# Patient Record
Sex: FEMALE | Race: WHITE | Employment: UNEMPLOYED | URBAN - METROPOLITAN AREA
[De-identification: names, ages, dates, MRNs, and addresses within clinical notes are randomized per-mention and may not be internally consistent; named-entity substitution may affect disease eponyms.]

---

## 2017-09-05 ENCOUNTER — APPOINTMENT (OUTPATIENT)
Dept: GENERAL RADIOLOGY | Age: 77
End: 2017-09-05
Payer: COMMERCIAL

## 2017-09-05 ENCOUNTER — HOSPITAL ENCOUNTER (EMERGENCY)
Age: 77
Discharge: HOME | End: 2017-09-05
Attending: EMERGENCY MEDICINE
Payer: COMMERCIAL

## 2017-09-05 ENCOUNTER — APPOINTMENT (OUTPATIENT)
Dept: CT IMAGING | Age: 77
End: 2017-09-05
Payer: COMMERCIAL

## 2017-09-05 VITALS
HEART RATE: 81 BPM | WEIGHT: 170 LBS | RESPIRATION RATE: 18 BRPM | TEMPERATURE: 98.6 F | BODY MASS INDEX: 31.09 KG/M2 | DIASTOLIC BLOOD PRESSURE: 94 MMHG | OXYGEN SATURATION: 98 % | SYSTOLIC BLOOD PRESSURE: 178 MMHG
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
## 2017-09-05 LAB
-: ABNORMAL
AMORPHOUS: ABNORMAL
BACTERIA: ABNORMAL
BILIRUBIN URINE: NEGATIVE
CASTS UA: ABNORMAL /LPF (ref 0–8)
COLOR: YELLOW
CRYSTALS, UA: ABNORMAL /HPF
EPITHELIAL CELLS UA: ABNORMAL /HPF (ref 0–5)
GLUCOSE URINE: NEGATIVE
KETONES, URINE: NEGATIVE
LEUKOCYTE ESTERASE, URINE: NEGATIVE
MUCUS: ABNORMAL
NITRITE, URINE: NEGATIVE
OTHER OBSERVATIONS UA: ABNORMAL
PH UA: 5.5 (ref 5–8)
PROTEIN UA: ABNORMAL
RBC UA: ABNORMAL /HPF (ref 0–4)
RENAL EPITHELIAL, UA: ABNORMAL /HPF
SPECIFIC GRAVITY UA: 1.01 (ref 1–1.03)
TRICHOMONAS: ABNORMAL
TURBIDITY: CLEAR
URINE HGB: NEGATIVE
UROBILINOGEN, URINE: NORMAL
WBC UA: ABNORMAL /HPF (ref 0–5)
YEAST: ABNORMAL

## 2017-09-05 PROCEDURE — 6370000000 HC RX 637 (ALT 250 FOR IP): Performed by: EMERGENCY MEDICINE

## 2017-09-05 PROCEDURE — 71010 XR CHEST PORTABLE: CPT

## 2017-09-05 PROCEDURE — 72131 CT LUMBAR SPINE W/O DYE: CPT

## 2017-09-05 PROCEDURE — 72128 CT CHEST SPINE W/O DYE: CPT

## 2017-09-05 PROCEDURE — 72125 CT NECK SPINE W/O DYE: CPT

## 2017-09-05 PROCEDURE — 73552 X-RAY EXAM OF FEMUR 2/>: CPT

## 2017-09-05 PROCEDURE — 99284 EMERGENCY DEPT VISIT MOD MDM: CPT

## 2017-09-05 PROCEDURE — 81001 URINALYSIS AUTO W/SCOPE: CPT

## 2017-09-05 PROCEDURE — 70450 CT HEAD/BRAIN W/O DYE: CPT

## 2017-09-05 RX ORDER — ACETAMINOPHEN 160 MG/5ML
650 SOLUTION ORAL ONCE
Status: COMPLETED | OUTPATIENT
Start: 2017-09-05 | End: 2017-09-05

## 2017-09-05 RX ORDER — ACETAMINOPHEN 325 MG/1
650 TABLET ORAL ONCE
Status: DISCONTINUED | OUTPATIENT
Start: 2017-09-05 | End: 2017-09-05 | Stop reason: HOSPADM

## 2017-09-05 RX ADMIN — ACETAMINOPHEN 650 MG: 650 SOLUTION ORAL at 13:13

## 2017-09-05 ASSESSMENT — ENCOUNTER SYMPTOMS
EYE REDNESS: 0
ABDOMINAL PAIN: 0
SHORTNESS OF BREATH: 0
COLOR CHANGE: 0
WHEEZING: 0
NAUSEA: 0
VOMITING: 0
EYE PAIN: 0
SORE THROAT: 0

## 2017-09-05 ASSESSMENT — PAIN SCALES - GENERAL
PAINLEVEL_OUTOF10: 3
PAINLEVEL_OUTOF10: 8
PAINLEVEL_OUTOF10: 6
PAINLEVEL_OUTOF10: 2

## 2019-07-14 ENCOUNTER — APPOINTMENT (OUTPATIENT)
Dept: CT IMAGING | Age: 79
DRG: 871 | End: 2019-07-14
Payer: COMMERCIAL

## 2019-07-14 ENCOUNTER — HOSPITAL ENCOUNTER (INPATIENT)
Age: 79
LOS: 2 days | Discharge: HOME | DRG: 871 | End: 2019-07-16
Attending: EMERGENCY MEDICINE | Admitting: INTERNAL MEDICINE
Payer: COMMERCIAL

## 2019-07-14 ENCOUNTER — APPOINTMENT (OUTPATIENT)
Dept: GENERAL RADIOLOGY | Age: 79
DRG: 871 | End: 2019-07-14
Payer: COMMERCIAL

## 2019-07-14 DIAGNOSIS — J18.9 PNEUMONIA DUE TO ORGANISM: Primary | ICD-10-CM

## 2019-07-14 LAB
-: ABNORMAL
ABSOLUTE EOS #: 0.06 K/UL (ref 0–0.44)
ABSOLUTE IMMATURE GRANULOCYTE: 0.08 K/UL (ref 0–0.3)
ABSOLUTE LYMPH #: 0.91 K/UL (ref 1.1–3.7)
ABSOLUTE MONO #: 0.66 K/UL (ref 0.1–1.2)
ALBUMIN SERPL-MCNC: 4 G/DL (ref 3.5–5.2)
ALBUMIN/GLOBULIN RATIO: 1.1 (ref 1–2.5)
ALP BLD-CCNC: 67 U/L (ref 35–104)
ALT SERPL-CCNC: 9 U/L (ref 5–33)
AMORPHOUS: ABNORMAL
ANION GAP SERPL CALCULATED.3IONS-SCNC: 14 MMOL/L (ref 9–17)
AST SERPL-CCNC: 14 U/L
BACTERIA: ABNORMAL
BASOPHILS # BLD: 0 % (ref 0–2)
BASOPHILS ABSOLUTE: 0.05 K/UL (ref 0–0.2)
BILIRUB SERPL-MCNC: 0.52 MG/DL (ref 0.3–1.2)
BILIRUBIN URINE: NEGATIVE
BNP INTERPRETATION: ABNORMAL
BUN BLDV-MCNC: 25 MG/DL (ref 8–23)
BUN/CREAT BLD: ABNORMAL (ref 9–20)
CALCIUM SERPL-MCNC: 9.8 MG/DL (ref 8.6–10.4)
CASTS UA: ABNORMAL /LPF (ref 0–8)
CHLORIDE BLD-SCNC: 101 MMOL/L (ref 98–107)
CO2: 23 MMOL/L (ref 20–31)
COLOR: YELLOW
CREAT SERPL-MCNC: 1.68 MG/DL (ref 0.5–0.9)
CREATININE URINE: 77.7 MG/DL (ref 28–217)
CRYSTALS, UA: ABNORMAL /HPF
DIFFERENTIAL TYPE: ABNORMAL
DIRECT EXAM: NORMAL
EOSINOPHILS RELATIVE PERCENT: 0 % (ref 1–4)
EPITHELIAL CELLS UA: ABNORMAL /HPF (ref 0–5)
GFR AFRICAN AMERICAN: 36 ML/MIN
GFR NON-AFRICAN AMERICAN: 29 ML/MIN
GFR SERPL CREATININE-BSD FRML MDRD: ABNORMAL ML/MIN/{1.73_M2}
GFR SERPL CREATININE-BSD FRML MDRD: ABNORMAL ML/MIN/{1.73_M2}
GLUCOSE BLD-MCNC: 163 MG/DL (ref 70–99)
GLUCOSE BLD-MCNC: 97 MG/DL (ref 65–105)
GLUCOSE URINE: NEGATIVE
HCT VFR BLD CALC: 41.4 % (ref 36.3–47.1)
HEMOGLOBIN: 13 G/DL (ref 11.9–15.1)
IMMATURE GRANULOCYTES: 0 %
INR BLD: 0.9
KETONES, URINE: NEGATIVE
LACTIC ACID, WHOLE BLOOD: 1.7 MMOL/L (ref 0.7–2.1)
LACTIC ACID, WHOLE BLOOD: 2.9 MMOL/L (ref 0.7–2.1)
LEUKOCYTE ESTERASE, URINE: NEGATIVE
LYMPHOCYTES # BLD: 5 % (ref 24–43)
Lab: NORMAL
MCH RBC QN AUTO: 29.8 PG (ref 25.2–33.5)
MCHC RBC AUTO-ENTMCNC: 31.4 G/DL (ref 28.4–34.8)
MCV RBC AUTO: 95 FL (ref 82.6–102.9)
MONOCYTES # BLD: 3 % (ref 3–12)
MUCUS: ABNORMAL
NITRITE, URINE: NEGATIVE
NRBC AUTOMATED: 0 PER 100 WBC
OTHER OBSERVATIONS UA: ABNORMAL
PARTIAL THROMBOPLASTIN TIME: 23.9 SEC (ref 20.5–30.5)
PDW BLD-RTO: 15.6 % (ref 11.8–14.4)
PH UA: 6.5 (ref 5–8)
PLATELET # BLD: 293 K/UL (ref 138–453)
PLATELET ESTIMATE: ABNORMAL
PMV BLD AUTO: 10 FL (ref 8.1–13.5)
POTASSIUM SERPL-SCNC: 4.5 MMOL/L (ref 3.7–5.3)
PRO-BNP: 1393 PG/ML
PROTEIN UA: ABNORMAL
PROTHROMBIN TIME: 10 SEC (ref 9–12)
RBC # BLD: 4.36 M/UL (ref 3.95–5.11)
RBC # BLD: ABNORMAL 10*6/UL
RBC UA: ABNORMAL /HPF (ref 0–4)
RENAL EPITHELIAL, UA: ABNORMAL /HPF
SEG NEUTROPHILS: 92 % (ref 36–65)
SEGMENTED NEUTROPHILS ABSOLUTE COUNT: 18.32 K/UL (ref 1.5–8.1)
SODIUM BLD-SCNC: 138 MMOL/L (ref 135–144)
SODIUM,UR: 117 MMOL/L
SPECIFIC GRAVITY UA: 1.04 (ref 1–1.03)
SPECIMEN DESCRIPTION: NORMAL
TOTAL PROTEIN, URINE: 30 MG/DL
TOTAL PROTEIN: 7.7 G/DL (ref 6.4–8.3)
TRICHOMONAS: ABNORMAL
TROPONIN INTERP: NORMAL
TROPONIN INTERP: NORMAL
TROPONIN T: NORMAL NG/ML
TROPONIN T: NORMAL NG/ML
TROPONIN, HIGH SENSITIVITY: 10 NG/L (ref 0–14)
TROPONIN, HIGH SENSITIVITY: 13 NG/L (ref 0–14)
TURBIDITY: CLEAR
URINE HGB: NEGATIVE
UROBILINOGEN, URINE: NORMAL
WBC # BLD: 20.1 K/UL (ref 3.5–11.3)
WBC # BLD: ABNORMAL 10*3/UL
WBC UA: ABNORMAL /HPF (ref 0–5)
YEAST: ABNORMAL

## 2019-07-14 PROCEDURE — 82947 ASSAY GLUCOSE BLOOD QUANT: CPT

## 2019-07-14 PROCEDURE — 2580000003 HC RX 258: Performed by: STUDENT IN AN ORGANIZED HEALTH CARE EDUCATION/TRAINING PROGRAM

## 2019-07-14 PROCEDURE — 84300 ASSAY OF URINE SODIUM: CPT

## 2019-07-14 PROCEDURE — 6360000004 HC RX CONTRAST MEDICATION: Performed by: STUDENT IN AN ORGANIZED HEALTH CARE EDUCATION/TRAINING PROGRAM

## 2019-07-14 PROCEDURE — 84156 ASSAY OF PROTEIN URINE: CPT

## 2019-07-14 PROCEDURE — 2060000000 HC ICU INTERMEDIATE R&B

## 2019-07-14 PROCEDURE — 6360000002 HC RX W HCPCS: Performed by: STUDENT IN AN ORGANIZED HEALTH CARE EDUCATION/TRAINING PROGRAM

## 2019-07-14 PROCEDURE — 6370000000 HC RX 637 (ALT 250 FOR IP): Performed by: STUDENT IN AN ORGANIZED HEALTH CARE EDUCATION/TRAINING PROGRAM

## 2019-07-14 PROCEDURE — 87086 URINE CULTURE/COLONY COUNT: CPT

## 2019-07-14 PROCEDURE — 81001 URINALYSIS AUTO W/SCOPE: CPT

## 2019-07-14 PROCEDURE — 86738 MYCOPLASMA ANTIBODY: CPT

## 2019-07-14 PROCEDURE — 71260 CT THORAX DX C+: CPT

## 2019-07-14 PROCEDURE — 82570 ASSAY OF URINE CREATININE: CPT

## 2019-07-14 PROCEDURE — 80053 COMPREHEN METABOLIC PANEL: CPT

## 2019-07-14 PROCEDURE — 96365 THER/PROPH/DIAG IV INF INIT: CPT

## 2019-07-14 PROCEDURE — 83036 HEMOGLOBIN GLYCOSYLATED A1C: CPT

## 2019-07-14 PROCEDURE — 85730 THROMBOPLASTIN TIME PARTIAL: CPT

## 2019-07-14 PROCEDURE — 93005 ELECTROCARDIOGRAM TRACING: CPT | Performed by: STUDENT IN AN ORGANIZED HEALTH CARE EDUCATION/TRAINING PROGRAM

## 2019-07-14 PROCEDURE — 87040 BLOOD CULTURE FOR BACTERIA: CPT

## 2019-07-14 PROCEDURE — 85025 COMPLETE CBC W/AUTO DIFF WBC: CPT

## 2019-07-14 PROCEDURE — 99291 CRITICAL CARE FIRST HOUR: CPT

## 2019-07-14 PROCEDURE — 84484 ASSAY OF TROPONIN QUANT: CPT

## 2019-07-14 PROCEDURE — 87449 NOS EACH ORGANISM AG IA: CPT

## 2019-07-14 PROCEDURE — 93005 ELECTROCARDIOGRAM TRACING: CPT

## 2019-07-14 PROCEDURE — 83605 ASSAY OF LACTIC ACID: CPT

## 2019-07-14 PROCEDURE — 83880 ASSAY OF NATRIURETIC PEPTIDE: CPT

## 2019-07-14 PROCEDURE — 36415 COLL VENOUS BLD VENIPUNCTURE: CPT

## 2019-07-14 PROCEDURE — 85610 PROTHROMBIN TIME: CPT

## 2019-07-14 PROCEDURE — 71046 X-RAY EXAM CHEST 2 VIEWS: CPT

## 2019-07-14 PROCEDURE — 96375 TX/PRO/DX INJ NEW DRUG ADDON: CPT

## 2019-07-14 RX ORDER — LISINOPRIL 5 MG/1
5 TABLET ORAL DAILY
Status: DISCONTINUED | OUTPATIENT
Start: 2019-07-14 | End: 2019-07-14

## 2019-07-14 RX ORDER — ACYCLOVIR 400 MG/1
400 TABLET ORAL
Status: ON HOLD | COMMUNITY
End: 2019-07-14 | Stop reason: ALTCHOICE

## 2019-07-14 RX ORDER — SODIUM CHLORIDE 0.9 % (FLUSH) 0.9 %
10 SYRINGE (ML) INJECTION PRN
Status: DISCONTINUED | OUTPATIENT
Start: 2019-07-14 | End: 2019-07-16 | Stop reason: HOSPADM

## 2019-07-14 RX ORDER — CLOPIDOGREL BISULFATE 75 MG/1
75 TABLET ORAL DAILY
COMMUNITY

## 2019-07-14 RX ORDER — ALPRAZOLAM 0.5 MG/1
0.25 TABLET ORAL 2 TIMES DAILY PRN
Status: ON HOLD | COMMUNITY
End: 2022-10-05 | Stop reason: SDUPTHER

## 2019-07-14 RX ORDER — AMLODIPINE BESYLATE 5 MG/1
5 TABLET ORAL DAILY
Status: DISCONTINUED | OUTPATIENT
Start: 2019-07-14 | End: 2019-07-14

## 2019-07-14 RX ORDER — OMEPRAZOLE 20 MG/1
20 CAPSULE, DELAYED RELEASE ORAL DAILY
Status: DISCONTINUED | OUTPATIENT
Start: 2019-07-14 | End: 2019-07-16 | Stop reason: HOSPADM

## 2019-07-14 RX ORDER — CARVEDILOL 25 MG/1
3.12 TABLET ORAL 2 TIMES DAILY
Status: ON HOLD | COMMUNITY
End: 2022-10-05 | Stop reason: HOSPADM

## 2019-07-14 RX ORDER — ALPRAZOLAM 0.5 MG/1
0.5 TABLET ORAL NIGHTLY PRN
Status: DISCONTINUED | OUTPATIENT
Start: 2019-07-14 | End: 2019-07-16 | Stop reason: HOSPADM

## 2019-07-14 RX ORDER — LEVOFLOXACIN 5 MG/ML
500 INJECTION, SOLUTION INTRAVENOUS EVERY 24 HOURS
Status: DISCONTINUED | OUTPATIENT
Start: 2019-07-14 | End: 2019-07-15

## 2019-07-14 RX ORDER — LABETALOL 20 MG/4 ML (5 MG/ML) INTRAVENOUS SYRINGE
10 EVERY 4 HOURS PRN
Status: DISCONTINUED | OUTPATIENT
Start: 2019-07-14 | End: 2019-07-16 | Stop reason: HOSPADM

## 2019-07-14 RX ORDER — ONDANSETRON 2 MG/ML
4 INJECTION INTRAMUSCULAR; INTRAVENOUS EVERY 6 HOURS PRN
Status: DISCONTINUED | OUTPATIENT
Start: 2019-07-14 | End: 2019-07-16 | Stop reason: HOSPADM

## 2019-07-14 RX ORDER — 0.9 % SODIUM CHLORIDE 0.9 %
1000 INTRAVENOUS SOLUTION INTRAVENOUS ONCE
Status: COMPLETED | OUTPATIENT
Start: 2019-07-14 | End: 2019-07-14

## 2019-07-14 RX ORDER — ALBUTEROL SULFATE 90 UG/1
2 AEROSOL, METERED RESPIRATORY (INHALATION) EVERY 6 HOURS PRN
Status: DISCONTINUED | OUTPATIENT
Start: 2019-07-14 | End: 2019-07-16 | Stop reason: HOSPADM

## 2019-07-14 RX ORDER — NICOTINE POLACRILEX 4 MG
15 LOZENGE BUCCAL PRN
Status: DISCONTINUED | OUTPATIENT
Start: 2019-07-14 | End: 2019-07-16 | Stop reason: HOSPADM

## 2019-07-14 RX ORDER — GABAPENTIN 300 MG/1
300 CAPSULE ORAL 3 TIMES DAILY
Status: DISCONTINUED | OUTPATIENT
Start: 2019-07-14 | End: 2019-07-16 | Stop reason: HOSPADM

## 2019-07-14 RX ORDER — ALPRAZOLAM 0.25 MG/1
0.25 TABLET ORAL NIGHTLY PRN
Status: DISCONTINUED | OUTPATIENT
Start: 2019-07-14 | End: 2019-07-14 | Stop reason: SDUPTHER

## 2019-07-14 RX ORDER — ACETAMINOPHEN 325 MG/1
650 TABLET ORAL EVERY 4 HOURS PRN
Status: DISCONTINUED | OUTPATIENT
Start: 2019-07-14 | End: 2019-07-16 | Stop reason: HOSPADM

## 2019-07-14 RX ORDER — METOPROLOL TARTRATE 50 MG/1
100 TABLET, FILM COATED ORAL 2 TIMES DAILY
Status: DISCONTINUED | OUTPATIENT
Start: 2019-07-14 | End: 2019-07-14

## 2019-07-14 RX ORDER — FLUOXETINE HYDROCHLORIDE 20 MG/1
40 CAPSULE ORAL DAILY
Status: DISCONTINUED | OUTPATIENT
Start: 2019-07-14 | End: 2019-07-16 | Stop reason: HOSPADM

## 2019-07-14 RX ORDER — GABAPENTIN 300 MG/1
300 CAPSULE ORAL DAILY
COMMUNITY

## 2019-07-14 RX ORDER — LEVOFLOXACIN 750 MG/1
750 TABLET ORAL DAILY
Status: DISCONTINUED | OUTPATIENT
Start: 2019-07-15 | End: 2019-07-14

## 2019-07-14 RX ORDER — HEPARIN SODIUM 5000 [USP'U]/ML
5000 INJECTION, SOLUTION INTRAVENOUS; SUBCUTANEOUS EVERY 8 HOURS SCHEDULED
Status: DISCONTINUED | OUTPATIENT
Start: 2019-07-14 | End: 2019-07-16 | Stop reason: HOSPADM

## 2019-07-14 RX ORDER — DEXTROSE MONOHYDRATE 50 MG/ML
100 INJECTION, SOLUTION INTRAVENOUS PRN
Status: DISCONTINUED | OUTPATIENT
Start: 2019-07-14 | End: 2019-07-16 | Stop reason: HOSPADM

## 2019-07-14 RX ORDER — CYCLOBENZAPRINE HCL 10 MG
10 TABLET ORAL 3 TIMES DAILY PRN
Status: DISCONTINUED | OUTPATIENT
Start: 2019-07-14 | End: 2019-07-16 | Stop reason: HOSPADM

## 2019-07-14 RX ORDER — SODIUM CHLORIDE 0.9 % (FLUSH) 0.9 %
10 SYRINGE (ML) INJECTION EVERY 12 HOURS SCHEDULED
Status: DISCONTINUED | OUTPATIENT
Start: 2019-07-14 | End: 2019-07-16 | Stop reason: HOSPADM

## 2019-07-14 RX ORDER — DEXTROSE MONOHYDRATE 25 G/50ML
12.5 INJECTION, SOLUTION INTRAVENOUS PRN
Status: DISCONTINUED | OUTPATIENT
Start: 2019-07-14 | End: 2019-07-16 | Stop reason: HOSPADM

## 2019-07-14 RX ORDER — SODIUM CHLORIDE 9 MG/ML
INJECTION, SOLUTION INTRAVENOUS CONTINUOUS
Status: DISCONTINUED | OUTPATIENT
Start: 2019-07-14 | End: 2019-07-16 | Stop reason: HOSPADM

## 2019-07-14 RX ORDER — HYDROCODONE BITARTRATE AND ACETAMINOPHEN 5; 325 MG/1; MG/1
1 TABLET ORAL EVERY 6 HOURS PRN
Status: ON HOLD | COMMUNITY
End: 2019-10-23 | Stop reason: HOSPADM

## 2019-07-14 RX ORDER — ATORVASTATIN CALCIUM 40 MG/1
40 TABLET, FILM COATED ORAL DAILY
Status: DISCONTINUED | OUTPATIENT
Start: 2019-07-14 | End: 2019-07-15

## 2019-07-14 RX ORDER — AMLODIPINE BESYLATE 10 MG/1
10 TABLET ORAL DAILY
Status: DISCONTINUED | OUTPATIENT
Start: 2019-07-14 | End: 2019-07-15

## 2019-07-14 RX ORDER — HYDRALAZINE HYDROCHLORIDE 50 MG/1
50 TABLET, FILM COATED ORAL 2 TIMES DAILY
Status: ON HOLD | COMMUNITY
End: 2019-07-16 | Stop reason: HOSPADM

## 2019-07-14 RX ORDER — DONEPEZIL HYDROCHLORIDE 10 MG/1
10 TABLET, FILM COATED ORAL NIGHTLY
Status: DISCONTINUED | OUTPATIENT
Start: 2019-07-14 | End: 2019-07-16 | Stop reason: HOSPADM

## 2019-07-14 RX ORDER — CARVEDILOL 25 MG/1
25 TABLET ORAL 2 TIMES DAILY
Status: DISCONTINUED | OUTPATIENT
Start: 2019-07-14 | End: 2019-07-16 | Stop reason: HOSPADM

## 2019-07-14 RX ADMIN — HEPARIN SODIUM 5000 UNITS: 5000 INJECTION INTRAVENOUS; SUBCUTANEOUS at 19:01

## 2019-07-14 RX ADMIN — AZITHROMYCIN MONOHYDRATE 500 MG: 500 INJECTION, POWDER, LYOPHILIZED, FOR SOLUTION INTRAVENOUS at 09:21

## 2019-07-14 RX ADMIN — HEPARIN SODIUM 5000 UNITS: 5000 INJECTION INTRAVENOUS; SUBCUTANEOUS at 20:51

## 2019-07-14 RX ADMIN — SODIUM CHLORIDE: 9 INJECTION, SOLUTION INTRAVENOUS at 14:26

## 2019-07-14 RX ADMIN — CEFTRIAXONE SODIUM 1 G: 1 INJECTION, POWDER, FOR SOLUTION INTRAMUSCULAR; INTRAVENOUS at 08:50

## 2019-07-14 RX ADMIN — DONEPEZIL HYDROCHLORIDE 10 MG: 10 TABLET, FILM COATED ORAL at 20:46

## 2019-07-14 RX ADMIN — SODIUM CHLORIDE 1000 ML: 9 INJECTION, SOLUTION INTRAVENOUS at 07:51

## 2019-07-14 RX ADMIN — Medication 10 ML: at 20:47

## 2019-07-14 RX ADMIN — LEVOFLOXACIN 500 MG: 5 INJECTION, SOLUTION INTRAVENOUS at 14:26

## 2019-07-14 RX ADMIN — CARVEDILOL 25 MG: 25 TABLET, FILM COATED ORAL at 20:46

## 2019-07-14 RX ADMIN — IOHEXOL 75 ML: 350 INJECTION, SOLUTION INTRAVENOUS at 08:49

## 2019-07-14 RX ADMIN — GABAPENTIN 300 MG: 300 CAPSULE ORAL at 20:46

## 2019-07-14 RX ADMIN — CYCLOBENZAPRINE 10 MG: 10 TABLET, FILM COATED ORAL at 19:02

## 2019-07-14 ASSESSMENT — ENCOUNTER SYMPTOMS
DIARRHEA: 0
NAUSEA: 0
SHORTNESS OF BREATH: 1
TROUBLE SWALLOWING: 0
VOMITING: 1
VOICE CHANGE: 0
ABDOMINAL DISTENTION: 0
EYE PAIN: 0
PHOTOPHOBIA: 0
SORE THROAT: 0
BLOOD IN STOOL: 0
VOMITING: 0
ABDOMINAL PAIN: 0
COUGH: 1
NAUSEA: 1
CONSTIPATION: 0
ABDOMINAL PAIN: 1
WHEEZING: 0
BACK PAIN: 0
STRIDOR: 0

## 2019-07-14 ASSESSMENT — PAIN SCALES - GENERAL
PAINLEVEL_OUTOF10: 0
PAINLEVEL_OUTOF10: 0
PAINLEVEL_OUTOF10: 10
PAINLEVEL_OUTOF10: 0

## 2019-07-14 ASSESSMENT — PAIN DESCRIPTION - ORIENTATION: ORIENTATION: LEFT

## 2019-07-14 ASSESSMENT — PAIN DESCRIPTION - LOCATION: LOCATION: FLANK

## 2019-07-14 ASSESSMENT — PAIN DESCRIPTION - PAIN TYPE: TYPE: ACUTE PAIN

## 2019-07-14 NOTE — ED PROVIDER NOTES
file    Years of education: Not on file    Highest education level: Not on file   Occupational History    Not on file   Social Needs    Financial resource strain: Not on file    Food insecurity:     Worry: Not on file     Inability: Not on file    Transportation needs:     Medical: Not on file     Non-medical: Not on file   Tobacco Use    Smoking status: Former Smoker    Smokeless tobacco: Never Used   Substance and Sexual Activity    Alcohol use: No    Drug use: No    Sexual activity: Not on file   Lifestyle    Physical activity:     Days per week: Not on file     Minutes per session: Not on file    Stress: Not on file   Relationships    Social connections:     Talks on phone: Not on file     Gets together: Not on file     Attends Yazdanism service: Not on file     Active member of club or organization: Not on file     Attends meetings of clubs or organizations: Not on file     Relationship status: Not on file    Intimate partner violence:     Fear of current or ex partner: Not on file     Emotionally abused: Not on file     Physically abused: Not on file     Forced sexual activity: Not on file   Other Topics Concern    Not on file   Social History Narrative    Not on file       Patient was advised to stop smoking or to avoid tobacco use    History reviewed. No pertinent family history. Allergies:  Doxycycline hyclate; Norco [hydrocodone-acetaminophen]; Pcn [penicillins]; and Sulfa antibiotics    Home Medications:  Prior to Admission medications    Medication Sig Start Date End Date Taking? Authorizing Provider   carvedilol (COREG) 25 MG tablet Take 25 mg by mouth 2 times daily   Yes Historical Provider, MD   ALPRAZolam (XANAX) 0.5 MG tablet Take 0.5 mg by mouth nightly as needed for Sleep. Yes Historical Provider, MD   gabapentin (NEURONTIN) 300 MG capsule Take 300 mg by mouth 3 times daily.    Yes Historical Provider, MD   hydrALAZINE (APRESOLINE) 50 MG tablet Take 50 mg by mouth 2 times Intake and output    Neurovascular checks    Tobacco cessation education    Place intermittent pneumatic compression device    HYPOGLYCEMIA TREATMENT: blood glucose less than 50 mg/dL and patient  ALERT and TOLERATING PO    HYPOGLYCEMIA TREATMENT: blood glucose less than 70 mg/dL and patient ALERT and TOLERATING PO    HYPOGLYCEMIA TREATMENT: blood glucose less than 70 mg/dL and patient NOT ALERT or NPO    Full Code    Inpatient consult to Internal Medicine    Inpatient consult to Social Work    OT eval and treat    PT evaluation and treat    Initiate Oxygen Therapy Protocol    POCT glucose    POCT Glucose    EKG 12 Lead    PATIENT STATUS (FROM ED OR OR/PROCEDURAL) Inpatient       MEDICATIONS ORDERED:  Orders Placed This Encounter   Medications    0.9 % sodium chloride bolus    cefTRIAXone (ROCEPHIN) 1 g IVPB in 50 mL D5W minibag    azithromycin (ZITHROMAX) 500 mg in dextrose 5 % 250 mL IVPB    iohexol (OMNIPAQUE 350) solution 75 mL    sodium chloride flush 0.9 % injection 10 mL    sodium chloride flush 0.9 % injection 10 mL    acetaminophen (TYLENOL) tablet 650 mg    heparin (porcine) injection 5,000 Units    DISCONTD: ALPRAZolam (XANAX) tablet 0.25 mg    DISCONTD: amLODIPine (NORVASC) tablet 5 mg    atorvastatin (LIPITOR) tablet 40 mg    cyclobenzaprine (FLEXERIL) tablet 10 mg    omeprazole (PRILOSEC) delayed release capsule 20 mg    DISCONTD: metoprolol tartrate (LOPRESSOR) tablet 100 mg    DISCONTD: lisinopril (PRINIVIL;ZESTRIL) tablet 5 mg    DISCONTD: levofloxacin (LEVAQUIN) tablet 750 mg    sodium chloride flush 0.9 % injection 10 mL    sodium chloride flush 0.9 % injection 10 mL    magnesium hydroxide (MILK OF MAGNESIA) 400 MG/5ML suspension 30 mL    ondansetron (ZOFRAN) injection 4 mg    levofloxacin (LEVAQUIN) 500 MG/100ML infusion 500 mg    amLODIPine (NORVASC) tablet 10 mg    labetalol (NORMODYNE;TRANDATE) injection syringe 10 mg    insulin lispro (HUMALOG) images are provided for review. Dose modulation, iterative reconstruction, and/or weight based adjustment of the mA/kV was utilized to reduce the radiation dose to as low as reasonably achievable. COMPARISON: Chest radiograph from 7/14/2019 HISTORY: Hypoxia and dyspnea. Previous venous thromboembolism. Not currently anticoagulated. FINDINGS: Pulmonary Arteries: Respiratory motion and streak artifact somewhat limits evaluation of distal branches. No central or segmental pulmonary embolism. Artifact simulates filling defect in the right lower lobe pulmonary artery; distal branches opacify normally. Mediastinum: Enlarged heart. No pericardial effusion. Fluid-filled esophagus. Prominent AP window lymph node may be reactive to an infectious process. Largest node measures 1.8 x 1.2 cm. Lungs/pleura: Patchy airspace disease left upper lobe compatible with pneumonia. Patchy airspace disease left lower lobe also favoring pneumonia. Small left effusion. Minimal right effusion. No airspace consolidation right lung. Upper Abdomen: Fatty liver. No focal lesion. Soft Tissues/Bones: No acute bone or soft tissue abnormality. Multifocal pneumonia left lung predominantly in the left upper lobe but also in the left lung base. Small left parapneumonic effusion. No pulmonary embolism; streak artifact and respiratory motion in the right lower lung simulates filling defect. However, distal branches opacify normally right pulmonary embolism unlikely.       EKG  EKG Interpretation    Interpreted by me    Rhythm: Sinus tachycardia  Rate: Tachycardia  Axis: normal  Ectopy: none  Conduction: normal  ST Segments: no acute change  T Waves: no acute change  Q Waves: none    Clinical Impression: Sinus tachycardia    All EKG's are interpreted by the Emergency Department Physician who either signs or Co-signsthis chart in the absence of a cardiologist.    EMERGENCY DEPARTMENT COURSE:   Patient has GFR less than 30 and given this CT is

## 2019-07-14 NOTE — ED NOTES
Bed: 10  Expected date:   Expected time:   Means of arrival:   Comments:  LS2-diff breathing      Freddy Shelton RN  07/14/19 3204

## 2019-07-14 NOTE — ED PROVIDER NOTES
[facility] ED     Emergency Department     Faculty Attestation        I performed a history and physical examination of the patient and discussed management with the resident. I reviewed the residents note and agree with the documented findings and plan of care. Any areas of disagreement are noted on the chart. I was personally present for the key portions of any procedures. I have documented in the chart those procedures where I was not present during the key portions. I have reviewed the emergency nurses triage note. I agree with the chief complaint, past medical history, past surgical history, allergies, medications, social and family history as documented unless otherwise noted below. For Physician Assistant/ Nurse Practitioner cases/documentation I have personally evaluated this patient and have completed at least one if not all key elements of the E/M (history, physical exam, and MDM). Additional findings are as noted. Vital Signs: BP: (!) 242/89  Pulse: 102  Resp: 24  Temp: 100.2 °F (37.9 °C) SpO2: 93 %  PCP:  Juan Cheatham MD    Pertinent Comments:     Patient is a 69-year-old female with history of COPD and no actual history of CHF but does have some chronic kidney disease as well as hypertension and diabetes who presents initially with nausea vomiting and some respiratory distress. Patient is supposed to be anticoagulated however several months ago was taken off by her doctor. She was on this for DVT and PE in the past.   She is been having some subjective fevers and chills and arrived hypoxic initially but correctable and oxygen. Initial blood pressure was severely accelerated however only reading of that and has been 140 to 464 mmHg systolic since. Patient does admit to shortness of breath as well as some chest pain. No abdominal pain and denies any lower extremity swelling. There is been no orthopnea either.     Patient has

## 2019-07-15 LAB
ABSOLUTE EOS #: 0.11 K/UL (ref 0–0.44)
ABSOLUTE IMMATURE GRANULOCYTE: 0.03 K/UL (ref 0–0.3)
ABSOLUTE LYMPH #: 1.59 K/UL (ref 1.1–3.7)
ABSOLUTE MONO #: 0.53 K/UL (ref 0.1–1.2)
ANION GAP SERPL CALCULATED.3IONS-SCNC: 14 MMOL/L (ref 9–17)
BASOPHILS # BLD: 0 % (ref 0–2)
BASOPHILS ABSOLUTE: 0.03 K/UL (ref 0–0.2)
BUN BLDV-MCNC: 21 MG/DL (ref 8–23)
BUN/CREAT BLD: ABNORMAL (ref 9–20)
CALCIUM SERPL-MCNC: 9.1 MG/DL (ref 8.6–10.4)
CHLORIDE BLD-SCNC: 104 MMOL/L (ref 98–107)
CO2: 20 MMOL/L (ref 20–31)
CREAT SERPL-MCNC: 1.44 MG/DL (ref 0.5–0.9)
CULTURE: NORMAL
DIFFERENTIAL TYPE: ABNORMAL
EKG ATRIAL RATE: 101 BPM
EKG P AXIS: 37 DEGREES
EKG P-R INTERVAL: 160 MS
EKG Q-T INTERVAL: 346 MS
EKG QRS DURATION: 78 MS
EKG QTC CALCULATION (BAZETT): 448 MS
EKG R AXIS: 9 DEGREES
EKG T AXIS: 54 DEGREES
EKG VENTRICULAR RATE: 101 BPM
EOSINOPHILS RELATIVE PERCENT: 1 % (ref 1–4)
ESTIMATED AVERAGE GLUCOSE: 146 MG/DL
ESTIMATED AVERAGE GLUCOSE: 146 MG/DL
GFR AFRICAN AMERICAN: 43 ML/MIN
GFR NON-AFRICAN AMERICAN: 35 ML/MIN
GFR SERPL CREATININE-BSD FRML MDRD: ABNORMAL ML/MIN/{1.73_M2}
GFR SERPL CREATININE-BSD FRML MDRD: ABNORMAL ML/MIN/{1.73_M2}
GLUCOSE BLD-MCNC: 107 MG/DL (ref 65–105)
GLUCOSE BLD-MCNC: 109 MG/DL (ref 70–99)
GLUCOSE BLD-MCNC: 115 MG/DL (ref 65–105)
GLUCOSE BLD-MCNC: 116 MG/DL (ref 65–105)
GLUCOSE BLD-MCNC: 124 MG/DL (ref 65–105)
GLUCOSE BLD-MCNC: 97 MG/DL (ref 65–105)
HBA1C MFR BLD: 6.7 % (ref 4–6)
HBA1C MFR BLD: 6.7 % (ref 4–6)
HCT VFR BLD CALC: 35.9 % (ref 36.3–47.1)
HEMOGLOBIN: 10.9 G/DL (ref 11.9–15.1)
IMMATURE GRANULOCYTES: 0 %
LACTIC ACID, WHOLE BLOOD: 0.6 MMOL/L (ref 0.7–2.1)
LACTIC ACID, WHOLE BLOOD: 0.7 MMOL/L (ref 0.7–2.1)
LYMPHOCYTES # BLD: 16 % (ref 24–43)
Lab: NORMAL
MCH RBC QN AUTO: 29.3 PG (ref 25.2–33.5)
MCHC RBC AUTO-ENTMCNC: 30.4 G/DL (ref 28.4–34.8)
MCV RBC AUTO: 96.5 FL (ref 82.6–102.9)
MONOCYTES # BLD: 5 % (ref 3–12)
MYCOPLASMA PNEUMONIAE IGM: 0.08
NRBC AUTOMATED: 0 PER 100 WBC
PDW BLD-RTO: 15.8 % (ref 11.8–14.4)
PLATELET # BLD: 235 K/UL (ref 138–453)
PLATELET ESTIMATE: ABNORMAL
PMV BLD AUTO: 9.7 FL (ref 8.1–13.5)
POTASSIUM SERPL-SCNC: 4.1 MMOL/L (ref 3.7–5.3)
RBC # BLD: 3.72 M/UL (ref 3.95–5.11)
RBC # BLD: ABNORMAL 10*6/UL
SEG NEUTROPHILS: 77 % (ref 36–65)
SEGMENTED NEUTROPHILS ABSOLUTE COUNT: 7.45 K/UL (ref 1.5–8.1)
SODIUM BLD-SCNC: 138 MMOL/L (ref 135–144)
SPECIMEN DESCRIPTION: NORMAL
WBC # BLD: 9.7 K/UL (ref 3.5–11.3)
WBC # BLD: ABNORMAL 10*3/UL

## 2019-07-15 PROCEDURE — 2060000000 HC ICU INTERMEDIATE R&B

## 2019-07-15 PROCEDURE — 2700000000 HC OXYGEN THERAPY PER DAY

## 2019-07-15 PROCEDURE — 97530 THERAPEUTIC ACTIVITIES: CPT

## 2019-07-15 PROCEDURE — 83605 ASSAY OF LACTIC ACID: CPT

## 2019-07-15 PROCEDURE — 94664 DEMO&/EVAL PT USE INHALER: CPT

## 2019-07-15 PROCEDURE — 6370000000 HC RX 637 (ALT 250 FOR IP): Performed by: STUDENT IN AN ORGANIZED HEALTH CARE EDUCATION/TRAINING PROGRAM

## 2019-07-15 PROCEDURE — 2500000003 HC RX 250 WO HCPCS: Performed by: STUDENT IN AN ORGANIZED HEALTH CARE EDUCATION/TRAINING PROGRAM

## 2019-07-15 PROCEDURE — 2580000003 HC RX 258: Performed by: STUDENT IN AN ORGANIZED HEALTH CARE EDUCATION/TRAINING PROGRAM

## 2019-07-15 PROCEDURE — 97535 SELF CARE MNGMENT TRAINING: CPT

## 2019-07-15 PROCEDURE — 93010 ELECTROCARDIOGRAM REPORT: CPT | Performed by: INTERNAL MEDICINE

## 2019-07-15 PROCEDURE — 99223 1ST HOSP IP/OBS HIGH 75: CPT | Performed by: INTERNAL MEDICINE

## 2019-07-15 PROCEDURE — 6360000002 HC RX W HCPCS: Performed by: STUDENT IN AN ORGANIZED HEALTH CARE EDUCATION/TRAINING PROGRAM

## 2019-07-15 PROCEDURE — 94760 N-INVAS EAR/PLS OXIMETRY 1: CPT

## 2019-07-15 PROCEDURE — 82947 ASSAY GLUCOSE BLOOD QUANT: CPT

## 2019-07-15 PROCEDURE — 36415 COLL VENOUS BLD VENIPUNCTURE: CPT

## 2019-07-15 PROCEDURE — 80048 BASIC METABOLIC PNL TOTAL CA: CPT

## 2019-07-15 PROCEDURE — 97166 OT EVAL MOD COMPLEX 45 MIN: CPT

## 2019-07-15 PROCEDURE — 85025 COMPLETE CBC W/AUTO DIFF WBC: CPT

## 2019-07-15 PROCEDURE — 97162 PT EVAL MOD COMPLEX 30 MIN: CPT

## 2019-07-15 PROCEDURE — 83036 HEMOGLOBIN GLYCOSYLATED A1C: CPT

## 2019-07-15 PROCEDURE — 94640 AIRWAY INHALATION TREATMENT: CPT

## 2019-07-15 RX ORDER — LEVOFLOXACIN 500 MG/1
500 TABLET, FILM COATED ORAL DAILY
Status: DISCONTINUED | OUTPATIENT
Start: 2019-07-16 | End: 2019-07-16

## 2019-07-15 RX ORDER — HYDRALAZINE HYDROCHLORIDE 50 MG/1
50 TABLET, FILM COATED ORAL EVERY 8 HOURS SCHEDULED
Status: DISCONTINUED | OUTPATIENT
Start: 2019-07-15 | End: 2019-07-15

## 2019-07-15 RX ORDER — HYDRALAZINE HYDROCHLORIDE 50 MG/1
50 TABLET, FILM COATED ORAL EVERY 12 HOURS SCHEDULED
Status: DISCONTINUED | OUTPATIENT
Start: 2019-07-15 | End: 2019-07-16

## 2019-07-15 RX ORDER — HYDRALAZINE HYDROCHLORIDE 50 MG/1
50 TABLET, FILM COATED ORAL 2 TIMES DAILY
Status: DISCONTINUED | OUTPATIENT
Start: 2019-07-15 | End: 2019-07-15

## 2019-07-15 RX ORDER — CLOPIDOGREL BISULFATE 75 MG/1
75 TABLET ORAL DAILY
Status: DISCONTINUED | OUTPATIENT
Start: 2019-07-15 | End: 2019-07-16 | Stop reason: HOSPADM

## 2019-07-15 RX ADMIN — GABAPENTIN 300 MG: 300 CAPSULE ORAL at 14:58

## 2019-07-15 RX ADMIN — CARVEDILOL 25 MG: 25 TABLET, FILM COATED ORAL at 09:39

## 2019-07-15 RX ADMIN — OMEPRAZOLE 20 MG: 20 CAPSULE, DELAYED RELEASE ORAL at 09:39

## 2019-07-15 RX ADMIN — ALBUTEROL SULFATE 2 PUFF: 90 AEROSOL, METERED RESPIRATORY (INHALATION) at 10:08

## 2019-07-15 RX ADMIN — CYCLOBENZAPRINE 10 MG: 10 TABLET, FILM COATED ORAL at 09:39

## 2019-07-15 RX ADMIN — HEPARIN SODIUM 5000 UNITS: 5000 INJECTION INTRAVENOUS; SUBCUTANEOUS at 07:37

## 2019-07-15 RX ADMIN — HEPARIN SODIUM 5000 UNITS: 5000 INJECTION INTRAVENOUS; SUBCUTANEOUS at 22:04

## 2019-07-15 RX ADMIN — CLOPIDOGREL 75 MG: 75 TABLET, FILM COATED ORAL at 11:38

## 2019-07-15 RX ADMIN — DONEPEZIL HYDROCHLORIDE 10 MG: 10 TABLET, FILM COATED ORAL at 20:15

## 2019-07-15 RX ADMIN — FLUOXETINE HYDROCHLORIDE 40 MG: 20 CAPSULE ORAL at 18:06

## 2019-07-15 RX ADMIN — HEPARIN SODIUM 5000 UNITS: 5000 INJECTION INTRAVENOUS; SUBCUTANEOUS at 14:59

## 2019-07-15 RX ADMIN — HYDRALAZINE HYDROCHLORIDE 50 MG: 50 TABLET, FILM COATED ORAL at 14:58

## 2019-07-15 RX ADMIN — CARVEDILOL 25 MG: 25 TABLET, FILM COATED ORAL at 20:15

## 2019-07-15 RX ADMIN — LEVOFLOXACIN 500 MG: 5 INJECTION, SOLUTION INTRAVENOUS at 11:38

## 2019-07-15 RX ADMIN — DESMOPRESSIN ACETATE 40 MG: 0.2 TABLET ORAL at 11:37

## 2019-07-15 RX ADMIN — Medication 10 MG: at 05:39

## 2019-07-15 RX ADMIN — ALPRAZOLAM 0.5 MG: 0.5 TABLET ORAL at 04:35

## 2019-07-15 RX ADMIN — GABAPENTIN 300 MG: 300 CAPSULE ORAL at 09:39

## 2019-07-15 RX ADMIN — HYDRALAZINE HYDROCHLORIDE 50 MG: 50 TABLET, FILM COATED ORAL at 15:00

## 2019-07-15 RX ADMIN — GABAPENTIN 300 MG: 300 CAPSULE ORAL at 22:04

## 2019-07-15 RX ADMIN — Medication 10 ML: at 10:11

## 2019-07-15 ASSESSMENT — PAIN SCALES - GENERAL
PAINLEVEL_OUTOF10: 0

## 2019-07-15 ASSESSMENT — PAIN DESCRIPTION - PAIN TYPE: TYPE: ACUTE PAIN

## 2019-07-15 ASSESSMENT — PAIN DESCRIPTION - LOCATION: LOCATION: BACK

## 2019-07-16 VITALS
OXYGEN SATURATION: 96 % | HEART RATE: 83 BPM | TEMPERATURE: 98.4 F | WEIGHT: 183.3 LBS | BODY MASS INDEX: 34.61 KG/M2 | DIASTOLIC BLOOD PRESSURE: 68 MMHG | SYSTOLIC BLOOD PRESSURE: 93 MMHG | HEIGHT: 61 IN | RESPIRATION RATE: 18 BRPM

## 2019-07-16 LAB
ABSOLUTE EOS #: 0.18 K/UL (ref 0–0.44)
ABSOLUTE IMMATURE GRANULOCYTE: 0.04 K/UL (ref 0–0.3)
ABSOLUTE LYMPH #: 2.17 K/UL (ref 1.1–3.7)
ABSOLUTE MONO #: 0.59 K/UL (ref 0.1–1.2)
ANION GAP SERPL CALCULATED.3IONS-SCNC: 11 MMOL/L (ref 9–17)
BASOPHILS # BLD: 0 % (ref 0–2)
BASOPHILS ABSOLUTE: 0.04 K/UL (ref 0–0.2)
BUN BLDV-MCNC: 23 MG/DL (ref 8–23)
BUN/CREAT BLD: ABNORMAL (ref 9–20)
CALCIUM SERPL-MCNC: 9.3 MG/DL (ref 8.6–10.4)
CHLORIDE BLD-SCNC: 103 MMOL/L (ref 98–107)
CO2: 25 MMOL/L (ref 20–31)
CREAT SERPL-MCNC: 1.68 MG/DL (ref 0.5–0.9)
DIFFERENTIAL TYPE: ABNORMAL
EOSINOPHILS RELATIVE PERCENT: 2 % (ref 1–4)
GFR AFRICAN AMERICAN: 36 ML/MIN
GFR NON-AFRICAN AMERICAN: 29 ML/MIN
GFR SERPL CREATININE-BSD FRML MDRD: ABNORMAL ML/MIN/{1.73_M2}
GFR SERPL CREATININE-BSD FRML MDRD: ABNORMAL ML/MIN/{1.73_M2}
GLUCOSE BLD-MCNC: 110 MG/DL (ref 65–105)
GLUCOSE BLD-MCNC: 115 MG/DL (ref 70–99)
GLUCOSE BLD-MCNC: 116 MG/DL (ref 65–105)
HCT VFR BLD CALC: 36 % (ref 36.3–47.1)
HEMOGLOBIN: 10.9 G/DL (ref 11.9–15.1)
IMMATURE GRANULOCYTES: 0 %
LYMPHOCYTES # BLD: 23 % (ref 24–43)
MCH RBC QN AUTO: 28.8 PG (ref 25.2–33.5)
MCHC RBC AUTO-ENTMCNC: 30.3 G/DL (ref 28.4–34.8)
MCV RBC AUTO: 95.2 FL (ref 82.6–102.9)
MONOCYTES # BLD: 6 % (ref 3–12)
NRBC AUTOMATED: 0 PER 100 WBC
PDW BLD-RTO: 15.8 % (ref 11.8–14.4)
PLATELET # BLD: 260 K/UL (ref 138–453)
PLATELET ESTIMATE: ABNORMAL
PMV BLD AUTO: 10.1 FL (ref 8.1–13.5)
POTASSIUM SERPL-SCNC: 4 MMOL/L (ref 3.7–5.3)
RBC # BLD: 3.78 M/UL (ref 3.95–5.11)
RBC # BLD: ABNORMAL 10*6/UL
SEG NEUTROPHILS: 69 % (ref 36–65)
SEGMENTED NEUTROPHILS ABSOLUTE COUNT: 6.57 K/UL (ref 1.5–8.1)
SODIUM BLD-SCNC: 139 MMOL/L (ref 135–144)
WBC # BLD: 9.6 K/UL (ref 3.5–11.3)
WBC # BLD: ABNORMAL 10*3/UL

## 2019-07-16 PROCEDURE — 6370000000 HC RX 637 (ALT 250 FOR IP): Performed by: STUDENT IN AN ORGANIZED HEALTH CARE EDUCATION/TRAINING PROGRAM

## 2019-07-16 PROCEDURE — 82947 ASSAY GLUCOSE BLOOD QUANT: CPT

## 2019-07-16 PROCEDURE — 94760 N-INVAS EAR/PLS OXIMETRY 1: CPT

## 2019-07-16 PROCEDURE — 85025 COMPLETE CBC W/AUTO DIFF WBC: CPT

## 2019-07-16 PROCEDURE — 80048 BASIC METABOLIC PNL TOTAL CA: CPT

## 2019-07-16 PROCEDURE — 6360000002 HC RX W HCPCS: Performed by: STUDENT IN AN ORGANIZED HEALTH CARE EDUCATION/TRAINING PROGRAM

## 2019-07-16 PROCEDURE — 99239 HOSP IP/OBS DSCHRG MGMT >30: CPT | Performed by: INTERNAL MEDICINE

## 2019-07-16 PROCEDURE — 36415 COLL VENOUS BLD VENIPUNCTURE: CPT

## 2019-07-16 RX ORDER — DONEPEZIL HYDROCHLORIDE 10 MG/1
10 TABLET, FILM COATED ORAL NIGHTLY
Qty: 30 TABLET | Refills: 3 | Status: SHIPPED | OUTPATIENT
Start: 2019-07-16

## 2019-07-16 RX ORDER — LEVOFLOXACIN 250 MG/1
250 TABLET ORAL DAILY
Status: DISCONTINUED | OUTPATIENT
Start: 2019-07-16 | End: 2019-07-16

## 2019-07-16 RX ORDER — LEVOFLOXACIN 250 MG/1
250 TABLET ORAL DAILY
Qty: 8 TABLET | Refills: 0 | Status: SHIPPED | OUTPATIENT
Start: 2019-07-17 | End: 2019-07-22

## 2019-07-16 RX ORDER — LEVOFLOXACIN 250 MG/1
250 TABLET ORAL DAILY
Status: DISCONTINUED | OUTPATIENT
Start: 2019-07-17 | End: 2019-07-16 | Stop reason: HOSPADM

## 2019-07-16 RX ORDER — HYDRALAZINE HYDROCHLORIDE 50 MG/1
50 TABLET, FILM COATED ORAL EVERY 8 HOURS SCHEDULED
Status: DISCONTINUED | OUTPATIENT
Start: 2019-07-16 | End: 2019-07-16 | Stop reason: HOSPADM

## 2019-07-16 RX ORDER — LEVOFLOXACIN 500 MG/1
500 TABLET, FILM COATED ORAL DAILY
Qty: 8 TABLET | Refills: 0 | Status: SHIPPED | OUTPATIENT
Start: 2019-07-17 | End: 2019-07-16 | Stop reason: HOSPADM

## 2019-07-16 RX ORDER — GLIPIZIDE 5 MG/1
2.5 TABLET ORAL DAILY
Qty: 60 TABLET | Refills: 3 | Status: SHIPPED | OUTPATIENT
Start: 2019-07-16

## 2019-07-16 RX ORDER — HYDRALAZINE HYDROCHLORIDE 50 MG/1
50 TABLET, FILM COATED ORAL EVERY 8 HOURS SCHEDULED
Qty: 90 TABLET | Refills: 3 | Status: ON HOLD | OUTPATIENT
Start: 2019-07-16 | End: 2019-08-22 | Stop reason: HOSPADM

## 2019-07-16 RX ORDER — FLUOXETINE HYDROCHLORIDE 20 MG/5ML
40 LIQUID ORAL DAILY
Qty: 150 ML | Refills: 3 | Status: ON HOLD | OUTPATIENT
Start: 2019-07-16 | End: 2022-09-23 | Stop reason: HOSPADM

## 2019-07-16 RX ORDER — ALBUTEROL SULFATE 90 UG/1
2 AEROSOL, METERED RESPIRATORY (INHALATION) EVERY 6 HOURS PRN
Qty: 1 INHALER | Refills: 0 | Status: SHIPPED | OUTPATIENT
Start: 2019-07-16

## 2019-07-16 RX ADMIN — CLOPIDOGREL 75 MG: 75 TABLET, FILM COATED ORAL at 10:11

## 2019-07-16 RX ADMIN — HEPARIN SODIUM 5000 UNITS: 5000 INJECTION INTRAVENOUS; SUBCUTANEOUS at 15:02

## 2019-07-16 RX ADMIN — LEVOFLOXACIN 500 MG: 500 TABLET, FILM COATED ORAL at 10:10

## 2019-07-16 RX ADMIN — GABAPENTIN 300 MG: 300 CAPSULE ORAL at 15:02

## 2019-07-16 RX ADMIN — GABAPENTIN 300 MG: 300 CAPSULE ORAL at 10:11

## 2019-07-16 RX ADMIN — HEPARIN SODIUM 5000 UNITS: 5000 INJECTION INTRAVENOUS; SUBCUTANEOUS at 05:33

## 2019-07-16 RX ADMIN — CARVEDILOL 25 MG: 25 TABLET, FILM COATED ORAL at 10:11

## 2019-07-16 RX ADMIN — OMEPRAZOLE 20 MG: 20 CAPSULE, DELAYED RELEASE ORAL at 10:10

## 2019-07-16 RX ADMIN — HYDRALAZINE HYDROCHLORIDE 50 MG: 50 TABLET, FILM COATED ORAL at 10:12

## 2019-07-16 NOTE — PROGRESS NOTES
Inhaler / Aerosol Education        [x] Served spacer    [] Provided and reviewed booklet   [x] Good return demonstration per patient   [] Aerosolized Medications:     Verbal education has been provided in the use, benefits and possible adverse reactions of aerosolized medications used in the treatment of this patient.     [] Other:
Physical Therapy  DATE: 2019    NAME: Manuel Solorzano  MRN: [id number]   : 1940    Patient not seen this date for Physical Therapy due to:  [] Blood transfusion in progress  [] Hemodialysis  [x]  Patient Declined  -  \"I'm going home today. \" RN informed. [] Spine Precautions   [] Strict Bedrest  [] Surgery/ Procedure  [] Testing      [] Other        [] PT being discontinued at this time. Patient independent. No further needs. [] PT being discontinued at this time as the patient has been transferred to palliative care. No further needs.     Destini Silence, PTA
Tolerated  Required Braces or Orthoses?: No  Position Activity Restriction  Other position/activity restrictions: Up w/assist    Subjective   General  Patient assessed for rehabilitation services?: Yes  Family / Caregiver Present: No  General Comment  Comments: RN ok'd patient for therapy services this date. Patient pleasant and cooperative throughout   Patient Currently in Pain: Denies  Pain Assessment  Pain Assessment: 0-10  Pain Level: 0  Pain Type: Acute pain  Pain Location: Back  Non-Pharmaceutical Pain Intervention(s): Ambulation/Increased Activity;Repositioned  Response to Pain Intervention: Patient Satisfied  Vital Signs  Pulse: 85  Resp: 20  Patient Currently in Pain: Denies  Oxygen Therapy  SpO2: 98 %  Pulse Oximeter Device Mode: Intermittent  O2 Device: Nasal cannula  O2 Flow Rate (L/min): 2 L/min  Social/Functional History  Social/Functional History  Lives With: Alone  Type of Home: House  Home Layout: One level  Home Access: Ramped entrance  Bathroom Shower/Tub: Walk-in shower, Shower chair with back  Bathroom Toilet: Handicap height  Bathroom Equipment: Grab bars in shower, Shower chair  Bathroom Accessibility: Accessible  Home Equipment: Rolling walker, Quad cane  Receives Help From: Family  ADL Assistance: Independent  Homemaking Assistance: Needs assistance  Homemaking Responsibilities: Yes  Meal Prep Responsibility: Secondary(neighbors and family assist with cooking majority of time)  Laundry Responsibility: Primary  Cleaning Responsibility: Primary  Ambulation Assistance: Independent  Transfer Assistance: Independent  Active : No  Patient's  Info: Family and neighbors   Mode of Transportation: Family  Occupation: Retired  Type of occupation: / law firm  Leisure & Hobbies: Patient enjoys traveling and shopping  IADL Comments: Patient completed own laundry ( located on main floor) and receives assistance from neighbors for most meal prep.  Patient completse own grocery
[facility]  7/16/2019 10:33 AM     Attestation and add on       I have discussed the care of Kwasi Gregg , including pertinent history and exam findings,    today with the resident. I have seen and examined the patient and the key elements of all parts of the encounter have been performed by me . I agree with the assessment, plan and orders as documented by the resident. Principal Problem:    Pneumonia  Active Problems:    COPD (chronic obstructive pulmonary disease) ([facility] [address]    Diabetes mellitus ([facility] [address]    Essential hypertension    CKD (chronic kidney disease)  Resolved Problems:    * No resolved hospital problems. *         Improved   Stable . Home today . See discharge summary . Medications: Allergies:     Allergies   Allergen Reactions    Doxycycline Hyclate     Norco [Hydrocodone-Acetaminophen] Other (See Comments)     Nausea, dizziness    Pcn [Penicillins]     Sulfa Antibiotics        Current Meds:   Scheduled Meds:    hydrALAZINE  50 mg Oral 3 times per day    [START ON 7/17/2019] levofloxacin  250 mg Oral Daily    clopidogrel  75 mg Oral Daily    sodium chloride flush  10 mL Intravenous 2 times per day    heparin (porcine)  5,000 Units Subcutaneous 3 times per day    omeprazole  20 mg Oral Daily    sodium chloride flush  10 mL Intravenous 2 times per day    insulin lispro  0-12 Units Subcutaneous TID WC    insulin lispro  0-6 Units Subcutaneous Nightly    donepezil  10 mg Oral Nightly    FLUoxetine  40 mg Oral Daily    carvedilol  25 mg Oral BID    gabapentin  300 mg Oral TID     Continuous Infusions:    dextrose      sodium chloride Stopped (07/16/19 0738)     PRN Meds: sodium chloride flush, acetaminophen, cyclobenzaprine, sodium chloride flush, magnesium hydroxide, ondansetron, labetalol, glucose, dextrose, glucagon (rDNA), dextrose, albuterol sulfate HFA, ALPRAZolam      ---- ;     [facility]
rhonchi or rales. There is no intercostal retraction or use of accessory muscles. No egophony noted. Cardiovascular: Regular without murmur, clicks, gallops or rubs. Abdomen: Slightly rounded and soft without organomegaly. No rebound, rigidity or guarding was appreciated. Lymphatic: No lymphadenopathy. Musculoskeletal: Normal curvature of the spine. No gross muscle weakness. Extremities:  No lower extremity edema, ulcerations, tenderness, varicosities or erythema. Muscle size, tone and strength are normal.  No involuntary movements are noted. Skin:  Warm and dry. Good color, turgor and pigmentation. No lesions or scars.   No cyanosis or clubbing  Neurological/Psychiatric: The patient's general behavior, level of consciousness, thought content and emotional status is normal.          INVESTIGATIONS     Laboratory Testing:     Recent Results (from the past 24 hour(s))   CBC Auto Differential    Collection Time: 07/14/19  6:52 AM   Result Value Ref Range    WBC 20.1 (H) 3.5 - 11.3 k/uL    RBC 4.36 3.95 - 5.11 m/uL    Hemoglobin 13.0 11.9 - 15.1 g/dL    Hematocrit 41.4 36.3 - 47.1 %    MCV 95.0 82.6 - 102.9 fL    MCH 29.8 25.2 - 33.5 pg    MCHC 31.4 28.4 - 34.8 g/dL    RDW 15.6 (H) 11.8 - 14.4 %    Platelets 706 798 - 263 k/uL    MPV 10.0 8.1 - 13.5 fL    NRBC Automated 0.0 0.0 per 100 WBC    Differential Type NOT REPORTED     Seg Neutrophils 92 (H) 36 - 65 %    Lymphocytes 5 (L) 24 - 43 %    Monocytes 3 3 - 12 %    Eosinophils % 0 (L) 1 - 4 %    Basophils 0 0 - 2 %    Immature Granulocytes 0 0 %    Segs Absolute 18.32 (H) 1.50 - 8.10 k/uL    Absolute Lymph # 0.91 (L) 1.10 - 3.70 k/uL    Absolute Mono # 0.66 0.10 - 1.20 k/uL    Absolute Eos # 0.06 0.00 - 0.44 k/uL    Basophils # 0.05 0.00 - 0.20 k/uL    Absolute Immature Granulocyte 0.08 0.00 - 0.30 k/uL    WBC Morphology NOT REPORTED     RBC Morphology ANISOCYTOSIS PRESENT     Platelet Estimate NOT REPORTED    Troponin    Collection Time: 07/14/19  6:52

## 2019-07-20 LAB
CULTURE: NORMAL
Lab: NORMAL
SPECIMEN DESCRIPTION: NORMAL

## 2019-07-22 RX ORDER — LEVOFLOXACIN 250 MG/1
250 TABLET ORAL DAILY
Qty: 8 TABLET | Refills: 0 | Status: SHIPPED | OUTPATIENT
Start: 2019-07-22 | End: 2019-07-30

## 2019-08-17 ENCOUNTER — APPOINTMENT (OUTPATIENT)
Dept: GENERAL RADIOLOGY | Age: 79
DRG: 871 | End: 2019-08-17
Payer: COMMERCIAL

## 2019-08-17 ENCOUNTER — HOSPITAL ENCOUNTER (INPATIENT)
Age: 79
LOS: 5 days | Discharge: HOME | DRG: 871 | End: 2019-08-22
Attending: EMERGENCY MEDICINE | Admitting: INTERNAL MEDICINE
Payer: COMMERCIAL

## 2019-08-17 DIAGNOSIS — G47.33 OSA (OBSTRUCTIVE SLEEP APNEA): ICD-10-CM

## 2019-08-17 DIAGNOSIS — A41.9 SEPSIS, DUE TO UNSPECIFIED ORGANISM: ICD-10-CM

## 2019-08-17 DIAGNOSIS — J18.9 PNEUMONIA DUE TO ORGANISM: Primary | ICD-10-CM

## 2019-08-17 LAB
-: ABNORMAL
ABSOLUTE EOS #: 0.05 K/UL (ref 0–0.44)
ABSOLUTE IMMATURE GRANULOCYTE: 0.07 K/UL (ref 0–0.3)
ABSOLUTE LYMPH #: 0.87 K/UL (ref 1.1–3.7)
ABSOLUTE MONO #: 0.61 K/UL (ref 0.1–1.2)
ALBUMIN SERPL-MCNC: 3.9 G/DL (ref 3.5–5.2)
ALBUMIN/GLOBULIN RATIO: 1 (ref 1–2.5)
ALP BLD-CCNC: 65 U/L (ref 35–104)
ALT SERPL-CCNC: 8 U/L (ref 5–33)
AMORPHOUS: ABNORMAL
ANION GAP SERPL CALCULATED.3IONS-SCNC: 15 MMOL/L (ref 9–17)
AST SERPL-CCNC: 16 U/L
BACTERIA: ABNORMAL
BASOPHILS # BLD: 0 % (ref 0–2)
BASOPHILS ABSOLUTE: 0.04 K/UL (ref 0–0.2)
BILIRUB SERPL-MCNC: 0.5 MG/DL (ref 0.3–1.2)
BILIRUBIN URINE: NEGATIVE
BNP INTERPRETATION: ABNORMAL
BUN BLDV-MCNC: 28 MG/DL (ref 8–23)
BUN/CREAT BLD: ABNORMAL (ref 9–20)
CALCIUM SERPL-MCNC: 9.6 MG/DL (ref 8.6–10.4)
CASTS UA: ABNORMAL /LPF (ref 0–2)
CHLORIDE BLD-SCNC: 102 MMOL/L (ref 98–107)
CO2: 21 MMOL/L (ref 20–31)
COLOR: YELLOW
CREAT SERPL-MCNC: 1.73 MG/DL (ref 0.5–0.9)
CRYSTALS, UA: ABNORMAL /HPF
DIFFERENTIAL TYPE: ABNORMAL
DIRECT EXAM: NORMAL
DIRECT EXAM: NORMAL
EOSINOPHILS RELATIVE PERCENT: 0 % (ref 1–4)
EPITHELIAL CELLS UA: ABNORMAL /HPF (ref 0–5)
GFR AFRICAN AMERICAN: 34 ML/MIN
GFR NON-AFRICAN AMERICAN: 28 ML/MIN
GFR SERPL CREATININE-BSD FRML MDRD: ABNORMAL ML/MIN/{1.73_M2}
GFR SERPL CREATININE-BSD FRML MDRD: ABNORMAL ML/MIN/{1.73_M2}
GLUCOSE BLD-MCNC: 151 MG/DL (ref 70–99)
GLUCOSE URINE: NEGATIVE
HCT VFR BLD CALC: 42 % (ref 36.3–47.1)
HEMOGLOBIN: 12.6 G/DL (ref 11.9–15.1)
IMMATURE GRANULOCYTES: 0 %
INR BLD: 0.9
KETONES, URINE: NEGATIVE
LACTIC ACID, WHOLE BLOOD: 1.7 MMOL/L (ref 0.7–2.1)
LACTIC ACID, WHOLE BLOOD: 2.4 MMOL/L (ref 0.7–2.1)
LEUKOCYTE ESTERASE, URINE: NEGATIVE
LYMPHOCYTES # BLD: 5 % (ref 24–43)
Lab: NORMAL
Lab: NORMAL
MCH RBC QN AUTO: 28.8 PG (ref 25.2–33.5)
MCHC RBC AUTO-ENTMCNC: 30 G/DL (ref 28.4–34.8)
MCV RBC AUTO: 96.1 FL (ref 82.6–102.9)
MONOCYTES # BLD: 3 % (ref 3–12)
MUCUS: ABNORMAL
NITRITE, URINE: NEGATIVE
NRBC AUTOMATED: 0 PER 100 WBC
OTHER OBSERVATIONS UA: ABNORMAL
PARTIAL THROMBOPLASTIN TIME: 23.6 SEC (ref 20.5–30.5)
PDW BLD-RTO: 14.4 % (ref 11.8–14.4)
PH UA: 6.5 (ref 5–8)
PLATELET # BLD: 259 K/UL (ref 138–453)
PLATELET ESTIMATE: ABNORMAL
PMV BLD AUTO: 9.6 FL (ref 8.1–13.5)
POTASSIUM SERPL-SCNC: 4.7 MMOL/L (ref 3.7–5.3)
PRO-BNP: 490 PG/ML
PROCALCITONIN: 0.16 NG/ML
PROTEIN UA: ABNORMAL
PROTHROMBIN TIME: 9.9 SEC (ref 9–12)
RBC # BLD: 4.37 M/UL (ref 3.95–5.11)
RBC # BLD: ABNORMAL 10*6/UL
RBC UA: ABNORMAL /HPF (ref 0–2)
RENAL EPITHELIAL, UA: ABNORMAL /HPF
SEG NEUTROPHILS: 92 % (ref 36–65)
SEGMENTED NEUTROPHILS ABSOLUTE COUNT: 16.1 K/UL (ref 1.5–8.1)
SODIUM BLD-SCNC: 138 MMOL/L (ref 135–144)
SPECIFIC GRAVITY UA: 1.01 (ref 1–1.03)
SPECIMEN DESCRIPTION: NORMAL
SPECIMEN DESCRIPTION: NORMAL
TOTAL PROTEIN: 7.7 G/DL (ref 6.4–8.3)
TRICHOMONAS: ABNORMAL
TROPONIN INTERP: NORMAL
TROPONIN T: NORMAL NG/ML
TROPONIN, HIGH SENSITIVITY: 11 NG/L (ref 0–14)
TURBIDITY: CLEAR
URINE HGB: NEGATIVE
UROBILINOGEN, URINE: NORMAL
WBC # BLD: 17.7 K/UL (ref 3.5–11.3)
WBC # BLD: ABNORMAL 10*3/UL
WBC UA: ABNORMAL /HPF (ref 0–5)
YEAST: ABNORMAL

## 2019-08-17 PROCEDURE — 82330 ASSAY OF CALCIUM: CPT

## 2019-08-17 PROCEDURE — 83880 ASSAY OF NATRIURETIC PEPTIDE: CPT

## 2019-08-17 PROCEDURE — 89220 SPUTUM SPECIMEN COLLECTION: CPT

## 2019-08-17 PROCEDURE — 86738 MYCOPLASMA ANTIBODY: CPT

## 2019-08-17 PROCEDURE — 6360000002 HC RX W HCPCS: Performed by: STUDENT IN AN ORGANIZED HEALTH CARE EDUCATION/TRAINING PROGRAM

## 2019-08-17 PROCEDURE — 85025 COMPLETE CBC W/AUTO DIFF WBC: CPT

## 2019-08-17 PROCEDURE — 84145 PROCALCITONIN (PCT): CPT

## 2019-08-17 PROCEDURE — 87086 URINE CULTURE/COLONY COUNT: CPT

## 2019-08-17 PROCEDURE — 82565 ASSAY OF CREATININE: CPT

## 2019-08-17 PROCEDURE — 36415 COLL VENOUS BLD VENIPUNCTURE: CPT

## 2019-08-17 PROCEDURE — 84132 ASSAY OF SERUM POTASSIUM: CPT

## 2019-08-17 PROCEDURE — 2580000003 HC RX 258: Performed by: STUDENT IN AN ORGANIZED HEALTH CARE EDUCATION/TRAINING PROGRAM

## 2019-08-17 PROCEDURE — 2060000000 HC ICU INTERMEDIATE R&B

## 2019-08-17 PROCEDURE — 6370000000 HC RX 637 (ALT 250 FOR IP): Performed by: STUDENT IN AN ORGANIZED HEALTH CARE EDUCATION/TRAINING PROGRAM

## 2019-08-17 PROCEDURE — 84295 ASSAY OF SERUM SODIUM: CPT

## 2019-08-17 PROCEDURE — 99285 EMERGENCY DEPT VISIT HI MDM: CPT

## 2019-08-17 PROCEDURE — 85610 PROTHROMBIN TIME: CPT

## 2019-08-17 PROCEDURE — 82947 ASSAY GLUCOSE BLOOD QUANT: CPT

## 2019-08-17 PROCEDURE — 84484 ASSAY OF TROPONIN QUANT: CPT

## 2019-08-17 PROCEDURE — 86698 HISTOPLASMA ANTIBODY: CPT

## 2019-08-17 PROCEDURE — 81001 URINALYSIS AUTO W/SCOPE: CPT

## 2019-08-17 PROCEDURE — 87449 NOS EACH ORGANISM AG IA: CPT

## 2019-08-17 PROCEDURE — 87040 BLOOD CULTURE FOR BACTERIA: CPT

## 2019-08-17 PROCEDURE — 82803 BLOOD GASES ANY COMBINATION: CPT

## 2019-08-17 PROCEDURE — 71046 X-RAY EXAM CHEST 2 VIEWS: CPT

## 2019-08-17 PROCEDURE — 80053 COMPREHEN METABOLIC PANEL: CPT

## 2019-08-17 PROCEDURE — 85014 HEMATOCRIT: CPT

## 2019-08-17 PROCEDURE — 93005 ELECTROCARDIOGRAM TRACING: CPT | Performed by: STUDENT IN AN ORGANIZED HEALTH CARE EDUCATION/TRAINING PROGRAM

## 2019-08-17 PROCEDURE — 87899 AGENT NOS ASSAY W/OPTIC: CPT

## 2019-08-17 PROCEDURE — 85730 THROMBOPLASTIN TIME PARTIAL: CPT

## 2019-08-17 PROCEDURE — 83605 ASSAY OF LACTIC ACID: CPT

## 2019-08-17 PROCEDURE — 82435 ASSAY OF BLOOD CHLORIDE: CPT

## 2019-08-17 RX ORDER — ACETAMINOPHEN 325 MG/1
650 TABLET ORAL ONCE
Status: COMPLETED | OUTPATIENT
Start: 2019-08-17 | End: 2019-08-17

## 2019-08-17 RX ORDER — SODIUM CHLORIDE 9 MG/ML
INJECTION, SOLUTION INTRAVENOUS CONTINUOUS
Status: DISCONTINUED | OUTPATIENT
Start: 2019-08-17 | End: 2019-08-20

## 2019-08-17 RX ORDER — LEVOFLOXACIN 5 MG/ML
750 INJECTION, SOLUTION INTRAVENOUS ONCE
Status: COMPLETED | OUTPATIENT
Start: 2019-08-17 | End: 2019-08-17

## 2019-08-17 RX ORDER — CARVEDILOL 25 MG/1
25 TABLET ORAL 2 TIMES DAILY
Status: DISCONTINUED | OUTPATIENT
Start: 2019-08-17 | End: 2019-08-22 | Stop reason: HOSPADM

## 2019-08-17 RX ORDER — HEPARIN SODIUM 5000 [USP'U]/ML
5000 INJECTION, SOLUTION INTRAVENOUS; SUBCUTANEOUS EVERY 8 HOURS SCHEDULED
Status: DISCONTINUED | OUTPATIENT
Start: 2019-08-17 | End: 2019-08-22 | Stop reason: HOSPADM

## 2019-08-17 RX ORDER — DONEPEZIL HYDROCHLORIDE 10 MG/1
10 TABLET, FILM COATED ORAL NIGHTLY
Status: DISCONTINUED | OUTPATIENT
Start: 2019-08-17 | End: 2019-08-22 | Stop reason: HOSPADM

## 2019-08-17 RX ORDER — SODIUM CHLORIDE 0.9 % (FLUSH) 0.9 %
10 SYRINGE (ML) INJECTION EVERY 12 HOURS SCHEDULED
Status: DISCONTINUED | OUTPATIENT
Start: 2019-08-17 | End: 2019-08-17

## 2019-08-17 RX ORDER — HYDRALAZINE HYDROCHLORIDE 20 MG/ML
10 INJECTION INTRAMUSCULAR; INTRAVENOUS EVERY 6 HOURS PRN
Status: DISCONTINUED | OUTPATIENT
Start: 2019-08-17 | End: 2019-08-22 | Stop reason: HOSPADM

## 2019-08-17 RX ORDER — CLOPIDOGREL BISULFATE 75 MG/1
75 TABLET ORAL DAILY
Status: DISCONTINUED | OUTPATIENT
Start: 2019-08-17 | End: 2019-08-22 | Stop reason: HOSPADM

## 2019-08-17 RX ORDER — OMEPRAZOLE 20 MG/1
20 CAPSULE, DELAYED RELEASE ORAL DAILY
Status: DISCONTINUED | OUTPATIENT
Start: 2019-08-17 | End: 2019-08-22 | Stop reason: HOSPADM

## 2019-08-17 RX ORDER — GABAPENTIN 300 MG/1
300 CAPSULE ORAL 3 TIMES DAILY
Status: DISCONTINUED | OUTPATIENT
Start: 2019-08-17 | End: 2019-08-22 | Stop reason: HOSPADM

## 2019-08-17 RX ORDER — SODIUM CHLORIDE 0.9 % (FLUSH) 0.9 %
10 SYRINGE (ML) INJECTION PRN
Status: DISCONTINUED | OUTPATIENT
Start: 2019-08-17 | End: 2019-08-17

## 2019-08-17 RX ORDER — ALPRAZOLAM 0.5 MG/1
0.5 TABLET ORAL NIGHTLY PRN
Status: DISCONTINUED | OUTPATIENT
Start: 2019-08-17 | End: 2019-08-22 | Stop reason: HOSPADM

## 2019-08-17 RX ORDER — ALBUTEROL SULFATE 90 UG/1
2 AEROSOL, METERED RESPIRATORY (INHALATION) EVERY 6 HOURS PRN
Status: DISCONTINUED | OUTPATIENT
Start: 2019-08-17 | End: 2019-08-22 | Stop reason: HOSPADM

## 2019-08-17 RX ORDER — FLUOXETINE HYDROCHLORIDE 20 MG/5ML
40 LIQUID ORAL DAILY
Status: DISCONTINUED | OUTPATIENT
Start: 2019-08-17 | End: 2019-08-18

## 2019-08-17 RX ORDER — LEVOFLOXACIN 5 MG/ML
750 INJECTION, SOLUTION INTRAVENOUS EVERY 24 HOURS
Status: CANCELLED | OUTPATIENT
Start: 2019-08-17

## 2019-08-17 RX ORDER — HYDRALAZINE HYDROCHLORIDE 50 MG/1
50 TABLET, FILM COATED ORAL EVERY 8 HOURS SCHEDULED
Status: DISCONTINUED | OUTPATIENT
Start: 2019-08-17 | End: 2019-08-21

## 2019-08-17 RX ORDER — 0.9 % SODIUM CHLORIDE 0.9 %
1000 INTRAVENOUS SOLUTION INTRAVENOUS ONCE
Status: COMPLETED | OUTPATIENT
Start: 2019-08-17 | End: 2019-08-17

## 2019-08-17 RX ORDER — ONDANSETRON 2 MG/ML
4 INJECTION INTRAMUSCULAR; INTRAVENOUS EVERY 6 HOURS PRN
Status: DISCONTINUED | OUTPATIENT
Start: 2019-08-17 | End: 2019-08-17

## 2019-08-17 RX ADMIN — SODIUM CHLORIDE 1000 ML: 9 INJECTION, SOLUTION INTRAVENOUS at 08:00

## 2019-08-17 RX ADMIN — GABAPENTIN 300 MG: 300 CAPSULE ORAL at 19:58

## 2019-08-17 RX ADMIN — CLOPIDOGREL 75 MG: 75 TABLET, FILM COATED ORAL at 14:13

## 2019-08-17 RX ADMIN — HYDRALAZINE HYDROCHLORIDE 50 MG: 50 TABLET, FILM COATED ORAL at 20:03

## 2019-08-17 RX ADMIN — CEFEPIME HYDROCHLORIDE 1 G: 1 INJECTION, POWDER, FOR SOLUTION INTRAMUSCULAR; INTRAVENOUS at 21:31

## 2019-08-17 RX ADMIN — HEPARIN SODIUM 5000 UNITS: 5000 INJECTION INTRAVENOUS; SUBCUTANEOUS at 22:05

## 2019-08-17 RX ADMIN — FLUOXETINE HYDROCHLORIDE 40 MG: 20 SOLUTION ORAL at 11:48

## 2019-08-17 RX ADMIN — CEFEPIME HYDROCHLORIDE 1 G: 1 INJECTION, POWDER, FOR SOLUTION INTRAMUSCULAR; INTRAVENOUS at 14:18

## 2019-08-17 RX ADMIN — GABAPENTIN 300 MG: 300 CAPSULE ORAL at 11:48

## 2019-08-17 RX ADMIN — ACETAMINOPHEN 650 MG: 325 TABLET ORAL at 08:05

## 2019-08-17 RX ADMIN — HYDRALAZINE HYDROCHLORIDE 50 MG: 50 TABLET, FILM COATED ORAL at 14:19

## 2019-08-17 RX ADMIN — CARVEDILOL 25 MG: 25 TABLET, FILM COATED ORAL at 19:58

## 2019-08-17 RX ADMIN — LEVOFLOXACIN 750 MG: 5 INJECTION, SOLUTION INTRAVENOUS at 09:11

## 2019-08-17 RX ADMIN — OMEPRAZOLE 20 MG: 20 CAPSULE, DELAYED RELEASE ORAL at 11:48

## 2019-08-17 RX ADMIN — CARVEDILOL 25 MG: 25 TABLET, FILM COATED ORAL at 14:13

## 2019-08-17 RX ADMIN — DONEPEZIL HYDROCHLORIDE 10 MG: 10 TABLET, FILM COATED ORAL at 19:58

## 2019-08-17 RX ADMIN — SODIUM CHLORIDE: 9 INJECTION, SOLUTION INTRAVENOUS at 13:31

## 2019-08-17 RX ADMIN — HEPARIN SODIUM 5000 UNITS: 5000 INJECTION INTRAVENOUS; SUBCUTANEOUS at 13:31

## 2019-08-17 ASSESSMENT — PAIN SCALES - GENERAL: PAINLEVEL_OUTOF10: 0

## 2019-08-17 NOTE — ED NOTES
Bed: 23  Expected date:   Expected time:   Means of arrival:   Comments:   [address], RN  08/17/19 4150

## 2019-08-17 NOTE — ED NOTES
Dr Bear Bucio advised of EPOC potassium of 9.1, suspected hemolysis     [address] [place] [address], RN  08/17/19 5738

## 2019-08-17 NOTE — ED PROVIDER NOTES
Medical: Not on file     Non-medical: Not on file   Tobacco Use    Smoking status: Former Smoker    Smokeless tobacco: Never Used   Substance and Sexual Activity    Alcohol use: No    Drug use: No    Sexual activity: Not on file   Lifestyle    Physical activity:     Days per week: Not on file     Minutes per session: Not on file    Stress: Not on file   Relationships    Social connections:     Talks on phone: Not on file     Gets together: Not on file     Attends Muslim service: Not on file     Active member of club or organization: Not on file     Attends meetings of clubs or organizations: Not on file     Relationship status: Not on file    Intimate partner violence:     Fear of current or ex partner: Not on file     Emotionally abused: Not on file     Physically abused: Not on file     Forced sexual activity: Not on file   Other Topics Concern    Not on file   Social History Narrative    Not on file       No family history on file. Allergies:  Doxycycline hyclate; Norco [hydrocodone-acetaminophen]; Pcn [penicillins]; and Sulfa antibiotics    Home Medications:  Prior to Admission medications    Medication Sig Start Date End Date Taking?  Authorizing Provider   albuterol sulfate HFA (VENTOLIN HFA) 108 (90 Base) MCG/ACT inhaler Inhale 2 puffs into the lungs every 6 hours as needed for Wheezing or Shortness of Breath 7/16/19   Lisa Mercer MD   FLUoxetine (PROZAC) 20 MG/5ML solution Take 10 mLs by mouth daily 7/16/19   Lisa Mercer MD   hydrALAZINE (APRESOLINE) 50 MG tablet Take 1 tablet by mouth every 8 hours 7/16/19   Lisa Mercer MD   donepezil (ARICEPT) 10 MG tablet Take 1 tablet by mouth nightly 7/16/19   Lisa Mercer MD   glipiZIDE (GLUCOTROL) 5 MG tablet Take 0.5 tablets by mouth daily 7/16/19   Lisa Mercer MD   carvedilol (COREG) 25 MG tablet Take 25 mg by mouth 2 times daily    Historical Provider, MD   ALPRAZolam Miguel Quiroga) 0.5 MG tablet Take 0.5 mg by mouth medicine. Discussed with internal medicine plan for admission to stepdown unit for sepsis 72 respiratory source. PROCEDURES:  None    CONSULTS:  PHARMACY TO DOSE VANCOMYCIN  IP CONSULT TO INTERNAL MEDICINE    CRITICAL CARE:  None    FINAL IMPRESSION      1. Pneumonia due to organism    2.  Sepsis, due to unspecified organism [facility])          DISPOSITION / [address] Admitted 08/17/2019 08:48:33 AM      PATIENT REFERRED TO:  Cash Tompkins [facility]  [facility] [id number] [id number]            DISCHARGE MEDICATIONS:  New Prescriptions    No medications on file       Sylvia Mckenna DO  Emergency Medicine Resident    (Please note that portions of thisnote were completed with a voice recognition program.  Efforts were made to edit the dictations but occasionally words are mis-transcribed.)       Sylvia Mckenna DO  08/17/19 1988

## 2019-08-17 NOTE — ED NOTES
Pt to ED per [facility] with c/o SOB and swollen ankles, pt states has been coughing and feeling SOB since yesterday, pt was febrile 101 on arrival to ED, denies any pain, no n/v/d, speaking in full sentences, pt is slow to answer questions, appears confused     Yahir Grayson RN  08/17/19 8119

## 2019-08-17 NOTE — CARE COORDINATION
Case Management Initial Discharge Plan  Lenny Brunson,             Met with:patient to discuss discharge plans. Information verified: address, contacts, phone number, , insurance Yes  PCP: Ruy Mg MD  Date of last visit: 3 weeks ago    Insurance Provider: [employer]    Discharge Planning    Living Arrangements:  Alone   Support Systems:  [id number] has 1 stories  Ramp to get into front door,   Location of bedroom/bathroom in home main    Patient able to perform ADL's:Independent    Current Services (outpatient & in home) none  DME equipment: cane  DME provider: none    Pharmacy: [facility] [address] Medications:  No  Does patient want to participate in local refill/ meds to beds program?  No    Potential Assistance Needed:  N/A    Patient agreeable to home care: No  Vaughan of choice provided:  no    Prior SNF/Rehab Placement and Facility: no  Agreeable to SNF/Rehab: No  Vaughan of choice provided: no   Evaluation: no    Expected Discharge date:     Patient expects to be discharged to:  home  Follow Up Appointment: Best Day/ Time:      Transportation provider: family  Transportation arrangements needed for discharge: No    Readmission Risk              Risk of Unplanned Readmission:        15             Does patient have a readmission risk score greater than 14?: Yes  If yes, follow-up appointment must be made within 7 days of discharge.      Discharge Plan: home independently          Electronically signed by Hero Ascencio RN on 19 at 11:31 AM

## 2019-08-17 NOTE — ED NOTES
Pt assisted with bedside commode, urine collected and sent     [address] [place] [address], RN  08/17/19 9894

## 2019-08-18 PROBLEM — E87.20 LACTIC ACIDOSIS: Status: ACTIVE | Noted: 2019-08-18

## 2019-08-18 PROBLEM — I16.0 HYPERTENSIVE URGENCY: Status: ACTIVE | Noted: 2019-08-18

## 2019-08-18 LAB
ABSOLUTE EOS #: 0.06 K/UL (ref 0–0.44)
ABSOLUTE IMMATURE GRANULOCYTE: 0.05 K/UL (ref 0–0.3)
ABSOLUTE LYMPH #: 1.51 K/UL (ref 1.1–3.7)
ABSOLUTE MONO #: 0.61 K/UL (ref 0.1–1.2)
ANION GAP SERPL CALCULATED.3IONS-SCNC: 14 MMOL/L (ref 9–17)
BASOPHILS # BLD: 0 % (ref 0–2)
BASOPHILS ABSOLUTE: 0.03 K/UL (ref 0–0.2)
BUN BLDV-MCNC: 26 MG/DL (ref 8–23)
BUN/CREAT BLD: ABNORMAL (ref 9–20)
CALCIUM SERPL-MCNC: 9 MG/DL (ref 8.6–10.4)
CHLORIDE BLD-SCNC: 101 MMOL/L (ref 98–107)
CO2: 22 MMOL/L (ref 20–31)
CREAT SERPL-MCNC: 1.78 MG/DL (ref 0.5–0.9)
CULTURE: NO GROWTH
DIFFERENTIAL TYPE: ABNORMAL
EOSINOPHILS RELATIVE PERCENT: 1 % (ref 1–4)
GFR AFRICAN AMERICAN: 33 ML/MIN
GFR NON-AFRICAN AMERICAN: 27 ML/MIN
GFR SERPL CREATININE-BSD FRML MDRD: ABNORMAL ML/MIN/{1.73_M2}
GFR SERPL CREATININE-BSD FRML MDRD: ABNORMAL ML/MIN/{1.73_M2}
GLUCOSE BLD-MCNC: 114 MG/DL (ref 70–99)
HCT VFR BLD CALC: 33 % (ref 36.3–47.1)
HEMOGLOBIN: 10.3 G/DL (ref 11.9–15.1)
IMMATURE GRANULOCYTES: 1 %
LYMPHOCYTES # BLD: 14 % (ref 24–43)
Lab: NORMAL
MCH RBC QN AUTO: 29.9 PG (ref 25.2–33.5)
MCHC RBC AUTO-ENTMCNC: 31.2 G/DL (ref 28.4–34.8)
MCV RBC AUTO: 95.7 FL (ref 82.6–102.9)
MONOCYTES # BLD: 6 % (ref 3–12)
NRBC AUTOMATED: 0 PER 100 WBC
PDW BLD-RTO: 14.5 % (ref 11.8–14.4)
PLATELET # BLD: 209 K/UL (ref 138–453)
PLATELET ESTIMATE: ABNORMAL
PMV BLD AUTO: 9.6 FL (ref 8.1–13.5)
POTASSIUM SERPL-SCNC: 4.1 MMOL/L (ref 3.7–5.3)
RBC # BLD: 3.45 M/UL (ref 3.95–5.11)
RBC # BLD: ABNORMAL 10*6/UL
SEG NEUTROPHILS: 79 % (ref 36–65)
SEGMENTED NEUTROPHILS ABSOLUTE COUNT: 8.29 K/UL (ref 1.5–8.1)
SODIUM BLD-SCNC: 137 MMOL/L (ref 135–144)
SPECIMEN DESCRIPTION: NORMAL
WBC # BLD: 10.6 K/UL (ref 3.5–11.3)
WBC # BLD: ABNORMAL 10*3/UL

## 2019-08-18 PROCEDURE — 97162 PT EVAL MOD COMPLEX 30 MIN: CPT

## 2019-08-18 PROCEDURE — 97530 THERAPEUTIC ACTIVITIES: CPT

## 2019-08-18 PROCEDURE — 80048 BASIC METABOLIC PNL TOTAL CA: CPT

## 2019-08-18 PROCEDURE — 6370000000 HC RX 637 (ALT 250 FOR IP): Performed by: STUDENT IN AN ORGANIZED HEALTH CARE EDUCATION/TRAINING PROGRAM

## 2019-08-18 PROCEDURE — 36415 COLL VENOUS BLD VENIPUNCTURE: CPT

## 2019-08-18 PROCEDURE — 87070 CULTURE OTHR SPECIMN AEROBIC: CPT

## 2019-08-18 PROCEDURE — 99223 1ST HOSP IP/OBS HIGH 75: CPT | Performed by: INTERNAL MEDICINE

## 2019-08-18 PROCEDURE — 6360000002 HC RX W HCPCS: Performed by: STUDENT IN AN ORGANIZED HEALTH CARE EDUCATION/TRAINING PROGRAM

## 2019-08-18 PROCEDURE — 2060000000 HC ICU INTERMEDIATE R&B

## 2019-08-18 PROCEDURE — 85025 COMPLETE CBC W/AUTO DIFF WBC: CPT

## 2019-08-18 PROCEDURE — 87205 SMEAR GRAM STAIN: CPT

## 2019-08-18 PROCEDURE — 2580000003 HC RX 258: Performed by: STUDENT IN AN ORGANIZED HEALTH CARE EDUCATION/TRAINING PROGRAM

## 2019-08-18 RX ORDER — FLUOXETINE HYDROCHLORIDE 20 MG/1
40 CAPSULE ORAL DAILY
Status: DISCONTINUED | OUTPATIENT
Start: 2019-08-18 | End: 2019-08-22 | Stop reason: HOSPADM

## 2019-08-18 RX ADMIN — CEFEPIME HYDROCHLORIDE 1 G: 1 INJECTION, POWDER, FOR SOLUTION INTRAMUSCULAR; INTRAVENOUS at 04:28

## 2019-08-18 RX ADMIN — HEPARIN SODIUM 5000 UNITS: 5000 INJECTION INTRAVENOUS; SUBCUTANEOUS at 05:56

## 2019-08-18 RX ADMIN — CLOPIDOGREL 75 MG: 75 TABLET, FILM COATED ORAL at 08:13

## 2019-08-18 RX ADMIN — GABAPENTIN 300 MG: 300 CAPSULE ORAL at 20:37

## 2019-08-18 RX ADMIN — HYDRALAZINE HYDROCHLORIDE 50 MG: 50 TABLET, FILM COATED ORAL at 22:35

## 2019-08-18 RX ADMIN — CEFEPIME HYDROCHLORIDE 1 G: 1 INJECTION, POWDER, FOR SOLUTION INTRAMUSCULAR; INTRAVENOUS at 12:45

## 2019-08-18 RX ADMIN — CEFEPIME HYDROCHLORIDE 1 G: 1 INJECTION, POWDER, FOR SOLUTION INTRAMUSCULAR; INTRAVENOUS at 20:37

## 2019-08-18 RX ADMIN — OMEPRAZOLE 20 MG: 20 CAPSULE, DELAYED RELEASE ORAL at 08:13

## 2019-08-18 RX ADMIN — GABAPENTIN 300 MG: 300 CAPSULE ORAL at 08:13

## 2019-08-18 RX ADMIN — GABAPENTIN 300 MG: 300 CAPSULE ORAL at 14:56

## 2019-08-18 RX ADMIN — CARVEDILOL 25 MG: 25 TABLET, FILM COATED ORAL at 08:13

## 2019-08-18 RX ADMIN — HEPARIN SODIUM 5000 UNITS: 5000 INJECTION INTRAVENOUS; SUBCUTANEOUS at 14:57

## 2019-08-18 RX ADMIN — CARVEDILOL 25 MG: 25 TABLET, FILM COATED ORAL at 20:37

## 2019-08-18 RX ADMIN — HEPARIN SODIUM 5000 UNITS: 5000 INJECTION INTRAVENOUS; SUBCUTANEOUS at 20:38

## 2019-08-18 RX ADMIN — FLUOXETINE HYDROCHLORIDE 40 MG: 20 CAPSULE ORAL at 14:57

## 2019-08-18 RX ADMIN — HYDRALAZINE HYDROCHLORIDE 50 MG: 50 TABLET, FILM COATED ORAL at 06:21

## 2019-08-18 RX ADMIN — HYDRALAZINE HYDROCHLORIDE 50 MG: 50 TABLET, FILM COATED ORAL at 15:13

## 2019-08-18 RX ADMIN — DONEPEZIL HYDROCHLORIDE 10 MG: 10 TABLET, FILM COATED ORAL at 20:37

## 2019-08-18 NOTE — PROGRESS NOTES
Inpatient without Stair CMS G-Code Modifier : Momo Sanabria (08/18/19 1138)       Goals  Short term goals  Time Frame for Short term goals: 14  Short term goal 1: Perform independent bed mobility   Short term goal 2: Demonstrate independent functional transfers  Short term goal 3: Ambulate 300ft w/ cane independently  Short term goal 4: Tolerate 45 minutes of therapy to demo increased endurnace  Patient Goals   Patient goals : Did not state       Therapy Time   Individual Concurrent Group Co-treatment   Time In 1053         Time Out 1107         Minutes 14         Timed Code Treatment Minutes: 8 Minutes       Shelli Tamayo, PT

## 2019-08-18 NOTE — PLAN OF CARE
Problem: Falls - Risk of:  Goal: Will remain free from falls  Description  Will remain free from falls  8/18/2019 1442 by Maulik Orr RN  Outcome: Ongoing  8/18/2019 0555 by Everardo Hardin RN  Outcome: Ongoing  Goal: Absence of physical injury  Description  Absence of physical injury  8/18/2019 1442 by Maulik Orr RN  Outcome: Ongoing  8/18/2019 0555 by Everardo Hardin RN  Outcome: Ongoing     Problem: Risk for Impaired Skin Integrity  Goal: Tissue integrity - skin and mucous membranes  Description  Structural intactness and normal physiological function of skin and  mucous membranes.   Outcome: Ongoing

## 2019-08-19 ENCOUNTER — APPOINTMENT (OUTPATIENT)
Dept: GENERAL RADIOLOGY | Age: 79
DRG: 871 | End: 2019-08-19
Payer: COMMERCIAL

## 2019-08-19 ENCOUNTER — APPOINTMENT (OUTPATIENT)
Dept: CT IMAGING | Age: 79
DRG: 871 | End: 2019-08-19
Payer: COMMERCIAL

## 2019-08-19 LAB
ABSOLUTE EOS #: 0.15 K/UL (ref 0–0.44)
ABSOLUTE IMMATURE GRANULOCYTE: <0.03 K/UL (ref 0–0.3)
ABSOLUTE LYMPH #: 1.75 K/UL (ref 1.1–3.7)
ABSOLUTE MONO #: 0.52 K/UL (ref 0.1–1.2)
ALLEN TEST: ABNORMAL
ANION GAP SERPL CALCULATED.3IONS-SCNC: 12 MMOL/L (ref 9–17)
ANION GAP: 5 MMOL/L (ref 7–16)
BASOPHILS # BLD: 1 % (ref 0–2)
BASOPHILS ABSOLUTE: 0.04 K/UL (ref 0–0.2)
BUN BLDV-MCNC: 25 MG/DL (ref 8–23)
BUN/CREAT BLD: ABNORMAL (ref 9–20)
CALCIUM SERPL-MCNC: 9.1 MG/DL (ref 8.6–10.4)
CHLORIDE BLD-SCNC: 102 MMOL/L (ref 98–107)
CO2: 23 MMOL/L (ref 20–31)
CREAT SERPL-MCNC: 1.59 MG/DL (ref 0.5–0.9)
CULTURE: ABNORMAL
DIFFERENTIAL TYPE: ABNORMAL
DIRECT EXAM: ABNORMAL
EOSINOPHILS RELATIVE PERCENT: 2 % (ref 1–4)
FIO2: ABNORMAL
GFR AFRICAN AMERICAN: 38 ML/MIN
GFR NON-AFRICAN AMERICAN: 29 ML/MIN
GFR NON-AFRICAN AMERICAN: 31 ML/MIN
GFR SERPL CREATININE-BSD FRML MDRD: 35 ML/MIN
GFR SERPL CREATININE-BSD FRML MDRD: ABNORMAL ML/MIN/{1.73_M2}
GLUCOSE BLD-MCNC: 108 MG/DL (ref 70–99)
GLUCOSE BLD-MCNC: 142 MG/DL (ref 74–100)
HCO3 VENOUS: 27.9 MMOL/L (ref 22–29)
HCT VFR BLD CALC: 35.4 % (ref 36.3–47.1)
HEMOGLOBIN: 10.9 G/DL (ref 11.9–15.1)
IMMATURE GRANULOCYTES: 0 %
LYMPHOCYTES # BLD: 25 % (ref 24–43)
Lab: ABNORMAL
MCH RBC QN AUTO: 29.6 PG (ref 25.2–33.5)
MCHC RBC AUTO-ENTMCNC: 30.8 G/DL (ref 28.4–34.8)
MCV RBC AUTO: 96.2 FL (ref 82.6–102.9)
MODE: ABNORMAL
MONOCYTES # BLD: 7 % (ref 3–12)
MYCOPLASMA PNEUMONIAE IGM: 0.08
NEGATIVE BASE EXCESS, VEN: ABNORMAL (ref 0–2)
NRBC AUTOMATED: 0 PER 100 WBC
O2 DEVICE/FLOW/%: ABNORMAL
O2 SAT, VEN: 14 % (ref 60–85)
PATIENT TEMP: ABNORMAL
PCO2, VEN: 46.6 MM HG (ref 41–51)
PDW BLD-RTO: 14.4 % (ref 11.8–14.4)
PH VENOUS: 7.38 (ref 7.32–7.43)
PLATELET # BLD: 217 K/UL (ref 138–453)
PLATELET ESTIMATE: ABNORMAL
PMV BLD AUTO: 10.1 FL (ref 8.1–13.5)
PO2, VEN: 12.9 MM HG (ref 30–50)
POC CHLORIDE: 105 MMOL/L (ref 98–107)
POC CREATININE: 1.69 MG/DL (ref 0.51–1.19)
POC HEMATOCRIT: 41 % (ref 36–46)
POC HEMOGLOBIN: 14.1 G/DL (ref 12–16)
POC IONIZED CALCIUM: 1.13 MMOL/L (ref 1.15–1.33)
POC LACTIC ACID: 1.77 MMOL/L (ref 0.56–1.39)
POC PCO2 TEMP: ABNORMAL MM HG
POC PH TEMP: ABNORMAL
POC PO2 TEMP: ABNORMAL MM HG
POC POTASSIUM: 9.1 MMOL/L (ref 3.5–4.5)
POC SODIUM: 138 MMOL/L (ref 138–146)
POSITIVE BASE EXCESS, VEN: 2 (ref 0–3)
POTASSIUM SERPL-SCNC: 4.1 MMOL/L (ref 3.7–5.3)
RBC # BLD: 3.68 M/UL (ref 3.95–5.11)
RBC # BLD: ABNORMAL 10*6/UL
SAMPLE SITE: ABNORMAL
SEG NEUTROPHILS: 65 % (ref 36–65)
SEGMENTED NEUTROPHILS ABSOLUTE COUNT: 4.66 K/UL (ref 1.5–8.1)
SODIUM BLD-SCNC: 137 MMOL/L (ref 135–144)
SPECIMEN DESCRIPTION: ABNORMAL
TOTAL CO2, VENOUS: 29 MMOL/L (ref 23–30)
WBC # BLD: 7.1 K/UL (ref 3.5–11.3)
WBC # BLD: ABNORMAL 10*3/UL

## 2019-08-19 PROCEDURE — 80048 BASIC METABOLIC PNL TOTAL CA: CPT

## 2019-08-19 PROCEDURE — 6370000000 HC RX 637 (ALT 250 FOR IP): Performed by: STUDENT IN AN ORGANIZED HEALTH CARE EDUCATION/TRAINING PROGRAM

## 2019-08-19 PROCEDURE — 2580000003 HC RX 258: Performed by: STUDENT IN AN ORGANIZED HEALTH CARE EDUCATION/TRAINING PROGRAM

## 2019-08-19 PROCEDURE — 74220 X-RAY XM ESOPHAGUS 1CNTRST: CPT

## 2019-08-19 PROCEDURE — 2060000000 HC ICU INTERMEDIATE R&B

## 2019-08-19 PROCEDURE — 85025 COMPLETE CBC W/AUTO DIFF WBC: CPT

## 2019-08-19 PROCEDURE — 36415 COLL VENOUS BLD VENIPUNCTURE: CPT

## 2019-08-19 PROCEDURE — 71250 CT THORAX DX C-: CPT

## 2019-08-19 PROCEDURE — 6360000002 HC RX W HCPCS: Performed by: STUDENT IN AN ORGANIZED HEALTH CARE EDUCATION/TRAINING PROGRAM

## 2019-08-19 PROCEDURE — 99232 SBSQ HOSP IP/OBS MODERATE 35: CPT | Performed by: INTERNAL MEDICINE

## 2019-08-19 PROCEDURE — 2500000003 HC RX 250 WO HCPCS: Performed by: STUDENT IN AN ORGANIZED HEALTH CARE EDUCATION/TRAINING PROGRAM

## 2019-08-19 RX ORDER — AZITHROMYCIN 250 MG/1
500 TABLET, FILM COATED ORAL DAILY
Status: COMPLETED | OUTPATIENT
Start: 2019-08-19 | End: 2019-08-21

## 2019-08-19 RX ADMIN — AZITHROMYCIN 500 MG: 250 TABLET, FILM COATED ORAL at 18:58

## 2019-08-19 RX ADMIN — OMEPRAZOLE 20 MG: 20 CAPSULE, DELAYED RELEASE ORAL at 08:12

## 2019-08-19 RX ADMIN — CARVEDILOL 25 MG: 25 TABLET, FILM COATED ORAL at 20:20

## 2019-08-19 RX ADMIN — GABAPENTIN 300 MG: 300 CAPSULE ORAL at 13:19

## 2019-08-19 RX ADMIN — HEPARIN SODIUM 5000 UNITS: 5000 INJECTION INTRAVENOUS; SUBCUTANEOUS at 13:20

## 2019-08-19 RX ADMIN — GABAPENTIN 300 MG: 300 CAPSULE ORAL at 08:11

## 2019-08-19 RX ADMIN — CARVEDILOL 25 MG: 25 TABLET, FILM COATED ORAL at 08:11

## 2019-08-19 RX ADMIN — FLUOXETINE HYDROCHLORIDE 40 MG: 20 CAPSULE ORAL at 08:11

## 2019-08-19 RX ADMIN — GABAPENTIN 300 MG: 300 CAPSULE ORAL at 20:20

## 2019-08-19 RX ADMIN — CEFEPIME HYDROCHLORIDE 1 G: 1 INJECTION, POWDER, FOR SOLUTION INTRAMUSCULAR; INTRAVENOUS at 20:20

## 2019-08-19 RX ADMIN — HYDRALAZINE HYDROCHLORIDE 50 MG: 50 TABLET, FILM COATED ORAL at 06:26

## 2019-08-19 RX ADMIN — CEFEPIME HYDROCHLORIDE 1 G: 1 INJECTION, POWDER, FOR SOLUTION INTRAMUSCULAR; INTRAVENOUS at 13:19

## 2019-08-19 RX ADMIN — HYDRALAZINE HYDROCHLORIDE 10 MG: 20 INJECTION INTRAMUSCULAR; INTRAVENOUS at 17:10

## 2019-08-19 RX ADMIN — DONEPEZIL HYDROCHLORIDE 10 MG: 10 TABLET, FILM COATED ORAL at 20:20

## 2019-08-19 RX ADMIN — HEPARIN SODIUM 5000 UNITS: 5000 INJECTION INTRAVENOUS; SUBCUTANEOUS at 23:05

## 2019-08-19 RX ADMIN — BARIUM SULFATE 140 ML: 980 POWDER, FOR SUSPENSION ORAL at 10:16

## 2019-08-19 RX ADMIN — HYDRALAZINE HYDROCHLORIDE 50 MG: 50 TABLET, FILM COATED ORAL at 13:19

## 2019-08-19 RX ADMIN — HEPARIN SODIUM 5000 UNITS: 5000 INJECTION INTRAVENOUS; SUBCUTANEOUS at 06:26

## 2019-08-19 RX ADMIN — CEFEPIME HYDROCHLORIDE 1 G: 1 INJECTION, POWDER, FOR SOLUTION INTRAMUSCULAR; INTRAVENOUS at 04:13

## 2019-08-19 NOTE — PROGRESS NOTES
answered . Electronically signed by Lisa Wilson MD on   8/19/19 at 10:25 PM    Please note that this chart was generated using voice recognition Dragon dictation software. Although every effort was made to ensure the accuracy of this automated transcription, some errors in transcription may have occurred.

## 2019-08-19 NOTE — PLAN OF CARE
Problem: Falls - Risk of:  Goal: Will remain free from falls  Description  Will remain free from falls  Outcome: Ongoing  Goal: Absence of physical injury  Description  Absence of physical injury  Outcome: Ongoing     Problem: Risk for Impaired Skin Integrity  Goal: Tissue integrity - skin and mucous membranes  Description  Structural intactness and normal physiological function of skin and  mucous membranes.   Outcome: Ongoing   Electronically signed by Rj Cross RN on 8/19/2019 at 4:43 AM

## 2019-08-19 NOTE — PROGRESS NOTES
Occupational Therapy Not Seen Note    DATE: 2019  Name: Luzma Michele  : 1940  MRN: [id number]    Patient not available for Occupational Therapy due to:    Patient Declined: Pt reports increased fatigue following procedures this date, agreeable to re-attempt tomorrow    Next Scheduled Treatment: Re-check 2019    Electronically signed by Koren Mcburney, OT on 2019 at 5:20 PM

## 2019-08-20 LAB
ABSOLUTE EOS #: 0.15 K/UL (ref 0–0.44)
ABSOLUTE IMMATURE GRANULOCYTE: <0.03 K/UL (ref 0–0.3)
ABSOLUTE LYMPH #: 1.71 K/UL (ref 1.1–3.7)
ABSOLUTE MONO #: 0.59 K/UL (ref 0.1–1.2)
ANION GAP SERPL CALCULATED.3IONS-SCNC: 13 MMOL/L (ref 9–17)
BASOPHILS # BLD: 0 % (ref 0–2)
BASOPHILS ABSOLUTE: 0.03 K/UL (ref 0–0.2)
BUN BLDV-MCNC: 26 MG/DL (ref 8–23)
BUN/CREAT BLD: ABNORMAL (ref 9–20)
CALCIUM SERPL-MCNC: 9.2 MG/DL (ref 8.6–10.4)
CHLORIDE BLD-SCNC: 103 MMOL/L (ref 98–107)
CO2: 22 MMOL/L (ref 20–31)
CREAT SERPL-MCNC: 1.39 MG/DL (ref 0.5–0.9)
DIFFERENTIAL TYPE: ABNORMAL
EKG ATRIAL RATE: 103 BPM
EKG P AXIS: 23 DEGREES
EKG P-R INTERVAL: 160 MS
EKG Q-T INTERVAL: 340 MS
EKG QRS DURATION: 70 MS
EKG QTC CALCULATION (BAZETT): 445 MS
EKG R AXIS: 10 DEGREES
EKG T AXIS: 37 DEGREES
EKG VENTRICULAR RATE: 103 BPM
EOSINOPHILS RELATIVE PERCENT: 2 % (ref 1–4)
GFR AFRICAN AMERICAN: 44 ML/MIN
GFR NON-AFRICAN AMERICAN: 36 ML/MIN
GFR SERPL CREATININE-BSD FRML MDRD: ABNORMAL ML/MIN/{1.73_M2}
GFR SERPL CREATININE-BSD FRML MDRD: ABNORMAL ML/MIN/{1.73_M2}
GLUCOSE BLD-MCNC: 105 MG/DL (ref 70–99)
GLUCOSE BLD-MCNC: 167 MG/DL (ref 65–105)
HCT VFR BLD CALC: 34.1 % (ref 36.3–47.1)
HEMOGLOBIN: 10.9 G/DL (ref 11.9–15.1)
IMMATURE GRANULOCYTES: 0 %
LYMPHOCYTES # BLD: 22 % (ref 24–43)
MCH RBC QN AUTO: 29.7 PG (ref 25.2–33.5)
MCHC RBC AUTO-ENTMCNC: 32 G/DL (ref 28.4–34.8)
MCV RBC AUTO: 92.9 FL (ref 82.6–102.9)
MONOCYTES # BLD: 8 % (ref 3–12)
NRBC AUTOMATED: 0 PER 100 WBC
PDW BLD-RTO: 14.5 % (ref 11.8–14.4)
PLATELET # BLD: 228 K/UL (ref 138–453)
PLATELET ESTIMATE: ABNORMAL
PMV BLD AUTO: 10.9 FL (ref 8.1–13.5)
POTASSIUM SERPL-SCNC: 4.6 MMOL/L (ref 3.7–5.3)
RBC # BLD: 3.67 M/UL (ref 3.95–5.11)
RBC # BLD: ABNORMAL 10*6/UL
SEG NEUTROPHILS: 68 % (ref 36–65)
SEGMENTED NEUTROPHILS ABSOLUTE COUNT: 5.25 K/UL (ref 1.5–8.1)
SODIUM BLD-SCNC: 138 MMOL/L (ref 135–144)
WBC # BLD: 7.8 K/UL (ref 3.5–11.3)
WBC # BLD: ABNORMAL 10*3/UL

## 2019-08-20 PROCEDURE — 6360000002 HC RX W HCPCS: Performed by: STUDENT IN AN ORGANIZED HEALTH CARE EDUCATION/TRAINING PROGRAM

## 2019-08-20 PROCEDURE — 6370000000 HC RX 637 (ALT 250 FOR IP): Performed by: STUDENT IN AN ORGANIZED HEALTH CARE EDUCATION/TRAINING PROGRAM

## 2019-08-20 PROCEDURE — 36415 COLL VENOUS BLD VENIPUNCTURE: CPT

## 2019-08-20 PROCEDURE — 99232 SBSQ HOSP IP/OBS MODERATE 35: CPT | Performed by: INTERNAL MEDICINE

## 2019-08-20 PROCEDURE — 85025 COMPLETE CBC W/AUTO DIFF WBC: CPT

## 2019-08-20 PROCEDURE — 82947 ASSAY GLUCOSE BLOOD QUANT: CPT

## 2019-08-20 PROCEDURE — 80048 BASIC METABOLIC PNL TOTAL CA: CPT

## 2019-08-20 PROCEDURE — 93010 ELECTROCARDIOGRAM REPORT: CPT | Performed by: INTERNAL MEDICINE

## 2019-08-20 PROCEDURE — 2060000000 HC ICU INTERMEDIATE R&B

## 2019-08-20 PROCEDURE — 2580000003 HC RX 258: Performed by: STUDENT IN AN ORGANIZED HEALTH CARE EDUCATION/TRAINING PROGRAM

## 2019-08-20 RX ADMIN — HEPARIN SODIUM 5000 UNITS: 5000 INJECTION INTRAVENOUS; SUBCUTANEOUS at 21:28

## 2019-08-20 RX ADMIN — GABAPENTIN 300 MG: 300 CAPSULE ORAL at 21:15

## 2019-08-20 RX ADMIN — AZITHROMYCIN 500 MG: 250 TABLET, FILM COATED ORAL at 08:35

## 2019-08-20 RX ADMIN — GABAPENTIN 300 MG: 300 CAPSULE ORAL at 13:12

## 2019-08-20 RX ADMIN — HYDRALAZINE HYDROCHLORIDE 50 MG: 50 TABLET, FILM COATED ORAL at 05:44

## 2019-08-20 RX ADMIN — GABAPENTIN 300 MG: 300 CAPSULE ORAL at 08:35

## 2019-08-20 RX ADMIN — HEPARIN SODIUM 5000 UNITS: 5000 INJECTION INTRAVENOUS; SUBCUTANEOUS at 05:43

## 2019-08-20 RX ADMIN — DONEPEZIL HYDROCHLORIDE 10 MG: 10 TABLET, FILM COATED ORAL at 21:15

## 2019-08-20 RX ADMIN — CARVEDILOL 25 MG: 25 TABLET, FILM COATED ORAL at 21:15

## 2019-08-20 RX ADMIN — CEFEPIME HYDROCHLORIDE 1 G: 1 INJECTION, POWDER, FOR SOLUTION INTRAMUSCULAR; INTRAVENOUS at 21:15

## 2019-08-20 RX ADMIN — OMEPRAZOLE 20 MG: 20 CAPSULE, DELAYED RELEASE ORAL at 08:36

## 2019-08-20 RX ADMIN — CEFEPIME HYDROCHLORIDE 1 G: 1 INJECTION, POWDER, FOR SOLUTION INTRAMUSCULAR; INTRAVENOUS at 04:00

## 2019-08-20 RX ADMIN — CARVEDILOL 25 MG: 25 TABLET, FILM COATED ORAL at 08:35

## 2019-08-20 RX ADMIN — CEFEPIME HYDROCHLORIDE 1 G: 1 INJECTION, POWDER, FOR SOLUTION INTRAMUSCULAR; INTRAVENOUS at 13:12

## 2019-08-20 RX ADMIN — HYDRALAZINE HYDROCHLORIDE 50 MG: 50 TABLET, FILM COATED ORAL at 23:13

## 2019-08-20 RX ADMIN — HYDRALAZINE HYDROCHLORIDE 50 MG: 50 TABLET, FILM COATED ORAL at 13:12

## 2019-08-20 RX ADMIN — HEPARIN SODIUM 5000 UNITS: 5000 INJECTION INTRAVENOUS; SUBCUTANEOUS at 13:16

## 2019-08-20 RX ADMIN — FLUOXETINE HYDROCHLORIDE 40 MG: 20 CAPSULE ORAL at 08:35

## 2019-08-20 ASSESSMENT — PAIN DESCRIPTION - LOCATION: LOCATION: BACK

## 2019-08-20 ASSESSMENT — PAIN SCALES - GENERAL: PAINLEVEL_OUTOF10: 2

## 2019-08-20 ASSESSMENT — PAIN DESCRIPTION - PAIN TYPE: TYPE: CHRONIC PAIN

## 2019-08-20 NOTE — PROGRESS NOTES
[facility]  Internal Medicine Teaching Residency Program  Inpatient Daily Progress Note  ______________________________________________________________________________    Patient: Kiki Sanchez  YOB: 1940   VJO:[id number]    Acct: [de-identified]     Room: [facility]-  Admit date: 2019  Today's date: 19  Number of days in the hospital: 3    SUBJECTIVE   Admitting Diagnosis: Pneumonia  CC: fever, cough  Pt examined at bedside. Chart & results reviewed. Patient still not feel at the baseline    Esophagogram revealed diverticulum, talked to the patient regarding the treatment but she is unwilling to go for the repair surgery. Did tell her about the endoscopic interventions. Patient still having cough, brings up food particles. Secretions are clear. Does not report any shortness of breath, fever, chills, chest pain. ROS:  Constitutional:  negative for chills, fevers, sweats  Respiratory:  negative for cough, dyspnea on exertion, hemoptysis, shortness of breath, wheezing  Cardiovascular:  negative for chest pain, chest pressure/discomfort, lower extremity edema, palpitations  Gastrointestinal:  negative for abdominal pain, constipation, diarrhea, nausea, vomiting  Neurological:  negative for dizziness, headache  BRIEF HISTORY     The patient is a 79 y.o.  female who is admitted after she presented with increasing shortness of breath cough and greenish to yellow sputum production since last 2 days, she was recently admitted 1 month back and was diagnosed with pneumonia and was treated with a levofloxacin, got clinically better.      OBJECTIVE     Vital Signs:  BP (!) 165/60   Pulse 84   Temp 97.9 °F (36.6 °C) (Oral)   Resp 20   Wt 186 lb (84.4 kg)   SpO2 96%   BMI 35.14 kg/m²     Temp (24hrs), Av.9 °F (36.6 °C), Min:97.7 °F (36.5 °C), Max:98.1 °F (36.7 °C)    In: 747   Out: 520 [Urine:520]    Physical Exam:  Constitutional: This is a well developed, well nourished, 25-29.9 - Overweight [de-identified]y.o. year old female who is alert, oriented, cooperative and in no apparent distress. Head:normocephalic and atraumatic. EENT:  PERRLA. No conjunctival injections. Septum was midline, mucosa was without erythema, exudates or cobblestoning. No thrush was noted. Neck: Supple without thyromegaly. No elevated JVP. Trachea was midline. Respiratory: Chest was symmetrical without dullness to percussion. Crackles noted on left side  Cardiovascular: Regular without murmur, clicks, gallops or rubs. Abdomen: Slightly rounded and soft without organomegaly. No rebound, rigidity or guarding was appreciated. Lymphatic: No lymphadenopathy. Musculoskeletal: Normal curvature of the spine. No gross muscle weakness. Extremities:  No lower extremity edema, ulcerations, tenderness, varicosities or erythema. Muscle size, tone and strength are normal.  No involuntary movements are noted. Skin:  Warm and dry. Good color, turgor and pigmentation. No lesions or scars.   No cyanosis or clubbing  Neurological/Psychiatric: The patient's general behavior, level of consciousness, thought content and emotional status is normal.        Medications:  Scheduled Medications:    azithromycin  500 mg Oral Daily    FLUoxetine  40 mg Oral Daily    heparin (porcine)  5,000 Units Subcutaneous 3 times per day    carvedilol  25 mg Oral BID    [Held by provider] clopidogrel  75 mg Oral Daily    donepezil  10 mg Oral Nightly    gabapentin  300 mg Oral TID    omeprazole  20 mg Oral Daily    cefepime  1 g Intravenous Q8H    hydrALAZINE  50 mg Oral 3 times per day     Continuous Infusions:   PRN Medications  magnesium hydroxide 30 mL Daily PRN   albuterol sulfate HFA 2 puff Q6H PRN   ALPRAZolam 0.5 mg Nightly PRN   hydrALAZINE 10 mg Q6H PRN       Diagnostic Labs:  CBC: Recent Labs     08/18/19  0739 08/19/19  0621 08/20/19  0552   WBC 10.6 7.1 7.8   RBC 3.45* 3.68* 3.67*   HGB

## 2019-08-21 LAB
ABSOLUTE EOS #: 0.22 K/UL (ref 0–0.44)
ABSOLUTE IMMATURE GRANULOCYTE: 0.04 K/UL (ref 0–0.3)
ABSOLUTE LYMPH #: 1.98 K/UL (ref 1.1–3.7)
ABSOLUTE MONO #: 0.47 K/UL (ref 0.1–1.2)
ANION GAP SERPL CALCULATED.3IONS-SCNC: 14 MMOL/L (ref 9–17)
BASOPHILS # BLD: 1 % (ref 0–2)
BASOPHILS ABSOLUTE: 0.04 K/UL (ref 0–0.2)
BUN BLDV-MCNC: 23 MG/DL (ref 8–23)
BUN/CREAT BLD: ABNORMAL (ref 9–20)
CALCIUM SERPL-MCNC: 9.7 MG/DL (ref 8.6–10.4)
CHLORIDE BLD-SCNC: 102 MMOL/L (ref 98–107)
CO2: 23 MMOL/L (ref 20–31)
CREAT SERPL-MCNC: 1.46 MG/DL (ref 0.5–0.9)
DIFFERENTIAL TYPE: ABNORMAL
EOSINOPHILS RELATIVE PERCENT: 3 % (ref 1–4)
GFR AFRICAN AMERICAN: 42 ML/MIN
GFR NON-AFRICAN AMERICAN: 34 ML/MIN
GFR SERPL CREATININE-BSD FRML MDRD: ABNORMAL ML/MIN/{1.73_M2}
GFR SERPL CREATININE-BSD FRML MDRD: ABNORMAL ML/MIN/{1.73_M2}
GLUCOSE BLD-MCNC: 110 MG/DL (ref 70–99)
HCT VFR BLD CALC: 36.2 % (ref 36.3–47.1)
HEMOGLOBIN: 11.2 G/DL (ref 11.9–15.1)
IMMATURE GRANULOCYTES: 1 %
LV EF: 55 %
LVEF MODALITY: NORMAL
LYMPHOCYTES # BLD: 27 % (ref 24–43)
MCH RBC QN AUTO: 29.3 PG (ref 25.2–33.5)
MCHC RBC AUTO-ENTMCNC: 30.9 G/DL (ref 28.4–34.8)
MCV RBC AUTO: 94.8 FL (ref 82.6–102.9)
MONOCYTES # BLD: 6 % (ref 3–12)
NRBC AUTOMATED: 0 PER 100 WBC
PDW BLD-RTO: 14.3 % (ref 11.8–14.4)
PLATELET # BLD: 280 K/UL (ref 138–453)
PLATELET ESTIMATE: ABNORMAL
PMV BLD AUTO: 10 FL (ref 8.1–13.5)
POTASSIUM SERPL-SCNC: 4.2 MMOL/L (ref 3.7–5.3)
RBC # BLD: 3.82 M/UL (ref 3.95–5.11)
RBC # BLD: ABNORMAL 10*6/UL
SEG NEUTROPHILS: 62 % (ref 36–65)
SEGMENTED NEUTROPHILS ABSOLUTE COUNT: 4.63 K/UL (ref 1.5–8.1)
SODIUM BLD-SCNC: 139 MMOL/L (ref 135–144)
TROPONIN INTERP: NORMAL
TROPONIN INTERP: NORMAL
TROPONIN T: NORMAL NG/ML
TROPONIN T: NORMAL NG/ML
TROPONIN, HIGH SENSITIVITY: 10 NG/L (ref 0–14)
TROPONIN, HIGH SENSITIVITY: 10 NG/L (ref 0–14)
WBC # BLD: 7.4 K/UL (ref 3.5–11.3)
WBC # BLD: ABNORMAL 10*3/UL

## 2019-08-21 PROCEDURE — 6360000002 HC RX W HCPCS: Performed by: STUDENT IN AN ORGANIZED HEALTH CARE EDUCATION/TRAINING PROGRAM

## 2019-08-21 PROCEDURE — 6370000000 HC RX 637 (ALT 250 FOR IP): Performed by: STUDENT IN AN ORGANIZED HEALTH CARE EDUCATION/TRAINING PROGRAM

## 2019-08-21 PROCEDURE — 99233 SBSQ HOSP IP/OBS HIGH 50: CPT | Performed by: INTERNAL MEDICINE

## 2019-08-21 PROCEDURE — 2500000003 HC RX 250 WO HCPCS: Performed by: STUDENT IN AN ORGANIZED HEALTH CARE EDUCATION/TRAINING PROGRAM

## 2019-08-21 PROCEDURE — 2580000003 HC RX 258: Performed by: STUDENT IN AN ORGANIZED HEALTH CARE EDUCATION/TRAINING PROGRAM

## 2019-08-21 PROCEDURE — 85025 COMPLETE CBC W/AUTO DIFF WBC: CPT

## 2019-08-21 PROCEDURE — 84484 ASSAY OF TROPONIN QUANT: CPT

## 2019-08-21 PROCEDURE — 80048 BASIC METABOLIC PNL TOTAL CA: CPT

## 2019-08-21 PROCEDURE — 36415 COLL VENOUS BLD VENIPUNCTURE: CPT

## 2019-08-21 PROCEDURE — 2060000000 HC ICU INTERMEDIATE R&B

## 2019-08-21 PROCEDURE — 93306 TTE W/DOPPLER COMPLETE: CPT

## 2019-08-21 RX ORDER — HYDROCHLOROTHIAZIDE 50 MG/1
50 TABLET ORAL DAILY
Status: DISCONTINUED | OUTPATIENT
Start: 2019-08-21 | End: 2019-08-22 | Stop reason: HOSPADM

## 2019-08-21 RX ORDER — AMLODIPINE BESYLATE 10 MG/1
10 TABLET ORAL DAILY
Status: DISCONTINUED | OUTPATIENT
Start: 2019-08-21 | End: 2019-08-22 | Stop reason: HOSPADM

## 2019-08-21 RX ORDER — HYDRALAZINE HYDROCHLORIDE 50 MG/1
50 TABLET, FILM COATED ORAL ONCE
Status: DISCONTINUED | OUTPATIENT
Start: 2019-08-21 | End: 2019-08-22 | Stop reason: HOSPADM

## 2019-08-21 RX ORDER — LABETALOL HYDROCHLORIDE 5 MG/ML
20 INJECTION, SOLUTION INTRAVENOUS
Status: DISCONTINUED | OUTPATIENT
Start: 2019-08-21 | End: 2019-08-22 | Stop reason: HOSPADM

## 2019-08-21 RX ORDER — HYDRALAZINE HYDROCHLORIDE 50 MG/1
100 TABLET, FILM COATED ORAL EVERY 8 HOURS SCHEDULED
Status: DISCONTINUED | OUTPATIENT
Start: 2019-08-21 | End: 2019-08-22 | Stop reason: HOSPADM

## 2019-08-21 RX ORDER — HYDROCHLOROTHIAZIDE 25 MG/1
25 TABLET ORAL DAILY
Status: DISCONTINUED | OUTPATIENT
Start: 2019-08-21 | End: 2019-08-21

## 2019-08-21 RX ADMIN — HYDRALAZINE HYDROCHLORIDE 100 MG: 50 TABLET, FILM COATED ORAL at 14:51

## 2019-08-21 RX ADMIN — CEFEPIME HYDROCHLORIDE 1 G: 1 INJECTION, POWDER, FOR SOLUTION INTRAMUSCULAR; INTRAVENOUS at 21:46

## 2019-08-21 RX ADMIN — Medication 20 MG: at 23:02

## 2019-08-21 RX ADMIN — HYDRALAZINE HYDROCHLORIDE 100 MG: 50 TABLET, FILM COATED ORAL at 20:00

## 2019-08-21 RX ADMIN — FLUOXETINE HYDROCHLORIDE 40 MG: 20 CAPSULE ORAL at 09:44

## 2019-08-21 RX ADMIN — HYDROCHLOROTHIAZIDE 50 MG: 50 TABLET ORAL at 20:00

## 2019-08-21 RX ADMIN — GABAPENTIN 300 MG: 300 CAPSULE ORAL at 19:41

## 2019-08-21 RX ADMIN — AZITHROMYCIN 500 MG: 250 TABLET, FILM COATED ORAL at 09:43

## 2019-08-21 RX ADMIN — HYDRALAZINE HYDROCHLORIDE 50 MG: 50 TABLET, FILM COATED ORAL at 06:35

## 2019-08-21 RX ADMIN — CARVEDILOL 25 MG: 25 TABLET, FILM COATED ORAL at 19:41

## 2019-08-21 RX ADMIN — AMLODIPINE BESYLATE 10 MG: 10 TABLET ORAL at 10:03

## 2019-08-21 RX ADMIN — CARVEDILOL 25 MG: 25 TABLET, FILM COATED ORAL at 08:55

## 2019-08-21 RX ADMIN — OMEPRAZOLE 20 MG: 20 CAPSULE, DELAYED RELEASE ORAL at 09:43

## 2019-08-21 RX ADMIN — HYDRALAZINE HYDROCHLORIDE 10 MG: 20 INJECTION INTRAMUSCULAR; INTRAVENOUS at 07:04

## 2019-08-21 RX ADMIN — Medication 20 MG: at 15:05

## 2019-08-21 RX ADMIN — DONEPEZIL HYDROCHLORIDE 10 MG: 10 TABLET, FILM COATED ORAL at 19:41

## 2019-08-21 RX ADMIN — CEFEPIME HYDROCHLORIDE 1 G: 1 INJECTION, POWDER, FOR SOLUTION INTRAMUSCULAR; INTRAVENOUS at 04:23

## 2019-08-21 RX ADMIN — HEPARIN SODIUM 5000 UNITS: 5000 INJECTION INTRAVENOUS; SUBCUTANEOUS at 14:51

## 2019-08-21 RX ADMIN — HEPARIN SODIUM 5000 UNITS: 5000 INJECTION INTRAVENOUS; SUBCUTANEOUS at 06:30

## 2019-08-21 RX ADMIN — CEFEPIME HYDROCHLORIDE 1 G: 1 INJECTION, POWDER, FOR SOLUTION INTRAMUSCULAR; INTRAVENOUS at 11:44

## 2019-08-21 RX ADMIN — HEPARIN SODIUM 5000 UNITS: 5000 INJECTION INTRAVENOUS; SUBCUTANEOUS at 20:00

## 2019-08-21 ASSESSMENT — PAIN SCALES - GENERAL
PAINLEVEL_OUTOF10: 0

## 2019-08-21 NOTE — CONSULTS
continues to defer surgical intervention at  this time. Further, she refused simple bedside flexible upper airway  endoscopy. We would consider modified barium swallow study with initiation of  swallow therapy. Modify diet as indicated. Otherwise, we will sign off  at this time. Please do not hesitate to contact myself with any  specific questions regarding the above.         Tanisha Tolentino    D: 08/20/2019 17:41:39       T: 08/20/2019 17:47:21     [email or web address]  Job#: [id number]     Doc#: [id number]    CC:

## 2019-08-21 NOTE — PROGRESS NOTES
recorded. Physical Exam:  Constitutional: This is a well developed, well nourished,[de-identified]y.o. year old female who is alert, oriented, cooperative and in no apparent distress. Head:normocephalic and atraumatic. EENT:  PERRLA. No conjunctival injections. Septum was midline, mucosa was without erythema, exudates or cobblestoning. No thrush was noted. Neck: Supple without thyromegaly. No elevated JVP. Trachea was midline. Respiratory: Chest was symmetrical without dullness to percussion. Breath sounds bilaterally were clear to auscultation. There were no wheezes, rhonchi or rales. There is no intercostal retraction or use of accessory muscles. No egophony noted. Cardiovascular: Regular without murmur, clicks, gallops or rubs. Abdomen: Slightly rounded and soft without organomegaly. No rebound, rigidity or guarding was appreciated. Lymphatic: No lymphadenopathy. Musculoskeletal: Normal curvature of the spine. No gross muscle weakness. Extremities:  No lower extremity edema, ulcerations, tenderness, varicosities or erythema. Muscle size, tone and strength are normal.  No involuntary movements are noted. Skin:  Warm and dry. Good color, turgor and pigmentation. No lesions or scars.   No cyanosis or clubbing  Neurological/Psychiatric: The patient's general behavior, level of consciousness, thought content and emotional status is normal.        Medications:  Scheduled Medications:    amLODIPine  10 mg Oral Daily    hydrALAZINE  100 mg Oral 3 times per day    hydrALAZINE  50 mg Oral Once    azithromycin  500 mg Oral Daily    FLUoxetine  40 mg Oral Daily    heparin (porcine)  5,000 Units Subcutaneous 3 times per day    carvedilol  25 mg Oral BID    [Held by provider] clopidogrel  75 mg Oral Daily    donepezil  10 mg Oral Nightly    gabapentin  300 mg Oral TID    omeprazole  20 mg Oral Daily    cefepime  1 g Intravenous Q8H     Continuous Infusions:   PRN Medications    labetalol 20 mg Q2H PRN   magnesium hydroxide 30 mL Daily PRN   albuterol sulfate HFA 2 puff Q6H PRN   ALPRAZolam 0.5 mg Nightly PRN   hydrALAZINE 10 mg Q6H PRN       Diagnostic Labs:  CBC:   Recent Labs     08/19/19  0621 08/20/19  0552 08/21/19  0558   WBC 7.1 7.8 7.4   RBC 3.68* 3.67* 3.82*   HGB 10.9* 10.9* 11.2*   HCT 35.4* 34.1* 36.2*   MCV 96.2 92.9 94.8   RDW 14.4 14.5* 14.3    228 280     BMP:   Recent Labs     08/19/19  0621 08/20/19  0552 08/21/19  0558    138 139   K 4.1 4.6 4.2    103 102   CO2 23 22 23   BUN 25* 26* 23   CREATININE 1.59* 1.39* 1.46*     BNP: No results for input(s): BNP in the last 72 hours. PT/INR: No results for input(s): PROTIME, INR in the last 72 hours. APTT: No results for input(s): APTT in the last 72 hours. CARDIAC ENZYMES: No results for input(s): CKMB, CKMBINDEX, TROPONINI in the last 72 hours. Invalid input(s): CKTOTAL;3  FASTING LIPID PANEL:No results found for: CHOL, HDL, TRIG  LIVER PROFILE: No results for input(s): AST, ALT, ALB, BILIDIR, BILITOT, ALKPHOS in the last 72 hours. MICROBIOLOGY:   Lab Results   Component Value Date/Time    CULTURE NORMAL RESPIRATORY LINETTE LIGHT GROWTH 08/18/2019 05:00 AM       Imaging:    Xr Chest Standard (2 Vw)    Result Date: 8/17/2019  1. Persistent disease projecting over the left mid and left lower lung zones likely pneumonia. Follow-up recommended to document resolution. 2. Mild pulmonary vascular congestion. Stable mild cardiomegaly. Ct Chest Wo Contrast    Result Date: 8/19/2019  Interval improvement in multiple areas of ground-glass pulmonary opacities, most significant within the left upper lobe since July 14, 2019. Findings may represent residual/recurrent atypical infection, pulmonary edema or possible aspiration. No evidence of significant pleural effusion. No evidence of pneumothorax. Retained contrast within the central diverticulum at the cervicothoracic junction.   This limits/obscures evaluation of

## 2019-08-21 NOTE — PROGRESS NOTES
Occupational Therapy Not Seen Note    DATE: 2019  Name: Terrence Solis  : 1940  MRN: [id number]    Patient not available for Occupational Therapy due to:     Other: Cx per RN, BP high    Next Scheduled Treatment: PM as able or 2019    Electronically signed by TRUE Hernandez on 2019 at 1:00 PM

## 2019-08-21 NOTE — PLAN OF CARE
Problem: Falls - Risk of:  Goal: Will remain free from falls  Description  Will remain free from falls  Outcome: Met This Shift     Problem: Falls - Risk of:  Goal: Absence of physical injury  Description  Absence of physical injury  Outcome: Met This Shift     Problem: Risk for Impaired Skin Integrity  Goal: Tissue integrity - skin and mucous membranes  Description  Structural intactness and normal physiological function of skin and  mucous membranes.   Outcome: Ongoing     Problem: Safety:  Goal: Free from accidental physical injury  Description  Free from accidental physical injury  Outcome: Met This Shift     Problem: Safety:  Goal: Free from intentional harm  Description  Free from intentional harm  Outcome: Met This Shift

## 2019-08-22 VITALS
OXYGEN SATURATION: 98 % | SYSTOLIC BLOOD PRESSURE: 112 MMHG | HEART RATE: 75 BPM | TEMPERATURE: 97.2 F | RESPIRATION RATE: 16 BRPM | WEIGHT: 179.1 LBS | BODY MASS INDEX: 33.84 KG/M2 | DIASTOLIC BLOOD PRESSURE: 54 MMHG

## 2019-08-22 LAB
ABSOLUTE EOS #: 0.09 K/UL (ref 0–0.44)
ABSOLUTE IMMATURE GRANULOCYTE: 0.03 K/UL (ref 0–0.3)
ABSOLUTE LYMPH #: 1.65 K/UL (ref 1.1–3.7)
ABSOLUTE MONO #: 0.67 K/UL (ref 0.1–1.2)
ANION GAP SERPL CALCULATED.3IONS-SCNC: 14 MMOL/L (ref 9–17)
BASOPHILS # BLD: 0 % (ref 0–2)
BASOPHILS ABSOLUTE: 0.03 K/UL (ref 0–0.2)
BUN BLDV-MCNC: 20 MG/DL (ref 8–23)
BUN/CREAT BLD: ABNORMAL (ref 9–20)
CALCIUM SERPL-MCNC: 9.6 MG/DL (ref 8.6–10.4)
CHLORIDE BLD-SCNC: 97 MMOL/L (ref 98–107)
CO2: 23 MMOL/L (ref 20–31)
CREAT SERPL-MCNC: 1.31 MG/DL (ref 0.5–0.9)
DIFFERENTIAL TYPE: ABNORMAL
EOSINOPHILS RELATIVE PERCENT: 1 % (ref 1–4)
GFR AFRICAN AMERICAN: 47 ML/MIN
GFR NON-AFRICAN AMERICAN: 39 ML/MIN
GFR SERPL CREATININE-BSD FRML MDRD: ABNORMAL ML/MIN/{1.73_M2}
GFR SERPL CREATININE-BSD FRML MDRD: ABNORMAL ML/MIN/{1.73_M2}
GLUCOSE BLD-MCNC: 123 MG/DL (ref 70–99)
HCT VFR BLD CALC: 37.2 % (ref 36.3–47.1)
HEMOGLOBIN: 11.6 G/DL (ref 11.9–15.1)
HISTOPLASMA ABS, ID: NORMAL
HISTOPLASMA ANTIBODY MYCELIAL CF: NORMAL
HISTOPLASMA ANTIBODY YEAST CF: NORMAL
IMMATURE GRANULOCYTES: 0 %
LYMPHOCYTES # BLD: 20 % (ref 24–43)
MCH RBC QN AUTO: 29.7 PG (ref 25.2–33.5)
MCHC RBC AUTO-ENTMCNC: 31.2 G/DL (ref 28.4–34.8)
MCV RBC AUTO: 95.4 FL (ref 82.6–102.9)
MONOCYTES # BLD: 8 % (ref 3–12)
NRBC AUTOMATED: 0 PER 100 WBC
PDW BLD-RTO: 13.9 % (ref 11.8–14.4)
PLATELET # BLD: 287 K/UL (ref 138–453)
PLATELET ESTIMATE: ABNORMAL
PMV BLD AUTO: 9.6 FL (ref 8.1–13.5)
POTASSIUM SERPL-SCNC: 4.1 MMOL/L (ref 3.7–5.3)
RBC # BLD: 3.9 M/UL (ref 3.95–5.11)
RBC # BLD: ABNORMAL 10*6/UL
SEG NEUTROPHILS: 71 % (ref 36–65)
SEGMENTED NEUTROPHILS ABSOLUTE COUNT: 5.71 K/UL (ref 1.5–8.1)
SODIUM BLD-SCNC: 134 MMOL/L (ref 135–144)
WBC # BLD: 8.2 K/UL (ref 3.5–11.3)
WBC # BLD: ABNORMAL 10*3/UL

## 2019-08-22 PROCEDURE — 97165 OT EVAL LOW COMPLEX 30 MIN: CPT

## 2019-08-22 PROCEDURE — 97535 SELF CARE MNGMENT TRAINING: CPT

## 2019-08-22 PROCEDURE — 6370000000 HC RX 637 (ALT 250 FOR IP): Performed by: STUDENT IN AN ORGANIZED HEALTH CARE EDUCATION/TRAINING PROGRAM

## 2019-08-22 PROCEDURE — 2500000003 HC RX 250 WO HCPCS: Performed by: STUDENT IN AN ORGANIZED HEALTH CARE EDUCATION/TRAINING PROGRAM

## 2019-08-22 PROCEDURE — 80048 BASIC METABOLIC PNL TOTAL CA: CPT

## 2019-08-22 PROCEDURE — 85025 COMPLETE CBC W/AUTO DIFF WBC: CPT

## 2019-08-22 PROCEDURE — 6360000002 HC RX W HCPCS: Performed by: STUDENT IN AN ORGANIZED HEALTH CARE EDUCATION/TRAINING PROGRAM

## 2019-08-22 PROCEDURE — 36415 COLL VENOUS BLD VENIPUNCTURE: CPT

## 2019-08-22 PROCEDURE — 99232 SBSQ HOSP IP/OBS MODERATE 35: CPT | Performed by: INTERNAL MEDICINE

## 2019-08-22 PROCEDURE — 2580000003 HC RX 258: Performed by: STUDENT IN AN ORGANIZED HEALTH CARE EDUCATION/TRAINING PROGRAM

## 2019-08-22 RX ORDER — ONDANSETRON 4 MG/1
4 TABLET, FILM COATED ORAL ONCE
Status: DISCONTINUED | OUTPATIENT
Start: 2019-08-22 | End: 2019-08-22

## 2019-08-22 RX ORDER — LISINOPRIL 20 MG/1
20 TABLET ORAL DAILY
Status: DISCONTINUED | OUTPATIENT
Start: 2019-08-22 | End: 2019-08-22

## 2019-08-22 RX ORDER — HYDROCHLOROTHIAZIDE 50 MG/1
50 TABLET ORAL DAILY
Qty: 30 TABLET | Refills: 3 | Status: ON HOLD | OUTPATIENT
Start: 2019-08-23 | End: 2019-09-15 | Stop reason: HOSPADM

## 2019-08-22 RX ORDER — HYDRALAZINE HYDROCHLORIDE 100 MG/1
100 TABLET, FILM COATED ORAL EVERY 8 HOURS SCHEDULED
Qty: 90 TABLET | Refills: 3 | Status: ON HOLD | OUTPATIENT
Start: 2019-08-22 | End: 2019-10-23 | Stop reason: HOSPADM

## 2019-08-22 RX ORDER — ONDANSETRON 2 MG/ML
4 INJECTION INTRAMUSCULAR; INTRAVENOUS ONCE
Status: COMPLETED | OUTPATIENT
Start: 2019-08-22 | End: 2019-08-22

## 2019-08-22 RX ORDER — LEVOFLOXACIN 750 MG/1
750 TABLET ORAL DAILY
Qty: 2 TABLET | Refills: 0 | Status: SHIPPED | OUTPATIENT
Start: 2019-08-22 | End: 2019-08-24

## 2019-08-22 RX ORDER — LISINOPRIL 20 MG/1
20 TABLET ORAL DAILY
Qty: 30 TABLET | Refills: 3 | Status: SHIPPED | OUTPATIENT
Start: 2019-08-22 | End: 2019-08-22 | Stop reason: HOSPADM

## 2019-08-22 RX ORDER — AMLODIPINE BESYLATE 10 MG/1
10 TABLET ORAL DAILY
Qty: 30 TABLET | Refills: 3 | Status: ON HOLD | OUTPATIENT
Start: 2019-08-23 | End: 2022-09-23 | Stop reason: HOSPADM

## 2019-08-22 RX ADMIN — CEFEPIME HYDROCHLORIDE 1 G: 1 INJECTION, POWDER, FOR SOLUTION INTRAMUSCULAR; INTRAVENOUS at 04:18

## 2019-08-22 RX ADMIN — HYDRALAZINE HYDROCHLORIDE 100 MG: 50 TABLET, FILM COATED ORAL at 07:00

## 2019-08-22 RX ADMIN — HYDRALAZINE HYDROCHLORIDE 10 MG: 20 INJECTION INTRAMUSCULAR; INTRAVENOUS at 00:36

## 2019-08-22 RX ADMIN — Medication 20 MG: at 04:18

## 2019-08-22 RX ADMIN — HEPARIN SODIUM 5000 UNITS: 5000 INJECTION INTRAVENOUS; SUBCUTANEOUS at 04:18

## 2019-08-22 RX ADMIN — HYDROCHLOROTHIAZIDE 50 MG: 50 TABLET ORAL at 09:27

## 2019-08-22 RX ADMIN — OMEPRAZOLE 20 MG: 20 CAPSULE, DELAYED RELEASE ORAL at 09:27

## 2019-08-22 RX ADMIN — CARVEDILOL 25 MG: 25 TABLET, FILM COATED ORAL at 09:27

## 2019-08-22 RX ADMIN — ONDANSETRON 4 MG: 2 INJECTION INTRAMUSCULAR; INTRAVENOUS at 00:36

## 2019-08-22 RX ADMIN — GABAPENTIN 300 MG: 300 CAPSULE ORAL at 09:27

## 2019-08-22 RX ADMIN — AMLODIPINE BESYLATE 10 MG: 10 TABLET ORAL at 09:27

## 2019-08-22 RX ADMIN — FLUOXETINE HYDROCHLORIDE 40 MG: 20 CAPSULE ORAL at 09:27

## 2019-08-22 ASSESSMENT — PAIN SCALES - GENERAL: PAINLEVEL_OUTOF10: 0

## 2019-08-22 NOTE — PROGRESS NOTES
RDW 14.5* 14.3 13.9    280 287     BMP:   Recent Labs     08/20/19  0552 08/21/19  0558 08/22/19  0605    139 134*   K 4.6 4.2 4.1    102 97*   CO2 22 23 23   BUN 26* 23 20   CREATININE 1.39* 1.46* 1.31*     BNP: No results for input(s): BNP in the last 72 hours. PT/INR: No results for input(s): PROTIME, INR in the last 72 hours. APTT: No results for input(s): APTT in the last 72 hours. CARDIAC ENZYMES: No results for input(s): CKMB, CKMBINDEX, TROPONINI in the last 72 hours. Invalid input(s): CKTOTAL;3  FASTING LIPID PANEL:No results found for: CHOL, HDL, TRIG  LIVER PROFILE: No results for input(s): AST, ALT, ALB, BILIDIR, BILITOT, ALKPHOS in the last 72 hours. MICROBIOLOGY:   Lab Results   Component Value Date/Time    CULTURE NORMAL RESPIRATORY LINETTE LIGHT GROWTH 08/18/2019 05:00 AM       Imaging:    Xr Chest Standard (2 Vw)    Result Date: 8/17/2019  1. Persistent disease projecting over the left mid and left lower lung zones likely pneumonia. Follow-up recommended to document resolution. 2. Mild pulmonary vascular congestion. Stable mild cardiomegaly. Ct Chest Wo Contrast    Result Date: 8/19/2019  Interval improvement in multiple areas of ground-glass pulmonary opacities, most significant within the left upper lobe since July 14, 2019. Findings may represent residual/recurrent atypical infection, pulmonary edema or possible aspiration. No evidence of significant pleural effusion. No evidence of pneumothorax. Retained contrast within the central diverticulum at the cervicothoracic junction. This limits/obscures evaluation of adjacent structures. Fl Esophagram    Result Date: 8/19/2019  1. Large Zenker's diverticulum at the cervicothoracic junction at the C6-T1 level which fills with barium/contrast. 2. Diffuse tertiary contractions. Patulous esophagus. 3. Contrast migrates through the esophagus, GE junction, and into the stomach.  The findings were sent to the Radiology Results [address] at 11:09 am on 8/19/2019to be communicated to a licensed caregiver. ASSESSMENT & PLAN     ASSESSMENT:     Primary Problem  Pneumonia    Active Hospital Problems    Diagnosis Date Noted    Lactic acidosis [E87.2] 08/18/2019    Hypertensive urgency [I16.0] 08/18/2019    Sepsis ([facility] [address] [A41.9] 08/17/2019    Pneumonia [J18.9] 07/14/2019    CKD (chronic kidney disease) [N18.9] 08/09/2016    Type 2 diabetes mellitus without complication, without long-term current use of insulin (HCC) [E11.9]     Pneumonia due to organism [J18.9] 08/04/2016    Diabetes mellitus ([facility] [address] [E11.9]     Essential hypertension [I10]     COPD (chronic obstructive pulmonary disease) ([facility]) [J44.9]        PLAN:   Pneumonia- left lobe   Blood cultures negative  Legionella, mycoplasma, strep pneumoniae negative  continue on cefepime- day 5 , azithromycin- day 3 ( will stop after this)  De-escalate on discharge  leukocytosis resolved      Zenker diverticulum  ENT were consulted: but patient does not want any intervention at the moment though she endorses dysphagia for both solids and liquids      Stage 3 CKD- at baseline  Creatinine 1.46     Diabetes mellitus- type 2, [id number]  poct glucose ac, hs  Add insulin sliding scale if needed     Hypertension  Still running high   Continue coreg and hydralazine  PRN hydralazine injection  Added amlodipine 10 mg  Will continue to monitor   Check BP Q shift      Leukocytosis- resolved      DVT ppx : heparin   GI ppx: Omeprazole      PT/OT: working with them  Discharge Planning / SW: plan to go home independently. Will DC today with sleep study outpatient. Laney Morris MD, PGY-1  Internal Medicine  [address]  8/22/2019 9:06 AM   Attending Physician Statement  I have discussed the care of Luzma Michele, including pertinent history and exam findings,  with the resident.  I have seen and examined the patient and the key elements of all

## 2019-08-22 NOTE — PROGRESS NOTES
infection), and UTI (urinary tract infection). has a past surgical history that includes Carotid endarterectomy (Bilateral). Restrictions  Restrictions/Precautions  Restrictions/Precautions: General Precautions, Up as Tolerated  Required Braces or Orthoses?: No    Subjective   General  Chart Reviewed: Progress Notes, History and Physical, Imaging, Labs, Orders  Patient assessed for rehabilitation services?: Yes  Family / Caregiver Present: No  Pain Assessment  Pain Assessment: 0-10  Pain Level: 0    Social/Functional History  Social/Functional History  Lives With: Alone  Type of Home: House  Home Layout: One level  Home Access: Ramped entrance  Bathroom Shower/Tub: Walk-in shower, Curtain  Bathroom Toilet: Handicap height  Bathroom Equipment: Grab bars in shower, Shower chair  Home Equipment: [employer], 4 wheeled walker(Uses can for short distances and walker for long distanced)  Receives Help From: Family, Neighbor, Friend(s)  ADL Assistance: Independent  Homemaking Assistance: Independent  Homemaking Responsibilities: Yes  Meal Prep Responsibility: Secondary  Laundry Responsibility: Primary  Cleaning Responsibility: Secondary  Shopping Responsibility: Primary  Ambulation Assistance: Independent  Transfer Assistance: Independent  Active : No  Patient's  Info: Family and neighbors   Mode of Transportation: Family, Friends  Occupation: Retired  Additional Comments: Patient completed own laundry ( located on main floor) and receives assistance from neighbors for most meal prep. Patient completes own grocery shopping. Pt reports good support upon discharge and has been managing well since last admission.      Objective   Vision: Impaired  Vision Exceptions: Wears glasses for reading  Hearing: Within functional limits    Orientation  Overall Orientation Status: Within Functional Limits  Observation/Palpation  Posture: Fair  Balance  Sitting Balance: Stand by assistance(Sitting on shower chair ~25

## 2019-08-23 LAB
CULTURE: NORMAL
Lab: NORMAL
SPECIMEN DESCRIPTION: NORMAL

## 2019-09-13 ENCOUNTER — HOSPITAL ENCOUNTER (INPATIENT)
Age: 79
LOS: 3 days | Discharge: HOME | DRG: 871 | End: 2019-09-16
Attending: EMERGENCY MEDICINE | Admitting: INTERNAL MEDICINE
Payer: COMMERCIAL

## 2019-09-13 ENCOUNTER — APPOINTMENT (OUTPATIENT)
Dept: GENERAL RADIOLOGY | Age: 79
DRG: 871 | End: 2019-09-13
Payer: COMMERCIAL

## 2019-09-13 ENCOUNTER — HOSPITAL ENCOUNTER (EMERGENCY)
Age: 79
Discharge: HOME | DRG: 871 | End: 2019-09-13
Attending: EMERGENCY MEDICINE
Payer: COMMERCIAL

## 2019-09-13 VITALS
BODY MASS INDEX: 33.13 KG/M2 | HEIGHT: 62 IN | HEART RATE: 112 BPM | WEIGHT: 180 LBS | TEMPERATURE: 101.5 F | OXYGEN SATURATION: 95 %

## 2019-09-13 DIAGNOSIS — J18.9 PNEUMONIA DUE TO ORGANISM: Primary | ICD-10-CM

## 2019-09-13 DIAGNOSIS — J69.0 RECURRENT ASPIRATION PNEUMONIA (HCC): ICD-10-CM

## 2019-09-13 DIAGNOSIS — Z91.199 NONCOMPLIANCE: ICD-10-CM

## 2019-09-13 DIAGNOSIS — J18.9 PNEUMONIA OF LEFT UPPER LOBE DUE TO INFECTIOUS ORGANISM: ICD-10-CM

## 2019-09-13 LAB
-: NORMAL
ABSOLUTE EOS #: 0.11 K/UL (ref 0–0.44)
ABSOLUTE IMMATURE GRANULOCYTE: 0.06 K/UL (ref 0–0.3)
ABSOLUTE LYMPH #: 0.99 K/UL (ref 1.1–3.7)
ABSOLUTE MONO #: 0.69 K/UL (ref 0.1–1.2)
ALBUMIN SERPL-MCNC: 4.2 G/DL (ref 3.5–5.2)
ALBUMIN/GLOBULIN RATIO: 1.1 (ref 1–2.5)
ALP BLD-CCNC: 74 U/L (ref 35–104)
ALT SERPL-CCNC: 13 U/L (ref 5–33)
AMORPHOUS: NORMAL
ANION GAP SERPL CALCULATED.3IONS-SCNC: 16 MMOL/L (ref 9–17)
AST SERPL-CCNC: 26 U/L
BACTERIA: NORMAL
BASOPHILS # BLD: 0 % (ref 0–2)
BASOPHILS ABSOLUTE: 0.04 K/UL (ref 0–0.2)
BILIRUB SERPL-MCNC: 0.29 MG/DL (ref 0.3–1.2)
BILIRUBIN DIRECT: 0.11 MG/DL
BILIRUBIN URINE: NEGATIVE
BILIRUBIN, INDIRECT: 0.18 MG/DL (ref 0–1)
BUN BLDV-MCNC: 39 MG/DL (ref 8–23)
BUN/CREAT BLD: ABNORMAL (ref 9–20)
CALCIUM SERPL-MCNC: 9.9 MG/DL (ref 8.6–10.4)
CASTS UA: NORMAL /LPF (ref 0–8)
CHLORIDE BLD-SCNC: 98 MMOL/L (ref 98–107)
CO2: 21 MMOL/L (ref 20–31)
COLOR: YELLOW
COMMENT UA: ABNORMAL
CREAT SERPL-MCNC: 1.62 MG/DL (ref 0.5–0.9)
CRYSTALS, UA: NORMAL /HPF
CULTURE: NORMAL
CULTURE: NORMAL
DIFFERENTIAL TYPE: ABNORMAL
EOSINOPHILS RELATIVE PERCENT: 1 % (ref 1–4)
EPITHELIAL CELLS UA: NORMAL /HPF (ref 0–5)
GFR AFRICAN AMERICAN: 37 ML/MIN
GFR NON-AFRICAN AMERICAN: 31 ML/MIN
GFR SERPL CREATININE-BSD FRML MDRD: ABNORMAL ML/MIN/{1.73_M2}
GFR SERPL CREATININE-BSD FRML MDRD: ABNORMAL ML/MIN/{1.73_M2}
GLOBULIN: ABNORMAL G/DL (ref 1.5–3.8)
GLUCOSE BLD-MCNC: 157 MG/DL (ref 70–99)
GLUCOSE URINE: NEGATIVE
HCT VFR BLD CALC: 41 % (ref 36.3–47.1)
HEMOGLOBIN: 12.3 G/DL (ref 11.9–15.1)
IMMATURE GRANULOCYTES: 0 %
INR BLD: 0.9
KETONES, URINE: NEGATIVE
LACTIC ACID, SEPSIS WHOLE BLOOD: 1.9 MMOL/L (ref 0.5–1.9)
LACTIC ACID, SEPSIS: NORMAL MMOL/L (ref 0.5–1.9)
LACTIC ACID, WHOLE BLOOD: 2.4 MMOL/L (ref 0.7–2.1)
LACTIC ACID: ABNORMAL MMOL/L
LEUKOCYTE ESTERASE, URINE: NEGATIVE
LYMPHOCYTES # BLD: 6 % (ref 24–43)
Lab: NORMAL
Lab: NORMAL
MCH RBC QN AUTO: 28.9 PG (ref 25.2–33.5)
MCHC RBC AUTO-ENTMCNC: 30 G/DL (ref 28.4–34.8)
MCV RBC AUTO: 96.2 FL (ref 82.6–102.9)
MONOCYTES # BLD: 4 % (ref 3–12)
MUCUS: NORMAL
NITRITE, URINE: NEGATIVE
NRBC AUTOMATED: 0 PER 100 WBC
OTHER OBSERVATIONS UA: NORMAL
PARTIAL THROMBOPLASTIN TIME: 26.5 SEC (ref 20.5–30.5)
PDW BLD-RTO: 13.6 % (ref 11.8–14.4)
PH UA: 6 (ref 5–8)
PLATELET # BLD: 303 K/UL (ref 138–453)
PLATELET ESTIMATE: ABNORMAL
PMV BLD AUTO: 9.5 FL (ref 8.1–13.5)
POTASSIUM SERPL-SCNC: 5.3 MMOL/L (ref 3.7–5.3)
PROCALCITONIN: 3.69 NG/ML
PROTEIN UA: NEGATIVE
PROTHROMBIN TIME: 9.6 SEC (ref 9–12)
RBC # BLD: 4.26 M/UL (ref 3.95–5.11)
RBC # BLD: ABNORMAL 10*6/UL
RBC UA: NORMAL /HPF (ref 0–4)
RENAL EPITHELIAL, UA: NORMAL /HPF
SEG NEUTROPHILS: 89 % (ref 36–65)
SEGMENTED NEUTROPHILS ABSOLUTE COUNT: 14.19 K/UL (ref 1.5–8.1)
SODIUM BLD-SCNC: 135 MMOL/L (ref 135–144)
SPECIFIC GRAVITY UA: 1.01 (ref 1–1.03)
SPECIMEN DESCRIPTION: NORMAL
SPECIMEN DESCRIPTION: NORMAL
TOTAL PROTEIN: 8 G/DL (ref 6.4–8.3)
TRICHOMONAS: NORMAL
TROPONIN INTERP: NORMAL
TROPONIN T: NORMAL NG/ML
TROPONIN, HIGH SENSITIVITY: 11 NG/L (ref 0–14)
TURBIDITY: ABNORMAL
URINE HGB: NEGATIVE
UROBILINOGEN, URINE: NORMAL
WBC # BLD: 16.1 K/UL (ref 3.5–11.3)
WBC # BLD: ABNORMAL 10*3/UL
WBC UA: NORMAL /HPF (ref 0–5)
YEAST: NORMAL

## 2019-09-13 PROCEDURE — 85025 COMPLETE CBC W/AUTO DIFF WBC: CPT

## 2019-09-13 PROCEDURE — 84145 PROCALCITONIN (PCT): CPT

## 2019-09-13 PROCEDURE — 81001 URINALYSIS AUTO W/SCOPE: CPT

## 2019-09-13 PROCEDURE — 80048 BASIC METABOLIC PNL TOTAL CA: CPT

## 2019-09-13 PROCEDURE — 96365 THER/PROPH/DIAG IV INF INIT: CPT

## 2019-09-13 PROCEDURE — 6370000000 HC RX 637 (ALT 250 FOR IP): Performed by: NURSE PRACTITIONER

## 2019-09-13 PROCEDURE — 94640 AIRWAY INHALATION TREATMENT: CPT

## 2019-09-13 PROCEDURE — 87086 URINE CULTURE/COLONY COUNT: CPT

## 2019-09-13 PROCEDURE — 6360000002 HC RX W HCPCS: Performed by: STUDENT IN AN ORGANIZED HEALTH CARE EDUCATION/TRAINING PROGRAM

## 2019-09-13 PROCEDURE — 6370000000 HC RX 637 (ALT 250 FOR IP): Performed by: STUDENT IN AN ORGANIZED HEALTH CARE EDUCATION/TRAINING PROGRAM

## 2019-09-13 PROCEDURE — 80076 HEPATIC FUNCTION PANEL: CPT

## 2019-09-13 PROCEDURE — 2580000003 HC RX 258: Performed by: STUDENT IN AN ORGANIZED HEALTH CARE EDUCATION/TRAINING PROGRAM

## 2019-09-13 PROCEDURE — 71045 X-RAY EXAM CHEST 1 VIEW: CPT

## 2019-09-13 PROCEDURE — 99285 EMERGENCY DEPT VISIT HI MDM: CPT

## 2019-09-13 PROCEDURE — 87040 BLOOD CULTURE FOR BACTERIA: CPT

## 2019-09-13 PROCEDURE — 87449 NOS EACH ORGANISM AG IA: CPT

## 2019-09-13 PROCEDURE — 36415 COLL VENOUS BLD VENIPUNCTURE: CPT

## 2019-09-13 PROCEDURE — 99223 1ST HOSP IP/OBS HIGH 75: CPT | Performed by: NURSE PRACTITIONER

## 2019-09-13 PROCEDURE — 2060000000 HC ICU INTERMEDIATE R&B

## 2019-09-13 PROCEDURE — 93005 ELECTROCARDIOGRAM TRACING: CPT

## 2019-09-13 PROCEDURE — 6360000002 HC RX W HCPCS: Performed by: NURSE PRACTITIONER

## 2019-09-13 PROCEDURE — 2580000003 HC RX 258: Performed by: NURSE PRACTITIONER

## 2019-09-13 PROCEDURE — 85730 THROMBOPLASTIN TIME PARTIAL: CPT

## 2019-09-13 PROCEDURE — 83605 ASSAY OF LACTIC ACID: CPT

## 2019-09-13 PROCEDURE — 84484 ASSAY OF TROPONIN QUANT: CPT

## 2019-09-13 PROCEDURE — 85610 PROTHROMBIN TIME: CPT

## 2019-09-13 RX ORDER — CARVEDILOL 25 MG/1
25 TABLET ORAL 2 TIMES DAILY
Status: DISCONTINUED | OUTPATIENT
Start: 2019-09-13 | End: 2019-09-16 | Stop reason: HOSPADM

## 2019-09-13 RX ORDER — ALBUTEROL SULFATE 90 UG/1
2 AEROSOL, METERED RESPIRATORY (INHALATION)
Status: DISCONTINUED | OUTPATIENT
Start: 2019-09-13 | End: 2019-09-14

## 2019-09-13 RX ORDER — ALBUTEROL SULFATE 2.5 MG/3ML
5 SOLUTION RESPIRATORY (INHALATION)
Status: DISCONTINUED | OUTPATIENT
Start: 2019-09-13 | End: 2019-09-13

## 2019-09-13 RX ORDER — SODIUM CHLORIDE 0.9 % (FLUSH) 0.9 %
10 SYRINGE (ML) INJECTION EVERY 12 HOURS SCHEDULED
Status: DISCONTINUED | OUTPATIENT
Start: 2019-09-13 | End: 2019-09-16 | Stop reason: HOSPADM

## 2019-09-13 RX ORDER — ALPRAZOLAM 0.5 MG/1
0.5 TABLET ORAL NIGHTLY PRN
Status: DISCONTINUED | OUTPATIENT
Start: 2019-09-13 | End: 2019-09-16 | Stop reason: HOSPADM

## 2019-09-13 RX ORDER — IPRATROPIUM BROMIDE AND ALBUTEROL SULFATE 2.5; .5 MG/3ML; MG/3ML
1 SOLUTION RESPIRATORY (INHALATION)
Status: DISCONTINUED | OUTPATIENT
Start: 2019-09-14 | End: 2019-09-14

## 2019-09-13 RX ORDER — ACETAMINOPHEN 325 MG/1
650 TABLET ORAL EVERY 4 HOURS PRN
Status: DISCONTINUED | OUTPATIENT
Start: 2019-09-13 | End: 2019-09-16 | Stop reason: HOSPADM

## 2019-09-13 RX ORDER — DONEPEZIL HYDROCHLORIDE 10 MG/1
10 TABLET, FILM COATED ORAL NIGHTLY
Status: DISCONTINUED | OUTPATIENT
Start: 2019-09-13 | End: 2019-09-16 | Stop reason: HOSPADM

## 2019-09-13 RX ORDER — CLOPIDOGREL BISULFATE 75 MG/1
75 TABLET ORAL DAILY
Status: DISCONTINUED | OUTPATIENT
Start: 2019-09-13 | End: 2019-09-16 | Stop reason: HOSPADM

## 2019-09-13 RX ORDER — SODIUM CHLORIDE 9 MG/ML
INJECTION, SOLUTION INTRAVENOUS CONTINUOUS
Status: DISCONTINUED | OUTPATIENT
Start: 2019-09-13 | End: 2019-09-16 | Stop reason: HOSPADM

## 2019-09-13 RX ORDER — FLUOXETINE HYDROCHLORIDE 20 MG/1
40 CAPSULE ORAL DAILY
Status: DISCONTINUED | OUTPATIENT
Start: 2019-09-13 | End: 2019-09-16 | Stop reason: HOSPADM

## 2019-09-13 RX ORDER — CYCLOBENZAPRINE HCL 10 MG
10 TABLET ORAL 3 TIMES DAILY PRN
Status: DISCONTINUED | OUTPATIENT
Start: 2019-09-13 | End: 2019-09-16 | Stop reason: HOSPADM

## 2019-09-13 RX ORDER — GABAPENTIN 300 MG/1
300 CAPSULE ORAL 3 TIMES DAILY
Status: DISCONTINUED | OUTPATIENT
Start: 2019-09-13 | End: 2019-09-16 | Stop reason: HOSPADM

## 2019-09-13 RX ORDER — AMLODIPINE BESYLATE 10 MG/1
10 TABLET ORAL DAILY
Status: DISCONTINUED | OUTPATIENT
Start: 2019-09-13 | End: 2019-09-16 | Stop reason: HOSPADM

## 2019-09-13 RX ORDER — ALBUTEROL SULFATE 90 UG/1
2 AEROSOL, METERED RESPIRATORY (INHALATION)
Status: DISCONTINUED | OUTPATIENT
Start: 2019-09-13 | End: 2019-09-13

## 2019-09-13 RX ORDER — HYDRALAZINE HYDROCHLORIDE 50 MG/1
100 TABLET, FILM COATED ORAL EVERY 8 HOURS SCHEDULED
Status: DISCONTINUED | OUTPATIENT
Start: 2019-09-13 | End: 2019-09-16 | Stop reason: HOSPADM

## 2019-09-13 RX ORDER — ACETAMINOPHEN 325 MG/1
650 TABLET ORAL ONCE
Status: COMPLETED | OUTPATIENT
Start: 2019-09-13 | End: 2019-09-13

## 2019-09-13 RX ORDER — 0.9 % SODIUM CHLORIDE 0.9 %
1000 INTRAVENOUS SOLUTION INTRAVENOUS ONCE
Status: COMPLETED | OUTPATIENT
Start: 2019-09-13 | End: 2019-09-13

## 2019-09-13 RX ORDER — PANTOPRAZOLE SODIUM 40 MG/1
40 TABLET, DELAYED RELEASE ORAL
Status: DISCONTINUED | OUTPATIENT
Start: 2019-09-14 | End: 2019-09-16 | Stop reason: HOSPADM

## 2019-09-13 RX ORDER — SODIUM CHLORIDE 0.9 % (FLUSH) 0.9 %
10 SYRINGE (ML) INJECTION PRN
Status: DISCONTINUED | OUTPATIENT
Start: 2019-09-13 | End: 2019-09-16 | Stop reason: HOSPADM

## 2019-09-13 RX ORDER — NICOTINE 21 MG/24HR
1 PATCH, TRANSDERMAL 24 HOURS TRANSDERMAL DAILY PRN
Status: DISCONTINUED | OUTPATIENT
Start: 2019-09-13 | End: 2019-09-16 | Stop reason: HOSPADM

## 2019-09-13 RX ORDER — ALBUTEROL SULFATE 2.5 MG/3ML
2.5 SOLUTION RESPIRATORY (INHALATION)
Status: DISCONTINUED | OUTPATIENT
Start: 2019-09-13 | End: 2019-09-14

## 2019-09-13 RX ORDER — ONDANSETRON 2 MG/ML
4 INJECTION INTRAMUSCULAR; INTRAVENOUS EVERY 6 HOURS PRN
Status: DISCONTINUED | OUTPATIENT
Start: 2019-09-13 | End: 2019-09-16 | Stop reason: HOSPADM

## 2019-09-13 RX ORDER — IPRATROPIUM BROMIDE AND ALBUTEROL SULFATE 2.5; .5 MG/3ML; MG/3ML
1 SOLUTION RESPIRATORY (INHALATION)
Status: DISCONTINUED | OUTPATIENT
Start: 2019-09-13 | End: 2019-09-13

## 2019-09-13 RX ADMIN — ALBUTEROL SULFATE 2.5 MG: 2.5 SOLUTION RESPIRATORY (INHALATION) at 22:18

## 2019-09-13 RX ADMIN — GABAPENTIN 300 MG: 300 CAPSULE ORAL at 21:56

## 2019-09-13 RX ADMIN — Medication 1250 MG: at 22:00

## 2019-09-13 RX ADMIN — ALBUTEROL SULFATE 5 MG: 5 SOLUTION RESPIRATORY (INHALATION) at 17:35

## 2019-09-13 RX ADMIN — HYDRALAZINE HYDROCHLORIDE 100 MG: 50 TABLET ORAL at 21:56

## 2019-09-13 RX ADMIN — SODIUM CHLORIDE: 9 INJECTION, SOLUTION INTRAVENOUS at 22:00

## 2019-09-13 RX ADMIN — SODIUM CHLORIDE 1000 ML: 9 INJECTION, SOLUTION INTRAVENOUS at 17:45

## 2019-09-13 RX ADMIN — CYCLOBENZAPRINE 10 MG: 10 TABLET, FILM COATED ORAL at 21:56

## 2019-09-13 RX ADMIN — CEFEPIME HYDROCHLORIDE 2 G: 2 INJECTION, POWDER, FOR SOLUTION INTRAVENOUS at 18:51

## 2019-09-13 RX ADMIN — CARVEDILOL 25 MG: 25 TABLET, FILM COATED ORAL at 21:56

## 2019-09-13 RX ADMIN — IPRATROPIUM BROMIDE 0.5 MG: 0.5 SOLUTION RESPIRATORY (INHALATION) at 14:35

## 2019-09-13 RX ADMIN — ACETAMINOPHEN 650 MG: 325 TABLET ORAL at 19:10

## 2019-09-13 RX ADMIN — ENOXAPARIN SODIUM 30 MG: 30 INJECTION SUBCUTANEOUS at 22:11

## 2019-09-13 RX ADMIN — AMLODIPINE BESYLATE 10 MG: 10 TABLET ORAL at 21:57

## 2019-09-13 RX ADMIN — CLOPIDOGREL 75 MG: 75 TABLET, FILM COATED ORAL at 21:56

## 2019-09-13 ASSESSMENT — PAIN SCALES - GENERAL: PAINLEVEL_OUTOF10: 0

## 2019-09-14 ENCOUNTER — APPOINTMENT (OUTPATIENT)
Dept: GENERAL RADIOLOGY | Age: 79
DRG: 871 | End: 2019-09-14
Payer: COMMERCIAL

## 2019-09-14 PROBLEM — K22.5 ZENKER DIVERTICULUM: Status: ACTIVE | Noted: 2019-09-14

## 2019-09-14 LAB
ANION GAP SERPL CALCULATED.3IONS-SCNC: 14 MMOL/L (ref 9–17)
BUN BLDV-MCNC: 39 MG/DL (ref 8–23)
BUN/CREAT BLD: ABNORMAL (ref 9–20)
CALCIUM SERPL-MCNC: 8.8 MG/DL (ref 8.6–10.4)
CHLORIDE BLD-SCNC: 102 MMOL/L (ref 98–107)
CO2: 21 MMOL/L (ref 20–31)
CREAT SERPL-MCNC: 1.71 MG/DL (ref 0.5–0.9)
D-DIMER QUANTITATIVE: 3.99 MG/L FEU
DIRECT EXAM: NORMAL
GFR AFRICAN AMERICAN: 35 ML/MIN
GFR NON-AFRICAN AMERICAN: 29 ML/MIN
GFR SERPL CREATININE-BSD FRML MDRD: ABNORMAL ML/MIN/{1.73_M2}
GFR SERPL CREATININE-BSD FRML MDRD: ABNORMAL ML/MIN/{1.73_M2}
GLUCOSE BLD-MCNC: 102 MG/DL (ref 70–99)
HCT VFR BLD CALC: 32.4 % (ref 36.3–47.1)
HEMOGLOBIN: 10.2 G/DL (ref 11.9–15.1)
Lab: NORMAL
MCH RBC QN AUTO: 29.9 PG (ref 25.2–33.5)
MCHC RBC AUTO-ENTMCNC: 31.5 G/DL (ref 28.4–34.8)
MCV RBC AUTO: 95 FL (ref 82.6–102.9)
NRBC AUTOMATED: 0 PER 100 WBC
PDW BLD-RTO: 13.8 % (ref 11.8–14.4)
PLATELET # BLD: 213 K/UL (ref 138–453)
PMV BLD AUTO: 9.7 FL (ref 8.1–13.5)
POTASSIUM SERPL-SCNC: 4.6 MMOL/L (ref 3.7–5.3)
RBC # BLD: 3.41 M/UL (ref 3.95–5.11)
SODIUM BLD-SCNC: 137 MMOL/L (ref 135–144)
SPECIMEN DESCRIPTION: NORMAL
WBC # BLD: 12.9 K/UL (ref 3.5–11.3)

## 2019-09-14 PROCEDURE — 80048 BASIC METABOLIC PNL TOTAL CA: CPT

## 2019-09-14 PROCEDURE — 99232 SBSQ HOSP IP/OBS MODERATE 35: CPT | Performed by: INTERNAL MEDICINE

## 2019-09-14 PROCEDURE — 36415 COLL VENOUS BLD VENIPUNCTURE: CPT

## 2019-09-14 PROCEDURE — 97162 PT EVAL MOD COMPLEX 30 MIN: CPT

## 2019-09-14 PROCEDURE — 94760 N-INVAS EAR/PLS OXIMETRY 1: CPT

## 2019-09-14 PROCEDURE — 2060000000 HC ICU INTERMEDIATE R&B

## 2019-09-14 PROCEDURE — 6370000000 HC RX 637 (ALT 250 FOR IP): Performed by: NURSE PRACTITIONER

## 2019-09-14 PROCEDURE — 2580000003 HC RX 258: Performed by: NURSE PRACTITIONER

## 2019-09-14 PROCEDURE — 71046 X-RAY EXAM CHEST 2 VIEWS: CPT

## 2019-09-14 PROCEDURE — 94640 AIRWAY INHALATION TREATMENT: CPT

## 2019-09-14 PROCEDURE — 99223 1ST HOSP IP/OBS HIGH 75: CPT | Performed by: INTERNAL MEDICINE

## 2019-09-14 PROCEDURE — 6360000002 HC RX W HCPCS: Performed by: NURSE PRACTITIONER

## 2019-09-14 PROCEDURE — 6370000000 HC RX 637 (ALT 250 FOR IP): Performed by: INTERNAL MEDICINE

## 2019-09-14 PROCEDURE — 97530 THERAPEUTIC ACTIVITIES: CPT

## 2019-09-14 PROCEDURE — 94664 DEMO&/EVAL PT USE INHALER: CPT

## 2019-09-14 PROCEDURE — 85027 COMPLETE CBC AUTOMATED: CPT

## 2019-09-14 PROCEDURE — 85379 FIBRIN DEGRADATION QUANT: CPT

## 2019-09-14 RX ORDER — ALBUTEROL SULFATE 2.5 MG/3ML
2.5 SOLUTION RESPIRATORY (INHALATION)
Status: DISCONTINUED | OUTPATIENT
Start: 2019-09-14 | End: 2019-09-14

## 2019-09-14 RX ORDER — ALBUTEROL SULFATE 2.5 MG/3ML
2.5 SOLUTION RESPIRATORY (INHALATION) EVERY 6 HOURS PRN
Status: DISCONTINUED | OUTPATIENT
Start: 2019-09-14 | End: 2019-09-16 | Stop reason: HOSPADM

## 2019-09-14 RX ORDER — ALBUTEROL SULFATE 90 UG/1
2 AEROSOL, METERED RESPIRATORY (INHALATION) EVERY 6 HOURS PRN
Status: DISCONTINUED | OUTPATIENT
Start: 2019-09-14 | End: 2019-09-16 | Stop reason: HOSPADM

## 2019-09-14 RX ORDER — ALBUTEROL SULFATE 90 UG/1
6 AEROSOL, METERED RESPIRATORY (INHALATION) EVERY 6 HOURS PRN
Status: DISCONTINUED | OUTPATIENT
Start: 2019-09-14 | End: 2019-09-14

## 2019-09-14 RX ADMIN — DONEPEZIL HYDROCHLORIDE 10 MG: 10 TABLET, FILM COATED ORAL at 20:27

## 2019-09-14 RX ADMIN — TIOTROPIUM BROMIDE 18 MCG: 18 CAPSULE ORAL; RESPIRATORY (INHALATION) at 17:30

## 2019-09-14 RX ADMIN — SODIUM CHLORIDE: 9 INJECTION, SOLUTION INTRAVENOUS at 22:46

## 2019-09-14 RX ADMIN — ENOXAPARIN SODIUM 30 MG: 30 INJECTION SUBCUTANEOUS at 09:23

## 2019-09-14 RX ADMIN — HYDRALAZINE HYDROCHLORIDE 100 MG: 50 TABLET ORAL at 22:34

## 2019-09-14 RX ADMIN — PANTOPRAZOLE SODIUM 40 MG: 40 TABLET, DELAYED RELEASE ORAL at 06:27

## 2019-09-14 RX ADMIN — GABAPENTIN 300 MG: 300 CAPSULE ORAL at 20:27

## 2019-09-14 RX ADMIN — HYDRALAZINE HYDROCHLORIDE 100 MG: 50 TABLET ORAL at 06:27

## 2019-09-14 RX ADMIN — IPRATROPIUM BROMIDE AND ALBUTEROL SULFATE 1 AMPULE: .5; 3 SOLUTION RESPIRATORY (INHALATION) at 12:42

## 2019-09-14 RX ADMIN — CARVEDILOL 25 MG: 25 TABLET, FILM COATED ORAL at 20:27

## 2019-09-14 RX ADMIN — Medication 10 ML: at 09:32

## 2019-09-14 RX ADMIN — GABAPENTIN 300 MG: 300 CAPSULE ORAL at 14:23

## 2019-09-14 RX ADMIN — FLUOXETINE HYDROCHLORIDE 40 MG: 20 CAPSULE ORAL at 09:21

## 2019-09-14 RX ADMIN — CLOPIDOGREL 75 MG: 75 TABLET, FILM COATED ORAL at 09:21

## 2019-09-14 RX ADMIN — GABAPENTIN 300 MG: 300 CAPSULE ORAL at 09:21

## 2019-09-14 RX ADMIN — AMLODIPINE BESYLATE 10 MG: 10 TABLET ORAL at 09:22

## 2019-09-14 RX ADMIN — HYDRALAZINE HYDROCHLORIDE 100 MG: 50 TABLET ORAL at 14:23

## 2019-09-14 RX ADMIN — CARVEDILOL 25 MG: 25 TABLET, FILM COATED ORAL at 09:22

## 2019-09-14 ASSESSMENT — ENCOUNTER SYMPTOMS
STRIDOR: 0
NAUSEA: 0
CONSTIPATION: 0
BLOOD IN STOOL: 0
ABDOMINAL PAIN: 0
SHORTNESS OF BREATH: 1
SORE THROAT: 0
WHEEZING: 0
VOICE CHANGE: 1
COUGH: 1
VOMITING: 0
DIARRHEA: 0

## 2019-09-15 LAB
CULTURE: NORMAL
EKG ATRIAL RATE: 108 BPM
EKG P AXIS: 59 DEGREES
EKG P-R INTERVAL: 162 MS
EKG Q-T INTERVAL: 294 MS
EKG QRS DURATION: 68 MS
EKG QTC CALCULATION (BAZETT): 393 MS
EKG R AXIS: 40 DEGREES
EKG T AXIS: 65 DEGREES
EKG VENTRICULAR RATE: 108 BPM
Lab: NORMAL
SPECIMEN DESCRIPTION: NORMAL

## 2019-09-15 PROCEDURE — 2060000000 HC ICU INTERMEDIATE R&B

## 2019-09-15 PROCEDURE — 99233 SBSQ HOSP IP/OBS HIGH 50: CPT | Performed by: INTERNAL MEDICINE

## 2019-09-15 PROCEDURE — 6360000002 HC RX W HCPCS: Performed by: NURSE PRACTITIONER

## 2019-09-15 PROCEDURE — 6370000000 HC RX 637 (ALT 250 FOR IP): Performed by: NURSE PRACTITIONER

## 2019-09-15 PROCEDURE — 94640 AIRWAY INHALATION TREATMENT: CPT

## 2019-09-15 PROCEDURE — 2580000003 HC RX 258: Performed by: NURSE PRACTITIONER

## 2019-09-15 PROCEDURE — 99232 SBSQ HOSP IP/OBS MODERATE 35: CPT | Performed by: INTERNAL MEDICINE

## 2019-09-15 PROCEDURE — 6370000000 HC RX 637 (ALT 250 FOR IP): Performed by: INTERNAL MEDICINE

## 2019-09-15 RX ORDER — LEVOFLOXACIN 500 MG/1
500 TABLET, FILM COATED ORAL DAILY
Qty: 5 TABLET | Refills: 0 | Status: SHIPPED | OUTPATIENT
Start: 2019-09-15 | End: 2019-09-16 | Stop reason: HOSPADM

## 2019-09-15 RX ADMIN — CARVEDILOL 25 MG: 25 TABLET, FILM COATED ORAL at 09:00

## 2019-09-15 RX ADMIN — CLOPIDOGREL 75 MG: 75 TABLET, FILM COATED ORAL at 09:00

## 2019-09-15 RX ADMIN — SODIUM CHLORIDE: 9 INJECTION, SOLUTION INTRAVENOUS at 20:48

## 2019-09-15 RX ADMIN — DONEPEZIL HYDROCHLORIDE 10 MG: 10 TABLET, FILM COATED ORAL at 20:43

## 2019-09-15 RX ADMIN — CARVEDILOL 25 MG: 25 TABLET, FILM COATED ORAL at 20:43

## 2019-09-15 RX ADMIN — Medication 1250 MG: at 09:00

## 2019-09-15 RX ADMIN — PANTOPRAZOLE SODIUM 40 MG: 40 TABLET, DELAYED RELEASE ORAL at 06:00

## 2019-09-15 RX ADMIN — ENOXAPARIN SODIUM 30 MG: 30 INJECTION SUBCUTANEOUS at 09:00

## 2019-09-15 RX ADMIN — HYDRALAZINE HYDROCHLORIDE 100 MG: 50 TABLET ORAL at 06:00

## 2019-09-15 RX ADMIN — HYDRALAZINE HYDROCHLORIDE 100 MG: 50 TABLET ORAL at 20:43

## 2019-09-15 RX ADMIN — FLUOXETINE HYDROCHLORIDE 40 MG: 20 CAPSULE ORAL at 09:00

## 2019-09-15 RX ADMIN — HYDRALAZINE HYDROCHLORIDE 100 MG: 50 TABLET ORAL at 15:04

## 2019-09-15 RX ADMIN — GABAPENTIN 300 MG: 300 CAPSULE ORAL at 20:43

## 2019-09-15 RX ADMIN — GABAPENTIN 300 MG: 300 CAPSULE ORAL at 15:04

## 2019-09-15 RX ADMIN — GABAPENTIN 300 MG: 300 CAPSULE ORAL at 09:00

## 2019-09-15 RX ADMIN — AMLODIPINE BESYLATE 10 MG: 10 TABLET ORAL at 09:00

## 2019-09-15 RX ADMIN — TIOTROPIUM BROMIDE 18 MCG: 18 CAPSULE ORAL; RESPIRATORY (INHALATION) at 09:00

## 2019-09-15 RX ADMIN — Medication 10 ML: at 09:00

## 2019-09-16 VITALS
DIASTOLIC BLOOD PRESSURE: 57 MMHG | HEIGHT: 61 IN | SYSTOLIC BLOOD PRESSURE: 136 MMHG | HEART RATE: 74 BPM | BODY MASS INDEX: 35.84 KG/M2 | TEMPERATURE: 98 F | WEIGHT: 189.82 LBS | RESPIRATION RATE: 18 BRPM | OXYGEN SATURATION: 96 %

## 2019-09-16 PROBLEM — A41.9 SEPSIS (HCC): Status: RESOLVED | Noted: 2019-08-17 | Resolved: 2019-09-16

## 2019-09-16 PROCEDURE — G0008 ADMIN INFLUENZA VIRUS VAC: HCPCS | Performed by: INTERNAL MEDICINE

## 2019-09-16 PROCEDURE — 99222 1ST HOSP IP/OBS MODERATE 55: CPT | Performed by: FAMILY MEDICINE

## 2019-09-16 PROCEDURE — 6360000002 HC RX W HCPCS: Performed by: INTERNAL MEDICINE

## 2019-09-16 PROCEDURE — 6370000000 HC RX 637 (ALT 250 FOR IP): Performed by: NURSE PRACTITIONER

## 2019-09-16 PROCEDURE — 6360000002 HC RX W HCPCS: Performed by: NURSE PRACTITIONER

## 2019-09-16 PROCEDURE — 99232 SBSQ HOSP IP/OBS MODERATE 35: CPT | Performed by: INTERNAL MEDICINE

## 2019-09-16 PROCEDURE — 6370000000 HC RX 637 (ALT 250 FOR IP): Performed by: INTERNAL MEDICINE

## 2019-09-16 PROCEDURE — 99238 HOSP IP/OBS DSCHRG MGMT 30/<: CPT | Performed by: INTERNAL MEDICINE

## 2019-09-16 PROCEDURE — 90686 IIV4 VACC NO PRSV 0.5 ML IM: CPT | Performed by: INTERNAL MEDICINE

## 2019-09-16 RX ORDER — LEVOFLOXACIN 750 MG/1
750 TABLET ORAL EVERY OTHER DAY
Status: DISCONTINUED | OUTPATIENT
Start: 2019-09-16 | End: 2019-09-16 | Stop reason: HOSPADM

## 2019-09-16 RX ORDER — LEVOFLOXACIN 750 MG/1
750 TABLET ORAL EVERY OTHER DAY
Qty: 2 TABLET | Refills: 0 | Status: SHIPPED | OUTPATIENT
Start: 2019-09-18 | End: 2019-09-21

## 2019-09-16 RX ADMIN — HYDRALAZINE HYDROCHLORIDE 100 MG: 50 TABLET ORAL at 14:25

## 2019-09-16 RX ADMIN — ENOXAPARIN SODIUM 30 MG: 30 INJECTION SUBCUTANEOUS at 09:10

## 2019-09-16 RX ADMIN — AMLODIPINE BESYLATE 10 MG: 10 TABLET ORAL at 09:10

## 2019-09-16 RX ADMIN — HYDRALAZINE HYDROCHLORIDE 100 MG: 50 TABLET ORAL at 05:52

## 2019-09-16 RX ADMIN — INFLUENZA A VIRUS A/BRISBANE/02/2018 IVR-190 (H1N1) ANTIGEN (PROPIOLACTONE INACTIVATED), INFLUENZA A VIRUS A/KANSAS/14/2017 X-327 (H3N2) ANTIGEN (PROPIOLACTONE INACTIVATED), INFLUENZA B VIRUS B/MARYLAND/15/2016 ANTIGEN (PROPIOLACTONE INACTIVATED), INFLUENZA B VIRUS B/PHUKET/3073/2013 BVR-1B ANTIGEN (PROPIOLACTONE INACTIVATED) 0.5 ML: 15; 15; 15; 15 INJECTION, SUSPENSION INTRAMUSCULAR at 17:19

## 2019-09-16 RX ADMIN — CLOPIDOGREL 75 MG: 75 TABLET, FILM COATED ORAL at 14:26

## 2019-09-16 RX ADMIN — GABAPENTIN 300 MG: 300 CAPSULE ORAL at 14:26

## 2019-09-16 RX ADMIN — PANTOPRAZOLE SODIUM 40 MG: 40 TABLET, DELAYED RELEASE ORAL at 09:09

## 2019-09-16 RX ADMIN — GABAPENTIN 300 MG: 300 CAPSULE ORAL at 09:09

## 2019-09-16 RX ADMIN — FLUOXETINE HYDROCHLORIDE 40 MG: 20 CAPSULE ORAL at 09:09

## 2019-09-16 RX ADMIN — LEVOFLOXACIN 750 MG: 750 TABLET, FILM COATED ORAL at 14:26

## 2019-09-16 RX ADMIN — CARVEDILOL 25 MG: 25 TABLET, FILM COATED ORAL at 14:26

## 2019-09-16 RX ADMIN — TIOTROPIUM BROMIDE 18 MCG: 18 CAPSULE ORAL; RESPIRATORY (INHALATION) at 08:58

## 2019-09-16 ASSESSMENT — PAIN SCALES - GENERAL: PAINLEVEL_OUTOF10: 0

## 2019-09-16 ASSESSMENT — PAIN DESCRIPTION - LOCATION: LOCATION: GENERALIZED

## 2019-09-16 ASSESSMENT — PAIN DESCRIPTION - PAIN TYPE: TYPE: CHRONIC PAIN

## 2019-09-19 LAB
CULTURE: NORMAL
CULTURE: NORMAL
Lab: NORMAL
Lab: NORMAL
SPECIMEN DESCRIPTION: NORMAL
SPECIMEN DESCRIPTION: NORMAL

## 2019-10-19 ENCOUNTER — APPOINTMENT (OUTPATIENT)
Dept: CT IMAGING | Age: 79
DRG: 871 | End: 2019-10-19
Payer: COMMERCIAL

## 2019-10-19 ENCOUNTER — APPOINTMENT (OUTPATIENT)
Dept: GENERAL RADIOLOGY | Age: 79
DRG: 871 | End: 2019-10-19
Payer: COMMERCIAL

## 2019-10-19 ENCOUNTER — HOSPITAL ENCOUNTER (INPATIENT)
Age: 79
LOS: 4 days | Discharge: HOME | DRG: 871 | End: 2019-10-23
Attending: EMERGENCY MEDICINE | Admitting: INTERNAL MEDICINE
Payer: COMMERCIAL

## 2019-10-19 DIAGNOSIS — J18.9 PNEUMONIA DUE TO ORGANISM: Primary | ICD-10-CM

## 2019-10-19 LAB
-: ABNORMAL
ABSOLUTE EOS #: 0.04 K/UL (ref 0–0.44)
ABSOLUTE IMMATURE GRANULOCYTE: 0.1 K/UL (ref 0–0.3)
ABSOLUTE LYMPH #: 0.71 K/UL (ref 1.1–3.7)
ABSOLUTE MONO #: 0.53 K/UL (ref 0.1–1.2)
ALBUMIN SERPL-MCNC: 4.3 G/DL (ref 3.5–5.2)
ALBUMIN/GLOBULIN RATIO: 1 (ref 1–2.5)
ALP BLD-CCNC: 76 U/L (ref 35–104)
ALT SERPL-CCNC: 11 U/L (ref 5–33)
AMORPHOUS: ABNORMAL
ANION GAP SERPL CALCULATED.3IONS-SCNC: 16 MMOL/L (ref 9–17)
AST SERPL-CCNC: 20 U/L
BACTERIA: ABNORMAL
BASOPHILS # BLD: 0 % (ref 0–2)
BASOPHILS ABSOLUTE: 0.04 K/UL (ref 0–0.2)
BILIRUB SERPL-MCNC: 0.46 MG/DL (ref 0.3–1.2)
BILIRUBIN URINE: NEGATIVE
BNP INTERPRETATION: NORMAL
BUN BLDV-MCNC: 29 MG/DL (ref 8–23)
BUN/CREAT BLD: ABNORMAL (ref 9–20)
CALCIUM SERPL-MCNC: 10.3 MG/DL (ref 8.6–10.4)
CASTS UA: ABNORMAL /LPF (ref 0–8)
CHLORIDE BLD-SCNC: 101 MMOL/L (ref 98–107)
CO2: 21 MMOL/L (ref 20–31)
COLOR: YELLOW
CREAT SERPL-MCNC: 1.54 MG/DL (ref 0.5–0.9)
CRYSTALS, UA: ABNORMAL /HPF
DIFFERENTIAL TYPE: ABNORMAL
EOSINOPHILS RELATIVE PERCENT: 0 % (ref 1–4)
EPITHELIAL CELLS UA: ABNORMAL /HPF (ref 0–5)
GFR AFRICAN AMERICAN: 39 ML/MIN
GFR NON-AFRICAN AMERICAN: 32 ML/MIN
GFR SERPL CREATININE-BSD FRML MDRD: ABNORMAL ML/MIN/{1.73_M2}
GFR SERPL CREATININE-BSD FRML MDRD: ABNORMAL ML/MIN/{1.73_M2}
GLUCOSE BLD-MCNC: 133 MG/DL (ref 70–99)
GLUCOSE BLD-MCNC: 149 MG/DL (ref 65–105)
GLUCOSE BLD-MCNC: 93 MG/DL (ref 65–105)
GLUCOSE URINE: NEGATIVE
HCT VFR BLD CALC: 42.1 % (ref 36.3–47.1)
HEMOGLOBIN: 13.4 G/DL (ref 11.9–15.1)
IMMATURE GRANULOCYTES: 1 %
INR BLD: 0.9
KETONES, URINE: NEGATIVE
LACTIC ACID, SEPSIS WHOLE BLOOD: 2.1 MMOL/L (ref 0.5–1.9)
LACTIC ACID, SEPSIS WHOLE BLOOD: 2.4 MMOL/L (ref 0.5–1.9)
LACTIC ACID, SEPSIS: ABNORMAL MMOL/L (ref 0.5–1.9)
LACTIC ACID, SEPSIS: ABNORMAL MMOL/L (ref 0.5–1.9)
LACTIC ACID, WHOLE BLOOD: 1.5 MMOL/L (ref 0.7–2.1)
LEUKOCYTE ESTERASE, URINE: NEGATIVE
LYMPHOCYTES # BLD: 4 % (ref 24–43)
MCH RBC QN AUTO: 29.6 PG (ref 25.2–33.5)
MCHC RBC AUTO-ENTMCNC: 31.8 G/DL (ref 28.4–34.8)
MCV RBC AUTO: 92.9 FL (ref 82.6–102.9)
MONOCYTES # BLD: 3 % (ref 3–12)
MUCUS: ABNORMAL
NITRITE, URINE: NEGATIVE
NRBC AUTOMATED: 0 PER 100 WBC
OTHER OBSERVATIONS UA: ABNORMAL
PARTIAL THROMBOPLASTIN TIME: 22.4 SEC (ref 20.5–30.5)
PDW BLD-RTO: 14 % (ref 11.8–14.4)
PH UA: 6 (ref 5–8)
PLATELET # BLD: 294 K/UL (ref 138–453)
PLATELET ESTIMATE: ABNORMAL
PMV BLD AUTO: 9.6 FL (ref 8.1–13.5)
POTASSIUM SERPL-SCNC: 4.9 MMOL/L (ref 3.7–5.3)
PRO-BNP: 294 PG/ML
PROTEIN UA: ABNORMAL
PROTHROMBIN TIME: 9.9 SEC (ref 9–12)
RBC # BLD: 4.53 M/UL (ref 3.95–5.11)
RBC # BLD: ABNORMAL 10*6/UL
RBC UA: ABNORMAL /HPF (ref 0–4)
RENAL EPITHELIAL, UA: ABNORMAL /HPF
SEG NEUTROPHILS: 92 % (ref 36–65)
SEGMENTED NEUTROPHILS ABSOLUTE COUNT: 16.76 K/UL (ref 1.5–8.1)
SODIUM BLD-SCNC: 138 MMOL/L (ref 135–144)
SPECIFIC GRAVITY UA: 1.02 (ref 1–1.03)
TOTAL PROTEIN: 8.7 G/DL (ref 6.4–8.3)
TRICHOMONAS: ABNORMAL
TROPONIN INTERP: ABNORMAL
TROPONIN INTERP: NORMAL
TROPONIN T: ABNORMAL NG/ML
TROPONIN T: NORMAL NG/ML
TROPONIN, HIGH SENSITIVITY: 14 NG/L (ref 0–14)
TROPONIN, HIGH SENSITIVITY: 16 NG/L (ref 0–14)
TURBIDITY: CLEAR
URINE HGB: NEGATIVE
UROBILINOGEN, URINE: NORMAL
WBC # BLD: 18.2 K/UL (ref 3.5–11.3)
WBC # BLD: ABNORMAL 10*3/UL
WBC UA: ABNORMAL /HPF (ref 0–5)
YEAST: ABNORMAL

## 2019-10-19 PROCEDURE — 73110 X-RAY EXAM OF WRIST: CPT

## 2019-10-19 PROCEDURE — 81001 URINALYSIS AUTO W/SCOPE: CPT

## 2019-10-19 PROCEDURE — 87899 AGENT NOS ASSAY W/OPTIC: CPT

## 2019-10-19 PROCEDURE — 85610 PROTHROMBIN TIME: CPT

## 2019-10-19 PROCEDURE — 93005 ELECTROCARDIOGRAM TRACING: CPT | Performed by: STUDENT IN AN ORGANIZED HEALTH CARE EDUCATION/TRAINING PROGRAM

## 2019-10-19 PROCEDURE — 6370000000 HC RX 637 (ALT 250 FOR IP): Performed by: STUDENT IN AN ORGANIZED HEALTH CARE EDUCATION/TRAINING PROGRAM

## 2019-10-19 PROCEDURE — 84484 ASSAY OF TROPONIN QUANT: CPT

## 2019-10-19 PROCEDURE — 6360000002 HC RX W HCPCS: Performed by: STUDENT IN AN ORGANIZED HEALTH CARE EDUCATION/TRAINING PROGRAM

## 2019-10-19 PROCEDURE — 2580000003 HC RX 258: Performed by: STUDENT IN AN ORGANIZED HEALTH CARE EDUCATION/TRAINING PROGRAM

## 2019-10-19 PROCEDURE — 87086 URINE CULTURE/COLONY COUNT: CPT

## 2019-10-19 PROCEDURE — 87040 BLOOD CULTURE FOR BACTERIA: CPT

## 2019-10-19 PROCEDURE — 99285 EMERGENCY DEPT VISIT HI MDM: CPT

## 2019-10-19 PROCEDURE — 96365 THER/PROPH/DIAG IV INF INIT: CPT

## 2019-10-19 PROCEDURE — 71045 X-RAY EXAM CHEST 1 VIEW: CPT

## 2019-10-19 PROCEDURE — 83880 ASSAY OF NATRIURETIC PEPTIDE: CPT

## 2019-10-19 PROCEDURE — 2060000000 HC ICU INTERMEDIATE R&B

## 2019-10-19 PROCEDURE — 36415 COLL VENOUS BLD VENIPUNCTURE: CPT

## 2019-10-19 PROCEDURE — 83605 ASSAY OF LACTIC ACID: CPT

## 2019-10-19 PROCEDURE — 80053 COMPREHEN METABOLIC PANEL: CPT

## 2019-10-19 PROCEDURE — 96366 THER/PROPH/DIAG IV INF ADDON: CPT

## 2019-10-19 PROCEDURE — 96367 TX/PROPH/DG ADDL SEQ IV INF: CPT

## 2019-10-19 PROCEDURE — 71260 CT THORAX DX C+: CPT

## 2019-10-19 PROCEDURE — 82947 ASSAY GLUCOSE BLOOD QUANT: CPT

## 2019-10-19 PROCEDURE — 85730 THROMBOPLASTIN TIME PARTIAL: CPT

## 2019-10-19 PROCEDURE — 6360000004 HC RX CONTRAST MEDICATION: Performed by: STUDENT IN AN ORGANIZED HEALTH CARE EDUCATION/TRAINING PROGRAM

## 2019-10-19 PROCEDURE — 85025 COMPLETE CBC W/AUTO DIFF WBC: CPT

## 2019-10-19 RX ORDER — CLOPIDOGREL BISULFATE 75 MG/1
75 TABLET ORAL DAILY
Status: DISCONTINUED | OUTPATIENT
Start: 2019-10-19 | End: 2019-10-23 | Stop reason: HOSPADM

## 2019-10-19 RX ORDER — DEXTROSE MONOHYDRATE 25 G/50ML
12.5 INJECTION, SOLUTION INTRAVENOUS PRN
Status: DISCONTINUED | OUTPATIENT
Start: 2019-10-19 | End: 2019-10-23 | Stop reason: HOSPADM

## 2019-10-19 RX ORDER — HYDRALAZINE HYDROCHLORIDE 50 MG/1
100 TABLET, FILM COATED ORAL EVERY 8 HOURS SCHEDULED
Status: DISCONTINUED | OUTPATIENT
Start: 2019-10-20 | End: 2019-10-20

## 2019-10-19 RX ORDER — DEXTROSE MONOHYDRATE 50 MG/ML
100 INJECTION, SOLUTION INTRAVENOUS PRN
Status: DISCONTINUED | OUTPATIENT
Start: 2019-10-19 | End: 2019-10-23 | Stop reason: HOSPADM

## 2019-10-19 RX ORDER — FLUOXETINE HYDROCHLORIDE 20 MG/1
40 CAPSULE ORAL DAILY
Status: DISCONTINUED | OUTPATIENT
Start: 2019-10-19 | End: 2019-10-23 | Stop reason: HOSPADM

## 2019-10-19 RX ORDER — CARVEDILOL 25 MG/1
25 TABLET ORAL 2 TIMES DAILY
Status: DISCONTINUED | OUTPATIENT
Start: 2019-10-19 | End: 2019-10-23 | Stop reason: HOSPADM

## 2019-10-19 RX ORDER — AMLODIPINE BESYLATE 10 MG/1
10 TABLET ORAL DAILY
Status: DISCONTINUED | OUTPATIENT
Start: 2019-10-19 | End: 2019-10-23 | Stop reason: HOSPADM

## 2019-10-19 RX ORDER — SODIUM CHLORIDE 0.9 % (FLUSH) 0.9 %
10 SYRINGE (ML) INJECTION EVERY 12 HOURS SCHEDULED
Status: DISCONTINUED | OUTPATIENT
Start: 2019-10-19 | End: 2019-10-23 | Stop reason: HOSPADM

## 2019-10-19 RX ORDER — GABAPENTIN 300 MG/1
300 CAPSULE ORAL 3 TIMES DAILY
Status: DISCONTINUED | OUTPATIENT
Start: 2019-10-19 | End: 2019-10-23 | Stop reason: HOSPADM

## 2019-10-19 RX ORDER — 0.9 % SODIUM CHLORIDE 0.9 %
1000 INTRAVENOUS SOLUTION INTRAVENOUS ONCE
Status: COMPLETED | OUTPATIENT
Start: 2019-10-19 | End: 2019-10-19

## 2019-10-19 RX ORDER — CARVEDILOL 12.5 MG/1
25 TABLET ORAL ONCE
Status: COMPLETED | OUTPATIENT
Start: 2019-10-19 | End: 2019-10-19

## 2019-10-19 RX ORDER — PANTOPRAZOLE SODIUM 40 MG/1
40 TABLET, DELAYED RELEASE ORAL
Status: DISCONTINUED | OUTPATIENT
Start: 2019-10-20 | End: 2019-10-23 | Stop reason: HOSPADM

## 2019-10-19 RX ORDER — HEPARIN SODIUM 5000 [USP'U]/ML
5000 INJECTION, SOLUTION INTRAVENOUS; SUBCUTANEOUS EVERY 8 HOURS SCHEDULED
Status: DISCONTINUED | OUTPATIENT
Start: 2019-10-19 | End: 2019-10-23 | Stop reason: HOSPADM

## 2019-10-19 RX ORDER — ALBUTEROL SULFATE 90 UG/1
2 AEROSOL, METERED RESPIRATORY (INHALATION) EVERY 6 HOURS PRN
Status: DISCONTINUED | OUTPATIENT
Start: 2019-10-19 | End: 2019-10-23 | Stop reason: HOSPADM

## 2019-10-19 RX ORDER — NICOTINE POLACRILEX 4 MG
15 LOZENGE BUCCAL PRN
Status: DISCONTINUED | OUTPATIENT
Start: 2019-10-19 | End: 2019-10-23 | Stop reason: HOSPADM

## 2019-10-19 RX ORDER — AMLODIPINE BESYLATE 10 MG/1
10 TABLET ORAL DAILY
Status: DISCONTINUED | OUTPATIENT
Start: 2019-10-20 | End: 2019-10-19

## 2019-10-19 RX ORDER — CARVEDILOL 25 MG/1
25 TABLET ORAL 2 TIMES DAILY
Status: DISCONTINUED | OUTPATIENT
Start: 2019-10-20 | End: 2019-10-19

## 2019-10-19 RX ORDER — SODIUM CHLORIDE 9 MG/ML
INJECTION, SOLUTION INTRAVENOUS CONTINUOUS
Status: DISCONTINUED | OUTPATIENT
Start: 2019-10-19 | End: 2019-10-21

## 2019-10-19 RX ORDER — FLUOXETINE HYDROCHLORIDE 20 MG/5ML
40 LIQUID ORAL DAILY
Status: DISCONTINUED | OUTPATIENT
Start: 2019-10-19 | End: 2019-10-19

## 2019-10-19 RX ORDER — ONDANSETRON 2 MG/ML
4 INJECTION INTRAMUSCULAR; INTRAVENOUS EVERY 6 HOURS PRN
Status: DISCONTINUED | OUTPATIENT
Start: 2019-10-19 | End: 2019-10-23 | Stop reason: HOSPADM

## 2019-10-19 RX ORDER — SODIUM CHLORIDE 0.9 % (FLUSH) 0.9 %
10 SYRINGE (ML) INJECTION PRN
Status: DISCONTINUED | OUTPATIENT
Start: 2019-10-19 | End: 2019-10-23 | Stop reason: HOSPADM

## 2019-10-19 RX ORDER — DONEPEZIL HYDROCHLORIDE 10 MG/1
10 TABLET, FILM COATED ORAL NIGHTLY
Status: DISCONTINUED | OUTPATIENT
Start: 2019-10-19 | End: 2019-10-23 | Stop reason: HOSPADM

## 2019-10-19 RX ADMIN — DONEPEZIL HYDROCHLORIDE 10 MG: 10 TABLET, FILM COATED ORAL at 21:28

## 2019-10-19 RX ADMIN — SODIUM CHLORIDE 1000 ML: 9 INJECTION, SOLUTION INTRAVENOUS at 16:32

## 2019-10-19 RX ADMIN — INSULIN LISPRO 1 UNITS: 100 INJECTION, SOLUTION INTRAVENOUS; SUBCUTANEOUS at 21:37

## 2019-10-19 RX ADMIN — SODIUM CHLORIDE: 9 INJECTION, SOLUTION INTRAVENOUS at 18:31

## 2019-10-19 RX ADMIN — AMLODIPINE BESYLATE 10 MG: 10 TABLET ORAL at 11:16

## 2019-10-19 RX ADMIN — CARVEDILOL 25 MG: 12.5 TABLET, FILM COATED ORAL at 11:16

## 2019-10-19 RX ADMIN — CLOPIDOGREL 75 MG: 75 TABLET, FILM COATED ORAL at 18:49

## 2019-10-19 RX ADMIN — IOHEXOL 75 ML: 350 INJECTION, SOLUTION INTRAVENOUS at 15:06

## 2019-10-19 RX ADMIN — GABAPENTIN 300 MG: 300 CAPSULE ORAL at 21:28

## 2019-10-19 RX ADMIN — SODIUM CHLORIDE 1000 ML: 9 INJECTION, SOLUTION INTRAVENOUS at 11:16

## 2019-10-19 RX ADMIN — HEPARIN SODIUM 5000 UNITS: 5000 INJECTION INTRAVENOUS; SUBCUTANEOUS at 21:38

## 2019-10-19 RX ADMIN — Medication 10 ML: at 21:29

## 2019-10-19 RX ADMIN — CARVEDILOL 25 MG: 25 TABLET, FILM COATED ORAL at 18:53

## 2019-10-19 RX ADMIN — CEFEPIME HYDROCHLORIDE 1 G: 1 INJECTION, POWDER, FOR SOLUTION INTRAMUSCULAR; INTRAVENOUS at 11:39

## 2019-10-19 RX ADMIN — Medication 1250 MG: at 12:14

## 2019-10-19 ASSESSMENT — ENCOUNTER SYMPTOMS
SHORTNESS OF BREATH: 1
BACK PAIN: 0
WHEEZING: 0
CONSTIPATION: 0
SORE THROAT: 0
COUGH: 1
ABDOMINAL PAIN: 0
DIARRHEA: 0
VOMITING: 0
NAUSEA: 0

## 2019-10-19 ASSESSMENT — PAIN SCALES - GENERAL
PAINLEVEL_OUTOF10: 0

## 2019-10-20 PROBLEM — J69.0 ASPIRATION PNEUMONIA (HCC): Status: ACTIVE | Noted: 2019-07-14

## 2019-10-20 LAB
ABSOLUTE EOS #: 0.19 K/UL (ref 0–0.44)
ABSOLUTE IMMATURE GRANULOCYTE: <0.03 K/UL (ref 0–0.3)
ABSOLUTE LYMPH #: 1.72 K/UL (ref 1.1–3.7)
ABSOLUTE MONO #: 0.4 K/UL (ref 0.1–1.2)
ANION GAP SERPL CALCULATED.3IONS-SCNC: 10 MMOL/L (ref 9–17)
BASOPHILS # BLD: 0 % (ref 0–2)
BASOPHILS ABSOLUTE: 0.03 K/UL (ref 0–0.2)
BUN BLDV-MCNC: 21 MG/DL (ref 8–23)
BUN/CREAT BLD: ABNORMAL (ref 9–20)
CALCIUM SERPL-MCNC: 8.8 MG/DL (ref 8.6–10.4)
CHLORIDE BLD-SCNC: 107 MMOL/L (ref 98–107)
CO2: 23 MMOL/L (ref 20–31)
CREAT SERPL-MCNC: 1.48 MG/DL (ref 0.5–0.9)
CULTURE: NO GROWTH
DIFFERENTIAL TYPE: ABNORMAL
DIRECT EXAM: NORMAL
EOSINOPHILS RELATIVE PERCENT: 3 % (ref 1–4)
GFR AFRICAN AMERICAN: 41 ML/MIN
GFR NON-AFRICAN AMERICAN: 34 ML/MIN
GFR SERPL CREATININE-BSD FRML MDRD: ABNORMAL ML/MIN/{1.73_M2}
GFR SERPL CREATININE-BSD FRML MDRD: ABNORMAL ML/MIN/{1.73_M2}
GLUCOSE BLD-MCNC: 113 MG/DL (ref 65–105)
GLUCOSE BLD-MCNC: 84 MG/DL (ref 65–105)
GLUCOSE BLD-MCNC: 99 MG/DL (ref 70–99)
HCT VFR BLD CALC: 33 % (ref 36.3–47.1)
HEMOGLOBIN: 10.2 G/DL (ref 11.9–15.1)
IMMATURE GRANULOCYTES: 0 %
LYMPHOCYTES # BLD: 23 % (ref 24–43)
Lab: NORMAL
Lab: NORMAL
MCH RBC QN AUTO: 29.6 PG (ref 25.2–33.5)
MCHC RBC AUTO-ENTMCNC: 30.9 G/DL (ref 28.4–34.8)
MCV RBC AUTO: 95.7 FL (ref 82.6–102.9)
MONOCYTES # BLD: 5 % (ref 3–12)
NRBC AUTOMATED: 0 PER 100 WBC
PDW BLD-RTO: 14.3 % (ref 11.8–14.4)
PLATELET # BLD: 242 K/UL (ref 138–453)
PLATELET ESTIMATE: ABNORMAL
PMV BLD AUTO: 9.5 FL (ref 8.1–13.5)
POTASSIUM SERPL-SCNC: 4.1 MMOL/L (ref 3.7–5.3)
RBC # BLD: 3.45 M/UL (ref 3.95–5.11)
RBC # BLD: ABNORMAL 10*6/UL
SEG NEUTROPHILS: 69 % (ref 36–65)
SEGMENTED NEUTROPHILS ABSOLUTE COUNT: 5.3 K/UL (ref 1.5–8.1)
SODIUM BLD-SCNC: 140 MMOL/L (ref 135–144)
SPECIMEN DESCRIPTION: NORMAL
SPECIMEN DESCRIPTION: NORMAL
WBC # BLD: 7.7 K/UL (ref 3.5–11.3)
WBC # BLD: ABNORMAL 10*3/UL

## 2019-10-20 PROCEDURE — 94640 AIRWAY INHALATION TREATMENT: CPT

## 2019-10-20 PROCEDURE — 6370000000 HC RX 637 (ALT 250 FOR IP): Performed by: STUDENT IN AN ORGANIZED HEALTH CARE EDUCATION/TRAINING PROGRAM

## 2019-10-20 PROCEDURE — 6360000002 HC RX W HCPCS: Performed by: STUDENT IN AN ORGANIZED HEALTH CARE EDUCATION/TRAINING PROGRAM

## 2019-10-20 PROCEDURE — 36415 COLL VENOUS BLD VENIPUNCTURE: CPT

## 2019-10-20 PROCEDURE — 87205 SMEAR GRAM STAIN: CPT

## 2019-10-20 PROCEDURE — 99223 1ST HOSP IP/OBS HIGH 75: CPT | Performed by: INTERNAL MEDICINE

## 2019-10-20 PROCEDURE — 2060000000 HC ICU INTERMEDIATE R&B

## 2019-10-20 PROCEDURE — 85025 COMPLETE CBC W/AUTO DIFF WBC: CPT

## 2019-10-20 PROCEDURE — 94761 N-INVAS EAR/PLS OXIMETRY MLT: CPT

## 2019-10-20 PROCEDURE — 80048 BASIC METABOLIC PNL TOTAL CA: CPT

## 2019-10-20 PROCEDURE — 82947 ASSAY GLUCOSE BLOOD QUANT: CPT

## 2019-10-20 PROCEDURE — 2580000003 HC RX 258: Performed by: STUDENT IN AN ORGANIZED HEALTH CARE EDUCATION/TRAINING PROGRAM

## 2019-10-20 PROCEDURE — 87070 CULTURE OTHR SPECIMN AEROBIC: CPT

## 2019-10-20 RX ORDER — HYDRALAZINE HYDROCHLORIDE 25 MG/1
25 TABLET, FILM COATED ORAL EVERY 8 HOURS SCHEDULED
Status: DISCONTINUED | OUTPATIENT
Start: 2019-10-20 | End: 2019-10-22

## 2019-10-20 RX ADMIN — HYDRALAZINE HYDROCHLORIDE 25 MG: 25 TABLET, FILM COATED ORAL at 21:16

## 2019-10-20 RX ADMIN — CARVEDILOL 25 MG: 25 TABLET, FILM COATED ORAL at 08:58

## 2019-10-20 RX ADMIN — SODIUM CHLORIDE: 9 INJECTION, SOLUTION INTRAVENOUS at 05:53

## 2019-10-20 RX ADMIN — AMLODIPINE BESYLATE 10 MG: 10 TABLET ORAL at 08:59

## 2019-10-20 RX ADMIN — HEPARIN SODIUM 5000 UNITS: 5000 INJECTION INTRAVENOUS; SUBCUTANEOUS at 14:02

## 2019-10-20 RX ADMIN — GABAPENTIN 300 MG: 300 CAPSULE ORAL at 21:15

## 2019-10-20 RX ADMIN — MEROPENEM 1 G: 1 INJECTION, POWDER, FOR SOLUTION INTRAVENOUS at 13:33

## 2019-10-20 RX ADMIN — Medication 10 ML: at 21:15

## 2019-10-20 RX ADMIN — CLOPIDOGREL 75 MG: 75 TABLET, FILM COATED ORAL at 08:59

## 2019-10-20 RX ADMIN — SODIUM CHLORIDE: 9 INJECTION, SOLUTION INTRAVENOUS at 15:05

## 2019-10-20 RX ADMIN — GABAPENTIN 300 MG: 300 CAPSULE ORAL at 08:59

## 2019-10-20 RX ADMIN — HYDRALAZINE HYDROCHLORIDE 25 MG: 25 TABLET, FILM COATED ORAL at 14:02

## 2019-10-20 RX ADMIN — CARVEDILOL 25 MG: 25 TABLET, FILM COATED ORAL at 21:15

## 2019-10-20 RX ADMIN — TIOTROPIUM BROMIDE 18 MCG: 18 CAPSULE ORAL; RESPIRATORY (INHALATION) at 08:15

## 2019-10-20 RX ADMIN — HEPARIN SODIUM 5000 UNITS: 5000 INJECTION INTRAVENOUS; SUBCUTANEOUS at 05:54

## 2019-10-20 RX ADMIN — GABAPENTIN 300 MG: 300 CAPSULE ORAL at 14:02

## 2019-10-20 RX ADMIN — DONEPEZIL HYDROCHLORIDE 10 MG: 10 TABLET, FILM COATED ORAL at 21:15

## 2019-10-20 RX ADMIN — HEPARIN SODIUM 5000 UNITS: 5000 INJECTION INTRAVENOUS; SUBCUTANEOUS at 21:21

## 2019-10-20 RX ADMIN — HYDRALAZINE HYDROCHLORIDE 25 MG: 25 TABLET, FILM COATED ORAL at 05:53

## 2019-10-20 RX ADMIN — PANTOPRAZOLE SODIUM 40 MG: 40 TABLET, DELAYED RELEASE ORAL at 05:53

## 2019-10-20 ASSESSMENT — PAIN SCALES - GENERAL
PAINLEVEL_OUTOF10: 0

## 2019-10-21 LAB
ABSOLUTE EOS #: 0.29 K/UL (ref 0–0.44)
ABSOLUTE IMMATURE GRANULOCYTE: <0.03 K/UL (ref 0–0.3)
ABSOLUTE LYMPH #: 1.77 K/UL (ref 1.1–3.7)
ABSOLUTE MONO #: 0.44 K/UL (ref 0.1–1.2)
ANION GAP SERPL CALCULATED.3IONS-SCNC: 9 MMOL/L (ref 9–17)
BASOPHILS # BLD: 1 % (ref 0–2)
BASOPHILS ABSOLUTE: 0.04 K/UL (ref 0–0.2)
BUN BLDV-MCNC: 19 MG/DL (ref 8–23)
BUN/CREAT BLD: ABNORMAL (ref 9–20)
CALCIUM SERPL-MCNC: 9.2 MG/DL (ref 8.6–10.4)
CHLORIDE BLD-SCNC: 107 MMOL/L (ref 98–107)
CO2: 23 MMOL/L (ref 20–31)
CREAT SERPL-MCNC: 1.41 MG/DL (ref 0.5–0.9)
CULTURE: ABNORMAL
DIFFERENTIAL TYPE: ABNORMAL
DIRECT EXAM: ABNORMAL
EOSINOPHILS RELATIVE PERCENT: 4 % (ref 1–4)
GFR AFRICAN AMERICAN: 43 ML/MIN
GFR NON-AFRICAN AMERICAN: 36 ML/MIN
GFR SERPL CREATININE-BSD FRML MDRD: ABNORMAL ML/MIN/{1.73_M2}
GFR SERPL CREATININE-BSD FRML MDRD: ABNORMAL ML/MIN/{1.73_M2}
GLUCOSE BLD-MCNC: 113 MG/DL (ref 65–105)
GLUCOSE BLD-MCNC: 142 MG/DL (ref 65–105)
GLUCOSE BLD-MCNC: 83 MG/DL (ref 65–105)
GLUCOSE BLD-MCNC: 89 MG/DL (ref 65–105)
GLUCOSE BLD-MCNC: 99 MG/DL (ref 70–99)
HCT VFR BLD CALC: 34.6 % (ref 36.3–47.1)
HEMOGLOBIN: 10.8 G/DL (ref 11.9–15.1)
IMMATURE GRANULOCYTES: 0 %
LYMPHOCYTES # BLD: 27 % (ref 24–43)
Lab: ABNORMAL
MCH RBC QN AUTO: 29.6 PG (ref 25.2–33.5)
MCHC RBC AUTO-ENTMCNC: 31.2 G/DL (ref 28.4–34.8)
MCV RBC AUTO: 94.8 FL (ref 82.6–102.9)
MONOCYTES # BLD: 7 % (ref 3–12)
NRBC AUTOMATED: 0 PER 100 WBC
PDW BLD-RTO: 14 % (ref 11.8–14.4)
PLATELET # BLD: 242 K/UL (ref 138–453)
PLATELET ESTIMATE: ABNORMAL
PMV BLD AUTO: 10.1 FL (ref 8.1–13.5)
POTASSIUM SERPL-SCNC: 4.1 MMOL/L (ref 3.7–5.3)
RBC # BLD: 3.65 M/UL (ref 3.95–5.11)
RBC # BLD: ABNORMAL 10*6/UL
SEG NEUTROPHILS: 61 % (ref 36–65)
SEGMENTED NEUTROPHILS ABSOLUTE COUNT: 4.11 K/UL (ref 1.5–8.1)
SODIUM BLD-SCNC: 139 MMOL/L (ref 135–144)
SPECIMEN DESCRIPTION: ABNORMAL
WBC # BLD: 6.7 K/UL (ref 3.5–11.3)
WBC # BLD: ABNORMAL 10*3/UL

## 2019-10-21 PROCEDURE — 80048 BASIC METABOLIC PNL TOTAL CA: CPT

## 2019-10-21 PROCEDURE — 6360000002 HC RX W HCPCS: Performed by: STUDENT IN AN ORGANIZED HEALTH CARE EDUCATION/TRAINING PROGRAM

## 2019-10-21 PROCEDURE — 6370000000 HC RX 637 (ALT 250 FOR IP): Performed by: STUDENT IN AN ORGANIZED HEALTH CARE EDUCATION/TRAINING PROGRAM

## 2019-10-21 PROCEDURE — 94761 N-INVAS EAR/PLS OXIMETRY MLT: CPT

## 2019-10-21 PROCEDURE — 94640 AIRWAY INHALATION TREATMENT: CPT

## 2019-10-21 PROCEDURE — 2580000003 HC RX 258: Performed by: STUDENT IN AN ORGANIZED HEALTH CARE EDUCATION/TRAINING PROGRAM

## 2019-10-21 PROCEDURE — 36415 COLL VENOUS BLD VENIPUNCTURE: CPT

## 2019-10-21 PROCEDURE — 82947 ASSAY GLUCOSE BLOOD QUANT: CPT

## 2019-10-21 PROCEDURE — 99232 SBSQ HOSP IP/OBS MODERATE 35: CPT | Performed by: INTERNAL MEDICINE

## 2019-10-21 PROCEDURE — 2060000000 HC ICU INTERMEDIATE R&B

## 2019-10-21 PROCEDURE — 76937 US GUIDE VASCULAR ACCESS: CPT

## 2019-10-21 PROCEDURE — 85025 COMPLETE CBC W/AUTO DIFF WBC: CPT

## 2019-10-21 RX ORDER — LISINOPRIL 5 MG/1
5 TABLET ORAL DAILY
Status: DISCONTINUED | OUTPATIENT
Start: 2019-10-21 | End: 2019-10-22

## 2019-10-21 RX ORDER — UREA 10 %
2 LOTION (ML) TOPICAL ONCE
Status: COMPLETED | OUTPATIENT
Start: 2019-10-22 | End: 2019-10-22

## 2019-10-21 RX ADMIN — CARVEDILOL 25 MG: 25 TABLET, FILM COATED ORAL at 20:40

## 2019-10-21 RX ADMIN — INSULIN LISPRO 1 UNITS: 100 INJECTION, SOLUTION INTRAVENOUS; SUBCUTANEOUS at 21:50

## 2019-10-21 RX ADMIN — HEPARIN SODIUM 5000 UNITS: 5000 INJECTION INTRAVENOUS; SUBCUTANEOUS at 14:29

## 2019-10-21 RX ADMIN — HEPARIN SODIUM 5000 UNITS: 5000 INJECTION INTRAVENOUS; SUBCUTANEOUS at 05:52

## 2019-10-21 RX ADMIN — PANTOPRAZOLE SODIUM 40 MG: 40 TABLET, DELAYED RELEASE ORAL at 06:37

## 2019-10-21 RX ADMIN — GABAPENTIN 300 MG: 300 CAPSULE ORAL at 09:38

## 2019-10-21 RX ADMIN — GABAPENTIN 300 MG: 300 CAPSULE ORAL at 14:29

## 2019-10-21 RX ADMIN — TIOTROPIUM BROMIDE 18 MCG: 18 CAPSULE ORAL; RESPIRATORY (INHALATION) at 08:33

## 2019-10-21 RX ADMIN — MEROPENEM 1 G: 1 INJECTION, POWDER, FOR SOLUTION INTRAVENOUS at 01:35

## 2019-10-21 RX ADMIN — GABAPENTIN 300 MG: 300 CAPSULE ORAL at 20:40

## 2019-10-21 RX ADMIN — HEPARIN SODIUM 5000 UNITS: 5000 INJECTION INTRAVENOUS; SUBCUTANEOUS at 21:49

## 2019-10-21 RX ADMIN — CARVEDILOL 25 MG: 25 TABLET, FILM COATED ORAL at 09:38

## 2019-10-21 RX ADMIN — HYDRALAZINE HYDROCHLORIDE 25 MG: 25 TABLET, FILM COATED ORAL at 14:29

## 2019-10-21 RX ADMIN — HYDRALAZINE HYDROCHLORIDE 25 MG: 25 TABLET, FILM COATED ORAL at 05:52

## 2019-10-21 RX ADMIN — LISINOPRIL 5 MG: 5 TABLET ORAL at 09:43

## 2019-10-21 RX ADMIN — Medication 10 ML: at 20:40

## 2019-10-21 RX ADMIN — HYDRALAZINE HYDROCHLORIDE 25 MG: 25 TABLET, FILM COATED ORAL at 21:49

## 2019-10-21 RX ADMIN — AMLODIPINE BESYLATE 10 MG: 10 TABLET ORAL at 09:38

## 2019-10-21 RX ADMIN — MEROPENEM 1 G: 1 INJECTION, POWDER, FOR SOLUTION INTRAVENOUS at 14:29

## 2019-10-21 RX ADMIN — SODIUM CHLORIDE: 9 INJECTION, SOLUTION INTRAVENOUS at 06:54

## 2019-10-21 RX ADMIN — DONEPEZIL HYDROCHLORIDE 10 MG: 10 TABLET, FILM COATED ORAL at 20:40

## 2019-10-21 RX ADMIN — CLOPIDOGREL 75 MG: 75 TABLET, FILM COATED ORAL at 09:38

## 2019-10-22 ENCOUNTER — APPOINTMENT (OUTPATIENT)
Dept: GENERAL RADIOLOGY | Age: 79
DRG: 871 | End: 2019-10-22
Payer: COMMERCIAL

## 2019-10-22 LAB
ABSOLUTE EOS #: 0.3 K/UL (ref 0–0.44)
ABSOLUTE IMMATURE GRANULOCYTE: <0.03 K/UL (ref 0–0.3)
ABSOLUTE LYMPH #: 2.08 K/UL (ref 1.1–3.7)
ABSOLUTE MONO #: 0.39 K/UL (ref 0.1–1.2)
ANION GAP SERPL CALCULATED.3IONS-SCNC: 12 MMOL/L (ref 9–17)
BASOPHILS # BLD: 1 % (ref 0–2)
BASOPHILS ABSOLUTE: 0.04 K/UL (ref 0–0.2)
BUN BLDV-MCNC: 21 MG/DL (ref 8–23)
BUN/CREAT BLD: ABNORMAL (ref 9–20)
CALCIUM SERPL-MCNC: 9.8 MG/DL (ref 8.6–10.4)
CHLORIDE BLD-SCNC: 105 MMOL/L (ref 98–107)
CO2: 23 MMOL/L (ref 20–31)
CREAT SERPL-MCNC: 1.5 MG/DL (ref 0.5–0.9)
DIFFERENTIAL TYPE: ABNORMAL
EKG ATRIAL RATE: 109 BPM
EKG P AXIS: 27 DEGREES
EKG P-R INTERVAL: 144 MS
EKG Q-T INTERVAL: 322 MS
EKG QRS DURATION: 78 MS
EKG QTC CALCULATION (BAZETT): 433 MS
EKG R AXIS: 9 DEGREES
EKG T AXIS: 42 DEGREES
EKG VENTRICULAR RATE: 109 BPM
EOSINOPHILS RELATIVE PERCENT: 5 % (ref 1–4)
GFR AFRICAN AMERICAN: 40 ML/MIN
GFR NON-AFRICAN AMERICAN: 33 ML/MIN
GFR SERPL CREATININE-BSD FRML MDRD: ABNORMAL ML/MIN/{1.73_M2}
GFR SERPL CREATININE-BSD FRML MDRD: ABNORMAL ML/MIN/{1.73_M2}
GLUCOSE BLD-MCNC: 104 MG/DL (ref 70–99)
GLUCOSE BLD-MCNC: 118 MG/DL (ref 65–105)
GLUCOSE BLD-MCNC: 176 MG/DL (ref 65–105)
GLUCOSE BLD-MCNC: 90 MG/DL (ref 65–105)
GLUCOSE BLD-MCNC: 93 MG/DL (ref 65–105)
HCT VFR BLD CALC: 35.2 % (ref 36.3–47.1)
HEMOGLOBIN: 11.1 G/DL (ref 11.9–15.1)
IMMATURE GRANULOCYTES: 0 %
LYMPHOCYTES # BLD: 32 % (ref 24–43)
MCH RBC QN AUTO: 29.4 PG (ref 25.2–33.5)
MCHC RBC AUTO-ENTMCNC: 31.5 G/DL (ref 28.4–34.8)
MCV RBC AUTO: 93.4 FL (ref 82.6–102.9)
MONOCYTES # BLD: 6 % (ref 3–12)
NRBC AUTOMATED: 0 PER 100 WBC
PDW BLD-RTO: 13.9 % (ref 11.8–14.4)
PLATELET # BLD: 272 K/UL (ref 138–453)
PLATELET ESTIMATE: ABNORMAL
PMV BLD AUTO: 9.5 FL (ref 8.1–13.5)
POTASSIUM SERPL-SCNC: 4.2 MMOL/L (ref 3.7–5.3)
RBC # BLD: 3.77 M/UL (ref 3.95–5.11)
RBC # BLD: ABNORMAL 10*6/UL
SEG NEUTROPHILS: 56 % (ref 36–65)
SEGMENTED NEUTROPHILS ABSOLUTE COUNT: 3.71 K/UL (ref 1.5–8.1)
SODIUM BLD-SCNC: 140 MMOL/L (ref 135–144)
WBC # BLD: 6.5 K/UL (ref 3.5–11.3)
WBC # BLD: ABNORMAL 10*3/UL

## 2019-10-22 PROCEDURE — 6360000002 HC RX W HCPCS: Performed by: STUDENT IN AN ORGANIZED HEALTH CARE EDUCATION/TRAINING PROGRAM

## 2019-10-22 PROCEDURE — 85025 COMPLETE CBC W/AUTO DIFF WBC: CPT

## 2019-10-22 PROCEDURE — 6370000000 HC RX 637 (ALT 250 FOR IP): Performed by: STUDENT IN AN ORGANIZED HEALTH CARE EDUCATION/TRAINING PROGRAM

## 2019-10-22 PROCEDURE — 94761 N-INVAS EAR/PLS OXIMETRY MLT: CPT

## 2019-10-22 PROCEDURE — 2060000000 HC ICU INTERMEDIATE R&B

## 2019-10-22 PROCEDURE — 80048 BASIC METABOLIC PNL TOTAL CA: CPT

## 2019-10-22 PROCEDURE — 2580000003 HC RX 258: Performed by: STUDENT IN AN ORGANIZED HEALTH CARE EDUCATION/TRAINING PROGRAM

## 2019-10-22 PROCEDURE — 99232 SBSQ HOSP IP/OBS MODERATE 35: CPT | Performed by: INTERNAL MEDICINE

## 2019-10-22 PROCEDURE — 97162 PT EVAL MOD COMPLEX 30 MIN: CPT

## 2019-10-22 PROCEDURE — 92611 MOTION FLUOROSCOPY/SWALLOW: CPT

## 2019-10-22 PROCEDURE — 97116 GAIT TRAINING THERAPY: CPT

## 2019-10-22 PROCEDURE — 94640 AIRWAY INHALATION TREATMENT: CPT

## 2019-10-22 PROCEDURE — 93010 ELECTROCARDIOGRAM REPORT: CPT | Performed by: INTERNAL MEDICINE

## 2019-10-22 PROCEDURE — 82947 ASSAY GLUCOSE BLOOD QUANT: CPT

## 2019-10-22 PROCEDURE — 36415 COLL VENOUS BLD VENIPUNCTURE: CPT

## 2019-10-22 PROCEDURE — 74230 X-RAY XM SWLNG FUNCJ C+: CPT

## 2019-10-22 RX ORDER — LISINOPRIL 10 MG/1
10 TABLET ORAL DAILY
Status: DISCONTINUED | OUTPATIENT
Start: 2019-10-23 | End: 2019-10-23 | Stop reason: HOSPADM

## 2019-10-22 RX ORDER — UREA 10 %
2 LOTION (ML) TOPICAL ONCE
Status: COMPLETED | OUTPATIENT
Start: 2019-10-22 | End: 2019-10-22

## 2019-10-22 RX ADMIN — MEROPENEM 1 G: 1 INJECTION, POWDER, FOR SOLUTION INTRAVENOUS at 01:31

## 2019-10-22 RX ADMIN — MEROPENEM 1 G: 1 INJECTION, POWDER, FOR SOLUTION INTRAVENOUS at 14:57

## 2019-10-22 RX ADMIN — Medication 10 ML: at 09:01

## 2019-10-22 RX ADMIN — GABAPENTIN 300 MG: 300 CAPSULE ORAL at 08:59

## 2019-10-22 RX ADMIN — Medication 10 ML: at 20:21

## 2019-10-22 RX ADMIN — INSULIN LISPRO 1 UNITS: 100 INJECTION, SOLUTION INTRAVENOUS; SUBCUTANEOUS at 17:56

## 2019-10-22 RX ADMIN — HEPARIN SODIUM 5000 UNITS: 5000 INJECTION INTRAVENOUS; SUBCUTANEOUS at 22:30

## 2019-10-22 RX ADMIN — LISINOPRIL 5 MG: 5 TABLET ORAL at 09:01

## 2019-10-22 RX ADMIN — CLOPIDOGREL 75 MG: 75 TABLET, FILM COATED ORAL at 09:00

## 2019-10-22 RX ADMIN — CARVEDILOL 25 MG: 25 TABLET, FILM COATED ORAL at 20:17

## 2019-10-22 RX ADMIN — Medication 2 MG: at 00:44

## 2019-10-22 RX ADMIN — HEPARIN SODIUM 5000 UNITS: 5000 INJECTION INTRAVENOUS; SUBCUTANEOUS at 14:49

## 2019-10-22 RX ADMIN — GABAPENTIN 300 MG: 300 CAPSULE ORAL at 20:16

## 2019-10-22 RX ADMIN — Medication 2 MG: at 23:13

## 2019-10-22 RX ADMIN — HYDRALAZINE HYDROCHLORIDE 25 MG: 25 TABLET, FILM COATED ORAL at 05:58

## 2019-10-22 RX ADMIN — AMLODIPINE BESYLATE 10 MG: 10 TABLET ORAL at 09:00

## 2019-10-22 RX ADMIN — HEPARIN SODIUM 5000 UNITS: 5000 INJECTION INTRAVENOUS; SUBCUTANEOUS at 05:58

## 2019-10-22 RX ADMIN — CARVEDILOL 25 MG: 25 TABLET, FILM COATED ORAL at 08:58

## 2019-10-22 RX ADMIN — PANTOPRAZOLE SODIUM 40 MG: 40 TABLET, DELAYED RELEASE ORAL at 06:11

## 2019-10-22 RX ADMIN — TIOTROPIUM BROMIDE 18 MCG: 18 CAPSULE ORAL; RESPIRATORY (INHALATION) at 08:38

## 2019-10-22 RX ADMIN — DONEPEZIL HYDROCHLORIDE 10 MG: 10 TABLET, FILM COATED ORAL at 20:17

## 2019-10-22 ASSESSMENT — PAIN - FUNCTIONAL ASSESSMENT: PAIN_FUNCTIONAL_ASSESSMENT: 0-10

## 2019-10-22 ASSESSMENT — PAIN SCALES - GENERAL: PAINLEVEL_OUTOF10: 0

## 2019-10-23 VITALS
HEART RATE: 74 BPM | BODY MASS INDEX: 35.09 KG/M2 | OXYGEN SATURATION: 96 % | DIASTOLIC BLOOD PRESSURE: 69 MMHG | WEIGHT: 185.85 LBS | RESPIRATION RATE: 15 BRPM | SYSTOLIC BLOOD PRESSURE: 145 MMHG | TEMPERATURE: 98.3 F | HEIGHT: 61 IN

## 2019-10-23 LAB
ABSOLUTE EOS #: 0.17 K/UL (ref 0–0.44)
ABSOLUTE IMMATURE GRANULOCYTE: 0.04 K/UL (ref 0–0.3)
ABSOLUTE LYMPH #: 2.05 K/UL (ref 1.1–3.7)
ABSOLUTE MONO #: 0.41 K/UL (ref 0.1–1.2)
ANION GAP SERPL CALCULATED.3IONS-SCNC: 11 MMOL/L (ref 9–17)
BASOPHILS # BLD: 1 % (ref 0–2)
BASOPHILS ABSOLUTE: 0.05 K/UL (ref 0–0.2)
BUN BLDV-MCNC: 20 MG/DL (ref 8–23)
BUN/CREAT BLD: ABNORMAL (ref 9–20)
CALCIUM SERPL-MCNC: 9.4 MG/DL (ref 8.6–10.4)
CHLORIDE BLD-SCNC: 105 MMOL/L (ref 98–107)
CO2: 23 MMOL/L (ref 20–31)
CREAT SERPL-MCNC: 1.33 MG/DL (ref 0.5–0.9)
DIFFERENTIAL TYPE: ABNORMAL
EOSINOPHILS RELATIVE PERCENT: 2 % (ref 1–4)
GFR AFRICAN AMERICAN: 47 ML/MIN
GFR NON-AFRICAN AMERICAN: 38 ML/MIN
GFR SERPL CREATININE-BSD FRML MDRD: ABNORMAL ML/MIN/{1.73_M2}
GFR SERPL CREATININE-BSD FRML MDRD: ABNORMAL ML/MIN/{1.73_M2}
GLUCOSE BLD-MCNC: 93 MG/DL (ref 65–105)
GLUCOSE BLD-MCNC: 96 MG/DL (ref 70–99)
GLUCOSE BLD-MCNC: 98 MG/DL (ref 65–105)
HCT VFR BLD CALC: 36.7 % (ref 36.3–47.1)
HEMOGLOBIN: 11.5 G/DL (ref 11.9–15.1)
IMMATURE GRANULOCYTES: 1 %
LYMPHOCYTES # BLD: 29 % (ref 24–43)
MCH RBC QN AUTO: 29.8 PG (ref 25.2–33.5)
MCHC RBC AUTO-ENTMCNC: 31.3 G/DL (ref 28.4–34.8)
MCV RBC AUTO: 95.1 FL (ref 82.6–102.9)
MONOCYTES # BLD: 6 % (ref 3–12)
NRBC AUTOMATED: 0 PER 100 WBC
PDW BLD-RTO: 13.9 % (ref 11.8–14.4)
PLATELET # BLD: 267 K/UL (ref 138–453)
PLATELET ESTIMATE: ABNORMAL
PMV BLD AUTO: 9.5 FL (ref 8.1–13.5)
POTASSIUM SERPL-SCNC: 4.6 MMOL/L (ref 3.7–5.3)
RBC # BLD: 3.86 M/UL (ref 3.95–5.11)
RBC # BLD: ABNORMAL 10*6/UL
SEG NEUTROPHILS: 61 % (ref 36–65)
SEGMENTED NEUTROPHILS ABSOLUTE COUNT: 4.47 K/UL (ref 1.5–8.1)
SODIUM BLD-SCNC: 139 MMOL/L (ref 135–144)
WBC # BLD: 7.2 K/UL (ref 3.5–11.3)
WBC # BLD: ABNORMAL 10*3/UL

## 2019-10-23 PROCEDURE — 6360000002 HC RX W HCPCS: Performed by: STUDENT IN AN ORGANIZED HEALTH CARE EDUCATION/TRAINING PROGRAM

## 2019-10-23 PROCEDURE — 99238 HOSP IP/OBS DSCHRG MGMT 30/<: CPT | Performed by: INTERNAL MEDICINE

## 2019-10-23 PROCEDURE — 6370000000 HC RX 637 (ALT 250 FOR IP): Performed by: STUDENT IN AN ORGANIZED HEALTH CARE EDUCATION/TRAINING PROGRAM

## 2019-10-23 PROCEDURE — 85025 COMPLETE CBC W/AUTO DIFF WBC: CPT

## 2019-10-23 PROCEDURE — 94640 AIRWAY INHALATION TREATMENT: CPT

## 2019-10-23 PROCEDURE — 2580000003 HC RX 258: Performed by: STUDENT IN AN ORGANIZED HEALTH CARE EDUCATION/TRAINING PROGRAM

## 2019-10-23 PROCEDURE — 36415 COLL VENOUS BLD VENIPUNCTURE: CPT

## 2019-10-23 PROCEDURE — 80048 BASIC METABOLIC PNL TOTAL CA: CPT

## 2019-10-23 PROCEDURE — 82947 ASSAY GLUCOSE BLOOD QUANT: CPT

## 2019-10-23 RX ORDER — LISINOPRIL 10 MG/1
10 TABLET ORAL DAILY
Qty: 30 TABLET | Refills: 3 | Status: ON HOLD | OUTPATIENT
Start: 2019-10-24 | End: 2020-01-17 | Stop reason: HOSPADM

## 2019-10-23 RX ORDER — CIPROFLOXACIN 500 MG/1
500 TABLET, FILM COATED ORAL 2 TIMES DAILY
Qty: 14 TABLET | Refills: 0 | Status: SHIPPED | OUTPATIENT
Start: 2019-10-23 | End: 2019-10-30

## 2019-10-23 RX ADMIN — AMLODIPINE BESYLATE 10 MG: 10 TABLET ORAL at 10:37

## 2019-10-23 RX ADMIN — CLOPIDOGREL 75 MG: 75 TABLET, FILM COATED ORAL at 09:57

## 2019-10-23 RX ADMIN — Medication 10 ML: at 00:02

## 2019-10-23 RX ADMIN — FLUOXETINE HYDROCHLORIDE 40 MG: 20 CAPSULE ORAL at 09:56

## 2019-10-23 RX ADMIN — TIOTROPIUM BROMIDE 18 MCG: 18 CAPSULE ORAL; RESPIRATORY (INHALATION) at 09:54

## 2019-10-23 RX ADMIN — CARVEDILOL 25 MG: 25 TABLET, FILM COATED ORAL at 09:57

## 2019-10-23 RX ADMIN — MEROPENEM 1 G: 1 INJECTION, POWDER, FOR SOLUTION INTRAVENOUS at 01:31

## 2019-10-23 RX ADMIN — MEROPENEM 1 G: 1 INJECTION, POWDER, FOR SOLUTION INTRAVENOUS at 14:30

## 2019-10-23 RX ADMIN — GABAPENTIN 300 MG: 300 CAPSULE ORAL at 09:57

## 2019-10-23 RX ADMIN — LISINOPRIL 10 MG: 10 TABLET ORAL at 09:56

## 2019-10-23 RX ADMIN — ONDANSETRON 4 MG: 2 INJECTION INTRAMUSCULAR; INTRAVENOUS at 00:02

## 2019-10-23 RX ADMIN — Medication 10 ML: at 09:58

## 2019-10-23 RX ADMIN — HEPARIN SODIUM 5000 UNITS: 5000 INJECTION INTRAVENOUS; SUBCUTANEOUS at 05:19

## 2019-10-23 RX ADMIN — PANTOPRAZOLE SODIUM 40 MG: 40 TABLET, DELAYED RELEASE ORAL at 05:19

## 2019-11-01 ENCOUNTER — OFFICE VISIT (OUTPATIENT)
Dept: PULMONOLOGY | Age: 79
End: 2019-11-01
Payer: COMMERCIAL

## 2019-11-01 VITALS
SYSTOLIC BLOOD PRESSURE: 137 MMHG | DIASTOLIC BLOOD PRESSURE: 71 MMHG | OXYGEN SATURATION: 96 % | HEART RATE: 75 BPM | RESPIRATION RATE: 16 BRPM | BODY MASS INDEX: 34.55 KG/M2 | WEIGHT: 183 LBS | HEIGHT: 61 IN

## 2019-11-01 DIAGNOSIS — K22.5 ZENKER DIVERTICULUM: Primary | ICD-10-CM

## 2019-11-01 DIAGNOSIS — J44.9 CHRONIC OBSTRUCTIVE PULMONARY DISEASE, UNSPECIFIED COPD TYPE (HCC): ICD-10-CM

## 2019-11-01 DIAGNOSIS — J69.0 ASPIRATION PNEUMONIA DUE TO GASTRIC SECRETIONS, UNSPECIFIED LATERALITY, UNSPECIFIED PART OF LUNG (HCC): ICD-10-CM

## 2019-11-01 PROCEDURE — 99213 OFFICE O/P EST LOW 20 MIN: CPT | Performed by: NURSE PRACTITIONER

## 2020-01-13 ENCOUNTER — APPOINTMENT (OUTPATIENT)
Dept: GENERAL RADIOLOGY | Age: 80
DRG: 871 | End: 2020-01-13
Payer: MEDICARE

## 2020-01-13 ENCOUNTER — HOSPITAL ENCOUNTER (INPATIENT)
Age: 80
LOS: 4 days | Discharge: HOME HEALTH | DRG: 871 | End: 2020-01-17
Attending: EMERGENCY MEDICINE | Admitting: INTERNAL MEDICINE
Payer: MEDICARE

## 2020-01-13 PROBLEM — A41.9 SEVERE SEPSIS (HCC): Status: ACTIVE | Noted: 2020-01-13

## 2020-01-13 PROBLEM — I50.9 CHF EXACERBATION (HCC): Status: ACTIVE | Noted: 2020-01-13

## 2020-01-13 PROBLEM — R65.20 SEVERE SEPSIS (HCC): Status: ACTIVE | Noted: 2020-01-13

## 2020-01-13 LAB
-: NORMAL
-: NORMAL
ABSOLUTE EOS #: 0 K/UL (ref 0–0.44)
ABSOLUTE IMMATURE GRANULOCYTE: 0 K/UL (ref 0–0.3)
ABSOLUTE LYMPH #: 0.67 K/UL (ref 1.1–3.7)
ABSOLUTE MONO #: 0.89 K/UL (ref 0.1–1.2)
ALBUMIN SERPL-MCNC: 3.2 G/DL (ref 3.5–5.2)
ALBUMIN/GLOBULIN RATIO: 1 (ref 1–2.5)
ALP BLD-CCNC: 61 U/L (ref 35–104)
ALT SERPL-CCNC: 12 U/L (ref 5–33)
AMORPHOUS: NORMAL
ANION GAP SERPL CALCULATED.3IONS-SCNC: 12 MMOL/L (ref 9–17)
AST SERPL-CCNC: 15 U/L
BACTERIA: NORMAL
BASOPHILS # BLD: 0 % (ref 0–2)
BASOPHILS ABSOLUTE: 0 K/UL (ref 0–0.2)
BILIRUB SERPL-MCNC: 0.29 MG/DL (ref 0.3–1.2)
BILIRUBIN URINE: NEGATIVE
BNP INTERPRETATION: NORMAL
BUN BLDV-MCNC: 36 MG/DL (ref 8–23)
BUN/CREAT BLD: ABNORMAL (ref 9–20)
CALCIUM SERPL-MCNC: 8.7 MG/DL (ref 8.6–10.4)
CASTS UA: NORMAL /LPF (ref 0–8)
CHLORIDE BLD-SCNC: 101 MMOL/L (ref 98–107)
CO2: 20 MMOL/L (ref 20–31)
COLOR: YELLOW
CREAT SERPL-MCNC: 1.89 MG/DL (ref 0.5–0.9)
CRYSTALS, UA: NORMAL /HPF
CULTURE: NORMAL
CULTURE: NORMAL
DIFFERENTIAL TYPE: ABNORMAL
DIRECT EXAM: NORMAL
EOSINOPHILS RELATIVE PERCENT: 0 % (ref 1–4)
EPITHELIAL CELLS UA: NORMAL /HPF (ref 0–5)
GFR AFRICAN AMERICAN: 31 ML/MIN
GFR NON-AFRICAN AMERICAN: 26 ML/MIN
GFR SERPL CREATININE-BSD FRML MDRD: ABNORMAL ML/MIN/{1.73_M2}
GFR SERPL CREATININE-BSD FRML MDRD: ABNORMAL ML/MIN/{1.73_M2}
GLUCOSE BLD-MCNC: 118 MG/DL (ref 65–105)
GLUCOSE BLD-MCNC: 142 MG/DL (ref 70–99)
GLUCOSE URINE: NEGATIVE
HCT VFR BLD CALC: 41.6 % (ref 36.3–47.1)
HEMOGLOBIN: 13.7 G/DL (ref 11.9–15.1)
IMMATURE GRANULOCYTES: 0 %
INR BLD: 0.9
KETONES, URINE: NEGATIVE
LACTIC ACID, SEPSIS WHOLE BLOOD: 1.2 MMOL/L (ref 0.5–1.9)
LACTIC ACID, SEPSIS WHOLE BLOOD: 2.3 MMOL/L (ref 0.5–1.9)
LACTIC ACID, SEPSIS WHOLE BLOOD: 3.3 MMOL/L (ref 0.5–1.9)
LACTIC ACID, SEPSIS: ABNORMAL MMOL/L (ref 0.5–1.9)
LACTIC ACID, SEPSIS: ABNORMAL MMOL/L (ref 0.5–1.9)
LACTIC ACID, SEPSIS: NORMAL MMOL/L (ref 0.5–1.9)
LEUKOCYTE ESTERASE, URINE: NEGATIVE
LYMPHOCYTES # BLD: 3 % (ref 24–43)
Lab: NORMAL
MAGNESIUM: 1.5 MG/DL (ref 1.6–2.6)
MCH RBC QN AUTO: 30 PG (ref 25.2–33.5)
MCHC RBC AUTO-ENTMCNC: 32.9 G/DL (ref 28.4–34.8)
MCV RBC AUTO: 91 FL (ref 82.6–102.9)
MONOCYTES # BLD: 4 % (ref 3–12)
MORPHOLOGY: ABNORMAL
MUCUS: NORMAL
NITRITE, URINE: NEGATIVE
NRBC AUTOMATED: 0 PER 100 WBC
OTHER OBSERVATIONS UA: NORMAL
PARTIAL THROMBOPLASTIN TIME: 20.4 SEC (ref 20.5–30.5)
PDW BLD-RTO: 14.5 % (ref 11.8–14.4)
PH UA: 5 (ref 5–8)
PLATELET # BLD: 343 K/UL (ref 138–453)
PLATELET ESTIMATE: ABNORMAL
PMV BLD AUTO: 9.8 FL (ref 8.1–13.5)
POTASSIUM SERPL-SCNC: 4.4 MMOL/L (ref 3.7–5.3)
PRO-BNP: 154 PG/ML
PROCALCITONIN: 8.03 NG/ML
PROTEIN UA: NEGATIVE
PROTHROMBIN TIME: 9.8 SEC (ref 9–12)
RBC # BLD: 4.57 M/UL (ref 3.95–5.11)
RBC # BLD: ABNORMAL 10*6/UL
RBC UA: NORMAL /HPF (ref 0–4)
REASON FOR REJECTION: NORMAL
RENAL EPITHELIAL, UA: NORMAL /HPF
SEG NEUTROPHILS: 93 % (ref 36–65)
SEGMENTED NEUTROPHILS ABSOLUTE COUNT: 20.74 K/UL (ref 1.5–8.1)
SODIUM BLD-SCNC: 133 MMOL/L (ref 135–144)
SPECIFIC GRAVITY UA: 1.02 (ref 1–1.03)
SPECIMEN DESCRIPTION: NORMAL
TOTAL PROTEIN: 6.3 G/DL (ref 6.4–8.3)
TRICHOMONAS: NORMAL
TROPONIN INTERP: NORMAL
TROPONIN T: NORMAL NG/ML
TROPONIN, HIGH SENSITIVITY: 13 NG/L (ref 0–14)
TURBIDITY: CLEAR
URINE HGB: NEGATIVE
UROBILINOGEN, URINE: NORMAL
WBC # BLD: 22.3 K/UL (ref 3.5–11.3)
WBC # BLD: ABNORMAL 10*3/UL
WBC UA: NORMAL /HPF (ref 0–5)
YEAST: NORMAL
ZZ NTE CLEAN UP: ORDERED TEST: NORMAL
ZZ NTE WITH NAME CLEAN UP: SPECIMEN SOURCE: NORMAL

## 2020-01-13 PROCEDURE — 82947 ASSAY GLUCOSE BLOOD QUANT: CPT

## 2020-01-13 PROCEDURE — 83735 ASSAY OF MAGNESIUM: CPT

## 2020-01-13 PROCEDURE — 80053 COMPREHEN METABOLIC PANEL: CPT

## 2020-01-13 PROCEDURE — 2580000003 HC RX 258: Performed by: EMERGENCY MEDICINE

## 2020-01-13 PROCEDURE — 96375 TX/PRO/DX INJ NEW DRUG ADDON: CPT

## 2020-01-13 PROCEDURE — 2580000003 HC RX 258: Performed by: INTERNAL MEDICINE

## 2020-01-13 PROCEDURE — 85025 COMPLETE CBC W/AUTO DIFF WBC: CPT

## 2020-01-13 PROCEDURE — 84145 PROCALCITONIN (PCT): CPT

## 2020-01-13 PROCEDURE — 96365 THER/PROPH/DIAG IV INF INIT: CPT

## 2020-01-13 PROCEDURE — 2060000000 HC ICU INTERMEDIATE R&B

## 2020-01-13 PROCEDURE — 96361 HYDRATE IV INFUSION ADD-ON: CPT

## 2020-01-13 PROCEDURE — 6370000000 HC RX 637 (ALT 250 FOR IP): Performed by: EMERGENCY MEDICINE

## 2020-01-13 PROCEDURE — 85610 PROTHROMBIN TIME: CPT

## 2020-01-13 PROCEDURE — 99223 1ST HOSP IP/OBS HIGH 75: CPT | Performed by: INTERNAL MEDICINE

## 2020-01-13 PROCEDURE — 71046 X-RAY EXAM CHEST 2 VIEWS: CPT

## 2020-01-13 PROCEDURE — 6370000000 HC RX 637 (ALT 250 FOR IP): Performed by: INTERNAL MEDICINE

## 2020-01-13 PROCEDURE — 87086 URINE CULTURE/COLONY COUNT: CPT

## 2020-01-13 PROCEDURE — 84484 ASSAY OF TROPONIN QUANT: CPT

## 2020-01-13 PROCEDURE — 36415 COLL VENOUS BLD VENIPUNCTURE: CPT

## 2020-01-13 PROCEDURE — 83605 ASSAY OF LACTIC ACID: CPT

## 2020-01-13 PROCEDURE — 93005 ELECTROCARDIOGRAM TRACING: CPT

## 2020-01-13 PROCEDURE — 85730 THROMBOPLASTIN TIME PARTIAL: CPT

## 2020-01-13 PROCEDURE — 6360000002 HC RX W HCPCS: Performed by: EMERGENCY MEDICINE

## 2020-01-13 PROCEDURE — 96367 TX/PROPH/DG ADDL SEQ IV INF: CPT

## 2020-01-13 PROCEDURE — 83880 ASSAY OF NATRIURETIC PEPTIDE: CPT

## 2020-01-13 PROCEDURE — 87040 BLOOD CULTURE FOR BACTERIA: CPT

## 2020-01-13 PROCEDURE — 81001 URINALYSIS AUTO W/SCOPE: CPT

## 2020-01-13 PROCEDURE — 99285 EMERGENCY DEPT VISIT HI MDM: CPT

## 2020-01-13 PROCEDURE — 6360000002 HC RX W HCPCS: Performed by: INTERNAL MEDICINE

## 2020-01-13 PROCEDURE — 87804 INFLUENZA ASSAY W/OPTIC: CPT

## 2020-01-13 RX ORDER — SODIUM CHLORIDE 0.9 % (FLUSH) 0.9 %
10 SYRINGE (ML) INJECTION EVERY 12 HOURS SCHEDULED
Status: DISCONTINUED | OUTPATIENT
Start: 2020-01-13 | End: 2020-01-17 | Stop reason: HOSPADM

## 2020-01-13 RX ORDER — FLUOXETINE HYDROCHLORIDE 20 MG/1
40 CAPSULE ORAL DAILY
Status: DISCONTINUED | OUTPATIENT
Start: 2020-01-14 | End: 2020-01-17 | Stop reason: HOSPADM

## 2020-01-13 RX ORDER — ONDANSETRON 2 MG/ML
4 INJECTION INTRAMUSCULAR; INTRAVENOUS ONCE
Status: COMPLETED | OUTPATIENT
Start: 2020-01-13 | End: 2020-01-13

## 2020-01-13 RX ORDER — DONEPEZIL HYDROCHLORIDE 10 MG/1
10 TABLET, FILM COATED ORAL NIGHTLY
Status: DISCONTINUED | OUTPATIENT
Start: 2020-01-13 | End: 2020-01-17 | Stop reason: HOSPADM

## 2020-01-13 RX ORDER — IPRATROPIUM BROMIDE AND ALBUTEROL SULFATE 2.5; .5 MG/3ML; MG/3ML
1 SOLUTION RESPIRATORY (INHALATION)
Status: DISCONTINUED | OUTPATIENT
Start: 2020-01-13 | End: 2020-01-13 | Stop reason: SDUPTHER

## 2020-01-13 RX ORDER — GLIPIZIDE 5 MG/1
2.5 TABLET ORAL DAILY
Status: DISCONTINUED | OUTPATIENT
Start: 2020-01-14 | End: 2020-01-14

## 2020-01-13 RX ORDER — IPRATROPIUM BROMIDE AND ALBUTEROL SULFATE 2.5; .5 MG/3ML; MG/3ML
1 SOLUTION RESPIRATORY (INHALATION)
Status: DISCONTINUED | OUTPATIENT
Start: 2020-01-13 | End: 2020-01-14

## 2020-01-13 RX ORDER — FUROSEMIDE 10 MG/ML
20 INJECTION INTRAMUSCULAR; INTRAVENOUS 2 TIMES DAILY
Status: DISCONTINUED | OUTPATIENT
Start: 2020-01-14 | End: 2020-01-14

## 2020-01-13 RX ORDER — CARVEDILOL 25 MG/1
25 TABLET ORAL 2 TIMES DAILY
Status: DISCONTINUED | OUTPATIENT
Start: 2020-01-13 | End: 2020-01-17 | Stop reason: HOSPADM

## 2020-01-13 RX ORDER — ALBUTEROL SULFATE 90 UG/1
2 AEROSOL, METERED RESPIRATORY (INHALATION) EVERY 6 HOURS PRN
Status: DISCONTINUED | OUTPATIENT
Start: 2020-01-13 | End: 2020-01-17 | Stop reason: HOSPADM

## 2020-01-13 RX ORDER — CLOPIDOGREL BISULFATE 75 MG/1
75 TABLET ORAL DAILY
Status: DISCONTINUED | OUTPATIENT
Start: 2020-01-14 | End: 2020-01-17 | Stop reason: HOSPADM

## 2020-01-13 RX ORDER — SODIUM CHLORIDE 0.9 % (FLUSH) 0.9 %
10 SYRINGE (ML) INJECTION PRN
Status: DISCONTINUED | OUTPATIENT
Start: 2020-01-13 | End: 2020-01-17 | Stop reason: HOSPADM

## 2020-01-13 RX ORDER — PANTOPRAZOLE SODIUM 40 MG/1
40 TABLET, DELAYED RELEASE ORAL
Status: DISCONTINUED | OUTPATIENT
Start: 2020-01-14 | End: 2020-01-17 | Stop reason: HOSPADM

## 2020-01-13 RX ORDER — ACETAMINOPHEN 500 MG
1000 TABLET ORAL ONCE
Status: COMPLETED | OUTPATIENT
Start: 2020-01-13 | End: 2020-01-13

## 2020-01-13 RX ORDER — 0.9 % SODIUM CHLORIDE 0.9 %
30 INTRAVENOUS SOLUTION INTRAVENOUS ONCE
Status: COMPLETED | OUTPATIENT
Start: 2020-01-13 | End: 2020-01-13

## 2020-01-13 RX ORDER — GABAPENTIN 300 MG/1
300 CAPSULE ORAL 3 TIMES DAILY
Status: DISCONTINUED | OUTPATIENT
Start: 2020-01-13 | End: 2020-01-17 | Stop reason: HOSPADM

## 2020-01-13 RX ORDER — ALPRAZOLAM 0.5 MG/1
0.5 TABLET ORAL NIGHTLY PRN
Status: DISCONTINUED | OUTPATIENT
Start: 2020-01-13 | End: 2020-01-17 | Stop reason: HOSPADM

## 2020-01-13 RX ADMIN — ACETAMINOPHEN 1000 MG: 500 TABLET ORAL at 13:50

## 2020-01-13 RX ADMIN — VANCOMYCIN HYDROCHLORIDE 1250 MG: 10 INJECTION, POWDER, LYOPHILIZED, FOR SOLUTION INTRAVENOUS at 23:56

## 2020-01-13 RX ADMIN — ONDANSETRON 4 MG: 2 INJECTION INTRAMUSCULAR; INTRAVENOUS at 13:49

## 2020-01-13 RX ADMIN — SODIUM CHLORIDE 2490 ML: 9 INJECTION, SOLUTION INTRAVENOUS at 13:13

## 2020-01-13 RX ADMIN — DONEPEZIL HYDROCHLORIDE 10 MG: 10 TABLET, FILM COATED ORAL at 23:56

## 2020-01-13 RX ADMIN — CEFTRIAXONE SODIUM 1 G: 1 INJECTION, POWDER, FOR SOLUTION INTRAMUSCULAR; INTRAVENOUS at 14:22

## 2020-01-13 RX ADMIN — Medication 500 MG: at 16:00

## 2020-01-13 ASSESSMENT — PAIN SCALES - GENERAL
PAINLEVEL_OUTOF10: 0
PAINLEVEL_OUTOF10: 0

## 2020-01-13 NOTE — H&P
embolism (HCC)     Reflux esophagitis     Tinea corporis     Tremor     URI (upper respiratory infection)     UTI (urinary tract infection)     Zenker's diverticulum         Past Surgical History:     Past Surgical History:   Procedure Laterality Date    CAROTID ENDARTERECTOMY Bilateral     ENDOSCOPY, COLON, DIAGNOSTIC      TONSILLECTOMY          Medications Prior to Admission:     Prior to Admission medications    Medication Sig Start Date End Date Taking? Authorizing Provider   tiotropium (SPIRIVA) 18 MCG inhalation capsule Inhale 1 capsule into the lungs daily 11/1/19   LUIS Alarcon CNP   lisinopril (PRINIVIL;ZESTRIL) 10 MG tablet Take 1 tablet by mouth daily 10/24/19   Judy Galindo, DPM   amLODIPine (NORVASC) 10 MG tablet Take 1 tablet by mouth daily 8/23/19   Kisha Vigil MD   albuterol sulfate HFA (VENTOLIN HFA) 108 (90 Base) MCG/ACT inhaler Inhale 2 puffs into the lungs every 6 hours as needed for Wheezing or Shortness of Breath 7/16/19   Bart Stroud MD   FLUoxetine (PROZAC) 20 MG/5ML solution Take 10 mLs by mouth daily 7/16/19   Bart Stroud MD   donepezil (ARICEPT) 10 MG tablet Take 1 tablet by mouth nightly 7/16/19   Bart Stroud MD   glipiZIDE (GLUCOTROL) 5 MG tablet Take 0.5 tablets by mouth daily 7/16/19   Bart Stroud MD   carvedilol (COREG) 25 MG tablet Take 25 mg by mouth 2 times daily    Historical Provider, MD   ALPRAZolam [place]) 0.5 MG tablet Take 0.5 mg by mouth nightly as needed for Sleep. Historical Provider, MD   gabapentin (NEURONTIN) 300 MG capsule Take 300 mg by mouth 3 times daily.     Historical Provider, MD   clopidogrel (PLAVIX) 75 MG tablet Take 75 mg by mouth daily    Historical Provider, MD   cyclobenzaprine (FLEXERIL) 10 MG tablet Take 10 mg by mouth 3 times daily as needed for Muscle spasms    Historical Provider, MD   omeprazole (PRILOSEC) 20 MG capsule Take 20 mg by mouth daily    Historical Provider, MD        Allergies: down    Principal Problem:    CHF exacerbation ([facility] [address] diastolic cardiology evaluation give IV diuresis complicated decision benefit outweighed the risk patient also have sepsis and elevated lactic acid unfortunately cannot give IV fluid or bolus as patient is in CHF exacerbation may need echo pending cardiology eval  Active Problems:    Pneumonia due to organism patient had multiple episodes of pneumonia speech swallow evaluation may benefit from CT scan or pulmonology evaluation as outpatient      COPD (chronic obstructive pulmonary disease) ([facility] [address] with acute exacerbation nebulizer treatment also check ABG as patient also has altered mental status no plan to give steroid for now also check respiratory virus      Diabetes mellitus ([facility] [address] probably type II continue home medication give insulin sliding scale        Essential hypertension hold lisinopril to allow for diuresis          Stage 4 chronic kidney disease ([facility] [address] complicated decision benefit out with the risk continue diuresis for now monitor renal function      Lactic acidosis multifactorial probably related to CHF and sepsis      Severe sepsis ([facility] [address] as patient also has altered mental status and elevated lactic acid no plan to give normal saline bolus as patient is in acute CHF exacerbation continue broad-spectrum IV antibiotics pending culture        Consultations:   IP CONSULT TO HOSPITALIST  IP CONSULT TO CARDIOLOGY  PHARMACY TO DOSE VANCOMYCIN     Patient is admitted as inpatient status because of co-morbidities listed above, severity of signs and symptoms as outlined, requirement for current medical therapies and most importantly because of direct risk to patient if care not provided in a hospital setting.     Nancy Burgess MD  1/13/2020  3:05 PM    Copy sent to Dr. Gabby Briceño MD

## 2020-01-13 NOTE — ED PROVIDER NOTES
[facility] [address] [facility]     Emergency Department     Faculty Attestation    I performed a history and physical examination of the patient and discussed management with the resident. I reviewed the residents note and agree with the documented findings and plan of care. Any areas of disagreement are noted on the chart. I was personally present for the key portions of any procedures. I have documented in the chart those procedures where I was not present during the key portions. I have reviewed the emergency nurses triage note. I agree with the chief complaint, past medical history, past surgical history, allergies, medications, social and family history as documented unless otherwise noted below. For Physician Assistant/ Nurse Practitioner cases/documentation I have personally evaluated this patient and have completed at least one if not all key elements of the E/M (history, physical exam, and MDM). Additional findings are as noted. Patient sent to ED for mental status changes. Lives alone but checked on by neighbors regularly who saw her this morning and felt that she was confused. She is febrile as well. Patient is intermittently confused during conversation but overall appropriate. States she does not feel well just feels weak and tired. Cough productive. No vomiting. On exam diminished breath sounds at the bases right more than left but speaking in full sentences. Heart is tachycardic but is febrile here. Abdomen soft nontender. No asymmetry 1+ edema bilaterally.   Will check labs each chest x-ray sepsis work-up, anticipate admission    EKG interpretation sinus tachycardia 107 normal intervals normal axis no acute ST or T changes normal EKG except for rate    Critical Care     none    Rosa Isela Sanchez MD, Germán Harper  Attending Emergency  Physician             Rosa Isela Sanchez MD  01/13/20 [id number]

## 2020-01-14 LAB
ABSOLUTE EOS #: 0.19 K/UL (ref 0–0.44)
ABSOLUTE IMMATURE GRANULOCYTE: 0.07 K/UL (ref 0–0.3)
ABSOLUTE LYMPH #: 2.54 K/UL (ref 1.1–3.7)
ABSOLUTE MONO #: 0.73 K/UL (ref 0.1–1.2)
ADENOVIRUS PCR: NOT DETECTED
ALBUMIN SERPL-MCNC: 3.1 G/DL (ref 3.5–5.2)
ALBUMIN/GLOBULIN RATIO: 1 (ref 1–2.5)
ALLEN TEST: POSITIVE
ALP BLD-CCNC: 60 U/L (ref 35–104)
ALT SERPL-CCNC: 12 U/L (ref 5–33)
ANION GAP SERPL CALCULATED.3IONS-SCNC: 12 MMOL/L (ref 9–17)
AST SERPL-CCNC: 14 U/L
BASOPHILS # BLD: 0 % (ref 0–2)
BASOPHILS ABSOLUTE: 0.05 K/UL (ref 0–0.2)
BILIRUB SERPL-MCNC: 0.3 MG/DL (ref 0.3–1.2)
BORDETELLA PARAPERTUSSIS: NOT DETECTED
BORDETELLA PERTUSSIS PCR: NOT DETECTED
BUN BLDV-MCNC: 32 MG/DL (ref 8–23)
BUN/CREAT BLD: ABNORMAL (ref 9–20)
CALCIUM IONIZED: 1.13 MMOL/L (ref 1.13–1.33)
CALCIUM SERPL-MCNC: 8.8 MG/DL (ref 8.6–10.4)
CHLAMYDIA PNEUMONIAE BY PCR: NOT DETECTED
CHLORIDE BLD-SCNC: 102 MMOL/L (ref 98–107)
CO2: 21 MMOL/L (ref 20–31)
CORONAVIRUS 229E PCR: NOT DETECTED
CORONAVIRUS HKU1 PCR: NOT DETECTED
CORONAVIRUS NL63 PCR: NOT DETECTED
CORONAVIRUS OC43 PCR: NOT DETECTED
CREAT SERPL-MCNC: 1.76 MG/DL (ref 0.5–0.9)
CULTURE: NORMAL
DIFFERENTIAL TYPE: ABNORMAL
EOSINOPHILS RELATIVE PERCENT: 1 % (ref 1–4)
FIO2: ABNORMAL
GFR AFRICAN AMERICAN: 34 ML/MIN
GFR NON-AFRICAN AMERICAN: 28 ML/MIN
GFR SERPL CREATININE-BSD FRML MDRD: ABNORMAL ML/MIN/{1.73_M2}
GFR SERPL CREATININE-BSD FRML MDRD: ABNORMAL ML/MIN/{1.73_M2}
GLUCOSE BLD-MCNC: 105 MG/DL (ref 65–105)
GLUCOSE BLD-MCNC: 123 MG/DL (ref 70–99)
GLUCOSE BLD-MCNC: 134 MG/DL (ref 65–105)
GLUCOSE BLD-MCNC: 95 MG/DL (ref 65–105)
HCT VFR BLD CALC: 33.7 % (ref 36.3–47.1)
HEMOGLOBIN: 10.8 G/DL (ref 11.9–15.1)
HUMAN METAPNEUMOVIRUS PCR: NOT DETECTED
IMMATURE GRANULOCYTES: 1 %
INFLUENZA A BY PCR: NOT DETECTED
INFLUENZA A H1 (2009) PCR: NORMAL
INFLUENZA A H1 PCR: NORMAL
INFLUENZA A H3 PCR: NORMAL
INFLUENZA B BY PCR: NOT DETECTED
LACTIC ACID, SEPSIS WHOLE BLOOD: 0.8 MMOL/L (ref 0.5–1.9)
LACTIC ACID, SEPSIS: NORMAL MMOL/L (ref 0.5–1.9)
LYMPHOCYTES # BLD: 17 % (ref 24–43)
Lab: NORMAL
MAGNESIUM: 1.8 MG/DL (ref 1.6–2.6)
MCH RBC QN AUTO: 30.1 PG (ref 25.2–33.5)
MCHC RBC AUTO-ENTMCNC: 32 G/DL (ref 28.4–34.8)
MCV RBC AUTO: 93.9 FL (ref 82.6–102.9)
MODE: ABNORMAL
MONOCYTES # BLD: 5 % (ref 3–12)
MYCOPLASMA PNEUMONIAE PCR: NOT DETECTED
NEGATIVE BASE EXCESS, ART: ABNORMAL (ref 0–2)
NRBC AUTOMATED: 0 PER 100 WBC
O2 DEVICE/FLOW/%: ABNORMAL
PARAINFLUENZA 1 PCR: NOT DETECTED
PARAINFLUENZA 2 PCR: NOT DETECTED
PARAINFLUENZA 3 PCR: NOT DETECTED
PARAINFLUENZA 4 PCR: NOT DETECTED
PATIENT TEMP: ABNORMAL
PDW BLD-RTO: 14.6 % (ref 11.8–14.4)
PHOSPHORUS: 3.5 MG/DL (ref 2.6–4.5)
PLATELET # BLD: 270 K/UL (ref 138–453)
PLATELET ESTIMATE: ABNORMAL
PMV BLD AUTO: 9.4 FL (ref 8.1–13.5)
POC HCO3: 25.7 MMOL/L (ref 21–28)
POC O2 SATURATION: 95 % (ref 94–98)
POC PCO2 TEMP: ABNORMAL MM HG
POC PCO2: 38.2 MM HG (ref 35–48)
POC PH TEMP: ABNORMAL
POC PH: 7.44 (ref 7.35–7.45)
POC PO2 TEMP: ABNORMAL MM HG
POC PO2: 71.6 MM HG (ref 83–108)
POSITIVE BASE EXCESS, ART: 2 (ref 0–3)
POTASSIUM SERPL-SCNC: 4.3 MMOL/L (ref 3.7–5.3)
RBC # BLD: 3.59 M/UL (ref 3.95–5.11)
RBC # BLD: ABNORMAL 10*6/UL
RESP SYNCYTIAL VIRUS PCR: NOT DETECTED
RHINO/ENTEROVIRUS PCR: NOT DETECTED
SAMPLE SITE: ABNORMAL
SEG NEUTROPHILS: 76 % (ref 36–65)
SEGMENTED NEUTROPHILS ABSOLUTE COUNT: 11.54 K/UL (ref 1.5–8.1)
SODIUM BLD-SCNC: 135 MMOL/L (ref 135–144)
SPECIMEN DESCRIPTION: NORMAL
SPECIMEN DESCRIPTION: NORMAL
TCO2 (CALC), ART: 27 MMOL/L (ref 22–29)
TOTAL PROTEIN: 6.3 G/DL (ref 6.4–8.3)
WBC # BLD: 15.1 K/UL (ref 3.5–11.3)
WBC # BLD: ABNORMAL 10*3/UL

## 2020-01-14 PROCEDURE — 96375 TX/PRO/DX INJ NEW DRUG ADDON: CPT

## 2020-01-14 PROCEDURE — 2060000000 HC ICU INTERMEDIATE R&B

## 2020-01-14 PROCEDURE — 36415 COLL VENOUS BLD VENIPUNCTURE: CPT

## 2020-01-14 PROCEDURE — 6370000000 HC RX 637 (ALT 250 FOR IP): Performed by: INTERNAL MEDICINE

## 2020-01-14 PROCEDURE — 85025 COMPLETE CBC W/AUTO DIFF WBC: CPT

## 2020-01-14 PROCEDURE — 83605 ASSAY OF LACTIC ACID: CPT

## 2020-01-14 PROCEDURE — 82803 BLOOD GASES ANY COMBINATION: CPT

## 2020-01-14 PROCEDURE — 96368 THER/DIAG CONCURRENT INF: CPT

## 2020-01-14 PROCEDURE — 94640 AIRWAY INHALATION TREATMENT: CPT

## 2020-01-14 PROCEDURE — 99233 SBSQ HOSP IP/OBS HIGH 50: CPT | Performed by: HOSPITALIST

## 2020-01-14 PROCEDURE — 82330 ASSAY OF CALCIUM: CPT

## 2020-01-14 PROCEDURE — 6360000002 HC RX W HCPCS: Performed by: INTERNAL MEDICINE

## 2020-01-14 PROCEDURE — 2580000003 HC RX 258: Performed by: INTERNAL MEDICINE

## 2020-01-14 PROCEDURE — 6370000000 HC RX 637 (ALT 250 FOR IP): Performed by: HOSPITALIST

## 2020-01-14 PROCEDURE — 96367 TX/PROPH/DG ADDL SEQ IV INF: CPT

## 2020-01-14 PROCEDURE — 83735 ASSAY OF MAGNESIUM: CPT

## 2020-01-14 PROCEDURE — 96366 THER/PROPH/DIAG IV INF ADDON: CPT

## 2020-01-14 PROCEDURE — 82947 ASSAY GLUCOSE BLOOD QUANT: CPT

## 2020-01-14 PROCEDURE — 6370000000 HC RX 637 (ALT 250 FOR IP): Performed by: NURSE PRACTITIONER

## 2020-01-14 PROCEDURE — 36600 WITHDRAWAL OF ARTERIAL BLOOD: CPT

## 2020-01-14 PROCEDURE — 84100 ASSAY OF PHOSPHORUS: CPT

## 2020-01-14 PROCEDURE — 80053 COMPREHEN METABOLIC PANEL: CPT

## 2020-01-14 PROCEDURE — 0100U HC RESPIRPTHGN MULT REV TRANS & AMP PRB TECH 21 TRGT: CPT

## 2020-01-14 RX ORDER — FUROSEMIDE 40 MG/1
40 TABLET ORAL DAILY
Status: DISCONTINUED | OUTPATIENT
Start: 2020-01-14 | End: 2020-01-17 | Stop reason: HOSPADM

## 2020-01-14 RX ORDER — ALBUTEROL SULFATE 2.5 MG/3ML
2.5 SOLUTION RESPIRATORY (INHALATION)
Status: DISCONTINUED | OUTPATIENT
Start: 2020-01-14 | End: 2020-01-17 | Stop reason: HOSPADM

## 2020-01-14 RX ORDER — SODIUM CHLORIDE 9 MG/ML
INJECTION, SOLUTION INTRAVENOUS CONTINUOUS
Status: DISCONTINUED | OUTPATIENT
Start: 2020-01-14 | End: 2020-01-17 | Stop reason: HOSPADM

## 2020-01-14 RX ORDER — IPRATROPIUM BROMIDE AND ALBUTEROL SULFATE 2.5; .5 MG/3ML; MG/3ML
1 SOLUTION RESPIRATORY (INHALATION) EVERY 4 HOURS PRN
Status: DISCONTINUED | OUTPATIENT
Start: 2020-01-14 | End: 2020-01-17 | Stop reason: HOSPADM

## 2020-01-14 RX ADMIN — FUROSEMIDE 20 MG: 10 INJECTION, SOLUTION INTRAMUSCULAR; INTRAVENOUS at 00:00

## 2020-01-14 RX ADMIN — CARVEDILOL 25 MG: 25 TABLET, FILM COATED ORAL at 00:01

## 2020-01-14 RX ADMIN — GABAPENTIN 300 MG: 300 CAPSULE ORAL at 08:00

## 2020-01-14 RX ADMIN — IPRATROPIUM BROMIDE AND ALBUTEROL SULFATE 1 AMPULE: .5; 3 SOLUTION RESPIRATORY (INHALATION) at 17:40

## 2020-01-14 RX ADMIN — SODIUM CHLORIDE, PRESERVATIVE FREE 10 ML: 5 INJECTION INTRAVENOUS at 00:35

## 2020-01-14 RX ADMIN — FLUOXETINE HYDROCHLORIDE 40 MG: 20 CAPSULE ORAL at 08:01

## 2020-01-14 RX ADMIN — CARVEDILOL 25 MG: 25 TABLET, FILM COATED ORAL at 08:00

## 2020-01-14 RX ADMIN — CARVEDILOL 25 MG: 25 TABLET, FILM COATED ORAL at 20:32

## 2020-01-14 RX ADMIN — CEFEPIME 2 G: 2 INJECTION, POWDER, FOR SOLUTION INTRAVENOUS at 00:00

## 2020-01-14 RX ADMIN — CEFEPIME 2 G: 2 INJECTION, POWDER, FOR SOLUTION INTRAVENOUS at 14:39

## 2020-01-14 RX ADMIN — DONEPEZIL HYDROCHLORIDE 10 MG: 10 TABLET, FILM COATED ORAL at 20:33

## 2020-01-14 RX ADMIN — PANTOPRAZOLE SODIUM 40 MG: 40 TABLET, DELAYED RELEASE ORAL at 08:01

## 2020-01-14 RX ADMIN — SODIUM CHLORIDE, PRESERVATIVE FREE 10 ML: 5 INJECTION INTRAVENOUS at 08:01

## 2020-01-14 RX ADMIN — CEFEPIME 2 G: 2 INJECTION, POWDER, FOR SOLUTION INTRAVENOUS at 22:21

## 2020-01-14 RX ADMIN — TIOTROPIUM BROMIDE 18 MCG: 18 CAPSULE ORAL; RESPIRATORY (INHALATION) at 17:39

## 2020-01-14 RX ADMIN — CLOPIDOGREL 75 MG: 75 TABLET, FILM COATED ORAL at 08:01

## 2020-01-14 RX ADMIN — FUROSEMIDE 40 MG: 40 TABLET ORAL at 17:28

## 2020-01-14 RX ADMIN — ALPRAZOLAM 0.5 MG: 0.5 TABLET ORAL at 22:24

## 2020-01-14 RX ADMIN — CEFEPIME 2 G: 2 INJECTION, POWDER, FOR SOLUTION INTRAVENOUS at 08:02

## 2020-01-14 ASSESSMENT — PAIN SCALES - GENERAL
PAINLEVEL_OUTOF10: 0

## 2020-01-14 NOTE — H&P
138/56   Pulse 88   Temp 99.1 °F (37.3 °C) (Oral)   Resp 14   Ht 5' 2\" (1.575 m)   Wt 183 lb (83 kg)   SpO2 97%   BMI 33.47 kg/m²   Temp (24hrs), Av.2 °F (37.9 °C), Min:99 °F (37.2 °C), Max:102.8 °F (39.3 °C)    Recent Labs     20   POCGLU 118*       I/O (24Hr):     Intake/Output Summary (Last 24 hours) at 2020 0951  Last data filed at 2020 0800  Gross per 24 hour   Intake 1060 ml   Output --   Net 1060 ml       Labs:  Hematology:  Recent Labs     20  1316 20  0556   WBC 22.3* 15.1*   RBC 4.57 3.59*   HGB 13.7 10.8*   HCT 41.6 33.7*   MCV 91.0 93.9   MCH 30.0 30.1   MCHC 32.9 32.0   RDW 14.5* 14.6*    270   MPV 9.8 9.4   INR 0.9  --      Chemistry:  Recent Labs     20  1517 20  0556   NA  --  133* 135   K  --  4.4 4.3   CL  --  101 102   CO2  --  20 21   GLUCOSE  --  142* 123*   BUN  --  36* 32*   CREATININE  --  1.89* 1.76*   MG  --  1.5* 1.8   ANIONGAP  --  12 12   LABGLOM  --  26* 28*   GFRAA  --  31* 34*   CALCIUM  --  8.7 8.8   CAION  --   --  1.13   PHOS  --   --  3.5   PROBNP 154  --   --    TROPHS 13  --   --      Recent Labs     20  0556   PROT  --  6.3* 6.3*   LABALBU  --  3.2* 3.1*   AST  --  15 14   ALT  --  12 12   ALKPHOS  --  61 60   BILITOT  --  0.29* 0.30   POCGLU 118*  --   --      ABG:  Lab Results   Component Value Date    POCPH 7.436 2020    POCPCO2 38.2 2020    POCPO2 71.6 2020    POCHCO3 25.7 2020    NBEA NOT REPORTED 2020    PBEA 2 2020    EHX9TNL 27 2020    KICW9KZW 95 2020    FIO2 NOT REPORTED 2020     Lab Results   Component Value Date/Time    SPECIAL NOT REPORTED 2020 10:15 PM    SPECIAL NOT REPORTED 2020 10:15 PM     Lab Results   Component Value Date/Time    CULTURE DUPLICATE ORDER  10:15 PM    CULTURE DUPLICATE ORDER  10:15 PM       Radiology:  [facility] Chest Standard (2 Vw)    Result Date:

## 2020-01-14 NOTE — PLAN OF CARE
Problem: Falls - Risk of:  Goal: Will remain free from falls  Description  Will remain free from falls  Outcome: Ongoing     Problem: Falls - Risk of:  Goal: Absence of physical injury  Description  Absence of physical injury  Outcome: Ongoing   Pt remains free from falls at this time. Floor free from obstacles, and bed is locked and in lowest position. Adequate lighting provided. Call light within reach; pt encouraged to call before getting OOB for any need. Will continue to monitor needs during hourly rounding.

## 2020-01-14 NOTE — ED NOTES
Report called to [facility]. All questions answered at this time.      Doren Councilman, CRISTINA  01/13/20 2125

## 2020-01-14 NOTE — PROGRESS NOTES
[facility]  Documentation Note                Admission Dx: Severe sepsis ([facility] [A41.9, R65.20]    Past Medical History:   has a past medical history of Anxiety, Arthritis, Atherosclerosis, Body mass index (bmi) 25.0-25.9, adult, CHF exacerbation (HCC), COPD (chronic obstructive pulmonary disease) ([facility] CVA (cerebral vascular accident) ([facility] [facility] Depression, Diabetes mellitus ([facility] [facility] Diverticula, esophagus congenital, Former smoker, Hypercholesteremia, Hypertension, Hypokalemia, Joint pain, knee, Myocardial infarct, old, Pneumonia, Protein S deficiency ([facility] [facility] Pulmonary embolism ([facility] Reflux esophagitis, Tinea corporis, Tremor, URI (upper respiratory infection), UTI (urinary tract infection), and Zenker's diverticulum. Previous Testing:     ECHO 8/21/19: EF 55%, grade I DD, mild MR/TR, RVSP is 23 mmHg. Previous office/hospital visit:   None     Home Medications:      Prior to Admission medications    Medication Sig Start Date End Date Taking?  Authorizing Provider   tiotropium (SPIRIVA) 18 MCG inhalation capsule Inhale 1 capsule into the lungs daily 11/1/19   LUIS Dalton CNP   lisinopril (PRINIVIL;ZESTRIL) 10 MG tablet Take 1 tablet by mouth daily 10/24/19   Monica Garcia DPM   amLODIPine (NORVASC) 10 MG tablet Take 1 tablet by mouth daily 8/23/19   Clemencia Sanchez MD   albuterol sulfate HFA (VENTOLIN HFA) 108 (90 Base) MCG/ACT inhaler Inhale 2 puffs into the lungs every 6 hours as needed for Wheezing or Shortness of Breath 7/16/19   Charise Snellen, MD   FLUoxetine (PROZAC) 20 MG/5ML solution Take 10 mLs by mouth daily 7/16/19   Charise Snellen, MD   donepezil (ARICEPT) 10 MG tablet Take 1 tablet by mouth nightly 7/16/19   Charise Snellen, MD   glipiZIDE (GLUCOTROL) 5 MG tablet Take 0.5 tablets by mouth daily 7/16/19   Charise Snellen, MD   carvedilol (COREG) 25 MG tablet Take 25 mg by mouth 2 times daily    Historical Provider, MD   ALPRAZolam [place] Ed[place]) 0.5 MG tablet

## 2020-01-14 NOTE — CONSULTS
EKG:NSR, nonspecific ST changes seen  Labs:     CBC:   Recent Labs     01/13/20  1316 01/14/20  0556   WBC 22.3* 15.1*   HGB 13.7 10.8*   HCT 41.6 33.7*    270     BMP:   Recent Labs     01/13/20 2208 01/14/20  0556   * 135   K 4.4 4.3   CO2 20 21   BUN 36* 32*   CREATININE 1.89* 1.76*   LABGLOM 26* 28*   GLUCOSE 142* 123*     BNP: No results for input(s): BNP in the last 72 hours. PT/INR:   Recent Labs     01/13/20  1316   PROTIME 9.8   INR 0.9     APTT:  Recent Labs     01/13/20  1316   APTT 20.4*     CARDIAC ENZYMES:No results for input(s): CKTOTAL, CKMB, CKMBINDEX, TROPONINI in the last 72 hours. FASTING LIPID PANEL:No results found for: HDL, LDLDIRECT, LDLCALC, TRIG  LIVER PROFILE:  Recent Labs     01/13/20 2208 01/14/20  0556   AST 15 14   ALT 12 12   LABALBU 3.2* 3.1*       IMPRESSION/RECOMMENDATIONS:  SOB. Fever and cough. Most likely due to pneumonia. No signs of volume overload. Feels better. Ok to change to PO lasix. Antibiotics per primary service. Echo on 8/2019 showed preserved EF. No further cardiac work up needed. Discussed with patient and Nurse.     Wilfrido Pimentel MD  [facility]           [phone]

## 2020-01-14 NOTE — PLAN OF CARE
improve  Description  Ability to seek appropriate health care will improve  Outcome: Met This Shift     Problem: Nutritional:  Goal: Maintenance of adequate nutrition will improve  Description  Maintenance of adequate nutrition will improve  Outcome: Met This Shift     Problem: Physical Regulation:  Goal: Complications related to the disease process, condition or treatment will be avoided or minimized  Description  Complications related to the disease process, condition or treatment will be avoided or minimized  Outcome: Met This Shift     Problem: Respiratory:  Goal: Ability to maintain adequate ventilation will improve  Description  Ability to maintain adequate ventilation will improve  Outcome: Met This Shift  Goal: Respiratory status will improve  Description  Respiratory status will improve  Outcome: Met This Shift

## 2020-01-15 PROBLEM — N17.9 ACUTE-ON-CHRONIC KIDNEY INJURY (HCC): Status: ACTIVE | Noted: 2020-01-15

## 2020-01-15 PROBLEM — N18.9 ACUTE-ON-CHRONIC KIDNEY INJURY (HCC): Status: ACTIVE | Noted: 2020-01-15

## 2020-01-15 LAB
ABSOLUTE EOS #: 0.23 K/UL (ref 0–0.44)
ABSOLUTE IMMATURE GRANULOCYTE: 0.03 K/UL (ref 0–0.3)
ABSOLUTE LYMPH #: 1.62 K/UL (ref 1.1–3.7)
ABSOLUTE MONO #: 0.72 K/UL (ref 0.1–1.2)
ALBUMIN SERPL-MCNC: 3.6 G/DL (ref 3.5–5.2)
ALBUMIN/GLOBULIN RATIO: 0.9 (ref 1–2.5)
ALP BLD-CCNC: 64 U/L (ref 35–104)
ALT SERPL-CCNC: 12 U/L (ref 5–33)
ANION GAP SERPL CALCULATED.3IONS-SCNC: 16 MMOL/L (ref 9–17)
AST SERPL-CCNC: 15 U/L
BASOPHILS # BLD: 1 % (ref 0–2)
BASOPHILS ABSOLUTE: 0.05 K/UL (ref 0–0.2)
BILIRUB SERPL-MCNC: 0.3 MG/DL (ref 0.3–1.2)
BUN BLDV-MCNC: 32 MG/DL (ref 8–23)
BUN/CREAT BLD: ABNORMAL (ref 9–20)
CALCIUM SERPL-MCNC: 9.7 MG/DL (ref 8.6–10.4)
CHLORIDE BLD-SCNC: 96 MMOL/L (ref 98–107)
CO2: 24 MMOL/L (ref 20–31)
CREAT SERPL-MCNC: 1.98 MG/DL (ref 0.5–0.9)
DIFFERENTIAL TYPE: ABNORMAL
EOSINOPHILS RELATIVE PERCENT: 3 % (ref 1–4)
GFR AFRICAN AMERICAN: 29 ML/MIN
GFR NON-AFRICAN AMERICAN: 24 ML/MIN
GFR SERPL CREATININE-BSD FRML MDRD: ABNORMAL ML/MIN/{1.73_M2}
GFR SERPL CREATININE-BSD FRML MDRD: ABNORMAL ML/MIN/{1.73_M2}
GLUCOSE BLD-MCNC: 105 MG/DL (ref 65–105)
GLUCOSE BLD-MCNC: 108 MG/DL (ref 65–105)
GLUCOSE BLD-MCNC: 110 MG/DL (ref 70–99)
GLUCOSE BLD-MCNC: 114 MG/DL (ref 65–105)
GLUCOSE BLD-MCNC: 195 MG/DL (ref 65–105)
GLUCOSE BLD-MCNC: 84 MG/DL (ref 65–105)
HCT VFR BLD CALC: 38 % (ref 36.3–47.1)
HEMOGLOBIN: 12.2 G/DL (ref 11.9–15.1)
IMMATURE GRANULOCYTES: 0 %
LYMPHOCYTES # BLD: 18 % (ref 24–43)
MCH RBC QN AUTO: 29.3 PG (ref 25.2–33.5)
MCHC RBC AUTO-ENTMCNC: 32.1 G/DL (ref 28.4–34.8)
MCV RBC AUTO: 91.3 FL (ref 82.6–102.9)
MONOCYTES # BLD: 8 % (ref 3–12)
NRBC AUTOMATED: 0 PER 100 WBC
PDW BLD-RTO: 14.5 % (ref 11.8–14.4)
PLATELET # BLD: 286 K/UL (ref 138–453)
PLATELET ESTIMATE: ABNORMAL
PMV BLD AUTO: 9.4 FL (ref 8.1–13.5)
POTASSIUM SERPL-SCNC: 4.3 MMOL/L (ref 3.7–5.3)
PROCALCITONIN: 5.31 NG/ML
RBC # BLD: 4.16 M/UL (ref 3.95–5.11)
RBC # BLD: ABNORMAL 10*6/UL
SEG NEUTROPHILS: 70 % (ref 36–65)
SEGMENTED NEUTROPHILS ABSOLUTE COUNT: 6.32 K/UL (ref 1.5–8.1)
SODIUM BLD-SCNC: 136 MMOL/L (ref 135–144)
TOTAL PROTEIN: 7.4 G/DL (ref 6.4–8.3)
WBC # BLD: 9 K/UL (ref 3.5–11.3)
WBC # BLD: ABNORMAL 10*3/UL

## 2020-01-15 PROCEDURE — 85025 COMPLETE CBC W/AUTO DIFF WBC: CPT

## 2020-01-15 PROCEDURE — 96367 TX/PROPH/DG ADDL SEQ IV INF: CPT

## 2020-01-15 PROCEDURE — 2500000003 HC RX 250 WO HCPCS: Performed by: PHYSICIAN ASSISTANT

## 2020-01-15 PROCEDURE — 94640 AIRWAY INHALATION TREATMENT: CPT

## 2020-01-15 PROCEDURE — 84145 PROCALCITONIN (PCT): CPT

## 2020-01-15 PROCEDURE — 6360000002 HC RX W HCPCS: Performed by: INTERNAL MEDICINE

## 2020-01-15 PROCEDURE — 2580000003 HC RX 258: Performed by: INTERNAL MEDICINE

## 2020-01-15 PROCEDURE — 6370000000 HC RX 637 (ALT 250 FOR IP): Performed by: HOSPITALIST

## 2020-01-15 PROCEDURE — 96366 THER/PROPH/DIAG IV INF ADDON: CPT

## 2020-01-15 PROCEDURE — 6370000000 HC RX 637 (ALT 250 FOR IP): Performed by: INTERNAL MEDICINE

## 2020-01-15 PROCEDURE — 2060000000 HC ICU INTERMEDIATE R&B

## 2020-01-15 PROCEDURE — 99232 SBSQ HOSP IP/OBS MODERATE 35: CPT | Performed by: PHYSICIAN ASSISTANT

## 2020-01-15 PROCEDURE — 82947 ASSAY GLUCOSE BLOOD QUANT: CPT

## 2020-01-15 PROCEDURE — 36415 COLL VENOUS BLD VENIPUNCTURE: CPT

## 2020-01-15 PROCEDURE — 80053 COMPREHEN METABOLIC PANEL: CPT

## 2020-01-15 PROCEDURE — 6370000000 HC RX 637 (ALT 250 FOR IP): Performed by: PHYSICIAN ASSISTANT

## 2020-01-15 PROCEDURE — 94761 N-INVAS EAR/PLS OXIMETRY MLT: CPT

## 2020-01-15 RX ORDER — DOCUSATE SODIUM 100 MG/1
100 CAPSULE, LIQUID FILLED ORAL DAILY
Status: DISCONTINUED | OUTPATIENT
Start: 2020-01-15 | End: 2020-01-17 | Stop reason: HOSPADM

## 2020-01-15 RX ADMIN — CARVEDILOL 25 MG: 25 TABLET, FILM COATED ORAL at 09:28

## 2020-01-15 RX ADMIN — GABAPENTIN 300 MG: 300 CAPSULE ORAL at 09:28

## 2020-01-15 RX ADMIN — CEFEPIME 2 G: 2 INJECTION, POWDER, FOR SOLUTION INTRAVENOUS at 06:14

## 2020-01-15 RX ADMIN — TIOTROPIUM BROMIDE 18 MCG: 18 CAPSULE ORAL; RESPIRATORY (INHALATION) at 08:58

## 2020-01-15 RX ADMIN — DOCUSATE SODIUM 100 MG: 100 CAPSULE, LIQUID FILLED ORAL at 15:11

## 2020-01-15 RX ADMIN — INSULIN LISPRO 1 UNITS: 100 INJECTION, SOLUTION INTRAVENOUS; SUBCUTANEOUS at 20:26

## 2020-01-15 RX ADMIN — GABAPENTIN 300 MG: 300 CAPSULE ORAL at 15:11

## 2020-01-15 RX ADMIN — METRONIDAZOLE 500 MG: 500 INJECTION, SOLUTION INTRAVENOUS at 20:25

## 2020-01-15 RX ADMIN — CEFEPIME 2 G: 2 INJECTION, POWDER, FOR SOLUTION INTRAVENOUS at 15:10

## 2020-01-15 RX ADMIN — CLOPIDOGREL 75 MG: 75 TABLET, FILM COATED ORAL at 09:28

## 2020-01-15 RX ADMIN — CEFEPIME 2 G: 2 INJECTION, POWDER, FOR SOLUTION INTRAVENOUS at 22:34

## 2020-01-15 RX ADMIN — FLUOXETINE HYDROCHLORIDE 40 MG: 20 CAPSULE ORAL at 09:28

## 2020-01-15 RX ADMIN — DONEPEZIL HYDROCHLORIDE 10 MG: 10 TABLET, FILM COATED ORAL at 20:25

## 2020-01-15 RX ADMIN — PANTOPRAZOLE SODIUM 40 MG: 40 TABLET, DELAYED RELEASE ORAL at 06:14

## 2020-01-15 RX ADMIN — CARVEDILOL 25 MG: 25 TABLET, FILM COATED ORAL at 20:24

## 2020-01-15 ASSESSMENT — ENCOUNTER SYMPTOMS
EYE PAIN: 0
DIARRHEA: 0
COUGH: 0
VOMITING: 0
NAUSEA: 0
SORE THROAT: 0
ABDOMINAL PAIN: 0
SHORTNESS OF BREATH: 0

## 2020-01-15 NOTE — PROGRESS NOTES
[facility]    Progress Note    1/15/2020    4:10 PM    Name:   Riley Kiser  MRN:     [id number]     Acct:      [de-identified]   Room:   [address] [place] [address] Day:  2  Admit Date:  1/13/2020 12:17 PM    PCP:   Kamron Hoffman MD  Code Status:  DNR-CC    Subjective:     C/C:   Chief Complaint   Patient presents with   Savanah Malik Altered Mental Status     Patient Active Problem List   Diagnosis    Pneumonia due to organism    COPD (chronic obstructive pulmonary disease) ([address]    Diabetes mellitus ([address]    Essential hypertension    Simple chronic bronchitis ([address]    Type 2 diabetes mellitus with kidney complication, with long-term current use of insulin ([address]    Stage 4 chronic kidney disease ([address]    Aspiration pneumonitis ([address]    Lactic acidosis    Hypertensive urgency    Zenker diverticulum    CHF exacerbation ([address]    Severe sepsis ([address]    Acute-on-chronic kidney injury ([address]       Interval History Status: improved. Patient evaluated while lying in bed. She reports significant improvement in her symptoms since admission. Reports that she has recurrent episodes of \"shaking\" of her entire body associated with inability to speak. This is been happening intermittently over the past 6 months and is worsened in intensity recently. She was admitted to the hospital after her neighbor found her to be altered. Notably, she has a longstanding history of Zenker's diverticulum with recurrent aspiration and is been admitted for pneumonia multiple times previously. She is a DNR CC and does not wish to pursue surgery. She remains afebrile and hemodynamically stable. Her creatinine is increasingly elevated today. She has been receiving IV diuresis; however, her documented intake and output is +2.2 L since admission; however, this appears to be an accurate given that no urination has been documented since admission. Labs reviewed.   Case discussed with nursing at bedside. Brief History:     Patient was admitted to hospital yesterday due to confusion. Patient was brought in by a neighbor. Per report patient was having some fever and cough. However patient is alert and oriented x3 today and denies having fever cough. Patient is grossly asymptomatic but patient's white count and procalcitonin level is elevated. Review of Systems:     Constitutional:  negative for chills, fevers, sweats  Respiratory:  negative for cough, dyspnea on exertion, hemoptysis, shortness of breath, wheezing  Cardiovascular:  negative for chest pain, chest pressure/discomfort, lower extremity edema, palpitations  Gastrointestinal:  negative for abdominal pain, constipation, diarrhea, nausea, vomiting  Neurological:  negative for dizziness, headache    Medications: Allergies:     Allergies   Allergen Reactions    Doxycycline Hyclate     Norco [Hydrocodone-Acetaminophen] Other (See Comments)     Nausea, dizziness    Pcn [Penicillins]     Sulfa Antibiotics        Current Meds:   Scheduled Meds:    docusate sodium  100 mg Oral Daily    [Held by provider] furosemide  40 mg Oral Daily    tiotropium  18 mcg Inhalation Daily    carvedilol  25 mg Oral BID    clopidogrel  75 mg Oral Daily    donepezil  10 mg Oral Nightly    FLUoxetine  40 mg Oral Daily    gabapentin  300 mg Oral TID    pantoprazole  40 mg Oral QAM AC    sodium chloride flush  10 mL Intravenous 2 times per day    cefepime  2 g Intravenous Q8H    insulin lispro  0-12 Units Subcutaneous TID WC    insulin lispro  0-6 Units Subcutaneous Nightly     Continuous Infusions:    sodium chloride 50 mL/hr at 01/14/20 1235     PRN Meds: albuterol, ipratropium-albuterol, albuterol sulfate HFA, ALPRAZolam, sodium chloride flush    Data:     Past Medical History:   has a past medical history of Anxiety, Arthritis, Atherosclerosis, Body mass index (bmi) 25.0-25.9, adult, CHF exacerbation ([facility] COPD (chronic Problems           Last Modified POA    * (Principal) Pneumonia due to organism 1/15/2020 Yes    COPD (chronic obstructive pulmonary disease) ([facility] 1/13/2020 Yes    Diabetes mellitus ([address] (Chronic) 1/13/2020 Yes    Essential hypertension (Chronic) 1/13/2020 Yes    Type 2 diabetes mellitus with kidney complication, with long-term current use of insulin ([address] 1/13/2020 Yes    Stage 4 chronic kidney disease ([facility] [address] 1/13/2020 Yes    Aspiration pneumonitis ([address] 1/15/2020 Yes    Lactic acidosis 1/13/2020 Yes    CHF exacerbation ([address] 1/15/2020 Yes    Severe sepsis ([facility] 1/13/2020 Yes    Acute-on-chronic kidney injury ([facility] 1/15/2020 Yes          Plan:        Continue cefepime for presumed aspiration pneumonia. Will add metronidazole for better anaerobic coverage. This problem will continue to recur due to patient's severe aspiration while eating evidenced by her poor performance on her swallow study. She declines to pursue any surgical intervention. She is a DNR CC. She prefers to continue with her current lifestyle as this gives her the most satisfaction. I will place a palliative care consult to possibly discuss hospice candidacy as she will continue to be admitted to the hospital for aspiration pneumonia given the degree of her aspiration. Congestive heart failure -patient has mild diastolic dysfunction. Patient is asymptomatic. Now well compensated. Will discontinue diuretics as the patient's creatinine jumped significantly since admission. Continue to monitor daily. Will hold lisinopril. COPD -continue current management. Well compensated at this time. Patient is saturating well without oxygen. Diabetes mellitus 2 -continue sliding scale. Essential hypertension -stable. Continue current medications. Acute on chronic kidney injury: Continue to monitor creatinine daily. Hold diuretics. Hold lisinopril. Light fluid resuscitation.   Lactic acidosis -resolved  Severe sepsis -patient no longer meets

## 2020-01-15 NOTE — ED PROVIDER NOTES
[facility] 4B STEPDOWN  Emergency Department Encounter  EmergencyMedicine Resident     Pt Name:Sharona Blankenship  MRN: [id number]  Birthdate 1940  Date of evaluation: 1/15/20  PCP:  Jt Moseley MD    [address] [place] [address]       Chief Complaint   Patient presents with    Altered Mental Status       HISTORY OF PRESENT ILLNESS  (Location/Symptom, Timing/Onset, Context/Setting, Quality, Duration, Modifying Factors, Severity.)      Mariely Kumar is a [de-identified] y.o. female who presents with concerns for AMS. Pt lives at home by herself but is frequently checked on by her neighbors. They went to dinner last night and pt reports she felt normal. When her neighbors checked on her this morning, they thought was wasn't acting right, so they called EMS to bring her here for evaluation and treatment. Pt is febrile and reports she still feels slightly confused. No fall or other known injury. She is not on blood thinners. She reports she feels slightly short of breath as well. No hx of DVT or PE, she does have mild LE edema, which is normal for her. No known cardiac hx. She has had recent issues with pneumonia and recurrent UTIs. She reports normal urination and defecation. No chest pain or abdominal pain. She has some slight nausea, no emesis. She is compliant with her med regimen. No drug intake.      PAST MEDICAL / SURGICAL / SOCIAL / FAMILY HISTORY      has a past medical history of Anxiety, Arthritis, Atherosclerosis, Body mass index (bmi) 25.0-25.9, adult, CHF exacerbation ([facility]), COPD (chronic obstructive pulmonary disease) ([facility]), CVA (cerebral vascular accident) ([address] Depression, Diabetes mellitus ([address] Diverticula, esophagus congenital, Former smoker, Hypercholesteremia, Hypertension, Hypokalemia, Joint pain, knee, Myocardial infarct, old, Pneumonia, Protein S deficiency ([address] Pulmonary embolism ([address] Reflux esophagitis, Tinea corporis, Tremor, URI (upper respiratory infection), UTI (urinary tract infection), and Zenker's lisinopril (PRINIVIL;ZESTRIL) 10 MG tablet Take 1 tablet by mouth daily 10/24/19   Kristin Chavarria DPM   amLODIPine (NORVASC) 10 MG tablet Take 1 tablet by mouth daily 8/23/19   Sagar Weeks MD   albuterol sulfate HFA (VENTOLIN HFA) 108 (90 Base) MCG/ACT inhaler Inhale 2 puffs into the lungs every 6 hours as needed for Wheezing or Shortness of Breath 7/16/19   Gagandeep Moreno MD   FLUoxetine (PROZAC) 20 MG/5ML solution Take 10 mLs by mouth daily 7/16/19   Gagandeep Moreno MD   donepezil (ARICEPT) 10 MG tablet Take 1 tablet by mouth nightly 7/16/19   Gagandeep Moreno MD   glipiZIDE (GLUCOTROL) 5 MG tablet Take 0.5 tablets by mouth daily 7/16/19   Gagandeep Moreno MD   carvedilol (COREG) 25 MG tablet Take 25 mg by mouth 2 times daily    Historical Provider, MD   ALPRAZolam Donshanika Gan) 0.5 MG tablet Take 0.5 mg by mouth nightly as needed for Sleep. Historical Provider, MD   gabapentin (NEURONTIN) 300 MG capsule Take 300 mg by mouth 3 times daily. Historical Provider, MD   clopidogrel (PLAVIX) 75 MG tablet Take 75 mg by mouth daily    Historical Provider, MD   cyclobenzaprine (FLEXERIL) 10 MG tablet Take 10 mg by mouth 3 times daily as needed for Muscle spasms    Historical Provider, MD   omeprazole (PRILOSEC) 20 MG capsule Take 20 mg by mouth daily    Historical Provider, MD       REVIEW OF SYSTEMS    (2-9 systems for level 4, 10 or more for level 5)      Review of Systems   Constitutional: Negative for activity change, appetite change and fever. HENT: Negative for congestion and sore throat. Eyes: Negative for pain and visual disturbance. Respiratory: Negative for cough and shortness of breath. Cardiovascular: Negative for chest pain and leg swelling. Gastrointestinal: Negative for abdominal pain, diarrhea, nausea and vomiting. Endocrine: Negative for polyphagia and polyuria. Genitourinary: Negative for dysuria and frequency.    Musculoskeletal: Negative for arthralgias and myalgias. Skin: Negative for rash and wound. Allergic/Immunologic: Negative for environmental allergies and food allergies. Neurological: Negative for syncope and weakness. Hematological: Negative for adenopathy. Does not bruise/bleed easily. Psychiatric/Behavioral: Negative for confusion. The patient is not nervous/anxious. PHYSICAL EXAM   (up to 7 for level 4, 8 or more for level 5)      INITIAL VITALS:   BP (!) 120/53   Pulse 88   Temp 98.4 °F (36.9 °C) (Temporal)   Resp 15   Ht 5' 2\" (1.575 m)   Wt 183 lb (83 kg)   SpO2 95%   BMI 33.47 kg/m²     Physical Exam  Constitutional:       General: She is not in acute distress. Appearance: She is well-developed. HENT:      Head: Normocephalic and atraumatic. Eyes:      Conjunctiva/sclera: Conjunctivae normal.      Pupils: Pupils are equal, round, and reactive to light. Neck:      Musculoskeletal: Normal range of motion and neck supple. Cardiovascular:      Rate and Rhythm: Regular rhythm. Tachycardia present. Heart sounds: Normal heart sounds. No murmur. No friction rub. No gallop. Pulmonary:      Effort: Pulmonary effort is normal. No respiratory distress. Breath sounds: Decreased air movement present. Rhonchi present. No wheezing or rales. Abdominal:      General: Bowel sounds are normal. There is no distension. Palpations: Abdomen is soft. Tenderness: There is no tenderness. There is no guarding or rebound. Musculoskeletal: Normal range of motion. General: No tenderness. Skin:     General: Skin is warm and dry. Findings: No rash. Neurological:      Mental Status: She is alert and oriented to person, place, and time. GCS: GCS eye subscore is 4. GCS verbal subscore is 5. GCS motor subscore is 6. Cranial Nerves: Cranial nerves are intact. Sensory: Sensation is intact. Motor: Motor function is intact.       Comments: Neuro exam intact   Psychiatric:         Behavior: MCV 91.3 82.6 - 102.9 fL    MCH 29.3 25.2 - 33.5 pg    MCHC 32.1 28.4 - 34.8 g/dL    RDW 14.5 (H) 11.8 - 14.4 %    Platelets 067 288 - 436 k/uL    MPV 9.4 8.1 - 13.5 fL    NRBC Automated 0.0 0.0 per 100 WBC    Differential Type NOT REPORTED     WBC Morphology NOT REPORTED     RBC Morphology ANISOCYTOSIS PRESENT     Platelet Estimate NOT REPORTED     Seg Neutrophils 70 (H) 36 - 65 %    Lymphocytes 18 (L) 24 - 43 %    Monocytes 8 3 - 12 %    Eosinophils % 3 1 - 4 %    Basophils 1 0 - 2 %    Immature Granulocytes 0 0 %    Segs Absolute 6.32 1.50 - 8.10 k/uL    Absolute Lymph # 1.62 1.10 - 3.70 k/uL    Absolute Mono # 0.72 0.10 - 1.20 k/uL    Absolute Eos # 0.23 0.00 - 0.44 k/uL    Basophils Absolute 0.05 0.00 - 0.20 k/uL    Absolute Immature Granulocyte 0.03 0.00 - 0.30 k/uL   Comprehensive Metabolic Panel w/ Reflex to MG   Result Value Ref Range    Glucose 110 (H) 70 - 99 mg/dL    BUN 32 (H) 8 - 23 mg/dL    CREATININE 1.98 (H) 0.50 - 0.90 mg/dL    Bun/Cre Ratio NOT REPORTED 9 - 20    Calcium 9.7 8.6 - 10.4 mg/dL    Sodium 136 135 - 144 mmol/L    Potassium 4.3 3.7 - 5.3 mmol/L    Chloride 96 (L) 98 - 107 mmol/L    CO2 24 20 - 31 mmol/L    Anion Gap 16 9 - 17 mmol/L    Alkaline Phosphatase 64 35 - 104 U/L    ALT 12 5 - 33 U/L    AST 15 <32 U/L    Total Bilirubin 0.30 0.3 - 1.2 mg/dL    Total Protein 7.4 6.4 - 8.3 g/dL    Alb 3.6 3.5 - 5.2 g/dL    Albumin/Globulin Ratio 0.9 (L) 1.0 - 2.5    GFR Non-African American 24 (L) >60 mL/min    GFR  29 (L) >60 mL/min    GFR Comment          GFR Staging NOT REPORTED    POC Glucose Fingerstick   Result Value Ref Range    POC Glucose 118 (H) 65 - 105 mg/dL   Arterial Blood Gas, POC   Result Value Ref Range    POC pH 7.436 7.350 - 7.450    POC pCO2 38.2 35.0 - 48.0 mm Hg    POC PO2 71.6 (L) 83.0 - 108.0 mm Hg    POC HCO3 25.7 21.0 - 28.0 mmol/L    TCO2 (calc), Art 27 22.0 - 29.0 mmol/L    Negative Base Excess, Art NOT REPORTED 0.0 - 2.0    Positive Base Excess, Art 2

## 2020-01-15 NOTE — PLAN OF CARE
Problem: Falls - Risk of:  Goal: Will remain free from falls  Description  Will remain free from falls  Outcome: Met This Shift  Goal: Absence of physical injury  Description  Absence of physical injury  Outcome: Met This Shift     Problem:  Activity:  Goal: Capacity to carry out activities will improve  Description  Capacity to carry out activities will improve  Outcome: Ongoing  Goal: Will verbalize the importance of balancing activity with adequate rest periods  Description  Will verbalize the importance of balancing activity with adequate rest periods  Outcome: Ongoing     Problem: Cardiac:  Goal: Hemodynamic stability will improve  Description  Hemodynamic stability will improve  Outcome: Ongoing  Goal: Ability to maintain an adequate cardiac output will improve  Description  Ability to maintain an adequate cardiac output will improve  Outcome: Ongoing     Problem: Coping:  Goal: Verbalizations of decreased anxiety will decrease  Description  Verbalizations of decreased anxiety will decrease  Outcome: Ongoing     Problem: Fluid Volume:  Goal: Risk for excess fluid volume will decrease  Description  Risk for excess fluid volume will decrease  Outcome: Ongoing  Goal: Maintenance of adequate hydration will improve  Description  Maintenance of adequate hydration will improve  Outcome: Ongoing  Goal: Will show no signs and symptoms of electrolyte imbalance  Description  Will show no signs and symptoms of electrolyte imbalance  Outcome: Ongoing     Problem: Health Behavior:  Goal: Ability to manage health-related needs will improve  Description  Ability to manage health-related needs will improve  Outcome: Met This Shift  Goal: Ability to seek appropriate health care will improve  Description  Ability to seek appropriate health care will improve  Outcome: Met This Shift     Problem: Nutritional:  Goal: Maintenance of adequate nutrition will improve  Description  Maintenance of adequate nutrition will improve  Outcome: Ongoing     Problem: Respiratory:  Goal: Ability to maintain adequate ventilation will improve  Description  Ability to maintain adequate ventilation will improve  Outcome: Ongoing  Goal: Respiratory status will improve  Description  Respiratory status will improve  Outcome: Ongoing

## 2020-01-16 LAB
ABSOLUTE EOS #: 0.29 K/UL (ref 0–0.44)
ABSOLUTE IMMATURE GRANULOCYTE: 0.05 K/UL (ref 0–0.3)
ABSOLUTE LYMPH #: 1.67 K/UL (ref 1.1–3.7)
ABSOLUTE MONO #: 0.58 K/UL (ref 0.1–1.2)
ALBUMIN SERPL-MCNC: 3.6 G/DL (ref 3.5–5.2)
ALBUMIN/GLOBULIN RATIO: 1 (ref 1–2.5)
ALP BLD-CCNC: 62 U/L (ref 35–104)
ALT SERPL-CCNC: 10 U/L (ref 5–33)
ANION GAP SERPL CALCULATED.3IONS-SCNC: 14 MMOL/L (ref 9–17)
ANION GAP SERPL CALCULATED.3IONS-SCNC: 16 MMOL/L (ref 9–17)
AST SERPL-CCNC: 13 U/L
BASOPHILS # BLD: 1 % (ref 0–2)
BASOPHILS ABSOLUTE: 0.04 K/UL (ref 0–0.2)
BILIRUB SERPL-MCNC: 0.29 MG/DL (ref 0.3–1.2)
BUN BLDV-MCNC: 38 MG/DL (ref 8–23)
BUN BLDV-MCNC: 41 MG/DL (ref 8–23)
BUN/CREAT BLD: ABNORMAL (ref 9–20)
BUN/CREAT BLD: ABNORMAL (ref 9–20)
CALCIUM SERPL-MCNC: 9.3 MG/DL (ref 8.6–10.4)
CALCIUM SERPL-MCNC: 9.7 MG/DL (ref 8.6–10.4)
CHLORIDE BLD-SCNC: 100 MMOL/L (ref 98–107)
CHLORIDE BLD-SCNC: 100 MMOL/L (ref 98–107)
CO2: 20 MMOL/L (ref 20–31)
CO2: 23 MMOL/L (ref 20–31)
CREAT SERPL-MCNC: 1.88 MG/DL (ref 0.5–0.9)
CREAT SERPL-MCNC: 2.11 MG/DL (ref 0.5–0.9)
DIFFERENTIAL TYPE: ABNORMAL
EOSINOPHILS RELATIVE PERCENT: 4 % (ref 1–4)
GFR AFRICAN AMERICAN: 27 ML/MIN
GFR AFRICAN AMERICAN: 31 ML/MIN
GFR NON-AFRICAN AMERICAN: 23 ML/MIN
GFR NON-AFRICAN AMERICAN: 26 ML/MIN
GFR SERPL CREATININE-BSD FRML MDRD: ABNORMAL ML/MIN/{1.73_M2}
GLUCOSE BLD-MCNC: 103 MG/DL (ref 65–105)
GLUCOSE BLD-MCNC: 103 MG/DL (ref 65–105)
GLUCOSE BLD-MCNC: 115 MG/DL (ref 70–99)
GLUCOSE BLD-MCNC: 126 MG/DL (ref 70–99)
GLUCOSE BLD-MCNC: 152 MG/DL (ref 65–105)
HCT VFR BLD CALC: 38 % (ref 36.3–47.1)
HEMOGLOBIN: 12.5 G/DL (ref 11.9–15.1)
IMMATURE GRANULOCYTES: 1 %
LYMPHOCYTES # BLD: 24 % (ref 24–43)
MCH RBC QN AUTO: 30 PG (ref 25.2–33.5)
MCHC RBC AUTO-ENTMCNC: 32.9 G/DL (ref 28.4–34.8)
MCV RBC AUTO: 91.3 FL (ref 82.6–102.9)
MONOCYTES # BLD: 8 % (ref 3–12)
NRBC AUTOMATED: 0 PER 100 WBC
PDW BLD-RTO: 14.4 % (ref 11.8–14.4)
PLATELET # BLD: 297 K/UL (ref 138–453)
PLATELET ESTIMATE: ABNORMAL
PMV BLD AUTO: 9.5 FL (ref 8.1–13.5)
POTASSIUM SERPL-SCNC: 4.4 MMOL/L (ref 3.7–5.3)
POTASSIUM SERPL-SCNC: 4.4 MMOL/L (ref 3.7–5.3)
RBC # BLD: 4.16 M/UL (ref 3.95–5.11)
RBC # BLD: ABNORMAL 10*6/UL
SEG NEUTROPHILS: 62 % (ref 36–65)
SEGMENTED NEUTROPHILS ABSOLUTE COUNT: 4.45 K/UL (ref 1.5–8.1)
SODIUM BLD-SCNC: 136 MMOL/L (ref 135–144)
SODIUM BLD-SCNC: 137 MMOL/L (ref 135–144)
TOTAL PROTEIN: 7.3 G/DL (ref 6.4–8.3)
WBC # BLD: 7.1 K/UL (ref 3.5–11.3)
WBC # BLD: ABNORMAL 10*3/UL

## 2020-01-16 PROCEDURE — 99221 1ST HOSP IP/OBS SF/LOW 40: CPT | Performed by: INTERNAL MEDICINE

## 2020-01-16 PROCEDURE — 94761 N-INVAS EAR/PLS OXIMETRY MLT: CPT

## 2020-01-16 PROCEDURE — 80053 COMPREHEN METABOLIC PANEL: CPT

## 2020-01-16 PROCEDURE — 80048 BASIC METABOLIC PNL TOTAL CA: CPT

## 2020-01-16 PROCEDURE — 36415 COLL VENOUS BLD VENIPUNCTURE: CPT

## 2020-01-16 PROCEDURE — 82947 ASSAY GLUCOSE BLOOD QUANT: CPT

## 2020-01-16 PROCEDURE — 6370000000 HC RX 637 (ALT 250 FOR IP): Performed by: HOSPITALIST

## 2020-01-16 PROCEDURE — 2580000003 HC RX 258: Performed by: PHYSICIAN ASSISTANT

## 2020-01-16 PROCEDURE — 6370000000 HC RX 637 (ALT 250 FOR IP): Performed by: INTERNAL MEDICINE

## 2020-01-16 PROCEDURE — 6370000000 HC RX 637 (ALT 250 FOR IP): Performed by: PHYSICIAN ASSISTANT

## 2020-01-16 PROCEDURE — 85025 COMPLETE CBC W/AUTO DIFF WBC: CPT

## 2020-01-16 PROCEDURE — 2580000003 HC RX 258: Performed by: INTERNAL MEDICINE

## 2020-01-16 PROCEDURE — 2500000003 HC RX 250 WO HCPCS: Performed by: PHYSICIAN ASSISTANT

## 2020-01-16 PROCEDURE — 99232 SBSQ HOSP IP/OBS MODERATE 35: CPT | Performed by: PHYSICIAN ASSISTANT

## 2020-01-16 PROCEDURE — 6360000002 HC RX W HCPCS: Performed by: INTERNAL MEDICINE

## 2020-01-16 PROCEDURE — 96366 THER/PROPH/DIAG IV INF ADDON: CPT

## 2020-01-16 PROCEDURE — 94640 AIRWAY INHALATION TREATMENT: CPT

## 2020-01-16 PROCEDURE — 2060000000 HC ICU INTERMEDIATE R&B

## 2020-01-16 PROCEDURE — 96361 HYDRATE IV INFUSION ADD-ON: CPT

## 2020-01-16 RX ORDER — 0.9 % SODIUM CHLORIDE 0.9 %
500 INTRAVENOUS SOLUTION INTRAVENOUS ONCE
Status: COMPLETED | OUTPATIENT
Start: 2020-01-16 | End: 2020-01-16

## 2020-01-16 RX ADMIN — CLOPIDOGREL 75 MG: 75 TABLET, FILM COATED ORAL at 07:55

## 2020-01-16 RX ADMIN — INSULIN LISPRO 2 UNITS: 100 INJECTION, SOLUTION INTRAVENOUS; SUBCUTANEOUS at 11:47

## 2020-01-16 RX ADMIN — CEFEPIME 2 G: 2 INJECTION, POWDER, FOR SOLUTION INTRAVENOUS at 06:09

## 2020-01-16 RX ADMIN — SODIUM CHLORIDE 500 ML: 9 INJECTION, SOLUTION INTRAVENOUS at 12:49

## 2020-01-16 RX ADMIN — PANTOPRAZOLE SODIUM 40 MG: 40 TABLET, DELAYED RELEASE ORAL at 06:09

## 2020-01-16 RX ADMIN — DOCUSATE SODIUM 100 MG: 100 CAPSULE, LIQUID FILLED ORAL at 07:55

## 2020-01-16 RX ADMIN — METRONIDAZOLE 500 MG: 500 INJECTION, SOLUTION INTRAVENOUS at 10:48

## 2020-01-16 RX ADMIN — METRONIDAZOLE 500 MG: 500 INJECTION, SOLUTION INTRAVENOUS at 17:25

## 2020-01-16 RX ADMIN — CEFEPIME HYDROCHLORIDE 1 G: 1 INJECTION, POWDER, FOR SOLUTION INTRAMUSCULAR; INTRAVENOUS at 19:42

## 2020-01-16 RX ADMIN — TIOTROPIUM BROMIDE 18 MCG: 18 CAPSULE ORAL; RESPIRATORY (INHALATION) at 07:21

## 2020-01-16 RX ADMIN — SODIUM CHLORIDE, PRESERVATIVE FREE 10 ML: 5 INJECTION INTRAVENOUS at 07:56

## 2020-01-16 RX ADMIN — FLUOXETINE HYDROCHLORIDE 40 MG: 20 CAPSULE ORAL at 07:55

## 2020-01-16 RX ADMIN — DONEPEZIL HYDROCHLORIDE 10 MG: 10 TABLET, FILM COATED ORAL at 20:15

## 2020-01-16 RX ADMIN — METRONIDAZOLE 500 MG: 500 INJECTION, SOLUTION INTRAVENOUS at 04:21

## 2020-01-16 RX ADMIN — CARVEDILOL 25 MG: 25 TABLET, FILM COATED ORAL at 20:15

## 2020-01-16 RX ADMIN — GABAPENTIN 300 MG: 300 CAPSULE ORAL at 16:27

## 2020-01-16 RX ADMIN — CARVEDILOL 25 MG: 25 TABLET, FILM COATED ORAL at 07:55

## 2020-01-16 RX ADMIN — GABAPENTIN 300 MG: 300 CAPSULE ORAL at 07:55

## 2020-01-16 RX ADMIN — INSULIN LISPRO 1 UNITS: 100 INJECTION, SOLUTION INTRAVENOUS; SUBCUTANEOUS at 20:15

## 2020-01-16 NOTE — PROGRESS NOTES
Meena Cifuentes 19    Progress Note    1/16/2020    5:32 PM    Name:   Leoncio Matthews  MRN:     [id number]     Acct:      [de-identified]   Room:   [address] [place] [address] Day:  3  Admit Date:  1/13/2020 12:17 PM    PCP:   Latasha Gilbert MD  Code Status:  DNR-CCA    Subjective:     C/C:   Chief Complaint   Patient presents with    Altered Mental Status     Patient Active Problem List   Diagnosis    Pneumonia due to organism    COPD (chronic obstructive pulmonary disease) ([address]    Diabetes mellitus ([address]    Essential hypertension    Simple chronic bronchitis ([address]    Type 2 diabetes mellitus with kidney complication, with long-term current use of insulin ([address]    Stage 4 chronic kidney disease ([address]    Aspiration pneumonitis ([address]    Lactic acidosis    Hypertensive urgency    Zenker diverticulum    CHF exacerbation ([address]    Severe sepsis ([address]    Acute-on-chronic kidney injury ([address]       Interval History Status: improved. Patient evaluated while lying in bed. She has no new complaints. Continues to complain of intermittent tremors. She remains afebrile and hemodynamically stable. Labs reviewed. Creatinine continues to rise. Case discussed with nursing. Brief History:     Patient was admitted to hospital yesterday due to confusion. Patient was brought in by a neighbor. Per report patient was having some fever and cough. However patient is alert and oriented x3 today and denies having fever cough. Patient is grossly asymptomatic but patient's white count and procalcitonin level is elevated.     Review of Systems:     Constitutional:  negative for chills, fevers, sweats  Respiratory:  negative for cough, dyspnea on exertion, hemoptysis, shortness of breath, wheezing  Cardiovascular:  negative for chest pain, chest pressure/discomfort, lower extremity edema, palpitations  Gastrointestinal:  negative for abdominal pain, constipation, use drugs. Family History: History reviewed. No pertinent family history. Vitals:  BP (!) 158/57   Pulse 88   Temp 98.1 °F (36.7 °C) (Tympanic)   Resp 16   Ht 5' 2\" (1.575 m)   Wt 183 lb (83 kg)   SpO2 90%   BMI 33.47 kg/m²   Temp (24hrs), Av.5 °F (36.9 °C), Min:98.1 °F (36.7 °C), Max:99 °F (37.2 °C)    Recent Labs     01/15/20  1944 20  0735 20  1145 20  1546   POCGLU 195* 103 152* 103       I/O (24Hr):     Intake/Output Summary (Last 24 hours) at 2020 1732  Last data filed at 2020 1726  Gross per 24 hour   Intake 2194 ml   Output --   Net 2194 ml       Labs:  Hematology:  Recent Labs     20  0556 01/15/20  0537 20  0649   WBC 15.1* 9.0 7.1   RBC 3.59* 4.16 4.16   HGB 10.8* 12.2 12.5   HCT 33.7* 38.0 38.0   MCV 93.9 91.3 91.3   MCH 30.1 29.3 30.0   MCHC 32.0 32.1 32.9   RDW 14.6* 14.5* 14.4    286 297   MPV 9.4 9.4 9.5     Chemistry:  Recent Labs     20  0556 01/15/20  0537 20  0649 20  1217   * 135 136 137 136   K 4.4 4.3 4.3 4.4 4.4    102 96* 100 100   CO2 20 21 24 23 20   GLUCOSE 142* 123* 110* 115* 126*   BUN 36* 32* 32* 38* 41*   CREATININE 1.89* 1.76* 1.98* 2.11* 1.88*   MG 1.5* 1.8  --   --   --    ANIONGAP 12 12 16 14 16   LABGLOM 26* 28* 24* 23* 26*   GFRAA 31* 34* 29* 27* 31*   CALCIUM 8.7 8.8 9.7 9.7 9.3   CAION  --  1.13  --   --   --    PHOS  --  3.5  --   --   --      Recent Labs     20  0556  01/15/20  0537  01/15/20  1250 01/15/20  1657 01/15/20  1944 20  0649 20  0735 20  1145 20  1546   PROT 6.3*  --  7.4  --   --   --   --  7.3  --   --   --    LABALBU 3.1*  --  3.6  --   --   --   --  3.6  --   --   --    AST 14  --  15  --   --   --   --  13  --   --   --    ALT 12  --  12  --   --   --   --  10  --   --   --    ALKPHOS 60  --  64  --   --   --   --  62  --   --   --    BILITOT 0.30  --  0.30  --   --   --   --  0.29*  --   --   --    POCGLU  --    < >  -- < > 84 108* 195*  --  103 152* 103    < > = values in this interval not displayed. ABG:  Lab Results   Component Value Date    POCPH 7.436 01/14/2020    POCPCO2 38.2 01/14/2020    POCPO2 71.6 01/14/2020    POCHCO3 25.7 01/14/2020    NBEA NOT REPORTED 01/14/2020    PBEA 2 01/14/2020    XRZ8AHB 27 01/14/2020    AQGK4PYO 95 01/14/2020    FIO2 NOT REPORTED 01/14/2020     Lab Results   Component Value Date/Time    SPECIAL NOT REPORTED 01/13/2020 10:15 PM    SPECIAL NOT REPORTED 01/13/2020 10:15 PM     Lab Results   Component Value Date/Time    CULTURE DUPLICATE ORDER 01/26/0580 10:15 PM    CULTURE DUPLICATE ORDER 04/82/3596 10:15 PM       Radiology:  Reuben Valentine Chest Standard (2 Vw)    Result Date: 1/13/2020  Possible mild congestive heart failure. Focal pneumonitis lateral lower right lung is a consideration as well. Calcific atherosclerotic disease aorta.        Physical Examination:        General appearance:  alert, cooperative and no distress  Mental Status:  oriented to person, place and time and normal affect  Lungs:  clear to auscultation bilaterally, normal effort  Heart:  regular rate and rhythm, no murmur  Abdomen:  soft, nontender, nondistended, normal bowel sounds, no masses, hepatomegaly, splenomegaly  Extremities:  no edema, redness, tenderness in the calves  Skin:  no gross lesions, rashes, induration    Assessment:        Hospital Problems           Last Modified POA    * (Principal) Pneumonia due to organism 1/15/2020 Yes    COPD (chronic obstructive pulmonary disease) ([address] 1/13/2020 Yes    Diabetes mellitus ([address] (Chronic) 1/13/2020 Yes    Essential hypertension (Chronic) 1/13/2020 Yes    Type 2 diabetes mellitus with kidney complication, with long-term current use of insulin ([address] 1/13/2020 Yes    Stage 4 chronic kidney disease ([address] 1/13/2020 Yes    Aspiration pneumonitis ([address] 1/15/2020 Yes    Lactic acidosis 1/13/2020 Yes    CHF exacerbation ([address] 1/15/2020 Yes    Severe sepsis ([address] 1/13/2020 Yes

## 2020-01-16 NOTE — DISCHARGE INSTR - COC
Continuity of Care Form    Patient Name: Serg Garrett   :  1940  MRN:  [id number]    Admit date:  2020  Discharge date:  2020    Code Status Order: [facility]   Advance Directives:   [address] [facility] Documentation     Date/Time Healthcare Directive Type of Healthcare Directive Copy in [address] [facility] [address] Agent's Name Healthcare Agent's Phone Number    [phone]  Yes, patient has an advance directive for healthcare treatment  --  Yes, copy in chart  --  --  --          Admitting Physician:  Tim Posadas MD  PCP: Jarret Staples MD    Discharging Nurse: JOANN [facility] Unit/Room#: 3314/9754-72  Discharging Unit Phone Number: [phone]    Emergency Contact:   Extended Emergency Contact Information  Primary Emergency Contact: Sumitdiane Hammond Phone: [phone]  Relation: Child   needed? No  Secondary Emergency Contact: Aidan Thompson  [place] Phone: [phone]  Relation: Child   needed?  No    Past Surgical History:  Past Surgical History:   Procedure Laterality Date    CAROTID ENDARTERECTOMY Bilateral     ENDOSCOPY, COLON, DIAGNOSTIC      TONSILLECTOMY         Immunization History:   Immunization History   Administered Date(s) Administered    Influenza, Quadv, IM, PF (6 mo and older Fluzone, Flulaval, Fluarix, and 3 yrs and older Afluria) 2019       Active Problems:  Patient Active Problem List   Diagnosis Code    Pneumonia due to organism J18.9    COPD (chronic obstructive pulmonary disease) ([facility] J44.9    Diabetes mellitus ([facility] E11.9    Essential hypertension I10    Simple chronic bronchitis ([address] J41.0    Type 2 diabetes mellitus with kidney complication, with long-term current use of insulin (HCC) E11.29, Z79.4    Stage 4 chronic kidney disease ([address] N18.4    Aspiration pneumonitis ([address] J69.0    Lactic acidosis E87.2    Hypertensive urgency I16.0    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    CHF exacerbation ([facility] I50.9    Severe sepsis ([facility] A41.9, R65.20    Acute-on-chronic kidney injury ([facility] N17.9, N18.9       Isolation/Infection:   Isolation          No Isolation        Patient Infection Status     None to display          Nurse Assessment:  Last Vital Signs: /70   Pulse 90   Temp 98.5 °F (36.9 °C) (Temporal)   Resp 20   Ht 5' 2\" (1.575 m)   Wt 183 lb (83 kg)   SpO2 90%   BMI 33.47 kg/m²     Last documented pain score (0-10 scale): Pain Level: 0  Last Weight:   Wt Readings from Last 1 Encounters:   01/13/20 183 lb (83 kg)     Mental Status:  oriented and alert    IV Access:  - None    Nursing Mobility/ADLs:  Walking   Independent  Transfer  Independent  Bathing  Independent  Dressing  Independent  [address]  Independent  Med Delivery   whole    Wound Care Documentation and Therapy:        Elimination:  Continence:   · Bowel: Yes  · Bladder: Yes  Urinary Catheter: None   Colostomy/Ileostomy/Ileal Conduit: No       Date of Last BM: 1/16/2020    Intake/Output Summary (Last 24 hours) at 1/16/2020 1049  Last data filed at 1/16/2020 0615  Gross per 24 hour   Intake 679 ml   Output --   Net 679 ml     I/O last 3 completed shifts: In: 200 [I.V.:679]  Out: -     Safety Concerns:     None    Impairments/Disabilities:      None    Nutrition Therapy:  Current Nutrition Therapy:   - Oral Diet:  General and Carb Control 4 carbs/meal (1800kcals/day)    Routes of Feeding: Oral  Liquids: Thin Liquids  Daily Fluid Restriction: no  Last Modified Barium Swallow with Video (Video Swallowing Test): not done    Treatments at the Time of Hospital Discharge:   Respiratory Treatments: ***  Oxygen Therapy:  is not on home oxygen therapy.   Ventilator:    - No ventilator support    Rehab Therapies: {THERAPEUTIC INTERVENTION:[id number]}  Weight Bearing Status/Restrictions: No weight bearing restirctions  Other Medical Equipment (for information only, NOT a DME order):  walker  Other

## 2020-01-16 NOTE — PLAN OF CARE
improve  Description  Ability to seek appropriate health care will improve  Outcome: Ongoing     Problem: Nutritional:  Goal: Maintenance of adequate nutrition will improve  Description  Maintenance of adequate nutrition will improve  Outcome: Ongoing

## 2020-01-16 NOTE — PLAN OF CARE
BRONCHOSPASM/BRONCHOCONSTRICTION     [x]         IMPROVE AERATION/BREATH SOUNDS  [x]   ADMINISTER BRONCHODILATOR THERAPY AS APPROPRIATE  [x]   ASSESS BREATH SOUNDS  [x]   IMPLEMENT AEROSOL/MDI PROTOCOL  [x]   PATIENT EDUCATION AS NEEDED   Connie Mckeon Assessment complete. Severe sepsis ([facility] [A41.9, R65.20] . Vitals:    01/16/20 0721   BP:    Pulse:    Resp: 13   Temp:    SpO2: 96%   . Patients home meds are   Prior to Admission medications    Medication Sig Start Date End Date Taking? Authorizing Provider   tiotropium (SPIRIVA) 18 MCG inhalation capsule Inhale 1 capsule into the lungs daily 11/1/19   LUIS Powell CNP   lisinopril (PRINIVIL;ZESTRIL) 10 MG tablet Take 1 tablet by mouth daily 10/24/19   Tita Rebolledo DPM   amLODIPine (NORVASC) 10 MG tablet Take 1 tablet by mouth daily 8/23/19   Brian Alanis MD   albuterol sulfate HFA (VENTOLIN HFA) 108 (90 Base) MCG/ACT inhaler Inhale 2 puffs into the lungs every 6 hours as needed for Wheezing or Shortness of Breath 7/16/19   Toan Leiva MD   FLUoxetine (PROZAC) 20 MG/5ML solution Take 10 mLs by mouth daily 7/16/19   Toan Leiva MD   donepezil (ARICEPT) 10 MG tablet Take 1 tablet by mouth nightly 7/16/19   Toan Leiva MD   glipiZIDE (GLUCOTROL) 5 MG tablet Take 0.5 tablets by mouth daily 7/16/19   Toan Leiva MD   carvedilol (COREG) 25 MG tablet Take 25 mg by mouth 2 times daily    Historical Provider, MD   ALPRAZolam Elihu Speaks) 0.5 MG tablet Take 0.5 mg by mouth nightly as needed for Sleep. Historical Provider, MD   gabapentin (NEURONTIN) 300 MG capsule Take 300 mg by mouth 3 times daily.     Historical Provider, MD   clopidogrel (PLAVIX) 75 MG tablet Take 75 mg by mouth daily    Historical Provider, MD   cyclobenzaprine (FLEXERIL) 10 MG tablet Take 10 mg by mouth 3 times daily as needed for Muscle spasms    Historical Provider, MD   omeprazole (PRILOSEC) 20 MG capsule Take 20 mg by mouth daily secretions []  Copius, Viscious Yellow/ Secretions   CXR as reported by physician []  clear  []  Unavailable []  Infiltrates and/or consolidation  []  Unavailable []  Mucus Plugging and or lobar consolidation  []  Unavailable   Cough []  Strong, productive cough []  Weak productive cough []  No cough or weak non-productive cough   Nelson James  7:36 AM                            FEMALE                                  MALE                            FEV1 Predicted Normal Values                        FEV1 Predicted Normal Values          Age                                     Height in Feet and Inches       Age                                     Height in Feet and Inches       4' 11\" 5' 1\" 5' 3\" 5' 5\" 5' 7\" 5' 9\" 5' 11\" 6' 1\"  4' 11\" 5' 1\" 5' 3\" 5' 5\" 5' 7\" 5' 9\" 5' 11\" 6' 1\"   42 - 45 2.49 2.66 2.84 3.03 3.22 3.42 3.62 3.83 42 - 45 2.82 3.03 3.26 3.49 3.72 3.96 4.22 4.47   46 - 49 2.40 2.57 2.76 2.94 3.14 3.33 3.54 3.75 46 - 49 2.70 2.92 3.14 3.37 3.61 3.85 4.10 4.36   50 - 53 2.31 2.48 2.66 2.85 3.04 3.24 3.45 3.66 50 - 53 2.58 2.80 3.02 3.25 3.49 3.73 3.98 4.24   54 - 57 2.21 2.38 2.57 2.75 2.95 3.14 3.35 3.56 54 - 57 2.46 2.67 2.89 3.12 3.36 3.60 3.85 4.11   58 - 61 2.10 2.28 2.46 2.65 2.84 3.04 3.24 3.45 58 - 61 2.32 2.54 2.76 2.99 3.23 3.47 3.72 3.98   62 - 65 1.99 2.17 2.35 2.54 2.73 2.93 3.13 3.34 62 - 65 2.19 2.40 2.62 2.85 3.09 3.33 3.58 3.84   66 - 69 1.88 2.05 2.23 2.42 2.61 2.81 3.02 3.23 66 - 69 2.04 2.26 2.48 2.71 2.95 3.19 3.44 3.70   70+ 1.82 1.99 2.17 2.36 2.55 2.75 2.95 3.16 70+ 1.97 2.19 2.41 2.64 2.87 3.12 3.37 3.62             Predicted Peak Expiratory Flow Rate                                       Height (in)  Female       Height (in) Male           Age 64 62 64 63 59 77 76 79 Age            27 688 898 533 311 827 243 063 508  92 00 96 91 24 95 94 19 64   25 337 352 366 381 396 411 426 441 25 447 476 505 533 [phone] 677   30 329 344 359 374 389 404 419 434 30 437 [phone]

## 2020-01-16 NOTE — CONSULTS
Palliative care consulted for goals of care.        Active Hospital Problems    Diagnosis Date Noted    Acute-on-chronic kidney injury ([facility] [N17.9, N18.9] 01/15/2020    CHF exacerbation ([facility] [I50.9] 01/13/2020    Severe sepsis ([facility] [A41.9, R65.20] 01/13/2020    Lactic acidosis [E87.2] 08/18/2019    Aspiration pneumonitis ([facility] [J69.0] 07/14/2019    Stage 4 chronic kidney disease ([facility] [N18.4] 08/09/2016    Type 2 diabetes mellitus with kidney complication, with long-term current use of insulin ([facility]) [E11.29, Z79.4]     Pneumonia due to organism [J18.9] 08/04/2016    Diabetes mellitus ([facility] [E11.9]     Essential hypertension [I10]     COPD (chronic obstructive pulmonary disease) ([facility]) [J44.9]        PAST MEDICAL HISTORY      Diagnosis Date    Anxiety     Arthritis     Atherosclerosis     Body mass index (bmi) 25.0-25.9, adult     CHF exacerbation ([facility] 1/13/2020    COPD (chronic obstructive pulmonary disease) ([facility])     CVA (cerebral vascular accident) ([facility]     Depression     Diabetes mellitus ([facility]     Diverticula, esophagus congenital     Former smoker     Hypercholesteremia     Hypertension     Hypokalemia     Joint pain, knee     Myocardial infarct, old     2007    Pneumonia     Pneumonia due to infectious organism, unspecified laterality, unspecified part of lung    Protein S deficiency ([address]     Pulmonary embolism ([facility])     Reflux esophagitis     Tinea corporis     Tremor     URI (upper respiratory infection)     UTI (urinary tract infection)     Zenker's diverticulum        PAST SURGICAL HISTORY  Past Surgical History:   Procedure Laterality Date    CAROTID ENDARTERECTOMY Bilateral     ENDOSCOPY, COLON, DIAGNOSTIC      TONSILLECTOMY         SOCIAL HISTORY  Social History     Tobacco Use    Smoking status: Former Smoker     Years: 25.00     Types: Cigarettes    Smokeless tobacco: Never Used   Substance Use Topics    Alcohol use: Yes     Comment: socially    Drug than minutes      Electronically signed by   Bong Brice MD  Palliative Care Team  on 1/16/2020 at 3:22 PM    Palliative care office: [phone]    I have discussed the care of Xochitl Mendez, including pertinent history and exam findings with the fellow. I have reviewed the key elements of all parts of the encounter with the fellow. I have seen and examined the patient with the fellow. I agree with the assessment and plan and status of the problem list as documented. I seen the patient today with palliative care fellow. Patient history of longstanding Zenker's diverticula, she had imaging studies done which include barium swallow, she is known to have Zenker's for more than 10 years, she was evaluated in the past for surgical intervention but patient did not want surgical intervention, she is aware of risk for aspiration and she did have history of pneumonia last time was in October and apparently in June 2019, she is usually mobile, lives alone and enjoy company with grandchildren's and visit friends and in fair functional status, she is aware of restrictions from dietary and speech and usually special diet to prevent aspiration. She was brought in with fever possible shortness of breath and cough and treated for presumed aspiration pneumonia chest x-ray did not show large area of consolidation, she is feeling better and currently getting antibiotic. As she not want surgical intervention in the past she does not want surgical intervention and want to continue medical treatment. Recommend to continue to follow patient wishes and continue medical treatment with antibiotic bronchodilators if needed and oxygen if needed, patient understand the risk of recurrent aspiration, discussed with the patient about speech, intervention to protect aspiration. If patient want suggest speech evaluation if already not done in the past    Please call palliative care team if any further assistance needed.     Edwige Reynaga MD  Attending palliative care team

## 2020-01-16 NOTE — PLAN OF CARE
Patient assessed for fall risk and fall precautions initiated as needed. Patient instructed about safety devices and allowed to make decisions related to safey. Environment kept free of clutter and adequate lighting provided. Bed in lowest position with brakes locked. Call light in reach. Patient ID band correct and in place. Patient transferred with appropriate methods. Patient free of falls during shift. Will continue to monitor.  Dayne Guan RN

## 2020-01-17 VITALS
HEIGHT: 62 IN | TEMPERATURE: 98.4 F | HEART RATE: 80 BPM | OXYGEN SATURATION: 96 % | RESPIRATION RATE: 14 BRPM | BODY MASS INDEX: 34.54 KG/M2 | SYSTOLIC BLOOD PRESSURE: 143 MMHG | WEIGHT: 187.7 LBS | DIASTOLIC BLOOD PRESSURE: 75 MMHG

## 2020-01-17 LAB
ANION GAP SERPL CALCULATED.3IONS-SCNC: 13 MMOL/L (ref 9–17)
BUN BLDV-MCNC: 35 MG/DL (ref 8–23)
BUN/CREAT BLD: ABNORMAL (ref 9–20)
CALCIUM SERPL-MCNC: 9.4 MG/DL (ref 8.6–10.4)
CHLORIDE BLD-SCNC: 104 MMOL/L (ref 98–107)
CO2: 20 MMOL/L (ref 20–31)
CREAT SERPL-MCNC: 1.72 MG/DL (ref 0.5–0.9)
EKG ATRIAL RATE: 107 BPM
EKG P AXIS: 34 DEGREES
EKG P-R INTERVAL: 158 MS
EKG Q-T INTERVAL: 326 MS
EKG QRS DURATION: 74 MS
EKG QTC CALCULATION (BAZETT): 435 MS
EKG R AXIS: 8 DEGREES
EKG T AXIS: 57 DEGREES
EKG VENTRICULAR RATE: 107 BPM
GFR AFRICAN AMERICAN: 35 ML/MIN
GFR NON-AFRICAN AMERICAN: 29 ML/MIN
GFR SERPL CREATININE-BSD FRML MDRD: ABNORMAL ML/MIN/{1.73_M2}
GFR SERPL CREATININE-BSD FRML MDRD: ABNORMAL ML/MIN/{1.73_M2}
GLUCOSE BLD-MCNC: 100 MG/DL (ref 65–105)
GLUCOSE BLD-MCNC: 105 MG/DL (ref 65–105)
GLUCOSE BLD-MCNC: 116 MG/DL (ref 70–99)
GLUCOSE BLD-MCNC: 125 MG/DL (ref 65–105)
GLUCOSE BLD-MCNC: 152 MG/DL (ref 65–105)
POTASSIUM SERPL-SCNC: 4.5 MMOL/L (ref 3.7–5.3)
SODIUM BLD-SCNC: 137 MMOL/L (ref 135–144)

## 2020-01-17 PROCEDURE — 6360000002 HC RX W HCPCS: Performed by: INTERNAL MEDICINE

## 2020-01-17 PROCEDURE — 2580000003 HC RX 258: Performed by: INTERNAL MEDICINE

## 2020-01-17 PROCEDURE — 94761 N-INVAS EAR/PLS OXIMETRY MLT: CPT

## 2020-01-17 PROCEDURE — 96366 THER/PROPH/DIAG IV INF ADDON: CPT

## 2020-01-17 PROCEDURE — 99239 HOSP IP/OBS DSCHRG MGMT >30: CPT | Performed by: HOSPITALIST

## 2020-01-17 PROCEDURE — 82947 ASSAY GLUCOSE BLOOD QUANT: CPT

## 2020-01-17 PROCEDURE — 80048 BASIC METABOLIC PNL TOTAL CA: CPT

## 2020-01-17 PROCEDURE — 36415 COLL VENOUS BLD VENIPUNCTURE: CPT

## 2020-01-17 PROCEDURE — 6370000000 HC RX 637 (ALT 250 FOR IP): Performed by: INTERNAL MEDICINE

## 2020-01-17 PROCEDURE — 6370000000 HC RX 637 (ALT 250 FOR IP): Performed by: HOSPITALIST

## 2020-01-17 PROCEDURE — 2500000003 HC RX 250 WO HCPCS: Performed by: PHYSICIAN ASSISTANT

## 2020-01-17 PROCEDURE — 94640 AIRWAY INHALATION TREATMENT: CPT

## 2020-01-17 PROCEDURE — 6370000000 HC RX 637 (ALT 250 FOR IP): Performed by: PHYSICIAN ASSISTANT

## 2020-01-17 RX ORDER — CEFDINIR 300 MG/1
300 CAPSULE ORAL DAILY
Qty: 5 CAPSULE | Refills: 0 | Status: SHIPPED | OUTPATIENT
Start: 2020-01-17 | End: 2020-01-22

## 2020-01-17 RX ORDER — AMLODIPINE BESYLATE 10 MG/1
10 TABLET ORAL DAILY
Status: DISCONTINUED | OUTPATIENT
Start: 2020-01-17 | End: 2020-01-17 | Stop reason: HOSPADM

## 2020-01-17 RX ADMIN — SODIUM CHLORIDE, PRESERVATIVE FREE 10 ML: 5 INJECTION INTRAVENOUS at 08:49

## 2020-01-17 RX ADMIN — METRONIDAZOLE 500 MG: 500 INJECTION, SOLUTION INTRAVENOUS at 01:32

## 2020-01-17 RX ADMIN — TIOTROPIUM BROMIDE 18 MCG: 18 CAPSULE ORAL; RESPIRATORY (INHALATION) at 11:33

## 2020-01-17 RX ADMIN — FLUOXETINE HYDROCHLORIDE 40 MG: 20 CAPSULE ORAL at 08:49

## 2020-01-17 RX ADMIN — DOCUSATE SODIUM 100 MG: 100 CAPSULE, LIQUID FILLED ORAL at 08:49

## 2020-01-17 RX ADMIN — CEFEPIME HYDROCHLORIDE 1 G: 1 INJECTION, POWDER, FOR SOLUTION INTRAMUSCULAR; INTRAVENOUS at 06:06

## 2020-01-17 RX ADMIN — CARVEDILOL 25 MG: 25 TABLET, FILM COATED ORAL at 08:49

## 2020-01-17 RX ADMIN — GABAPENTIN 300 MG: 300 CAPSULE ORAL at 08:49

## 2020-01-17 RX ADMIN — AMLODIPINE BESYLATE 10 MG: 10 TABLET ORAL at 13:46

## 2020-01-17 RX ADMIN — METRONIDAZOLE 500 MG: 500 INJECTION, SOLUTION INTRAVENOUS at 08:49

## 2020-01-17 RX ADMIN — CLOPIDOGREL 75 MG: 75 TABLET, FILM COATED ORAL at 08:49

## 2020-01-17 RX ADMIN — PANTOPRAZOLE SODIUM 40 MG: 40 TABLET, DELAYED RELEASE ORAL at 06:06

## 2020-01-17 ASSESSMENT — PAIN SCALES - GENERAL: PAINLEVEL_OUTOF10: 0

## 2020-01-17 NOTE — PROGRESS NOTES
Pt requested that her ATB on discharge be sent over to [facility] [address].  Spoke with Pily Ring, in the pharmacy that states he will send ATB over to [facility]

## 2020-01-17 NOTE — DISCHARGE SUMMARY
Meena Cifuentes [address]    Discharge Summary     Patient ID: Alejandro Grewal  :  1940   MRN: [id number]     ACCOUNT:  [de-identified]   Patient's PCP: Malcom Lobo MD  Admit Date: 2020   Discharge Date: 2020     Length of Stay: 4  Code Status:  DNR-CCA  Admitting Physician: Maia Austin MD  Discharge Physician: Wagner Acuna DO     Active Discharge Diagnoses:     Hospital Problem Lists:  Principal Problem:    Pneumonia due to organism  Active Problems:    COPD (chronic obstructive pulmonary disease) ([address]    Diabetes mellitus ([address]    Essential hypertension    Type 2 diabetes mellitus with kidney complication, with long-term current use of insulin ([address]    Stage 4 chronic kidney disease ([address]    Aspiration pneumonitis (HCC)    Lactic acidosis    CHF exacerbation (HCC)    Severe sepsis ([address]    Acute-on-chronic kidney injury ([address]  Resolved Problems:    * No resolved hospital problems. *      Admission Condition:  poor     Discharged Condition: poor    Hospital Stay:     Hospital Course:  Alejandro Grewal is a [de-identified] y.o. female who was admitted for the management of   Pneumonia due to organism , presented to ER with Altered Mental Status    Patient was brought to the hospital due to confusion. Reported to have some fever and cough. Pneumonia was suspected and antibiotics was started started. Blood cultures were negative and procalcitonin was elevated. Patient did not have fever or elevated white count in the hospital.  Patient remained stable for the entire hospitalization. Patient does have chronic aspiration and patient refused to get any intervention for her aspiration issues. Patient refused to get any swallowing evaluation as well. Patient is well aware of this risk of aspiration pneumonia and patient is willing to continue with eating regular food.   Elevated care was consulted and had another discussion in regards to her eating

## 2020-02-07 ENCOUNTER — OFFICE VISIT (OUTPATIENT)
Dept: PULMONOLOGY | Age: 80
End: 2020-02-07
Payer: MEDICARE

## 2020-02-07 VITALS
HEIGHT: 60 IN | SYSTOLIC BLOOD PRESSURE: 126 MMHG | HEART RATE: 75 BPM | RESPIRATION RATE: 12 BRPM | DIASTOLIC BLOOD PRESSURE: 74 MMHG | BODY MASS INDEX: 36.52 KG/M2 | WEIGHT: 186 LBS | OXYGEN SATURATION: 98 %

## 2020-02-07 PROCEDURE — 99213 OFFICE O/P EST LOW 20 MIN: CPT | Performed by: INTERNAL MEDICINE

## 2020-02-07 ASSESSMENT — SLEEP AND FATIGUE QUESTIONNAIRES
HOW LIKELY ARE YOU TO NOD OFF OR FALL ASLEEP WHEN YOU ARE A PASSENGER IN A CAR FOR AN HOUR WITHOUT A BREAK: 0
HOW LIKELY ARE YOU TO NOD OFF OR FALL ASLEEP WHILE LYING DOWN TO REST IN THE AFTERNOON WHEN CIRCUMSTANCES PERMIT: 2
ESS TOTAL SCORE: 4
HOW LIKELY ARE YOU TO NOD OFF OR FALL ASLEEP IN A CAR, WHILE STOPPED FOR A FEW MINUTES IN TRAFFIC: 0
HOW LIKELY ARE YOU TO NOD OFF OR FALL ASLEEP WHILE SITTING AND READING: 1
HOW LIKELY ARE YOU TO NOD OFF OR FALL ASLEEP WHILE SITTING AND TALKING TO SOMEONE: 0
HOW LIKELY ARE YOU TO NOD OFF OR FALL ASLEEP WHILE WATCHING TV: 1
HOW LIKELY ARE YOU TO NOD OFF OR FALL ASLEEP WHILE SITTING QUIETLY AFTER LUNCH WITHOUT ALCOHOL: 0
HOW LIKELY ARE YOU TO NOD OFF OR FALL ASLEEP WHILE SITTING INACTIVE IN A PUBLIC PLACE: 0

## 2020-02-07 NOTE — PROGRESS NOTES
PULMONARY OP  PROGRESS NOTE      Patient:  Antony Salazar  YOB: 1940    MRN: [id number]     Acct:        Pt seen and Chart reviewed. Subjective: This patient initially seen by us during her hospitalization. At that time she was thought to have aspiration pneumonia. She was diagnosed to have Zenker's diverticulum which was causing recurrent aspiration. Patient was treated with antibiotics. She did have hypoxemia caused by aspiration. Patient was offered surgical intervention at that time but considering her own age she did not feel interested in surgical intervention. She continued to have a minor aspiration. She does cough and feel choking while eating or especially after drinking liquids. She has not noted any fever or chills. No chest pain no wheezing or dyspnea cough or sputum production. She has symptoms suggestive of chronic bronchitis which probably related to recurrent infection in the airway secondary to aspiration. She is not on any antibiotics anymore. She does use bronchodilators which does give her relief. Review of Systems -   Constitutional ROS: negative  ENT ROS: dysphagia   Allergy and Immunology ROS: negative  Respiratory ROS: SOB, stable  Cardiovascular ROS: negative  Gastrointestinal ROS: negative  Musculoskeletal ROS: negative    Allergies:   Allergies   Allergen Reactions    Doxycycline Hyclate     Norco [Hydrocodone-Acetaminophen] Other (See Comments)     Nausea, dizziness    Pcn [Penicillins]     Sulfa Antibiotics        Medications:    Current Outpatient Medications:     tiotropium (SPIRIVA) 18 MCG inhalation capsule, Inhale 1 capsule into the lungs daily, Disp: 30 capsule, Rfl: 3    amLODIPine (NORVASC) 10 MG tablet, Take 1 tablet by mouth daily, Disp: 30 tablet, Rfl: 3    albuterol sulfate HFA (VENTOLIN HFA) 108 (90 Base) MCG/ACT inhaler, Inhale 2 puffs into the lungs every 6 hours as needed for Wheezing or Shortness of Breath, Disp: 1 Inhaler, Rfl: 0    FLUoxetine (PROZAC) 20 MG/5ML solution, Take 10 mLs by mouth daily, Disp: 150 mL, Rfl: 3    donepezil (ARICEPT) 10 MG tablet, Take 1 tablet by mouth nightly, Disp: 30 tablet, Rfl: 3    glipiZIDE (GLUCOTROL) 5 MG tablet, Take 0.5 tablets by mouth daily, Disp: 60 tablet, Rfl: 3    carvedilol (COREG) 25 MG tablet, Take 25 mg by mouth 2 times daily, Disp: , Rfl:     ALPRAZolam (XANAX) 0.5 MG tablet, Take 0.5 mg by mouth nightly as needed for Sleep., Disp: , Rfl:     gabapentin (NEURONTIN) 300 MG capsule, Take 300 mg by mouth 3 times daily. , Disp: , Rfl:     clopidogrel (PLAVIX) 75 MG tablet, Take 75 mg by mouth daily, Disp: , Rfl:     cyclobenzaprine (FLEXERIL) 10 MG tablet, Take 10 mg by mouth 3 times daily as needed for Muscle spasms, Disp: , Rfl:     omeprazole (PRILOSEC) 20 MG capsule, Take 20 mg by mouth daily, Disp: , Rfl:       Objective:    Physical Exam:  Vitals: /74 (Site: Right Upper Arm, Position: Sitting, Cuff Size: Medium Adult)   Pulse 75   Resp 12   Ht 5' (1.524 m)   Wt 186 lb (84.4 kg)   SpO2 98% Comment: room air at rest  BMI 36.33 kg/m²   Last 3 weights: Wt Readings from Last 3 Encounters:   02/07/20 186 lb (84.4 kg)   01/17/20 187 lb 11.2 oz (85.1 kg)   11/01/19 183 lb (83 kg)     Body mass index is 36.33 kg/m². Physical Examination:   Constitutional: Appears well, no distress; obese  EENT: PERRLA,  sclera clear, anicteric, oropharynx clear, no lesions, neck supple with midline trachea. Neck: Supple, symmetrical, trachea midline, no adenopathy, no goiter, no jvd skin normal  Respiratory: clear to auscultation, no wheezes or rales and unlabored breathing.  No intercostal tenderness  Cardiovascular: regular rate and rhythm, normal S1, S2, no murmur noted  Abdomen: soft, nontender, nondistended, no masses or organomegaly  Extremities:  1+ pitting pedal edema to ankle, no clubbing or cyanosis  Haed normocephalic  Nose no polyps no sinus tenderness  Ear examination normal  Throat examination is unremarkable. Skin normal   vascular completed and negative no DVT    Assessment:     Diagnosis Orders   1. Zenker diverticulum     2. Aspiration pneumonitis ([address]     3. Chronic bronchitis, unspecified chronic bronchitis type ([address]           PLAN:    Patient is accompanied by her son. Patient was advised to continue bronchodilator therapy    She was reassured that symptomatically she is doing well. However her symptoms get better when she started running fever she will contact us for antibiotic. I do believe that because of a Zenker's diverticulum she will continue to have recurrent small pulmonary aspirations and may develop pneumonia even at times. At 1 at this time again the patient emphasized that she is not interested in any surgical intervention. Considering her age I tend to agree with her decision at this time. Patient advised to keep herself warm and avoid exposure to cold weather.

## 2020-03-16 RX ORDER — AMLODIPINE BESYLATE 10 MG/1
TABLET ORAL
Qty: 30 TABLET | Refills: 2 | OUTPATIENT
Start: 2020-03-16

## 2020-05-28 ENCOUNTER — VIRTUAL VISIT (OUTPATIENT)
Dept: PULMONOLOGY | Age: 80
End: 2020-05-28
Payer: MEDICARE

## 2020-05-28 VITALS — TEMPERATURE: 98.7 F | WEIGHT: 180 LBS | BODY MASS INDEX: 33.99 KG/M2 | HEIGHT: 61 IN

## 2020-05-28 PROCEDURE — 99214 OFFICE O/P EST MOD 30 MIN: CPT | Performed by: INTERNAL MEDICINE

## 2020-05-28 RX ORDER — LISINOPRIL 5 MG/1
5 TABLET ORAL DAILY
Status: ON HOLD | COMMUNITY
Start: 2020-05-22 | End: 2022-09-23 | Stop reason: HOSPADM

## 2020-05-28 NOTE — PROGRESS NOTES
Yes Oskar Umanzor, APRN - CNP   amLODIPine (NORVASC) 10 MG tablet Take 1 tablet by mouth daily Yes Bladimir Stern MD   albuterol sulfate HFA (VENTOLIN HFA) 108 (90 Base) MCG/ACT inhaler Inhale 2 puffs into the lungs every 6 hours as needed for Wheezing or Shortness of Breath Yes Catarino Sinclair MD   FLUoxetine (PROZAC) 20 MG/5ML solution Take 10 mLs by mouth daily Yes Catarino Sinclair MD   donepezil (ARICEPT) 10 MG tablet Take 1 tablet by mouth nightly Yes Catarino Sinclair MD   glipiZIDE (GLUCOTROL) 5 MG tablet Take 0.5 tablets by mouth daily Yes Catarino Sinclair MD   carvedilol (COREG) 25 MG tablet Take 25 mg by mouth 2 times daily Yes Historical Provider, MD   ALPRAZolam Gary Coyer) 0.5 MG tablet Take 0.5 mg by mouth nightly as needed for Sleep. Yes Historical Provider, MD   gabapentin (NEURONTIN) 300 MG capsule Take 300 mg by mouth 3 times daily. Yes Historical Provider, MD   clopidogrel (PLAVIX) 75 MG tablet Take 75 mg by mouth daily Yes Historical Provider, MD   cyclobenzaprine (FLEXERIL) 10 MG tablet Take 10 mg by mouth 3 times daily as needed for Muscle spasms Yes Historical Provider, MD   omeprazole (PRILOSEC) 20 MG capsule Take 20 mg by mouth daily Yes Historical Provider, MD       Social History     Tobacco Use    Smoking status: Former Smoker     Years: 25.00     Types: Cigarettes    Smokeless tobacco: Never Used   Substance Use Topics    Alcohol use: Yes     Comment: socially    Drug use: No            RECORD REVIEW: Previous medical records were reviewed at today's visit. Wt Readings from Last 3 Encounters:   05/28/20 180 lb (81.6 kg)   02/07/20 186 lb (84.4 kg)   01/17/20 187 lb 11.2 oz (85.1 kg)       Results for orders placed or performed during the hospital encounter of 01/13/20   Culture Blood #1   Result Value Ref Range    Specimen Description . BLOOD     Special Requests 10ML L AC     Culture NO GROWTH 6 DAYS    Culture Blood #1   Result Value Ref Range    Specimen Description U/L    Total Bilirubin 0.30 0.3 - 1.2 mg/dL    Total Protein 6.3 (L) 6.4 - 8.3 g/dL    Alb 3.1 (L) 3.5 - 5.2 g/dL    Albumin/Globulin Ratio 1.0 1.0 - 2.5    GFR Non-African American 28 (L) >60 mL/min    GFR  34 (L) >60 mL/min    GFR Comment          GFR Staging NOT REPORTED    Magnesium   Result Value Ref Range    Magnesium 1.5 (L) 1.6 - 2.6 mg/dL   CBC auto differential   Result Value Ref Range    WBC 9.0 3.5 - 11.3 k/uL    RBC 4.16 3.95 - 5.11 m/uL    Hemoglobin 12.2 11.9 - 15.1 g/dL    Hematocrit 38.0 36.3 - 47.1 %    MCV 91.3 82.6 - 102.9 fL    MCH 29.3 25.2 - 33.5 pg    MCHC 32.1 28.4 - 34.8 g/dL    RDW 14.5 (H) 11.8 - 14.4 %    Platelets 449 660 - 996 k/uL    MPV 9.4 8.1 - 13.5 fL    NRBC Automated 0.0 0.0 per 100 WBC    Differential Type NOT REPORTED     WBC Morphology NOT REPORTED     RBC Morphology ANISOCYTOSIS PRESENT     Platelet Estimate NOT REPORTED     Seg Neutrophils 70 (H) 36 - 65 %    Lymphocytes 18 (L) 24 - 43 %    Monocytes 8 3 - 12 %    Eosinophils % 3 1 - 4 %    Basophils 1 0 - 2 %    Immature Granulocytes 0 0 %    Segs Absolute 6.32 1.50 - 8.10 k/uL    Absolute Lymph # 1.62 1.10 - 3.70 k/uL    Absolute Mono # 0.72 0.10 - 1.20 k/uL    Absolute Eos # 0.23 0.00 - 0.44 k/uL    Basophils Absolute 0.05 0.00 - 0.20 k/uL    Absolute Immature Granulocyte 0.03 0.00 - 0.30 k/uL   Comprehensive Metabolic Panel w/ Reflex to MG   Result Value Ref Range    Glucose 110 (H) 70 - 99 mg/dL    BUN 32 (H) 8 - 23 mg/dL    CREATININE 1.98 (H) 0.50 - 0.90 mg/dL    Bun/Cre Ratio NOT REPORTED 9 - 20    Calcium 9.7 8.6 - 10.4 mg/dL    Sodium 136 135 - 144 mmol/L    Potassium 4.3 3.7 - 5.3 mmol/L    Chloride 96 (L) 98 - 107 mmol/L    CO2 24 20 - 31 mmol/L    Anion Gap 16 9 - 17 mmol/L    Alkaline Phosphatase 64 35 - 104 U/L    ALT 12 5 - 33 U/L    AST 15 <32 U/L    Total Bilirubin 0.30 0.3 - 1.2 mg/dL    Total Protein 7.4 6.4 - 8.3 g/dL    Alb 3.6 3.5 - 5.2 g/dL    Albumin/Globulin Ratio 0.9 (L) 1.0 - 2.5 105 mg/dL   POC Glucose Fingerstick   Result Value Ref Range    POC Glucose 114 (H) 65 - 105 mg/dL   POC Glucose Fingerstick   Result Value Ref Range    POC Glucose 105 65 - 105 mg/dL   POC Glucose Fingerstick   Result Value Ref Range    POC Glucose 84 65 - 105 mg/dL   POC Glucose Fingerstick   Result Value Ref Range    POC Glucose 108 (H) 65 - 105 mg/dL   POC Glucose Fingerstick   Result Value Ref Range    POC Glucose 195 (H) 65 - 105 mg/dL   POC Glucose Fingerstick   Result Value Ref Range    POC Glucose 103 65 - 105 mg/dL   POC Glucose Fingerstick   Result Value Ref Range    POC Glucose 152 (H) 65 - 105 mg/dL   POC Glucose Fingerstick   Result Value Ref Range    POC Glucose 103 65 - 105 mg/dL   POC Glucose Fingerstick   Result Value Ref Range    POC Glucose 152 (H) 65 - 105 mg/dL   POC Glucose Fingerstick   Result Value Ref Range    POC Glucose 105 65 - 105 mg/dL   POC Glucose Fingerstick   Result Value Ref Range    POC Glucose 100 65 - 105 mg/dL   POC Glucose Fingerstick   Result Value Ref Range    POC Glucose 125 (H) 65 - 105 mg/dL   EKG 12 Lead   Result Value Ref Range    Ventricular Rate 107 BPM    Atrial Rate 107 BPM    P-R Interval 158 ms    QRS Duration 74 ms    Q-T Interval 326 ms    QTc Calculation (Bazett) 435 ms    P Axis 34 degrees    R Axis 8 degrees    T Axis 57 degrees       No results found. PHYSICAL EXAMINATION:  Due to this being a TeleHealth encounter, evaluation of the following organ systems is limited: Vitals/Constitutional/EENT/Resp/CV/GI//MS/Neuro/Skin/Heme-Lymph-Imm. Constitutional: [x] Appears well-developed and well-nourished. [x] Abnormal obese mental status  [x] Alert and awake  [x] Oriented to person/place/time [x]Able to follow commands    [x] No apparent distress very cooperative  Eyes:  EOM    [x]  Normal  [] Abnormal-  Sclera  [x]  Normal  [] Abnormal -         Discharge [x]  None visible  [] Abnormal -    HENT:   [x] Normocephalic, atraumatic.   [] Abnormal shaped head [x] Mouth/Throat: Mucous membranes are moist.     Ears [x] Normal  [] Abnormal-    Neck: [x] Normal range of motion [x] Supple [x] No visualized mass. Pulmonary/Chest: [x] Respiratory effort normal.  [x] No visualized signs of difficulty breathing or respiratory distress not using any accessory muscles        [] Abnormal   Cardiac no hypertension no angina no coronary artery disease  Musculoskeletal:   [x] Normal range of motion. [x] Normal gait with no signs of ataxia. [x]  No signs of cyanosis of the peripheral portions of extremities. [] Abnormal       Neurological:        [x] Normal cranial nerve (limited exam to video visit) [x] No focal weakness observed       [] Abnormal          Speech       [x] Normal   [] Abnormal     Skin:        [x] No rash on visible skin  [x] Normal  [] Abnormal     Psychiatric:       [x] Normal  [] Abnormal        [x] Normal Mood  [] Anxious appearing        Other Pertinent Exam Findings: None      ASSESSMENT:  Zenker's diverticulum    Recurrent pulmonary aspiration    History of right lower lobe pneumonia due to aspiration    Obesity      Plan:   Patient was advised to continue the present bronchodilator including Spiriva and albuterol which has been helping her symptoms. He probably has changes of chronic bronchitis secondary to recurrent pulmonary aspiration which is helped by the bronchodilators    Patient is not interested in any surgery for Zenker's diverticulum    Patient was advised to use thickener to have thickened liquids that she swallowed and prevent pulmonary aspiration this way. He already had Pneumovax    She already already had flu vaccine. He is not a candidate for lung cancer screening    I do not see any need for any specific other therapy at this time. Patient will call me in case she had any other needs. She did not require any as any prescriptions.   She does have symptoms suggestive of sleep apnea but she is not interested in doing a sleep study. We will see her follow-up in few months. He does not have any symptoms of pneumonia. Dictated by Dr. Karl Taylor MD dictation over thank you         An  electronic signature was used to authenticate this note. --Vicki Simmons MD on 5/28/2020 at 10:37 AM    9}    Pursuant to the emergency declaration under the [address] [place] [address] [facility] authority and the [employer] Act, this Virtual  Visit was conducted, with patient's consent, to reduce the patient's risk of exposure to COVID-19 and provide continuity of care for an established patient.     Services were provided through a video synchronous discussion virtually to substitute for in-person clinic visit.     _______________________________________________________________________________________________________________________________________________

## 2021-05-15 ENCOUNTER — HOSPITAL ENCOUNTER (INPATIENT)
Age: 81
LOS: 6 days | Discharge: HOME HEALTH | DRG: 177 | End: 2021-05-21
Attending: EMERGENCY MEDICINE | Admitting: INTERNAL MEDICINE
Payer: MEDICARE

## 2021-05-15 ENCOUNTER — APPOINTMENT (OUTPATIENT)
Dept: GENERAL RADIOLOGY | Age: 81
DRG: 177 | End: 2021-05-15
Payer: MEDICARE

## 2021-05-15 DIAGNOSIS — J81.0 ACUTE PULMONARY EDEMA (HCC): ICD-10-CM

## 2021-05-15 DIAGNOSIS — R53.83 FATIGUE, UNSPECIFIED TYPE: Primary | ICD-10-CM

## 2021-05-15 PROBLEM — J69.0 RECURRENT ASPIRATION PNEUMONIA (HCC): Chronic | Status: ACTIVE | Noted: 2021-05-15

## 2021-05-15 PROBLEM — I50.1 PULMONARY EDEMA CARDIAC CAUSE (HCC): Status: ACTIVE | Noted: 2021-05-15

## 2021-05-15 LAB
-: ABNORMAL
ABSOLUTE EOS #: <0.03 K/UL (ref 0–0.44)
ABSOLUTE IMMATURE GRANULOCYTE: 0.07 K/UL (ref 0–0.3)
ABSOLUTE LYMPH #: 0.83 K/UL (ref 1.1–3.7)
ABSOLUTE MONO #: 0.57 K/UL (ref 0.1–1.2)
ALBUMIN SERPL-MCNC: 3.9 G/DL (ref 3.5–5.2)
ALBUMIN/GLOBULIN RATIO: 1.1 (ref 1–2.5)
ALP BLD-CCNC: 61 U/L (ref 35–104)
ALT SERPL-CCNC: 11 U/L (ref 5–33)
AMORPHOUS: ABNORMAL
ANION GAP SERPL CALCULATED.3IONS-SCNC: 12 MMOL/L (ref 9–17)
AST SERPL-CCNC: 18 U/L
BACTERIA: ABNORMAL
BASOPHILS # BLD: 0 % (ref 0–2)
BASOPHILS ABSOLUTE: 0.05 K/UL (ref 0–0.2)
BILIRUB SERPL-MCNC: 0.47 MG/DL (ref 0.3–1.2)
BILIRUBIN URINE: NEGATIVE
BNP INTERPRETATION: ABNORMAL
BUN BLDV-MCNC: 37 MG/DL (ref 8–23)
BUN/CREAT BLD: ABNORMAL (ref 9–20)
CALCIUM SERPL-MCNC: 9.1 MG/DL (ref 8.6–10.4)
CASTS UA: ABNORMAL /LPF (ref 0–8)
CHLORIDE BLD-SCNC: 101 MMOL/L (ref 98–107)
CO2: 22 MMOL/L (ref 20–31)
COLOR: YELLOW
COMMENT UA: ABNORMAL
CREAT SERPL-MCNC: 1.61 MG/DL (ref 0.5–0.9)
CRYSTALS, UA: ABNORMAL /HPF
DIFFERENTIAL TYPE: ABNORMAL
EOSINOPHILS RELATIVE PERCENT: 0 % (ref 1–4)
EPITHELIAL CELLS UA: ABNORMAL /HPF (ref 0–5)
GFR AFRICAN AMERICAN: 37 ML/MIN
GFR NON-AFRICAN AMERICAN: 31 ML/MIN
GFR SERPL CREATININE-BSD FRML MDRD: ABNORMAL ML/MIN/{1.73_M2}
GFR SERPL CREATININE-BSD FRML MDRD: ABNORMAL ML/MIN/{1.73_M2}
GLUCOSE BLD-MCNC: 151 MG/DL (ref 70–99)
GLUCOSE BLD-MCNC: 98 MG/DL (ref 65–105)
GLUCOSE URINE: NEGATIVE
HCT VFR BLD CALC: 38.1 % (ref 36.3–47.1)
HEMOGLOBIN: 12.3 G/DL (ref 11.9–15.1)
IMMATURE GRANULOCYTES: 0 %
KETONES, URINE: NEGATIVE
LEUKOCYTE ESTERASE, URINE: ABNORMAL
LYMPHOCYTES # BLD: 5 % (ref 24–43)
MAGNESIUM: 2 MG/DL (ref 1.6–2.6)
MCH RBC QN AUTO: 30.1 PG (ref 25.2–33.5)
MCHC RBC AUTO-ENTMCNC: 32.3 G/DL (ref 28.4–34.8)
MCV RBC AUTO: 93.4 FL (ref 82.6–102.9)
MONOCYTES # BLD: 3 % (ref 3–12)
MUCUS: ABNORMAL
NITRITE, URINE: POSITIVE
NRBC AUTOMATED: 0 PER 100 WBC
OTHER OBSERVATIONS UA: ABNORMAL
PDW BLD-RTO: 14.1 % (ref 11.8–14.4)
PH UA: 5 (ref 5–8)
PLATELET # BLD: 262 K/UL (ref 138–453)
PLATELET ESTIMATE: ABNORMAL
PMV BLD AUTO: 9.8 FL (ref 8.1–13.5)
POTASSIUM SERPL-SCNC: 3.9 MMOL/L (ref 3.7–5.3)
PRO-BNP: 954 PG/ML
PROTEIN UA: ABNORMAL
RBC # BLD: 4.08 M/UL (ref 3.95–5.11)
RBC # BLD: ABNORMAL 10*6/UL
RBC UA: ABNORMAL /HPF (ref 0–4)
RENAL EPITHELIAL, UA: ABNORMAL /HPF
SEG NEUTROPHILS: 92 % (ref 36–65)
SEGMENTED NEUTROPHILS ABSOLUTE COUNT: 16.51 K/UL (ref 1.5–8.1)
SODIUM BLD-SCNC: 135 MMOL/L (ref 135–144)
SPECIFIC GRAVITY UA: 1.01 (ref 1–1.03)
TOTAL PROTEIN: 7.4 G/DL (ref 6.4–8.3)
TRICHOMONAS: ABNORMAL
TROPONIN INTERP: NORMAL
TROPONIN INTERP: NORMAL
TROPONIN T: NORMAL NG/ML
TROPONIN T: NORMAL NG/ML
TROPONIN, HIGH SENSITIVITY: 13 NG/L (ref 0–14)
TROPONIN, HIGH SENSITIVITY: 13 NG/L (ref 0–14)
TSH SERPL DL<=0.05 MIU/L-ACNC: 1.55 MIU/L (ref 0.3–5)
TURBIDITY: CLEAR
URINE HGB: ABNORMAL
UROBILINOGEN, URINE: NORMAL
WBC # BLD: 18.1 K/UL (ref 3.5–11.3)
WBC # BLD: ABNORMAL 10*3/UL
WBC UA: ABNORMAL /HPF (ref 0–5)
YEAST: ABNORMAL

## 2021-05-15 PROCEDURE — 71046 X-RAY EXAM CHEST 2 VIEWS: CPT

## 2021-05-15 PROCEDURE — 93005 ELECTROCARDIOGRAM TRACING: CPT | Performed by: STUDENT IN AN ORGANIZED HEALTH CARE EDUCATION/TRAINING PROGRAM

## 2021-05-15 PROCEDURE — 83735 ASSAY OF MAGNESIUM: CPT

## 2021-05-15 PROCEDURE — 2580000003 HC RX 258: Performed by: CLINICAL NURSE SPECIALIST

## 2021-05-15 PROCEDURE — 84443 ASSAY THYROID STIM HORMONE: CPT

## 2021-05-15 PROCEDURE — 87186 SC STD MICRODIL/AGAR DIL: CPT

## 2021-05-15 PROCEDURE — 84484 ASSAY OF TROPONIN QUANT: CPT

## 2021-05-15 PROCEDURE — 83880 ASSAY OF NATRIURETIC PEPTIDE: CPT

## 2021-05-15 PROCEDURE — 85025 COMPLETE CBC W/AUTO DIFF WBC: CPT

## 2021-05-15 PROCEDURE — 87086 URINE CULTURE/COLONY COUNT: CPT

## 2021-05-15 PROCEDURE — 1200000000 HC SEMI PRIVATE

## 2021-05-15 PROCEDURE — 81001 URINALYSIS AUTO W/SCOPE: CPT

## 2021-05-15 PROCEDURE — 6360000002 HC RX W HCPCS: Performed by: STUDENT IN AN ORGANIZED HEALTH CARE EDUCATION/TRAINING PROGRAM

## 2021-05-15 PROCEDURE — 6370000000 HC RX 637 (ALT 250 FOR IP): Performed by: CLINICAL NURSE SPECIALIST

## 2021-05-15 PROCEDURE — 36415 COLL VENOUS BLD VENIPUNCTURE: CPT

## 2021-05-15 PROCEDURE — 87077 CULTURE AEROBIC IDENTIFY: CPT

## 2021-05-15 PROCEDURE — 80053 COMPREHEN METABOLIC PANEL: CPT

## 2021-05-15 PROCEDURE — 96374 THER/PROPH/DIAG INJ IV PUSH: CPT

## 2021-05-15 PROCEDURE — 82947 ASSAY GLUCOSE BLOOD QUANT: CPT

## 2021-05-15 PROCEDURE — 99223 1ST HOSP IP/OBS HIGH 75: CPT | Performed by: CLINICAL NURSE SPECIALIST

## 2021-05-15 PROCEDURE — 99284 EMERGENCY DEPT VISIT MOD MDM: CPT

## 2021-05-15 PROCEDURE — 87040 BLOOD CULTURE FOR BACTERIA: CPT

## 2021-05-15 RX ORDER — AMLODIPINE BESYLATE 10 MG/1
10 TABLET ORAL DAILY
Status: DISCONTINUED | OUTPATIENT
Start: 2021-05-16 | End: 2021-05-21 | Stop reason: HOSPADM

## 2021-05-15 RX ORDER — POLYETHYLENE GLYCOL 3350 17 G/17G
17 POWDER, FOR SOLUTION ORAL DAILY PRN
Status: DISCONTINUED | OUTPATIENT
Start: 2021-05-15 | End: 2021-05-21 | Stop reason: HOSPADM

## 2021-05-15 RX ORDER — CARVEDILOL 12.5 MG/1
25 TABLET ORAL 2 TIMES DAILY
Status: DISCONTINUED | OUTPATIENT
Start: 2021-05-15 | End: 2021-05-21 | Stop reason: HOSPADM

## 2021-05-15 RX ORDER — ACETAMINOPHEN 650 MG/1
650 SUPPOSITORY RECTAL EVERY 6 HOURS PRN
Status: DISCONTINUED | OUTPATIENT
Start: 2021-05-15 | End: 2021-05-21 | Stop reason: HOSPADM

## 2021-05-15 RX ORDER — FUROSEMIDE 10 MG/ML
40 INJECTION INTRAMUSCULAR; INTRAVENOUS ONCE
Status: COMPLETED | OUTPATIENT
Start: 2021-05-15 | End: 2021-05-15

## 2021-05-15 RX ORDER — GABAPENTIN 300 MG/1
300 CAPSULE ORAL 3 TIMES DAILY
Status: DISCONTINUED | OUTPATIENT
Start: 2021-05-15 | End: 2021-05-18

## 2021-05-15 RX ORDER — FLUOXETINE HYDROCHLORIDE 20 MG/1
40 CAPSULE ORAL DAILY
Status: DISCONTINUED | OUTPATIENT
Start: 2021-05-16 | End: 2021-05-21 | Stop reason: HOSPADM

## 2021-05-15 RX ORDER — CLOPIDOGREL BISULFATE 75 MG/1
75 TABLET ORAL DAILY
Status: DISCONTINUED | OUTPATIENT
Start: 2021-05-16 | End: 2021-05-21 | Stop reason: HOSPADM

## 2021-05-15 RX ORDER — ALBUTEROL SULFATE 2.5 MG/3ML
2.5 SOLUTION RESPIRATORY (INHALATION) EVERY 4 HOURS PRN
Status: DISCONTINUED | OUTPATIENT
Start: 2021-05-15 | End: 2021-05-21 | Stop reason: HOSPADM

## 2021-05-15 RX ORDER — ALPRAZOLAM 0.25 MG/1
0.5 TABLET ORAL NIGHTLY PRN
Status: DISCONTINUED | OUTPATIENT
Start: 2021-05-15 | End: 2021-05-16

## 2021-05-15 RX ORDER — ONDANSETRON 2 MG/ML
4 INJECTION INTRAMUSCULAR; INTRAVENOUS EVERY 6 HOURS PRN
Status: DISCONTINUED | OUTPATIENT
Start: 2021-05-15 | End: 2021-05-21 | Stop reason: HOSPADM

## 2021-05-15 RX ORDER — ACETAMINOPHEN 325 MG/1
650 TABLET ORAL EVERY 6 HOURS PRN
Status: DISCONTINUED | OUTPATIENT
Start: 2021-05-15 | End: 2021-05-21 | Stop reason: HOSPADM

## 2021-05-15 RX ORDER — DONEPEZIL HYDROCHLORIDE 10 MG/1
10 TABLET, FILM COATED ORAL NIGHTLY
Status: DISCONTINUED | OUTPATIENT
Start: 2021-05-15 | End: 2021-05-21 | Stop reason: HOSPADM

## 2021-05-15 RX ORDER — NICOTINE 21 MG/24HR
1 PATCH, TRANSDERMAL 24 HOURS TRANSDERMAL DAILY PRN
Status: DISCONTINUED | OUTPATIENT
Start: 2021-05-15 | End: 2021-05-21 | Stop reason: HOSPADM

## 2021-05-15 RX ORDER — SODIUM CHLORIDE 0.9 % (FLUSH) 0.9 %
10 SYRINGE (ML) INJECTION PRN
Status: DISCONTINUED | OUTPATIENT
Start: 2021-05-15 | End: 2021-05-21 | Stop reason: HOSPADM

## 2021-05-15 RX ORDER — SODIUM CHLORIDE 0.9 % (FLUSH) 0.9 %
5-40 SYRINGE (ML) INJECTION EVERY 12 HOURS SCHEDULED
Status: DISCONTINUED | OUTPATIENT
Start: 2021-05-15 | End: 2021-05-21 | Stop reason: HOSPADM

## 2021-05-15 RX ORDER — PROMETHAZINE HYDROCHLORIDE 12.5 MG/1
12.5 TABLET ORAL EVERY 6 HOURS PRN
Status: DISCONTINUED | OUTPATIENT
Start: 2021-05-15 | End: 2021-05-21 | Stop reason: HOSPADM

## 2021-05-15 RX ORDER — LISINOPRIL 10 MG/1
5 TABLET ORAL DAILY
Status: DISCONTINUED | OUTPATIENT
Start: 2021-05-16 | End: 2021-05-18

## 2021-05-15 RX ORDER — FUROSEMIDE 10 MG/ML
40 INJECTION INTRAMUSCULAR; INTRAVENOUS DAILY
Status: COMPLETED | OUTPATIENT
Start: 2021-05-16 | End: 2021-05-18

## 2021-05-15 RX ORDER — GLIPIZIDE 5 MG/1
2.5 TABLET ORAL DAILY
Status: DISCONTINUED | OUTPATIENT
Start: 2021-05-16 | End: 2021-05-18

## 2021-05-15 RX ORDER — SODIUM CHLORIDE 9 MG/ML
25 INJECTION, SOLUTION INTRAVENOUS PRN
Status: DISCONTINUED | OUTPATIENT
Start: 2021-05-15 | End: 2021-05-21 | Stop reason: HOSPADM

## 2021-05-15 RX ORDER — PANTOPRAZOLE SODIUM 40 MG/1
40 TABLET, DELAYED RELEASE ORAL
Status: DISCONTINUED | OUTPATIENT
Start: 2021-05-16 | End: 2021-05-21 | Stop reason: HOSPADM

## 2021-05-15 RX ADMIN — FUROSEMIDE 40 MG: 10 INJECTION, SOLUTION INTRAMUSCULAR; INTRAVENOUS at 11:21

## 2021-05-15 RX ADMIN — CARVEDILOL 25 MG: 12.5 TABLET, FILM COATED ORAL at 23:50

## 2021-05-15 RX ADMIN — GABAPENTIN 300 MG: 300 CAPSULE ORAL at 23:50

## 2021-05-15 RX ADMIN — ALPRAZOLAM 0.5 MG: 0.25 TABLET ORAL at 23:50

## 2021-05-15 RX ADMIN — SODIUM CHLORIDE, PRESERVATIVE FREE 10 ML: 5 INJECTION INTRAVENOUS at 23:50

## 2021-05-15 RX ADMIN — DONEPEZIL HYDROCHLORIDE 10 MG: 10 TABLET, FILM COATED ORAL at 23:50

## 2021-05-15 ASSESSMENT — ENCOUNTER SYMPTOMS
COUGH: 0
TROUBLE SWALLOWING: 0
DIARRHEA: 0
NAUSEA: 0
VOICE CHANGE: 1
SHORTNESS OF BREATH: 0
COUGH: 1
SORE THROAT: 0
PHOTOPHOBIA: 0
ABDOMINAL PAIN: 0
RHINORRHEA: 0
CONSTIPATION: 0
VOMITING: 0
SHORTNESS OF BREATH: 1

## 2021-05-15 NOTE — ED PROVIDER NOTES
[facility]  ED  Emergency Department Encounter  EmergencyMedicine Resident     Pt Name:Sharona Sutton  MRN: [id number]  Fidelinagfpaula 1940  Date of evaluation: 5/15/21  PCP:  Sedrick Carlisle MD    [address] [place] [address]       Chief Complaint   Patient presents with    Fatigue       HISTORY OF PRESENT ILLNESS  (Location/Symptom, Timing/Onset, Context/Setting, Quality, Duration, Modifying Factors, Severity.)      Eleni Olson is a 79 y.o. female who presents with 2 days of worsening fatigue. Patient has a history of pneumonia she is concerned about recurrence of this. Does have underlying COPD however she is not on home oxygen does use inhalers at home and has been compliant with these denies any fevers any changes in her appetite any nausea vomiting or chest pain. She has been urinating normally. Has in the past been told she has been developing congestive heart failure has never been treated for this. PAST MEDICAL / SURGICAL / SOCIAL / FAMILY HISTORY      has a past medical history of Anxiety, Arthritis, Atherosclerosis, Body mass index (bmi) 25.0-25.9, adult, CHF exacerbation (HCC), COPD (chronic obstructive pulmonary disease) (HCC), CVA (cerebral vascular accident) ([address] Depression, Diabetes mellitus ([address] Diverticula, esophagus congenital, Former smoker, Hypercholesteremia, Hypertension, Hypokalemia, Joint pain, knee, Myocardial infarct, old, Pneumonia, Protein S deficiency ([address] Pulmonary embolism ([address] Reflux esophagitis, Tinea corporis, Tremor, URI (upper respiratory infection), UTI (urinary tract infection), and Zenker's diverticulum. has a past surgical history that includes Carotid endarterectomy (Bilateral); Tonsillectomy; and Endoscopy, colon, diagnostic.       Social History     Socioeconomic History    Marital status: Single     Spouse name: Not on file    Number of children: 2    Years of education: Not on file    Highest education level: Not on file   Occupational albuterol sulfate HFA (VENTOLIN HFA) 108 (90 Base) MCG/ACT inhaler Inhale 2 puffs into the lungs every 6 hours as needed for Wheezing or Shortness of Breath 7/16/19   Brian Meehan MD   FLUoxetine (PROZAC) 20 MG/5ML solution Take 10 mLs by mouth daily 7/16/19   Brian Meehan MD   donepezil (ARICEPT) 10 MG tablet Take 1 tablet by mouth nightly 7/16/19   Brian Meehan MD   glipiZIDE (GLUCOTROL) 5 MG tablet Take 0.5 tablets by mouth daily 7/16/19   Brian Meehan MD   carvedilol (COREG) 25 MG tablet Take 25 mg by mouth 2 times daily    Historical Provider, MD   ALPRAZolam Shade Nones) 0.5 MG tablet Take 0.5 mg by mouth nightly as needed for Sleep. Historical Provider, MD   gabapentin (NEURONTIN) 300 MG capsule Take 300 mg by mouth 3 times daily. Historical Provider, MD   clopidogrel (PLAVIX) 75 MG tablet Take 75 mg by mouth daily    Historical Provider, MD   cyclobenzaprine (FLEXERIL) 10 MG tablet Take 10 mg by mouth 3 times daily as needed for Muscle spasms    Historical Provider, MD   omeprazole (PRILOSEC) 20 MG capsule Take 20 mg by mouth daily    Historical Provider, MD       REVIEW OF SYSTEMS    (2-9 systems for level 4, 10 or more for level 5)      Review of Systems   Constitutional: Positive for activity change and fatigue. Negative for appetite change and fever. HENT: Negative for congestion, rhinorrhea and sore throat. Eyes: Negative for photophobia. Respiratory: Negative for cough and shortness of breath. Cardiovascular: Negative for chest pain. Gastrointestinal: Negative for abdominal pain, nausea and vomiting. Genitourinary: Negative for difficulty urinating. Musculoskeletal: Negative for myalgias and neck pain. Skin: Negative for pallor. Allergic/Immunologic: Negative for environmental allergies and food allergies. Neurological: Positive for weakness. Negative for syncope, speech difficulty and headaches. Hematological: Negative for adenopathy. Psychiatric/Behavioral: Negative for agitation and confusion. PHYSICAL EXAM   (up to 7 for level 4, 8 or more for level 5)      INITIAL VITALS:   BP (!) 158/65   Pulse 79   Temp 99.3 °F (37.4 °C) (Oral)   Resp 18   SpO2 97%     Physical Exam  Vitals and nursing note reviewed. Constitutional:       Appearance: Normal appearance. She is obese. She is not toxic-appearing. HENT:      Head: Normocephalic. Right Ear: External ear normal.      Left Ear: External ear normal.      Nose: Nose normal.      Mouth/Throat:      Pharynx: Oropharynx is clear. Eyes:      General: No scleral icterus. Conjunctiva/sclera: Conjunctivae normal.   Cardiovascular:      Rate and Rhythm: Normal rate. Pulses: Normal pulses. Pulmonary:      Breath sounds: Rales present. Comments: B/l bases  Abdominal:      Palpations: Abdomen is soft. Tenderness: There is no abdominal tenderness. Musculoskeletal:      Cervical back: Normal range of motion. Right lower leg: Edema present. Left lower leg: Edema present. Skin:     General: Skin is warm. Capillary Refill: Capillary refill takes less than 2 seconds. Neurological:      Mental Status: She is alert and oriented to person, place, and time.    Psychiatric:         Mood and Affect: Mood normal.         DIFFERENTIAL  DIAGNOSIS     PLAN (LABS / IMAGING / EKG):  Orders Placed This Encounter   Procedures    Culture, Blood 1    Culture, Blood 1    Culture, Urine    XR CHEST (2 VW)    CBC WITH AUTO DIFFERENTIAL    Comprehensive Metabolic Panel    Troponin    Brain Natriuretic Peptide    Urinalysis Reflex to Culture    Magnesium    TSH with Reflex    Troponin    Microscopic Urinalysis    Inpatient consult to Hospitalist    EKG 12 Lead    PATIENT STATUS (FROM ED OR OR/PROCEDURAL) Inpatient       MEDICATIONS ORDERED:  Orders Placed This Encounter   Medications    furosemide (LASIX) injection 40 mg    cefTRIAXone (ROCEPHIN) 1000 mg IVPB in 50 mL D5W minibag     Order Specific Question:   Antimicrobial Indications     Answer:   Aspiration Pneumonia    azithromycin (ZITHROMAX) 500 mg in dextrose 5% 250 mL IVPB     Order Specific Question:   Antimicrobial Indications     Answer:   Aspiration Pneumonia       DDX: chf, pneumonia, uti, electrolyte abnormality    DIAGNOSTIC RESULTS / EMERGENCY DEPARTMENT COURSE / MDM   LAB RESULTS:  Results for orders placed or performed during the hospital encounter of 05/15/21   Culture, Blood 1    Specimen: Blood   Result Value Ref Range    Specimen Description . BLOOD     Special Requests L HAND 1 ML     Culture NO GROWTH 2 HOURS    Culture, Blood 1    Specimen: Blood   Result Value Ref Range    Specimen Description . BLOOD     Special Requests RT AC 10ML     Culture NO GROWTH 2 HOURS    CBC WITH AUTO DIFFERENTIAL   Result Value Ref Range    WBC 18.1 (H) 3.5 - 11.3 k/uL    RBC 4.08 3.95 - 5.11 m/uL    Hemoglobin 12.3 11.9 - 15.1 g/dL    Hematocrit 38.1 36.3 - 47.1 %    MCV 93.4 82.6 - 102.9 fL    MCH 30.1 25.2 - 33.5 pg    MCHC 32.3 28.4 - 34.8 g/dL    RDW 14.1 11.8 - 14.4 %    Platelets 807 197 - 066 k/uL    MPV 9.8 8.1 - 13.5 fL    NRBC Automated 0.0 0.0 per 100 WBC    Differential Type NOT REPORTED     Seg Neutrophils 92 (H) 36 - 65 %    Lymphocytes 5 (L) 24 - 43 %    Monocytes 3 3 - 12 %    Eosinophils % 0 (L) 1 - 4 %    Basophils 0 0 - 2 %    Immature Granulocytes 0 0 %    Segs Absolute 16.51 (H) 1.50 - 8.10 k/uL    Absolute Lymph # 0.83 (L) 1.10 - 3.70 k/uL    Absolute Mono # 0.57 0.10 - 1.20 k/uL    Absolute Eos # <0.03 0.00 - 0.44 k/uL    Basophils Absolute 0.05 0.00 - 0.20 k/uL    Absolute Immature Granulocyte 0.07 0.00 - 0.30 k/uL    WBC Morphology NOT REPORTED     RBC Morphology NOT REPORTED     Platelet Estimate NOT REPORTED    Comprehensive Metabolic Panel   Result Value Ref Range    Glucose 151 (H) 70 - 99 mg/dL    BUN 37 (H) 8 - 23 mg/dL    CREATININE 1.61 (H) 0.50 - 0.90 mg/dL    Bun/Cre Ratio NOT UA NOT REPORTED 0 /HPF    Bacteria, UA MANY (A) None    Mucus, UA NOT REPORTED None    Trichomonas, UA NOT REPORTED None    Amorphous, UA NOT REPORTED None    Other Observations UA NOT REPORTED NOT REQ. Yeast, UA NOT REPORTED None       IMPRESSION: Patient is alert oriented 59-year-old female speaking full sentences with equal strides lungs have rales bilaterally at the bases is regular in rhythm there is mild lower extremity edema patient states is new for her. There are some overlying's venous stasis changes. Denies any chest pain diaphoresis nausea vomiting. Has been tolerating p.o. Afebrile mildly hypertensive she has been vaccinated against COVID-19. Plan will be work-up for pulmonary edema    RADIOLOGY:  XR CHEST (2 VW)    Result Date: 5/15/2021  EXAMINATION: TWO XRAY VIEWS OF THE CHEST 5/15/2021 10:32 am COMPARISON: 01/13/2020, 10/19/2019 HISTORY: ORDERING SYSTEM PROVIDED HISTORY: eval pulmonary edema TECHNOLOGIST PROVIDED HISTORY: eval pulmonary edema FINDINGS: Cardiac silhouette enlargement again demonstrated. Mild prominence of the aortic contour appears unchanged. Vascular congestion and mild ground-glass attenuation is noted. Mild peribronchial wall thickening and septal thickening noted. No focal area of consolidation. No pneumothorax or effusion. No acute osseous abnormality identified. Findings compatible with mild interstitial edema. EKG  Sinus rhythm at a rate of 72 normal to leftward axis TC is 445 normal R wave progression. T wave inversion noted in aVL compared with prior from  13-JAN-2020.   The T wave inversions seen on the current ECG is new compared with the prior over the Q wave noted on the current ECG in lead III suggestive of prior inferior wall MI is seen on prior      All EKG's are interpreted by the Emergency Department Physician who either signs or Co-signs this chart in the absence of a cardiologist.    EMERGENCY DEPARTMENT COURSE:  ED Course as of May 15 1851 Sat May 15, 2021   1019 bedside ultrasound performed demonstrating sporadic B-lines. Pt And family updated on results of work-up thus far and plan for admission.    [BG]   1034 Elevated bnp noted, will repeat trop. Awaiting cmp prior to diuresis to evaluate electrolyte levels    [BG]   1040 lasix    [BG]   1123 Called for admission. Last echo 8/2019 showed Calculated ejection fraction via 3D Heart Model is 55%  Grade I (mild) left ventricular diastolic dysfunction. Left atrium is mildly dilated. Mildly thickened mitral valve leaflets with no evidence of stenosis. Mild mitral regurgitation. Mild tricuspid regurgitation. Estimated right ventricular systolic pressure is 23 mmHg. [BG]   1152 Trop stable    [BG]   1206 Case discussed with intermed np. Pt admitted med/surg tele    [BG]      ED Course User Index  [BG] Mook Bejarano DO         PROCEDURES:  none    CONSULTS:  IP CONSULT TO HOSPITALIST  IP CONSULT TO DIETITIAN    CRITICAL CARE:  Please see attending note    FINAL IMPRESSION      1. Fatigue, unspecified type    2. Acute pulmonary edema (HCC)          DISPOSITION / PLAN     DISPOSITION  admitted      PATIENT REFERRED TO:  No follow-up provider specified.     DISCHARGE MEDICATIONS:  New Prescriptions    No medications on file       Mook Bejarano DO  Emergency Medicine Resident    (Please note that portions of thisnote were completed with a voice recognition program.  Efforts were made to edit the dictations but occasionally words are mis-transcribed.)        Mook Bejarano DO  Resident  05/15/21 5180
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
## 2021-05-15 NOTE — ED NOTES
The following labs labeled with pt sticker and tubed:     [x] Blood Cultures x2           Onelia Burgess RN  05/15/21 [id number]

## 2021-05-15 NOTE — ED NOTES
Pt incontinent of urine, brief changed. Pt placed on a purewick, will obtain urine sample shortly.      Lizz Rodrigues RN  05/15/21 [id number]

## 2021-05-15 NOTE — ED NOTES
Pt to ED with c/o generalized weakness and fatigue. Pt reports more difficulty getting around her house d/t fatigue. Denies more weakness in one side of her body than the other. States she may have a UTI, as she has a hx of this and gets more fatigued than normal.   Denies any increase in frequency/urgency to urinate, pt wears a brief at home. Pt on full cardiac monitoring, EKG obtained, IV inserted and labs drawn/correctly labeled.      Henry Bustillo RN  05/15/21 2031

## 2021-05-15 NOTE — ED NOTES
Bed: 30  Expected date:   Expected time:   Means of arrival:   Comments:  Reji Vivas, RN  05/15/21 7770

## 2021-05-15 NOTE — ED NOTES
The following labs labeled with pt sticker and tubed:     [x] Lavender   [] on ice   [x] Blue   [x] Green/yellow  [x] Green/black [] on ice       Jojo Marshall RN  05/15/21 2099

## 2021-05-15 NOTE — ED PROVIDER NOTES
1 [facility]     Emergency Department     Faculty Note/ Attestation      Pt Name: Zechariah Isaac                                       MRN: [id number]  [place] 1940  Date of evaluation: 5/15/2021    Patients PCP:    Kenneth Monsivais MD    Attestation  I performed a history and physical examination of the patient and discussed management with the resident. I reviewed the residents note and agree with the documented findings and plan of care. Any areas of disagreement are noted on the chart. I was personally present for the key portions of any procedures. I have documented in the chart those procedures where I was not present during the key portions. I have reviewed the emergency nurses triage note. I agree with the chief complaint, past medical history, past surgical history, allergies, medications, social and family history as documented unless otherwise noted below. For Physician Assistant/ Nurse Practitioner cases/documentation I have personally evaluated this patient and have completed at least one if not all key elements of the E/M (history, physical exam, and MDM). Additional findings are as noted. Initial Screens:        Leatha Coma Scale  Eye Opening: Spontaneous  Best Verbal Response: Oriented  Best Motor Response: Obeys commands  Candia Coma Scale Score: 15    Vitals:    Vitals:    05/15/21 0951 05/15/21 0952 05/15/21 0956   BP: (!) 158/65 (!) 158/65    Pulse:  79    Resp:  18    Temp:  99.3 °F (37.4 °C)    TempSrc:  Oral    SpO2: 95% 97% 97%       CHIEF COMPLAINT       Chief Complaint   Patient presents with    Fatigue       The pt is an 78 YO F with 2 days of fatigue hx of COPD. The pt has new swelling cough and weakness generalized. Denies chest pain nausea and vomiting. Not on home O2.       Mild b lines on US    EMERGENCY DEPARTMENT COURSE:     -------------------------  BP: (!) 158/65, Temp: 99.3 °F (37.4 °C), Pulse: 79, Resp: 18  Physical Exam  Constitutional: Appearance: She is well-developed. She is not diaphoretic. HENT:      Head: Normocephalic and atraumatic. Right Ear: External ear normal.      Left Ear: External ear normal.   Eyes:      General: No scleral icterus. Right eye: No discharge. Left eye: No discharge. Neck:      Trachea: No tracheal deviation. Pulmonary:      Effort: Pulmonary effort is normal. No respiratory distress. Breath sounds: No stridor. Rales present. Musculoskeletal:         General: Normal range of motion. Cervical back: Normal range of motion. Skin:     General: Skin is warm and dry. Neurological:      Mental Status: She is alert and oriented to person, place, and time. Coordination: Coordination normal.   Psychiatric:         Behavior: Behavior normal.           Comments  EKG Interpretation   Interpreted by Morris Caballero DO    Rhythm: normal sinus   Rate: normal  Axis: normal  Ectopy: none  Conduction: normal  ST Segments: normal  T Waves: new inversion AVL  Q Waves: none    Clinical Impression: nonspecific      ED Course as of May 15 1525   Sat May 15, 2021   1019 bedside ultrasound performed demonstrating sporadic B-lines. Pt And family updated on results of work-up thus far and plan for admission.    [BG]   1034 Elevated bnp noted, will repeat trop. Awaiting cmp prior to diuresis to evaluate electrolyte levels    [BG]   1040 lasix    [BG]   1123 Called for admission. Last echo 8/2019 showed Calculated ejection fraction via 3D Heart Model is 55%  Grade I (mild) left ventricular diastolic dysfunction. Left atrium is mildly dilated. Mildly thickened mitral valve leaflets with no evidence of stenosis. Mild mitral regurgitation. Mild tricuspid regurgitation. Estimated right ventricular systolic pressure is 23 mmHg. [BG]   1152 Trop stable    [BG]   1206 Case discussed with [facility] np.  Pt admitted med/surg tele    [BG]      ED Course User Index  [BG] Aldair Bess DO Zamora DO, RDMS.   Attending Emergency Physician          Andrew Acosta DO  05/15/21 1527

## 2021-05-16 ENCOUNTER — APPOINTMENT (OUTPATIENT)
Dept: GENERAL RADIOLOGY | Age: 81
DRG: 177 | End: 2021-05-16
Payer: MEDICARE

## 2021-05-16 LAB
BNP INTERPRETATION: ABNORMAL
CHOLESTEROL/HDL RATIO: 4.7
CHOLESTEROL: 239 MG/DL
CULTURE: ABNORMAL
GLUCOSE BLD-MCNC: 133 MG/DL (ref 65–105)
GLUCOSE BLD-MCNC: 135 MG/DL (ref 65–105)
GLUCOSE BLD-MCNC: 261 MG/DL (ref 65–105)
GLUCOSE BLD-MCNC: 58 MG/DL (ref 65–105)
GLUCOSE BLD-MCNC: 97 MG/DL (ref 65–105)
HCT VFR BLD CALC: 40.7 % (ref 36.3–47.1)
HDLC SERPL-MCNC: 51 MG/DL
HEMOGLOBIN: 11.9 G/DL (ref 11.9–15.1)
LDL CHOLESTEROL: 153 MG/DL (ref 0–130)
Lab: ABNORMAL
MAGNESIUM: 1.7 MG/DL (ref 1.6–2.6)
MCH RBC QN AUTO: 30.1 PG (ref 25.2–33.5)
MCHC RBC AUTO-ENTMCNC: 29.2 G/DL (ref 28.4–34.8)
MCV RBC AUTO: 103 FL (ref 82.6–102.9)
NRBC AUTOMATED: 0 PER 100 WBC
PDW BLD-RTO: 14.3 % (ref 11.8–14.4)
PLATELET # BLD: 245 K/UL (ref 138–453)
PMV BLD AUTO: 10.1 FL (ref 8.1–13.5)
PRO-BNP: 1118 PG/ML
RBC # BLD: 3.95 M/UL (ref 3.95–5.11)
SARS-COV-2, RAPID: NOT DETECTED
SPECIMEN DESCRIPTION: ABNORMAL
SPECIMEN DESCRIPTION: NORMAL
TRIGL SERPL-MCNC: 173 MG/DL
TSH SERPL DL<=0.05 MIU/L-ACNC: 2.31 MIU/L (ref 0.3–5)
VLDLC SERPL CALC-MCNC: ABNORMAL MG/DL (ref 1–30)
WBC # BLD: 7.5 K/UL (ref 3.5–11.3)

## 2021-05-16 PROCEDURE — 6360000002 HC RX W HCPCS: Performed by: CLINICAL NURSE SPECIALIST

## 2021-05-16 PROCEDURE — 85027 COMPLETE CBC AUTOMATED: CPT

## 2021-05-16 PROCEDURE — 71045 X-RAY EXAM CHEST 1 VIEW: CPT

## 2021-05-16 PROCEDURE — 36415 COLL VENOUS BLD VENIPUNCTURE: CPT

## 2021-05-16 PROCEDURE — 6370000000 HC RX 637 (ALT 250 FOR IP): Performed by: CLINICAL NURSE SPECIALIST

## 2021-05-16 PROCEDURE — 99233 SBSQ HOSP IP/OBS HIGH 50: CPT | Performed by: INTERNAL MEDICINE

## 2021-05-16 PROCEDURE — 80061 LIPID PANEL: CPT

## 2021-05-16 PROCEDURE — 73562 X-RAY EXAM OF KNEE 3: CPT

## 2021-05-16 PROCEDURE — 94640 AIRWAY INHALATION TREATMENT: CPT

## 2021-05-16 PROCEDURE — 87635 SARS-COV-2 COVID-19 AMP PRB: CPT

## 2021-05-16 PROCEDURE — 2580000003 HC RX 258: Performed by: CLINICAL NURSE SPECIALIST

## 2021-05-16 PROCEDURE — 73552 X-RAY EXAM OF FEMUR 2/>: CPT

## 2021-05-16 PROCEDURE — 83880 ASSAY OF NATRIURETIC PEPTIDE: CPT

## 2021-05-16 PROCEDURE — 2700000000 HC OXYGEN THERAPY PER DAY

## 2021-05-16 PROCEDURE — 73590 X-RAY EXAM OF LOWER LEG: CPT

## 2021-05-16 PROCEDURE — 1200000000 HC SEMI PRIVATE

## 2021-05-16 PROCEDURE — 6370000000 HC RX 637 (ALT 250 FOR IP): Performed by: INTERNAL MEDICINE

## 2021-05-16 PROCEDURE — 2580000003 HC RX 258: Performed by: INTERNAL MEDICINE

## 2021-05-16 PROCEDURE — 84443 ASSAY THYROID STIM HORMONE: CPT

## 2021-05-16 PROCEDURE — 71046 X-RAY EXAM CHEST 2 VIEWS: CPT

## 2021-05-16 PROCEDURE — 83735 ASSAY OF MAGNESIUM: CPT

## 2021-05-16 PROCEDURE — 73564 X-RAY EXAM KNEE 4 OR MORE: CPT

## 2021-05-16 PROCEDURE — 94660 CPAP INITIATION&MGMT: CPT

## 2021-05-16 PROCEDURE — 82947 ASSAY GLUCOSE BLOOD QUANT: CPT

## 2021-05-16 RX ORDER — DEXTROSE MONOHYDRATE 25 G/50ML
12.5 INJECTION, SOLUTION INTRAVENOUS PRN
Status: DISCONTINUED | OUTPATIENT
Start: 2021-05-16 | End: 2021-05-21 | Stop reason: HOSPADM

## 2021-05-16 RX ORDER — NICOTINE POLACRILEX 4 MG
15 LOZENGE BUCCAL PRN
Status: DISCONTINUED | OUTPATIENT
Start: 2021-05-16 | End: 2021-05-21 | Stop reason: HOSPADM

## 2021-05-16 RX ORDER — 0.9 % SODIUM CHLORIDE 0.9 %
250 INTRAVENOUS SOLUTION INTRAVENOUS ONCE
Status: COMPLETED | OUTPATIENT
Start: 2021-05-16 | End: 2021-05-16

## 2021-05-16 RX ORDER — DEXTROSE MONOHYDRATE 50 MG/ML
100 INJECTION, SOLUTION INTRAVENOUS PRN
Status: DISCONTINUED | OUTPATIENT
Start: 2021-05-16 | End: 2021-05-21 | Stop reason: HOSPADM

## 2021-05-16 RX ORDER — OXYCODONE HYDROCHLORIDE AND ACETAMINOPHEN 5; 325 MG/1; MG/1
1 TABLET ORAL EVERY 4 HOURS PRN
Status: DISCONTINUED | OUTPATIENT
Start: 2021-05-16 | End: 2021-05-16

## 2021-05-16 RX ORDER — DOCUSATE SODIUM 100 MG/1
100 CAPSULE, LIQUID FILLED ORAL 3 TIMES DAILY
Status: DISCONTINUED | OUTPATIENT
Start: 2021-05-16 | End: 2021-05-18

## 2021-05-16 RX ADMIN — LISINOPRIL 5 MG: 10 TABLET ORAL at 09:24

## 2021-05-16 RX ADMIN — CARVEDILOL 25 MG: 12.5 TABLET, FILM COATED ORAL at 09:24

## 2021-05-16 RX ADMIN — OXYCODONE HYDROCHLORIDE AND ACETAMINOPHEN 1 TABLET: 5; 325 TABLET ORAL at 14:10

## 2021-05-16 RX ADMIN — PANTOPRAZOLE SODIUM 40 MG: 40 TABLET, DELAYED RELEASE ORAL at 09:27

## 2021-05-16 RX ADMIN — ENOXAPARIN SODIUM 30 MG: 30 INJECTION SUBCUTANEOUS at 09:24

## 2021-05-16 RX ADMIN — FUROSEMIDE 40 MG: 10 INJECTION, SOLUTION INTRAMUSCULAR; INTRAVENOUS at 09:24

## 2021-05-16 RX ADMIN — SODIUM CHLORIDE 250 ML: 9 INJECTION, SOLUTION INTRAVENOUS at 18:07

## 2021-05-16 RX ADMIN — INSULIN LISPRO 3 UNITS: 100 INJECTION, SOLUTION INTRAVENOUS; SUBCUTANEOUS at 23:59

## 2021-05-16 RX ADMIN — INSULIN LISPRO 2 UNITS: 100 INJECTION, SOLUTION INTRAVENOUS; SUBCUTANEOUS at 12:26

## 2021-05-16 RX ADMIN — GABAPENTIN 300 MG: 300 CAPSULE ORAL at 09:26

## 2021-05-16 RX ADMIN — SODIUM CHLORIDE, PRESERVATIVE FREE 10 ML: 5 INJECTION INTRAVENOUS at 23:37

## 2021-05-16 RX ADMIN — DOCUSATE SODIUM 100 MG: 100 CAPSULE ORAL at 23:35

## 2021-05-16 RX ADMIN — GABAPENTIN 300 MG: 300 CAPSULE ORAL at 14:11

## 2021-05-16 RX ADMIN — SODIUM CHLORIDE, PRESERVATIVE FREE 5 ML: 5 INJECTION INTRAVENOUS at 09:27

## 2021-05-16 RX ADMIN — AZITHROMYCIN MONOHYDRATE 500 MG: 500 INJECTION, POWDER, LYOPHILIZED, FOR SOLUTION INTRAVENOUS at 16:52

## 2021-05-16 RX ADMIN — ONDANSETRON 4 MG: 2 INJECTION INTRAMUSCULAR; INTRAVENOUS at 18:08

## 2021-05-16 RX ADMIN — GLIPIZIDE 2.5 MG: 5 TABLET ORAL at 09:25

## 2021-05-16 RX ADMIN — CEFTRIAXONE SODIUM 1000 MG: 1 INJECTION, POWDER, FOR SOLUTION INTRAMUSCULAR; INTRAVENOUS at 23:35

## 2021-05-16 RX ADMIN — FLUOXETINE HYDROCHLORIDE 40 MG: 20 CAPSULE ORAL at 09:25

## 2021-05-16 RX ADMIN — DOCUSATE SODIUM 100 MG: 100 CAPSULE ORAL at 16:50

## 2021-05-16 RX ADMIN — AMLODIPINE BESYLATE 10 MG: 10 TABLET ORAL at 09:26

## 2021-05-16 RX ADMIN — TIOTROPIUM BROMIDE INHALATION SPRAY 2 PUFF: 3.12 SPRAY, METERED RESPIRATORY (INHALATION) at 08:13

## 2021-05-16 RX ADMIN — CLOPIDOGREL 75 MG: 75 TABLET, FILM COATED ORAL at 09:27

## 2021-05-16 RX ADMIN — DONEPEZIL HYDROCHLORIDE 10 MG: 10 TABLET, FILM COATED ORAL at 23:35

## 2021-05-16 RX ADMIN — GABAPENTIN 300 MG: 300 CAPSULE ORAL at 23:35

## 2021-05-16 RX ADMIN — ACETAMINOPHEN 650 MG: 325 TABLET ORAL at 09:28

## 2021-05-16 ASSESSMENT — PAIN SCALES - GENERAL
PAINLEVEL_OUTOF10: 10
PAINLEVEL_OUTOF10: 10

## 2021-05-16 NOTE — ED NOTES
ED to inpatient nurses report    Chief Complaint   Patient presents with    Fatigue      Present to ED from home  LOC: alert and orientated to name, place, date  Vital signs   Vitals:    05/15/21 1131 05/15/21 1204 05/15/21 1502 05/15/21 1956   BP: (!) 125/56   (!) 137/57   Pulse: 74 88 78 80   Resp:       Temp:       TempSrc:       SpO2: 93% 98% 96% 98%      Oxygen Baseline 95%    Current needs required 2L NC, denies wearing at home. LDAs:   Peripheral IV 05/15/21 Left Antecubital (Active)   Site Assessment Clean;Dry; Intact 05/15/21 0952   Line Status Blood return noted;Brisk blood return;Normal saline locked;Specimen collected; Flushed 05/15/21 0952   Dressing Status Intact;Dry;Clean 05/15/21 0952   Dressing Intervention New 05/15/21 0952       Peripheral IV 05/15/21 Right Antecubital (Active)   Site Assessment Clean; Intact;Dry 05/15/21 1430   Line Status Blood return noted;Brisk blood return;Specimen collected; Flushed;Normal saline locked 05/15/21 1430   Dressing Status Clean; Intact;Dry 05/15/21 1430   Dressing Intervention New 05/15/21 1430     Mobility: Requires assistance * 1  Pending ED orders: n/a  Present condition: a&ox4, RR even and unlabored, NAD noted. Just finished dinner. Code Status: [unfilled]   Consults:  []  Hospitalist    Completed  [] yes [] no  [x]  Medicine  Completed  [] yes [x] No  []  Cardiology  Completed  [] yes [] No  []  GI   Completed  [] yes [] No  []  Neurology  Completed  [] yes [] No  []  Nephrology Completed  [] yes [] No  []  Vascular  Completed  [] yes [] No   []  Surgery  Completed  [] yes [] No   []  Urology  Completed  [] yes [] No   []  Plastics  Completed  [] yes [] No   []  ENT  Completed  [] yes [] No   []  Other   Completed  [] yes [] No    Pertinent event(s) - pt lost lower dentures, req soft diet. Ate dinner in ED.      Electronically signed by Bryson Mcmanus RN on 5/15/2021 at 9:06 PM         Bryson Mcmanus, [id number]  05/15/21 4137

## 2021-05-16 NOTE — CONSULTS
Nutrition Education    · Verbally reviewed information with Patient  · Educated on Low Sodium Diet  · Written educational materials provided. · Contact name and number provided. · Refer to Patient Education activity for more details.     Electronically signed by Suresh Jean RD, LD on 5/16/21 at 11:59 AM EDT    Contact: [phone]

## 2021-05-16 NOTE — ED NOTES
Report to April Zaragoza RN   All questions answered at this time.    Pt is waiting for the following:  [] Lab/rad results    [] Room assignment     [] Other:             Hans Perera RN  05/15/21 1324

## 2021-05-16 NOTE — ED NOTES
Pt bed linens changed, pt brief changed and gown. Pt verbalized comfort at this time. Will con't to monitor.       Katiuska Macdonald RN  05/15/21 5008

## 2021-05-16 NOTE — CARE COORDINATION
Case Management Initial Discharge Plan  Analilia Quinn,             Met with:patient to discuss discharge plans. Information verified: address, contacts, phone number, , insurance Yes    Emergency Contact/Next of Kin name & number: Merton Aase daughter [phone] Karen Morris  son [phone]     PCP: Ana Marshall MD  Date of last visit: last week    Insurance Provider: Nyasia dolan    Discharge Planning    Living Arrangements:      Support Systems:       Home has 0 stories  0 stairs to climb to get into front door, 0stairs to climb to reach second floor  Location of bedroom/bathroom in home main    Patient able to perform ADL's:Assisted    Current Services (outpatient & in home)   DME equipment: has walker and cane   DME provider:     Receiving oral anticoagulation therapy? No    If indicated:   Physician managing anticoagulation treatment:   Where does patient obtain lab work for ATC treatment? Potential Assistance Needed:       Patient agreeable to home care: yes had [facility] just ended one week   Freedom of choice provided:  no    Prior SNF/Rehab Placement and Facility:   Agreeable to SNF/Rehab: No  [place] of choice provided: no     Evaluation: no    Expected Discharge date:       Patient expects to be discharged to: Follow Up Appointment: Best Day/ Time:      Transportation provider: family  Transportation arrangements needed for discharge: No    Readmission Risk              Risk of Unplanned Readmission:  19             Does patient have a readmission risk score greater than 14?: No  If yes, follow-up appointment must be made within 7 days of discharge.      Goals of Care:       Discharge Plan: home independent follow for needs          Electronically signed by Daniel Alas RN on 21 at 5:32 PM EDT

## 2021-05-16 NOTE — ED NOTES
morena Reveles transferred pt ELAINE to 2014 via stretcher.       Early CRISTINA Sousa  05/15/21 3555

## 2021-05-16 NOTE — PROGRESS NOTES
[Hydrocodone-Acetaminophen] Other (See Comments)     Nausea, dizziness    Pcn [Penicillins]     Sulfa Antibiotics        Current Meds:   Scheduled Meds:    amLODIPine  10 mg Oral Daily    carvedilol  25 mg Oral BID    clopidogrel  75 mg Oral Daily    donepezil  10 mg Oral Nightly    FLUoxetine  40 mg Oral Daily    gabapentin  300 mg Oral TID    glipiZIDE  2.5 mg Oral Daily    insulin lispro  0-12 Units Subcutaneous TID WC    insulin lispro  0-6 Units Subcutaneous Nightly    lisinopril  5 mg Oral Daily    pantoprazole  40 mg Oral QAM AC    tiotropium  2 puff Inhalation Daily    sodium chloride flush  5-40 mL Intravenous 2 times per day    enoxaparin  30 mg Subcutaneous Daily    furosemide  40 mg Intravenous Daily    cefTRIAXone (ROCEPHIN) IV  1,000 mg Intravenous Q24H    azithromycin  500 mg Intravenous Q24H     Continuous Infusions:    dextrose      sodium chloride       PRN Meds: oxyCODONE-acetaminophen, glucose, dextrose, glucagon (rDNA), dextrose, albuterol, ALPRAZolam, sodium chloride flush, sodium chloride, nicotine, promethazine **OR** ondansetron, polyethylene glycol, acetaminophen **OR** acetaminophen    Data:     Past Medical History:   has a past medical history of Anxiety, Arthritis, Atherosclerosis, Body mass index (bmi) 25.0-25.9, adult, CHF exacerbation ([facility]), COPD (chronic obstructive pulmonary disease) ([facility]), CVA (cerebral vascular accident) ([facility] [address] Depression, Diabetes mellitus ([facility] [address] Diverticula, esophagus congenital, Former smoker, Hypercholesteremia, Hypertension, Hypokalemia, Joint pain, knee, Myocardial infarct, old, Pneumonia, Protein S deficiency ([facility] [address] Pulmonary embolism ([address] Reflux esophagitis, Tinea corporis, Tremor, URI (upper respiratory infection), UTI (urinary tract infection), and Zenker's diverticulum. Social History:   reports that she has quit smoking. Her smoking use included cigarettes. She quit after 25.00 years of use.  She has never used smokeless tobacco. She reports current alcohol use. She reports that she does not use drugs. Family History: History reviewed. No pertinent family history. Vitals:  BP (!) 167/80   Pulse 80   Temp 99.5 °F (37.5 °C) (Oral)   Resp 20   Ht 5' (1.524 m)   Wt 185 lb 3 oz (84 kg)   SpO2 96%   BMI 36.17 kg/m²   Temp (24hrs), Av.7 °F (37.1 °C), Min:98.2 °F (36.8 °C), Max:99.5 °F (37.5 °C)    Recent Labs     05/15/21  2315 05/16/21  0622 05/16/21  1154   POCGLU 98 97 135*       I/O (24Hr):     Intake/Output Summary (Last 24 hours) at 2021 1452  Last data filed at 2021 1408  Gross per 24 hour   Intake --   Output 1300 ml   Net -1300 ml       Labs:  Hematology:  Recent Labs     05/15/21  0956 05/16/21  0707   WBC 18.1* 7.5   RBC 4.08 3.95   HGB 12.3 11.9   HCT 38.1 40.7   MCV 93.4 103.0*   MCH 30.1 30.1   MCHC 32.3 29.2   RDW 14.1 14.3    245   MPV 9.8 10.1     Chemistry:  Recent Labs     05/15/21  0956 05/15/21  1121 21  0707     --   --    K 3.9  --   --      --   --    CO2 22  --   --    GLUCOSE 151*  --   --    BUN 37*  --   --    CREATININE 1.61*  --   --    MG 2.0  --  1.7   ANIONGAP 12  --   --    LABGLOM 31*  --   --    GFRAA 37*  --   --    CALCIUM 9.1  --   --    PROBNP 954*  --  1,118*   TROPHS 13 13  --      Recent Labs     05/15/21  0956 05/15/21  2315 05/16/21  0622 21  0707 21  1154   PROT 7.4  --   --   --   --    LABALBU 3.9  --   --   --   --    TSH 1.55  --   --  2.31  --    AST 18  --   --   --   --    ALT 11  --   --   --   --    ALKPHOS 61  --   --   --   --    BILITOT 0.47  --   --   --   --    CHOL  --   --   --  239*  --    HDL  --   --   --  51  --    LDLCHOLESTEROL  --   --   --  153*  --    CHOLHDLRATIO  --   --   --  4.7  --    TRIG  --   --   --  173*  --    VLDL  --   --   --  NOT REPORTED*  --    POCGLU  --  98 97  --  135*     ABG:  Lab Results   Component Value Date    POCPH 7.436 2020    POCPCO2 38.2 2020    POCPO2 71.6 01/14/2020    POCHCO3 25.7 01/14/2020    NBEA NOT REPORTED 01/14/2020    PBEA 2 01/14/2020    TNM0UAQ 27 01/14/2020    JJFA2MSU 95 01/14/2020    FIO2 NOT REPORTED 01/14/2020     Lab Results   Component Value Date/Time    SPECIAL L HAND 1 ML 05/15/2021 02:35 PM     Lab Results   Component Value Date/Time    CULTURE NO GROWTH 21 HOURS 05/15/2021 02:35 PM       Radiology:  XR CHEST (2 VW)    Result Date: 5/16/2021  No significant change in chest findings. XR CHEST (2 VW)    Result Date: 5/15/2021  Findings compatible with mild interstitial edema. XR FEMUR RIGHT (MIN 2 VIEWS)    Result Date: 5/16/2021  No acute bony process is seen involving the right femur, knee or lower leg. Degenerative changes as described above. XR KNEE RIGHT (MIN 4 VIEWS)    Result Date: 5/16/2021  No acute bony process is seen involving the right femur, knee or lower leg. Degenerative changes as described above. XR TIBIA FIBULA RIGHT (2 VIEWS)    Result Date: 5/16/2021  No acute bony process is seen involving the right femur, knee or lower leg. Degenerative changes as described above.        Physical Examination:        General appearance:  alert, cooperative and no distress  Mental Status:  oriented to person, place and time and normal affect  Lungs:  clear to auscultation bilaterally, normal effort  Heart:  regular rate and rhythm, no murmur  Abdomen:  soft, nontender, nondistended, normal bowel sounds, no masses, hepatomegaly, splenomegaly  Extremities:  no edema, redness, tenderness in the calves  Skin:  no gross lesions, rashes, induration    Assessment:        Hospital Problems         Last Modified POA    * (Principal) Pulmonary edema cardiac cause ([facility] [address] 5/15/2021 Yes    COPD (chronic obstructive pulmonary disease) ([facility] [address] 5/15/2021 Yes    Essential hypertension (Chronic) 5/15/2021 Yes    Type 2 diabetes mellitus with kidney complication, with long-term current use of insulin ([facility] [address] 5/15/2021 Yes    Stage 4 chronic kidney disease ([address] 5/15/2021 Yes    Zenker diverticulum 5/15/2021 Yes    Overview Signed 5/15/2021 12:34 PM by LUIS Durham - CNS     Video swallow 12/12/2020 showed penetration of thin and mildly thickened consistencies resulting from reflux caused by Zenker's diverticulum         Recurrent aspiration pneumonia ([address] (Chronic) 5/15/2021 Yes          Plan:        Community-acquired pneumonia  Plan continue azithromycin and Rocephin    Hypertension  -Continue amlodipine, Coreg, Lasix, lisinopril    Acute cystitis  - on antibiotics as above    Depression  -Continue Prozac    Diabetes  -Continue sliding scale insulin and glipizide    Diabetic neuropathy  -Continue gabapentin    Right lower extremity pain and fall  -Order x-ray of the right knee and right femur  -Start patient on pain medications    Addendum: Pt was hypotensive and hypoxic later in the day. She was complaining of feeling tired. She was also complaining of nausea. She got this way after receiving percocet. Will discontinue pain medications. Hold off on anti-hypertensive medications. Will give her 250 cc of fluid bolus as she had 1300 output so far for the day. Will order CXR. She was started on NRB and subsequently on Bipap. Will order covid 19 rapid test as well. Daughter called and updated.          Alex Milian MD  5/16/2021  2:52 PM

## 2021-05-16 NOTE — PROGRESS NOTES
Patent placed on bipap. Dr. Jenifer Delgadillo called daughter and updated. Patient states she is just feeling a little tired.

## 2021-05-16 NOTE — PROGRESS NOTES
bp 86/57. Blood sugar 133. Sp02 77 %. Dr Shannan Rice called and came to room. Patient placed on NRB. Respiratory in room. Charge nurse present. Patient stated she feels sick and started to vomit a little bit.

## 2021-05-16 NOTE — PROGRESS NOTES
Pharmacy Note     Renal Dose Adjustment    Analilia Quinn is a 79 y.o. female. Pharmacist assessment of renally cleared medications. Recent Labs     05/15/21  0956   BUN 37*       Recent Labs     05/15/21  0956   CREATININE 1.61*       Estimated Creatinine Clearance: 26 mL/min (A) (based on SCr of 1.61 mg/dL (H)). Estimated CrCl using Ideal Body Weight: 20 mL/min (based on IBW 45.5 kg)    Height:   Ht Readings from Last 1 Encounters:   05/15/21 5' (1.524 m)     Weight:  Wt Readings from Last 1 Encounters:   05/15/21 185 lb 3 oz (84 kg)       The following medication dose has been adjusted based upon renal function per P&T Guidelines:             Lovenox dose adjusted from 40 mg sc daily to 30 mg sc daily. Leslie Byers Pharm. D.    5/15/2021  11:15 PM

## 2021-05-17 LAB
ABSOLUTE EOS #: 0.08 K/UL (ref 0–0.44)
ABSOLUTE IMMATURE GRANULOCYTE: 0.1 K/UL (ref 0–0.3)
ABSOLUTE LYMPH #: 1.7 K/UL (ref 1.1–3.7)
ABSOLUTE MONO #: 0.76 K/UL (ref 0.1–1.2)
ANION GAP SERPL CALCULATED.3IONS-SCNC: 18 MMOL/L (ref 9–17)
BASOPHILS # BLD: 0 % (ref 0–2)
BASOPHILS ABSOLUTE: 0.05 K/UL (ref 0–0.2)
BUN BLDV-MCNC: 40 MG/DL (ref 8–23)
BUN/CREAT BLD: ABNORMAL (ref 9–20)
CALCIUM SERPL-MCNC: 8.7 MG/DL (ref 8.6–10.4)
CHLORIDE BLD-SCNC: 100 MMOL/L (ref 98–107)
CO2: 23 MMOL/L (ref 20–31)
CREAT SERPL-MCNC: 2.31 MG/DL (ref 0.5–0.9)
DIFFERENTIAL TYPE: ABNORMAL
EKG ATRIAL RATE: 72 BPM
EKG P AXIS: 40 DEGREES
EKG P-R INTERVAL: 172 MS
EKG Q-T INTERVAL: 416 MS
EKG QRS DURATION: 82 MS
EKG QTC CALCULATION (BAZETT): 455 MS
EKG R AXIS: 5 DEGREES
EKG T AXIS: 93 DEGREES
EKG VENTRICULAR RATE: 72 BPM
EOSINOPHILS RELATIVE PERCENT: 0 % (ref 1–4)
GFR AFRICAN AMERICAN: 25 ML/MIN
GFR NON-AFRICAN AMERICAN: 20 ML/MIN
GFR SERPL CREATININE-BSD FRML MDRD: ABNORMAL ML/MIN/{1.73_M2}
GFR SERPL CREATININE-BSD FRML MDRD: ABNORMAL ML/MIN/{1.73_M2}
GLUCOSE BLD-MCNC: 115 MG/DL (ref 70–99)
GLUCOSE BLD-MCNC: 119 MG/DL (ref 65–105)
GLUCOSE BLD-MCNC: 139 MG/DL (ref 65–105)
GLUCOSE BLD-MCNC: 140 MG/DL (ref 65–105)
GLUCOSE BLD-MCNC: 166 MG/DL (ref 65–105)
HCT VFR BLD CALC: 38.7 % (ref 36.3–47.1)
HEMOGLOBIN: 11.7 G/DL (ref 11.9–15.1)
IMMATURE GRANULOCYTES: 1 %
LACTIC ACID, SEPSIS WHOLE BLOOD: 0.7 MMOL/L (ref 0.5–1.9)
LACTIC ACID, SEPSIS: NORMAL MMOL/L (ref 0.5–1.9)
LYMPHOCYTES # BLD: 9 % (ref 24–43)
MCH RBC QN AUTO: 29.6 PG (ref 25.2–33.5)
MCHC RBC AUTO-ENTMCNC: 30.2 G/DL (ref 28.4–34.8)
MCV RBC AUTO: 98 FL (ref 82.6–102.9)
MONOCYTES # BLD: 4 % (ref 3–12)
NRBC AUTOMATED: 0 PER 100 WBC
PDW BLD-RTO: 14.3 % (ref 11.8–14.4)
PLATELET # BLD: 269 K/UL (ref 138–453)
PLATELET ESTIMATE: ABNORMAL
PMV BLD AUTO: 9.8 FL (ref 8.1–13.5)
POTASSIUM SERPL-SCNC: 4 MMOL/L (ref 3.7–5.3)
RBC # BLD: 3.95 M/UL (ref 3.95–5.11)
RBC # BLD: ABNORMAL 10*6/UL
SEG NEUTROPHILS: 86 % (ref 36–65)
SEGMENTED NEUTROPHILS ABSOLUTE COUNT: 17.05 K/UL (ref 1.5–8.1)
SODIUM BLD-SCNC: 141 MMOL/L (ref 135–144)
WBC # BLD: 19.7 K/UL (ref 3.5–11.3)
WBC # BLD: ABNORMAL 10*3/UL

## 2021-05-17 PROCEDURE — 97162 PT EVAL MOD COMPLEX 30 MIN: CPT

## 2021-05-17 PROCEDURE — 97166 OT EVAL MOD COMPLEX 45 MIN: CPT

## 2021-05-17 PROCEDURE — 97535 SELF CARE MNGMENT TRAINING: CPT

## 2021-05-17 PROCEDURE — 87040 BLOOD CULTURE FOR BACTERIA: CPT

## 2021-05-17 PROCEDURE — 1200000000 HC SEMI PRIVATE

## 2021-05-17 PROCEDURE — 94761 N-INVAS EAR/PLS OXIMETRY MLT: CPT

## 2021-05-17 PROCEDURE — 6360000002 HC RX W HCPCS: Performed by: CLINICAL NURSE SPECIALIST

## 2021-05-17 PROCEDURE — 36415 COLL VENOUS BLD VENIPUNCTURE: CPT

## 2021-05-17 PROCEDURE — 2700000000 HC OXYGEN THERAPY PER DAY

## 2021-05-17 PROCEDURE — 2580000003 HC RX 258: Performed by: CLINICAL NURSE SPECIALIST

## 2021-05-17 PROCEDURE — 93010 ELECTROCARDIOGRAM REPORT: CPT | Performed by: INTERNAL MEDICINE

## 2021-05-17 PROCEDURE — 80048 BASIC METABOLIC PNL TOTAL CA: CPT

## 2021-05-17 PROCEDURE — 99232 SBSQ HOSP IP/OBS MODERATE 35: CPT | Performed by: INTERNAL MEDICINE

## 2021-05-17 PROCEDURE — 6360000002 HC RX W HCPCS

## 2021-05-17 PROCEDURE — 94640 AIRWAY INHALATION TREATMENT: CPT

## 2021-05-17 PROCEDURE — 83605 ASSAY OF LACTIC ACID: CPT

## 2021-05-17 PROCEDURE — 85025 COMPLETE CBC W/AUTO DIFF WBC: CPT

## 2021-05-17 PROCEDURE — 82947 ASSAY GLUCOSE BLOOD QUANT: CPT

## 2021-05-17 PROCEDURE — 6370000000 HC RX 637 (ALT 250 FOR IP): Performed by: CLINICAL NURSE SPECIALIST

## 2021-05-17 PROCEDURE — 6370000000 HC RX 637 (ALT 250 FOR IP): Performed by: INTERNAL MEDICINE

## 2021-05-17 PROCEDURE — 97530 THERAPEUTIC ACTIVITIES: CPT

## 2021-05-17 RX ORDER — FUROSEMIDE 10 MG/ML
INJECTION INTRAMUSCULAR; INTRAVENOUS
Status: COMPLETED
Start: 2021-05-17 | End: 2021-05-17

## 2021-05-17 RX ADMIN — GABAPENTIN 300 MG: 300 CAPSULE ORAL at 18:39

## 2021-05-17 RX ADMIN — DOCUSATE SODIUM 100 MG: 100 CAPSULE ORAL at 18:39

## 2021-05-17 RX ADMIN — GABAPENTIN 300 MG: 300 CAPSULE ORAL at 09:39

## 2021-05-17 RX ADMIN — DONEPEZIL HYDROCHLORIDE 10 MG: 10 TABLET, FILM COATED ORAL at 20:14

## 2021-05-17 RX ADMIN — INSULIN LISPRO 1 UNITS: 100 INJECTION, SOLUTION INTRAVENOUS; SUBCUTANEOUS at 20:10

## 2021-05-17 RX ADMIN — AZITHROMYCIN MONOHYDRATE 500 MG: 500 INJECTION, POWDER, LYOPHILIZED, FOR SOLUTION INTRAVENOUS at 18:38

## 2021-05-17 RX ADMIN — FUROSEMIDE 40 MG: 10 INJECTION, SOLUTION INTRAMUSCULAR; INTRAVENOUS at 09:37

## 2021-05-17 RX ADMIN — SODIUM CHLORIDE, PRESERVATIVE FREE 10 ML: 5 INJECTION INTRAVENOUS at 18:42

## 2021-05-17 RX ADMIN — DOCUSATE SODIUM 100 MG: 100 CAPSULE ORAL at 20:14

## 2021-05-17 RX ADMIN — CLOPIDOGREL 75 MG: 75 TABLET, FILM COATED ORAL at 09:39

## 2021-05-17 RX ADMIN — DOCUSATE SODIUM 100 MG: 100 CAPSULE ORAL at 09:39

## 2021-05-17 RX ADMIN — SODIUM CHLORIDE, PRESERVATIVE FREE 10 ML: 5 INJECTION INTRAVENOUS at 20:14

## 2021-05-17 RX ADMIN — PANTOPRAZOLE SODIUM 40 MG: 40 TABLET, DELAYED RELEASE ORAL at 09:38

## 2021-05-17 RX ADMIN — GABAPENTIN 300 MG: 300 CAPSULE ORAL at 20:14

## 2021-05-17 RX ADMIN — GLIPIZIDE 2.5 MG: 5 TABLET ORAL at 09:39

## 2021-05-17 RX ADMIN — ENOXAPARIN SODIUM 30 MG: 30 INJECTION SUBCUTANEOUS at 09:39

## 2021-05-17 RX ADMIN — FLUOXETINE HYDROCHLORIDE 40 MG: 20 CAPSULE ORAL at 09:38

## 2021-05-17 RX ADMIN — LISINOPRIL 5 MG: 10 TABLET ORAL at 09:38

## 2021-05-17 RX ADMIN — TIOTROPIUM BROMIDE INHALATION SPRAY 2 PUFF: 3.12 SPRAY, METERED RESPIRATORY (INHALATION) at 09:37

## 2021-05-17 RX ADMIN — CEFTRIAXONE SODIUM 1000 MG: 1 INJECTION, POWDER, FOR SOLUTION INTRAMUSCULAR; INTRAVENOUS at 20:06

## 2021-05-17 RX ADMIN — INSULIN LISPRO 2 UNITS: 100 INJECTION, SOLUTION INTRAVENOUS; SUBCUTANEOUS at 09:39

## 2021-05-17 ASSESSMENT — PAIN DESCRIPTION - DESCRIPTORS: DESCRIPTORS: ACHING

## 2021-05-17 ASSESSMENT — PAIN - FUNCTIONAL ASSESSMENT: PAIN_FUNCTIONAL_ASSESSMENT: ACTIVITIES ARE NOT PREVENTED

## 2021-05-17 NOTE — PLAN OF CARE
BRONCHOSPASM/BRONCHOCONSTRICTION     [x]         IMPROVE AERATION/BREATH SOUNDS  [x]   ADMINISTER BRONCHODILATOR THERAPY AS APPROPRIATE  [x]   ASSESS BREATH SOUNDS  []   IMPLEMENT AEROSOL/MDI PROTOCOL  [x]   PATIENT EDUCATION AS NEEDED       NON INVASIVE VENTILATION  PROVIDE OPTIMAL VENTILATION/ACCEPTABLE SP02  IMPLEMENT NON INVASIVE VENTILATION PROTOCOL  ASSESSMENT SKIN INTEGRITY  PATIENT EDUCATION AS NEEDED  BIPAP AS NEEDED

## 2021-05-17 NOTE — PROGRESS NOTES
Congestive Heart Failure Education completed and charted. CHF booklet given. Patient was receptive to education. Discussed the  importance of medication compliance. Discussed the importance of a heart healthy diet. Discussed 2000 mg sodium-restricted daily diet. Patient instructed to limit fluid intake to  1.5 to 2 liters per day. Patient instructed to weigh self at the same time of each day each morning, reinforced teaching to monitor for 3-5 lb weight increase over 1-2 days notify physician if change noted. Signs and symptoms of CHF discussed with patient, such as feeling more tired than normal, feeling short of breath, coughing that increases when lying down, sudden weight gain, swelling of the feet, legs or belly. Patient verbalized understanding to notify physician office if these symptoms occur.     Echo  2019 EF 55%

## 2021-05-17 NOTE — PROGRESS NOTES
fatigue;Patient limited by pain; Patient limited by endurance  Activity Tolerance: Pt demostrates difficulty with following commands at this time       Patient Diagnosis(es): The primary encounter diagnosis was Fatigue, unspecified type. A diagnosis of Acute pulmonary edema (HCC) was also pertinent to this visit. has a past medical history of Anxiety, Arthritis, Atherosclerosis, Body mass index (bmi) 25.0-25.9, adult, CHF exacerbation (HCC), COPD (chronic obstructive pulmonary disease) (HCC), CVA (cerebral vascular accident) ([address] Depression, Diabetes mellitus ([address] Diverticula, esophagus congenital, Former smoker, Hypercholesteremia, Hypertension, Hypokalemia, Joint pain, knee, Myocardial infarct, old, Pneumonia, Protein S deficiency ([address] Pulmonary embolism ([address] Reflux esophagitis, Tinea corporis, Tremor, URI (upper respiratory infection), UTI (urinary tract infection), and Zenker's diverticulum. has a past surgical history that includes Carotid endarterectomy (Bilateral); Tonsillectomy; and Endoscopy, colon, diagnostic. Restrictions  Restrictions/Precautions  Restrictions/Precautions: Fall Risk  Required Braces or Orthoses?: No  Position Activity Restriction  Other position/activity restrictions: up with assist  Vision/Hearing  Vision: Impaired  Vision Exceptions: Wears glasses at all times (LUZ MARIA visual tracking, pt did not understand directions. While at rest, pt's B eyes demo'd slight drift to L; assessed by OT)  Hearing: Within functional limits     Subjective  General  Patient assessed for rehabilitation services?: Yes  Response To Previous Treatment: Not applicable  Family / Caregiver Present: No  Follows Commands: Impaired (Pt inconsistently follows commands)  Subjective  Subjective: RN and pt agreeable to PT. Pt was supine in bed upon arrival. Pt was cooperative and pleasant throughout session. Pt with mild dysarthria, significant tremors and ataxia noted.  Pt reports tremors and ataxia are redirect; Difficulty attending to directions  Memory: Decreased recall of biographical Information  Safety Judgement: Decreased awareness of need for safety;Decreased awareness of need for assistance  Insights: Decreased awareness of deficits  Initiation: Requires cues for some  Sequencing: Requires cues for some  Cognition Comment: pt demo'd lethargy during eval, requiring min v/c to keep eyes open throughout    Objective          Joint Mobility  Spine: WFL  ROM RLE: WFL  ROM LLE: WFL  ROM RUE: WFL - ~90 degrees of shoulder flexion  ROM LUE: WFL  Strength RLE  Strength RLE: WFL  Comment: grossly 3/5  Strength LLE  Strength LLE: WFL  Comment: grossly 3/5  Strength RUE  Strength RUE: WFL  Comment: assessed by OT  Strength LUE  Strength LUE: WFL  Comment: Assessed by OT  Strength Other  Other: difficult to formally assess due to pt difficulty to follow commands  Tone RLE  RLE Tone: Normotonic  Tone LLE  LLE Tone: Normotonic  Motor Control  Gross Motor?: Exceptions  Comments: Pt tremor throughout the session and ataxic. BUE > BLE  Coordination  Finger to Nose: Dysmetric  Sensation  Overall Sensation Status: WFL  Bed mobility  Supine to Sit: Moderate assistance;2 Person assistance (for trunk and BLE progression)  Sit to Supine: Moderate assistance;2 Person assistance (for trunk and BLE progression)  Scooting: Moderate assistance  Comment: performed 2x during evaluation  Transfers  Sit to Stand: Moderate Assistance;2 Person Assistance  Stand to sit: Moderate Assistance;2 Person Assistance  Comment: Assessed with RW; BUE placed on walker for sit <>stand;  Sit to stand performed 3x  Ambulation  Ambulation?: No (Attempted to walk EOB; pt unable to lift feet to ambulate)     Balance  Posture: Fair  Sitting - Static: Fair  Sitting - Dynamic: Fair;-  Standing - Static: Poor;+ (MOD Ax2 to maintain due to posterior lean)  Standing - Dynamic: Poor (Pt able to shift weight slightly side to side, unable to to lift BLE)  Comments: standing balance assessed with RW; verbal cues noted for hip extension in stand; Pt stood 2x for ~5 minutes        Plan   Plan  Times per week: 5-6x/wk  Current Treatment Recommendations: Strengthening, ROM, Balance Training, Functional Mobility Training, Transfer Training, Gait Training, Endurance Training, Safety Education & Training, Patient/Caregiver Education & Training, Home Exercise Program  Safety Devices  Type of devices: All fall risk precautions in place, Bed alarm in place, Call light within reach, Gait belt, Patient at risk for falls, Left in bed, Nurse notified  Restraints  Initially in place: No                        AM-PAC Score  AM-PAC Inpatient Mobility Raw Score : 9 (05/17/21 1106)  AM-PAC Inpatient T-Scale Score : 30.55 (05/17/21 1106)  Mobility Inpatient CMS 0-100% Score: 81.38 (05/17/21 1106)  Mobility Inpatient CMS G-Code Modifier : CM (05/17/21 1106)          Goals  Short term goals  Time Frame for Short term goals: 14 visits  Short term goal 1: Perform bed mobility and transfers with MIN A  Short term goal 2: Ambulate 150 ft with RW and MIN A  Short term goal 3: Demo Fair+ Dynamic and Staitc standing balance  Short term goal 4: Demo Good - posture       Therapy Time   Individual Concurrent Group Co-treatment   Time In 0833         Time Out 0933         Minutes 60         Timed Code Treatment Minutes: 23 Minutes       Regina Alvarez   Evaluation/treatment performed by Student PT under the supervision of co-signing PT who agrees with all evaluation/treatment and documentation.

## 2021-05-17 NOTE — PROGRESS NOTES
[facility]  Office: [address], DO, Bela Stone, DO, Megha Oskar, DO, Isabel Elizabethwood Blood, DO, Luiz Dubon MD, Tasha Dietrich MD, Daniela Gonzalez MD, Beryl Angulo MD, Kj Sanchez MD, Yfn Jung MD, Asa Stack MD, Lianna Desai MD, Tom Garcia, DO, Margaret Cortez MD, Sandra Fairbanks DO, Suzi Cruz MD,  Deonte Tran, DO, Adria Slaughter MD, Yina Bertrand MD, Abelino Chapman MD, Sunday Rand MD, Sandy Tidwell, Rojelioee Roslyn, CNP, Prosper Ye, CNP, Shantanu Simmons, CNS, Viviane Dyer, CNP, Tamara Chamberlain, CNP, Kaveh Saldaña, CNP, Heydi Sanchez, CNP, Sunday Ze, CNP, KELSIE CastC, Joaquim Essex, DNP, Maximus Stevens, CNP, Zuhair Iraheta, CNP, Eli Deluca, CNP, Lincoln Meagan, CNP, Pasha Malhotra, CNP, Jose Hewitt, [address] [place] [address]    Progress Note    5/17/2021    12:21 PM    Name:   Twyla Lopes  MRN:     [id number]     Acct:      [de-identified]   Room:   2014/2014-01  IP Day:  2  Admit Date:  5/15/2021  9:44 AM    PCP:   Jahaira Gardner MD  Code Status:  Full Code    Subjective:     C/C:   Chief Complaint   Patient presents with    Fatigue     Interval History Status: not changed. Pt was seen and evaluated at bedside this morning. Pt reports feeling about the same today. Brief History:     79year old female who was admitted with pulmonary edema and pneumonia. Review of Systems:     Constitutional:  negative for chills, fevers, sweats  Respiratory:  negative for cough, dyspnea on exertion, shortness of breath, wheezing  Cardiovascular:  negative for chest pain, chest pressure/discomfort, lower extremity edema, palpitations  Gastrointestinal:  negative for abdominal pain, constipation, diarrhea, nausea, vomiting  Neurological:  negative for dizziness, headache    Medications: Allergies:     Allergies   Allergen Reactions    Doxycycline Hyclate     Norco [Hydrocodone-Acetaminophen] Other (See Comments)     Nausea, dizziness    Pcn [Penicillins]     Sulfa Antibiotics        Current Meds:   Scheduled Meds:    docusate sodium  100 mg Oral TID    [Held by provider] amLODIPine  10 mg Oral Daily    [Held by provider] carvedilol  25 mg Oral BID    clopidogrel  75 mg Oral Daily    donepezil  10 mg Oral Nightly    FLUoxetine  40 mg Oral Daily    gabapentin  300 mg Oral TID    glipiZIDE  2.5 mg Oral Daily    insulin lispro  0-12 Units Subcutaneous TID WC    insulin lispro  0-6 Units Subcutaneous Nightly    lisinopril  5 mg Oral Daily    pantoprazole  40 mg Oral QAM AC    tiotropium  2 puff Inhalation Daily    sodium chloride flush  5-40 mL Intravenous 2 times per day    enoxaparin  30 mg Subcutaneous Daily    furosemide  40 mg Intravenous Daily    cefTRIAXone (ROCEPHIN) IV  1,000 mg Intravenous Q24H    azithromycin  500 mg Intravenous Q24H     Continuous Infusions:    dextrose      sodium chloride       PRN Meds: glucose, dextrose, glucagon (rDNA), dextrose, albuterol, sodium chloride flush, sodium chloride, nicotine, promethazine **OR** ondansetron, polyethylene glycol, acetaminophen **OR** acetaminophen    Data:     Past Medical History:   has a past medical history of Anxiety, Arthritis, Atherosclerosis, Body mass index (bmi) 25.0-25.9, adult, CHF exacerbation ([facility]), COPD (chronic obstructive pulmonary disease) ([facility]), CVA (cerebral vascular accident) ([facility] Depression, Diabetes mellitus ([facility] Diverticula, esophagus congenital, Former smoker, Hypercholesteremia, Hypertension, Hypokalemia, Joint pain, knee, Myocardial infarct, old, Pneumonia, Protein S deficiency ([facility] Pulmonary embolism ([facility] Reflux esophagitis, Tinea corporis, Tremor, URI (upper respiratory infection), UTI (urinary tract infection), and Zenker's diverticulum. Social History:   reports that she has quit smoking. Her smoking use included cigarettes.  She quit after 25.00 years of use. She has never used smokeless tobacco. She reports current alcohol use. She reports that she does not use drugs. Family History: History reviewed. No pertinent family history. Vitals:  /78   Pulse 87   Temp 99.5 °F (37.5 °C) (Axillary)   Resp 25   Ht 5' (1.524 m)   Wt 186 lb 15.2 oz (84.8 kg)   SpO2 (!) 86%   BMI 36.51 kg/m²   Temp (24hrs), Av.3 °F (37.4 °C), Min:98.1 °F (36.7 °C), Max:100 °F (37.8 °C)    Recent Labs     21  1757 21  0634 21  1108   POCGLU 133* 261* 140* 139*       I/O (24Hr):     Intake/Output Summary (Last 24 hours) at 2021 1221  Last data filed at 2021 0600  Gross per 24 hour   Intake 1048 ml   Output 1600 ml   Net -552 ml       Labs:  Hematology:  Recent Labs     05/15/21  0956 21  0707 21  0932   WBC 18.1* 7.5 19.7*   RBC 4.08 3.95 3.95   HGB 12.3 11.9 11.7*   HCT 38.1 40.7 38.7   MCV 93.4 103.0* 98.0   MCH 30.1 30.1 29.6   MCHC 32.3 29.2 30.2   RDW 14.1 14.3 14.3    245 269   MPV 9.8 10.1 9.8     Chemistry:  Recent Labs     05/15/21  0956 05/15/21  1121 21  0707 21  0932     --   --  141   K 3.9  --   --  4.0     --   --  100   CO2 22  --   --  23   GLUCOSE 151*  --   --  115*   BUN 37*  --   --  40*   CREATININE 1.61*  --   --  2.31*   MG 2.0  --  1.7  --    ANIONGAP 12  --   --  18*   LABGLOM 31*  --   --  20*   GFRAA 37*  --   --  25*   CALCIUM 9.1  --   --  8.7   PROBNP 954*  --  1,118*  --    TROPHS  13  --   --      Recent Labs     05/15/21  0956 21  0707 21  1154 21  1626 21  1757 212 21  0634 21  1108   PROT 7.4  --   --   --   --   --   --   --    LABALBU 3.9  --   --   --   --   --   --   --    TSH 1.55 2.31  --   --   --   --   --   --    AST 18  --   --   --   --   --   --   --    ALT 11  --   --   --   --   --   --   --    ALKPHOS 61  --   --   --   --   --   --   --    BILITOT 0.47  --   --   --   --   --   --   -- CHOL  --  239*  --   --   --   --   --   --    HDL  --  51  --   --   --   --   --   --    LDLCHOLESTEROL  --  153*  --   --   --   --   --   --    CHOLHDLRATIO  --  4.7  --   --   --   --   --   --    TRIG  --  173*  --   --   --   --   --   --    VLDL  --  NOT REPORTED*  --   --   --   --   --   --    POCGLU  --   --  135* 58* 133* 261* 140* 139*     ABG:  Lab Results   Component Value Date    POCPH 7.436 01/14/2020    POCPCO2 38.2 01/14/2020    POCPO2 71.6 01/14/2020    POCHCO3 25.7 01/14/2020    NBEA NOT REPORTED 01/14/2020    PBEA 2 01/14/2020    CBL2YZI 27 01/14/2020    KZNM7VUE 95 01/14/2020    FIO2 NOT REPORTED 01/14/2020     Lab Results   Component Value Date/Time    SPECIAL L HAND 1 ML 05/15/2021 02:35 PM     Lab Results   Component Value Date/Time    CULTURE NO GROWTH 2 DAYS 05/15/2021 02:35 PM       Radiology:  XR CHEST (2 VW)    Result Date: 5/16/2021  No significant change in chest findings. XR CHEST (2 VW)    Result Date: 5/15/2021  Findings compatible with mild interstitial edema. XR FEMUR RIGHT (MIN 2 VIEWS)    Result Date: 5/16/2021  No acute bony process is seen involving the right femur, knee or lower leg. Degenerative changes as described above. XR KNEE RIGHT (MIN 4 VIEWS)    Result Date: 5/16/2021  No acute bony process is seen involving the right femur, knee or lower leg. Degenerative changes as described above. XR TIBIA FIBULA RIGHT (2 VIEWS)    Result Date: 5/16/2021  No acute bony process is seen involving the right femur, knee or lower leg. Degenerative changes as described above.        Physical Examination:        General appearance:  alert, cooperative and no distress  Mental Status:  oriented to person, place and time and normal affect  Lungs:  clear to auscultation bilaterally, normal effort  Heart:  regular rate and rhythm, no murmur  Abdomen:  soft, nontender, nondistended, normal bowel sounds, no masses, hepatomegaly, splenomegaly  Extremities:  no edema, redness, tenderness in the calves  Skin:  no gross lesions, rashes, induration    Assessment:        Hospital Problems         Last Modified POA    * (Principal) Pulmonary edema cardiac cause ([address] 5/15/2021 Yes    COPD (chronic obstructive pulmonary disease) ([address] 5/15/2021 Yes    Essential hypertension (Chronic) 5/15/2021 Yes    Type 2 diabetes mellitus with kidney complication, with long-term current use of insulin ([address] 5/15/2021 Yes    Stage 4 chronic kidney disease ([address] 5/15/2021 Yes    Zenker diverticulum 5/15/2021 Yes    Overview Signed 5/15/2021 12:34 PM by LUIS Ventura - CNS     Video swallow 12/12/2020 showed penetration of thin and mildly thickened consistencies resulting from reflux caused by Zenker's diverticulum         Recurrent aspiration pneumonia ([address] (Chronic) 5/15/2021 Yes          Plan:        Community-acquired pneumonia  -continue azithromycin and Rocephin    Hypertension  -medications on hold due to hypotension yesterday  -resume lasix due to pulmonary edema    Acute hypoxic respiratory failure  -management as above  -continue bipap     Acute cystitis  - on antibiotics as above    Depression  -Continue Prozac    Diabetes  -Continue sliding scale insulin and glipizide    Diabetic neuropathy  -Continue gabapentin    Right lower extremity pain and fall  -Order x-ray of the right knee and right femur  -Start patient on pain medications          Fay Keller MD  5/17/2021  12:21 PM

## 2021-05-17 NOTE — PROGRESS NOTES
Occupational Therapy   Occupational Therapy Initial Assessment  Date: 2021   Patient Name: Ramón Rodriguez  MRN: [id number]     : 1940  Chief Complaint   Patient presents with    Fatigue     Date of Service: 2021    Discharge Recommendations:    Further therapy recommended at discharge. Pt unsafe to return to prior living arrangement d/t cognitive deficits, decreased ADL performance, and decreased functional mobility. OT Equipment Recommendations  Equipment Needed: Yes  Mobility Devices: ADL Assistive Devices  ADL Assistive Devices: Shower Chair with back; Toileting - Heavy Duty Commode    Assessment   Performance deficits / Impairments: Decreased functional mobility ; Decreased ADL status; Decreased strength;Decreased safe awareness;Decreased endurance;Decreased balance;Decreased high-level IADLs;Decreased cognition;Decreased fine motor control;Decreased coordination  Prognosis: Good  Decision Making: Medium Complexity  OT Education: OT Role;Plan of Care;Orientation;Transfer Training  Patient Education: OT role, POC, orientation, hand placement during transfers with RW. Fair return  REQUIRES OT FOLLOW UP: Yes  Activity Tolerance  Activity Tolerance: Patient Tolerated treatment well;Treatment limited secondary to decreased cognition  Safety Devices  Safety Devices in place: Yes  Type of devices: Gait belt;Left in bed;Nurse notified; Patient at risk for falls; Bed alarm in place;Call light within reach  Restraints  Initially in place: No           Patient Diagnosis(es): The primary encounter diagnosis was Fatigue, unspecified type. A diagnosis of Acute pulmonary edema (HCC) was also pertinent to this visit.      has a past medical history of Anxiety, Arthritis, Atherosclerosis, Body mass index (bmi) 25.0-25.9, adult, CHF exacerbation (HCC), COPD (chronic obstructive pulmonary disease) (HCC), CVA (cerebral vascular accident) ([facility] Depression, Diabetes mellitus ([facility] Diverticula, esophagus congenital, Former smoker, Hypercholesteremia, Hypertension, Hypokalemia, Joint pain, knee, Myocardial infarct, old, Pneumonia, Protein S deficiency ([address] Pulmonary embolism ([address] Reflux esophagitis, Tinea corporis, Tremor, URI (upper respiratory infection), UTI (urinary tract infection), and Zenker's diverticulum. has a past surgical history that includes Carotid endarterectomy (Bilateral); Tonsillectomy; and Endoscopy, colon, diagnostic. Restrictions  Restrictions/Precautions  Restrictions/Precautions: Fall Risk (up with assist, 4L 02 via NC)  Required Braces or Orthoses?: No    Subjective   General  Patient assessed for rehabilitation services?: Yes  Family / Caregiver Present: No  Patient Currently in Pain: Yes  Pain Assessment  Pain Assessment: 0-10  Pain Level: 10  Pain Location: Knee  Pain Orientation: Right  Non-Pharmaceutical Pain Intervention(s): Ambulation/Increased Activity; Therapeutic presence;Distraction  Response to Pain Intervention: Patient Satisfied  RN ok'd for OT eval. Pt pleasantly confused and tolerated well    Social/Functional History  Social/Functional History  Lives With: Alone  Type of Home: House  Home Layout: One level  Home Access: Ramped entrance (with B handrails)  Bathroom Shower/Tub: Walk-in shower  Bathroom Toilet: Handicap height  Bathroom Equipment: Grab bars in [address]: 4 wheeled walker (pt reports using 4WW occasionally at baseline)  ADL Assistance: Independent (pt reports dressing and toileting Ind, but that dtr helps PRN.  Pt unable to specify what dtr helps specifically with)  Homemaking Assistance: Independent  Homemaking Responsibilities: Yes (pt reports dtr assists with cooking/cleaning/laundry/shopping)  Ambulation Assistance: Independent  Transfer Assistance: Independent  Active : No  Occupation: Retired  Type of occupation: \"\"  Leisure & Hobbies: shopping, spending time with friends  Additional Comments: Pt questionable historian       Objective and Movement description: Right UE;Left UE;Resting tremors; Intention tremors;Decreased speed; Ataxia; Decreased accuracy (Pt reports being L handed)  Quality of Movement Other  Comment: pt demo'd ataxic movement during first half of session with resting and intention tremors in BUE and BLE that decreased significantly during second half of session. Bed mobility  Supine to Sit: Moderate assistance;2 Person assistance (for trunk and BLE progression)  Sit to Supine: Moderate assistance;2 Person assistance (for trunk and BLE progression)  Scooting: Moderate assistance  Transfers  Sit to stand: 2 Person assistance; Moderate assistance  Stand to sit: 2 Person assistance; Moderate assistance  Transfer Comments: with RW     Cognition  Overall Cognitive Status: Exceptions  Arousal/Alertness: Delayed responses to stimuli  Following Commands: Follows one step commands with increased time; Follows one step commands with repetition (pt followed ~50% of one-step commands)  Attention Span: Attends with cues to redirect; Difficulty attending to directions  Memory: Decreased recall of biographical Information  Safety Judgement: Decreased awareness of need for safety;Decreased awareness of need for assistance (pt attempted to stand without assistance of writer)  Insights: Decreased awareness of deficits  Initiation: Requires cues for some  Sequencing: Requires cues for some  Cognition Comment: pt demo'd lethargy during eval, requiring min v/c to keep eyes open throughout        Sensation  Overall Sensation Status: WFL        LUE AROM (degrees)  LUE AROM : WFL  Left Hand AROM (degrees)  Left Hand AROM: WFL  RUE AROM (degrees)  RUE AROM : WFL  Right Hand AROM (degrees)  Right Hand AROM: WFL  LUE Strength  Gross LUE Strength: Exceptions to [facility]  L Shoulder Flex: 3+/5  L Shoulder Ext: 4-/5  L Hand General: 4-/5  RUE Strength  Gross RUE Strength: Exceptions to [facility]  R Shoulder Flex: 3+/5  R Shoulder Ext: 4-/5  R Hand General: 4-/5       Plan Plan  Times per week: 3-4x/wk    AM-PAC Score  AM-PAC Inpatient Daily Activity Raw Score: 15 (05/17/21 1050)  AM-PAC Inpatient ADL T-Scale Score : 34.69 (05/17/21 1050)  ADL Inpatient CMS 0-100% Score: 56.46 (05/17/21 1050)  ADL Inpatient CMS G-Code Modifier : CK (05/17/21 1050)    Goals  Short term goals  Time Frame for Short term goals: pt will, by discharge  Short term goal 1: dem UB ADL with CGA and AE PRN  Short term goal 2: dem LB ADL/toileting with mod A and AE PRN  Short term goal 3: complete func mob/transfer with min A and LRD PRN  Short term goal 4: dem improved safety awareness by requiring 2 or less v/c's for safety during functional activity  Short term goal 5: dem improved sequencing/initiation by requiring one or less v/c for each during functional activity  Short term goal 6: engage in static/dynamic standing for 8+ minutes with CGA and LRD PRN  Short term goal 7: engage in functional activity for 20+ minutes for improved functional endurance  Short term goal 8: engage in functional activity requiring gross and fine motor control of BUE for 10+ minutes.        Therapy Time   Individual Concurrent Group Co-treatment   Time In 1189         Time Out 0933         Minutes 60      Co-eval with PT   Timed Code Treatment Minutes: [address] [facility] Kristian Guadarrama

## 2021-05-17 NOTE — CARE COORDINATION
Transitional planning-talked with patient. Plan is to go home alone. [address] in the past. [facility] fax[facility]

## 2021-05-18 ENCOUNTER — APPOINTMENT (OUTPATIENT)
Dept: GENERAL RADIOLOGY | Age: 81
DRG: 177 | End: 2021-05-18
Payer: MEDICARE

## 2021-05-18 ENCOUNTER — APPOINTMENT (OUTPATIENT)
Dept: ULTRASOUND IMAGING | Age: 81
DRG: 177 | End: 2021-05-18
Payer: MEDICARE

## 2021-05-18 LAB
ABSOLUTE EOS #: 0.2 K/UL (ref 0–0.44)
ABSOLUTE IMMATURE GRANULOCYTE: 0.05 K/UL (ref 0–0.3)
ABSOLUTE LYMPH #: 2.2 K/UL (ref 1.1–3.7)
ABSOLUTE MONO #: 0.9 K/UL (ref 0.1–1.2)
ANION GAP SERPL CALCULATED.3IONS-SCNC: 11 MMOL/L (ref 9–17)
BASOPHILS # BLD: 0 % (ref 0–2)
BASOPHILS ABSOLUTE: 0.03 K/UL (ref 0–0.2)
BNP INTERPRETATION: ABNORMAL
BUN BLDV-MCNC: 48 MG/DL (ref 8–23)
BUN/CREAT BLD: ABNORMAL (ref 9–20)
CALCIUM SERPL-MCNC: 8.7 MG/DL (ref 8.6–10.4)
CHLORIDE BLD-SCNC: 98 MMOL/L (ref 98–107)
CO2: 27 MMOL/L (ref 20–31)
CREAT SERPL-MCNC: 2.44 MG/DL (ref 0.5–0.9)
DIFFERENTIAL TYPE: ABNORMAL
EOSINOPHILS RELATIVE PERCENT: 2 % (ref 1–4)
GFR AFRICAN AMERICAN: 23 ML/MIN
GFR NON-AFRICAN AMERICAN: 19 ML/MIN
GFR SERPL CREATININE-BSD FRML MDRD: ABNORMAL ML/MIN/{1.73_M2}
GFR SERPL CREATININE-BSD FRML MDRD: ABNORMAL ML/MIN/{1.73_M2}
GLUCOSE BLD-MCNC: 156 MG/DL (ref 65–105)
GLUCOSE BLD-MCNC: 85 MG/DL (ref 65–105)
GLUCOSE BLD-MCNC: 86 MG/DL (ref 65–105)
GLUCOSE BLD-MCNC: 94 MG/DL (ref 65–105)
GLUCOSE BLD-MCNC: 95 MG/DL (ref 70–99)
HCT VFR BLD CALC: 34.3 % (ref 36.3–47.1)
HEMOGLOBIN: 11 G/DL (ref 11.9–15.1)
IMMATURE GRANULOCYTES: 0 %
LYMPHOCYTES # BLD: 19 % (ref 24–43)
MCH RBC QN AUTO: 30.1 PG (ref 25.2–33.5)
MCHC RBC AUTO-ENTMCNC: 32.1 G/DL (ref 28.4–34.8)
MCV RBC AUTO: 94 FL (ref 82.6–102.9)
MONOCYTES # BLD: 8 % (ref 3–12)
NRBC AUTOMATED: 0 PER 100 WBC
PDW BLD-RTO: 14.2 % (ref 11.8–14.4)
PLATELET # BLD: 237 K/UL (ref 138–453)
PLATELET ESTIMATE: ABNORMAL
PMV BLD AUTO: 9.8 FL (ref 8.1–13.5)
POTASSIUM SERPL-SCNC: 3.7 MMOL/L (ref 3.7–5.3)
PRO-BNP: 1345 PG/ML
PROCALCITONIN: 0.39 NG/ML
RBC # BLD: 3.65 M/UL (ref 3.95–5.11)
RBC # BLD: ABNORMAL 10*6/UL
SEG NEUTROPHILS: 71 % (ref 36–65)
SEGMENTED NEUTROPHILS ABSOLUTE COUNT: 8.09 K/UL (ref 1.5–8.1)
SODIUM BLD-SCNC: 136 MMOL/L (ref 135–144)
WBC # BLD: 11.5 K/UL (ref 3.5–11.3)
WBC # BLD: ABNORMAL 10*3/UL

## 2021-05-18 PROCEDURE — 99232 SBSQ HOSP IP/OBS MODERATE 35: CPT | Performed by: INTERNAL MEDICINE

## 2021-05-18 PROCEDURE — 94761 N-INVAS EAR/PLS OXIMETRY MLT: CPT

## 2021-05-18 PROCEDURE — 2580000003 HC RX 258: Performed by: CLINICAL NURSE SPECIALIST

## 2021-05-18 PROCEDURE — 84145 PROCALCITONIN (PCT): CPT

## 2021-05-18 PROCEDURE — 2700000000 HC OXYGEN THERAPY PER DAY

## 2021-05-18 PROCEDURE — 36415 COLL VENOUS BLD VENIPUNCTURE: CPT

## 2021-05-18 PROCEDURE — 85025 COMPLETE CBC W/AUTO DIFF WBC: CPT

## 2021-05-18 PROCEDURE — 6360000002 HC RX W HCPCS: Performed by: CLINICAL NURSE SPECIALIST

## 2021-05-18 PROCEDURE — 82947 ASSAY GLUCOSE BLOOD QUANT: CPT

## 2021-05-18 PROCEDURE — 92611 MOTION FLUOROSCOPY/SWALLOW: CPT

## 2021-05-18 PROCEDURE — 76770 US EXAM ABDO BACK WALL COMP: CPT

## 2021-05-18 PROCEDURE — 99497 ADVNCD CARE PLAN 30 MIN: CPT | Performed by: INTERNAL MEDICINE

## 2021-05-18 PROCEDURE — 83880 ASSAY OF NATRIURETIC PEPTIDE: CPT

## 2021-05-18 PROCEDURE — 6370000000 HC RX 637 (ALT 250 FOR IP): Performed by: CLINICAL NURSE SPECIALIST

## 2021-05-18 PROCEDURE — 2500000003 HC RX 250 WO HCPCS: Performed by: INTERNAL MEDICINE

## 2021-05-18 PROCEDURE — 80048 BASIC METABOLIC PNL TOTAL CA: CPT

## 2021-05-18 PROCEDURE — 74230 X-RAY XM SWLNG FUNCJ C+: CPT

## 2021-05-18 PROCEDURE — 6360000002 HC RX W HCPCS: Performed by: INTERNAL MEDICINE

## 2021-05-18 PROCEDURE — 6370000000 HC RX 637 (ALT 250 FOR IP): Performed by: INTERNAL MEDICINE

## 2021-05-18 PROCEDURE — 2060000000 HC ICU INTERMEDIATE R&B

## 2021-05-18 PROCEDURE — 94640 AIRWAY INHALATION TREATMENT: CPT

## 2021-05-18 RX ORDER — POTASSIUM CHLORIDE 7.45 MG/ML
10 INJECTION INTRAVENOUS
Status: COMPLETED | OUTPATIENT
Start: 2021-05-18 | End: 2021-05-18

## 2021-05-18 RX ORDER — GABAPENTIN 100 MG/1
200 CAPSULE ORAL 3 TIMES DAILY
Status: DISCONTINUED | OUTPATIENT
Start: 2021-05-18 | End: 2021-05-21 | Stop reason: HOSPADM

## 2021-05-18 RX ORDER — HEPARIN SODIUM 5000 [USP'U]/ML
5000 INJECTION, SOLUTION INTRAVENOUS; SUBCUTANEOUS EVERY 8 HOURS SCHEDULED
Status: DISCONTINUED | OUTPATIENT
Start: 2021-05-18 | End: 2021-05-21 | Stop reason: HOSPADM

## 2021-05-18 RX ADMIN — METRONIDAZOLE 500 MG: 500 INJECTION, SOLUTION INTRAVENOUS at 16:39

## 2021-05-18 RX ADMIN — SODIUM CHLORIDE, PRESERVATIVE FREE 10 ML: 5 INJECTION INTRAVENOUS at 09:56

## 2021-05-18 RX ADMIN — CLOPIDOGREL 75 MG: 75 TABLET, FILM COATED ORAL at 17:15

## 2021-05-18 RX ADMIN — GABAPENTIN 200 MG: 100 CAPSULE ORAL at 21:09

## 2021-05-18 RX ADMIN — POTASSIUM CHLORIDE 10 MEQ: 10 INJECTION, SOLUTION INTRAVENOUS at 17:06

## 2021-05-18 RX ADMIN — DONEPEZIL HYDROCHLORIDE 10 MG: 10 TABLET, FILM COATED ORAL at 21:09

## 2021-05-18 RX ADMIN — INSULIN LISPRO 1 UNITS: 100 INJECTION, SOLUTION INTRAVENOUS; SUBCUTANEOUS at 21:16

## 2021-05-18 RX ADMIN — POTASSIUM CHLORIDE 10 MEQ: 10 INJECTION, SOLUTION INTRAVENOUS at 18:44

## 2021-05-18 RX ADMIN — CEFTRIAXONE SODIUM 1000 MG: 1 INJECTION, POWDER, FOR SOLUTION INTRAMUSCULAR; INTRAVENOUS at 21:05

## 2021-05-18 RX ADMIN — GABAPENTIN 200 MG: 100 CAPSULE ORAL at 17:15

## 2021-05-18 RX ADMIN — ENOXAPARIN SODIUM 30 MG: 30 INJECTION SUBCUTANEOUS at 09:56

## 2021-05-18 RX ADMIN — PANTOPRAZOLE SODIUM 40 MG: 40 TABLET, DELAYED RELEASE ORAL at 17:15

## 2021-05-18 RX ADMIN — TIOTROPIUM BROMIDE INHALATION SPRAY 2 PUFF: 3.12 SPRAY, METERED RESPIRATORY (INHALATION) at 08:41

## 2021-05-18 RX ADMIN — METRONIDAZOLE 500 MG: 500 INJECTION, SOLUTION INTRAVENOUS at 22:35

## 2021-05-18 RX ADMIN — FUROSEMIDE 40 MG: 10 INJECTION, SOLUTION INTRAMUSCULAR; INTRAVENOUS at 09:56

## 2021-05-18 RX ADMIN — SODIUM CHLORIDE, PRESERVATIVE FREE 10 ML: 5 INJECTION INTRAVENOUS at 17:11

## 2021-05-18 RX ADMIN — HEPARIN SODIUM 5000 UNITS: 5000 INJECTION INTRAVENOUS; SUBCUTANEOUS at 21:16

## 2021-05-18 NOTE — CARE COORDINATION
Transitional planning. Spoke with Dr Loly Ross about hospice care and to set up an informational meeting about Hospice at home. Left message with Filomena at [address] since they had home care w/[facility] in the past and se if she can set up an informational meeting about hospice in the home. Asked her to call me back. Jessy Valerio from [facility] calls and will follow up tomorrow.

## 2021-05-18 NOTE — PLAN OF CARE
Problem: Skin Integrity:  Goal: Will show no infection signs and symptoms  Description: Will show no infection signs and symptoms  Outcome: Ongoing     Problem: Skin Integrity:  Goal: Absence of new skin breakdown  Description: Absence of new skin breakdown  Outcome: Ongoing     Problem: Falls - Risk of:  Goal: Will remain free from falls  Description: Will remain free from falls  Outcome: Ongoing     Problem: Falls - Risk of:  Goal: Absence of physical injury  Description: Absence of physical injury  Outcome: Ongoing     Problem: Gas Exchange - Impaired:  Goal: Levels of oxygenation will improve  Description: Levels of oxygenation will improve  5/18/2021 1829 by Flakito Matt RN  Outcome: Ongoing  5/18/2021 1558 by Brooke Beltrán RCP  Outcome: Ongoing  Note:   PROVIDE ADEQUATE OXYGENATION WITH ACCEPTABLE SP02/ABG'S    [x]  IDENTIFY APPROPRIATE OXYGEN THERAPY  [x]   MONITOR SP02/ABG'S AS NEEDED   [x]   PATIENT EDUCATION AS NEEDED
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
## 2021-05-18 NOTE — ACP (ADVANCE CARE PLANNING)
Met patient at bedside, patient still has a Zenker diverticula and likely has a recurrent aspiration pneumonia. Discussed that she will likely continue to have aspiration events and development of an aspiration pneumonia as long she chooses not to have this surgically repaired. She understands that this could lead to her eventual death. She states she has advanced directive, wishes to be DNR CCA, all CODE STATUS is were outlined. Patient also interested in staying out of the hospital and would like to speak with hospice. She will discuss her decision with her daughter.  >30 minutes were spent speaking to patient and answering questions    [facility]

## 2021-05-18 NOTE — PLAN OF CARE
Problem: Skin Integrity:  Goal: Will show no infection signs and symptoms  Description: Will show no infection signs and symptoms  Outcome: Ongoing  Goal: Absence of new skin breakdown  Description: Absence of new skin breakdown  Outcome: Ongoing     Problem: Falls - Risk of:  Goal: Will remain free from falls  Description: Will remain free from falls  Outcome: Ongoing  Goal: Absence of physical injury  Description: Absence of physical injury  Outcome: Ongoing     Problem: Pain:  Goal: Pain level will decrease  Description: Pain level will decrease  Outcome: Ongoing  Goal: Control of acute pain  Description: Control of acute pain  Outcome: Ongoing  Goal: Control of chronic pain  Description: Control of chronic pain  Outcome: Ongoing   Electronically signed by Bre Araujo RN on 5/18/2021 at 2:16 AM Receive refill request from [facility] for Gabapentin 800mg.  LOV 6/20/16, next 6/29/17.  Chart reviewed, medication refilled per protocol.

## 2021-05-18 NOTE — PROGRESS NOTES
Physical Therapy    DATE: 2021    NAME: Alex Quesada  MRN: [id number]   : 1940      Patient not seen this date for Physical Therapy due to:    Testing: Pt has a Ultrasound and a video swallow study today. Will check on pt tomorrow.       Electronically signed by Nila Landry PTA on 2021 at 1:25 PM

## 2021-05-18 NOTE — PROGRESS NOTES
[facility]  Office: [address], DO, Israel Marquez, DO, Olayinka Minaya, DO, Kimmy Baron Blood, DO, Patria Cartwright MD, Syd Hardy MD, Amrit Hutchison MD, Wendy Soni MD, Claritza Moran MD, Vandana Bolton MD, Nelia Bose MD, Seferino Macario MD, Felipa Rodriguez, DO, Felicita Cosby MD, Kal Malave, DO, Mojgan Quarles MD,  Rachel Card, DO, Manfred Webb MD, Roberto Encarnacion MD, Indu Grewal MD, Sam Nunes MD, Kip Verdigre, Bobby Lovelace, CNP, Ian Dooley, CNP, Abbi Cross, CNS, Diamond Taylor, CNP, Moises Sutton, CNP, Erika Foster, CNP, Myrna Del Castillo, CNP, Harjeet Trejo, CNP, KELSIE VegaC, Mehreen Jackson, [facility], Yash Trejo, CNP, Catherine Herbert, CNP, Moira Briones, CNP, Niurka Sales, CNP, Cheko Winter, CNP, Paula Sotelo, [address] [place] [address]    Progress Note    5/18/2021    12:59 PM    Name:   Mehnaz Beltran  MRN:     [id number]     Acct:      [de-identified]   Room:   2014/2014-01  IP Day:  3  Admit Date:  5/15/2021  9:44 AM    PCP:   Baljeet Wiley MD  Code Status:  Full Code    Subjective:     C/C:   Chief Complaint   Patient presents with    Fatigue     Interval History Status: not changed. Patient was seen and examined, no issues overnight. Patient still on high flow nasal cannula requiring 40% FiO2, readjusted pulse ox at bedside and instructed nursing to decrease high flow as tolerated. Speech and swallow evaluation requested video swallow evaluation as patient has a history of silent aspiration. Brief History:     79year old female past medical history for Zenker's diverticulum, protein as deficiency, pulmonary embolism, CVA, myocardial infarction, COPD, type 2 diabetes, essential hypertension presents to the emergency department after fall at home. Patient has a history of aspiration pneumonia most recently December 13 to December 18, 2020, 4/6/2021.   She has refused surgery in the past.  She presents after tripping and falling on blankets at home members brought by EMS. In the emergency department patient was found to have a WBC of 18.1, creatinine 1.61 and a BNP of 654. Chest x-ray showed pulmonary vascular congestion, patient was given 1 dose of Lasix 40 mg. Since admission patient's kidney function has worsened with diuretic therapy, patient currently on high flow nasal cannula requiring 40% FiO2    Review of Systems:     Constitutional:  negative for chills, fevers, sweats  Respiratory:  negative for cough, dyspnea on exertion, shortness of breath, wheezing  Cardiovascular:  negative for chest pain, chest pressure/discomfort, lower extremity edema, palpitations  Gastrointestinal:  negative for abdominal pain, constipation, diarrhea, nausea, vomiting  Neurological:  negative for dizziness, headache    Medications: Allergies:     Allergies   Allergen Reactions    Doxycycline Hyclate     Norco [Hydrocodone-Acetaminophen] Other (See Comments)     Nausea, dizziness    Pcn [Penicillins]     Sulfa Antibiotics        Current Meds:   Scheduled Meds:    heparin (porcine)  5,000 Units Subcutaneous 3 times per day    gabapentin  200 mg Oral TID    [Held by provider] amLODIPine  10 mg Oral Daily    [Held by provider] carvedilol  25 mg Oral BID    clopidogrel  75 mg Oral Daily    donepezil  10 mg Oral Nightly    [Held by provider] FLUoxetine  40 mg Oral Daily    insulin lispro  0-12 Units Subcutaneous TID WC    insulin lispro  0-6 Units Subcutaneous Nightly    pantoprazole  40 mg Oral QAM AC    tiotropium  2 puff Inhalation Daily    sodium chloride flush  5-40 mL Intravenous 2 times per day    cefTRIAXone (ROCEPHIN) IV  1,000 mg Intravenous Q24H    azithromycin  500 mg Intravenous Q24H     Continuous Infusions:    dextrose      sodium chloride       PRN Meds: glucose, dextrose, glucagon (rDNA), dextrose, albuterol, sodium chloride flush, sodium chloride, nicotine, promethazine **OR** ondansetron, polyethylene glycol, acetaminophen **OR** acetaminophen    Data:     Past Medical History:   has a past medical history of Anxiety, Arthritis, Atherosclerosis, Body mass index (bmi) 25.0-25.9, adult, CHF exacerbation ([facility]), COPD (chronic obstructive pulmonary disease) ([facility]), CVA (cerebral vascular accident) ([facility] [address] Depression, Diabetes mellitus ([facility] [address] Diverticula, esophagus congenital, Former smoker, Hypercholesteremia, Hypertension, Hypokalemia, Joint pain, knee, Myocardial infarct, old, Pneumonia, Protein S deficiency ([facility] [address] Pulmonary embolism ([facility] [address] Reflux esophagitis, Tinea corporis, Tremor, URI (upper respiratory infection), UTI (urinary tract infection), and Zenker's diverticulum. Social History:   reports that she has quit smoking. Her smoking use included cigarettes. She quit after 25.00 years of use. She has never used smokeless tobacco. She reports current alcohol use. She reports that she does not use drugs. Family History: History reviewed. No pertinent family history. Vitals:  BP (!) 132/57   Pulse 101   Temp 98.2 °F (36.8 °C)   Resp 14   Ht 5' (1.524 m)   Wt 186 lb 15.2 oz (84.8 kg)   SpO2 96%   BMI 36.51 kg/m²   Temp (24hrs), Av.6 °F (37 °C), Min:98.2 °F (36.8 °C), Max:99.2 °F (37.3 °C)    Recent Labs     21  1701 21  1949 21  0655 21  1122   POCGLU 119* 166* 94 85       I/O (24Hr):     Intake/Output Summary (Last 24 hours) at 2021 1259  Last data filed at 2021 0542  Gross per 24 hour   Intake 440 ml   Output 650 ml   Net -210 ml       Labs:  Hematology:  Recent Labs     21  0707 21  0932 21  0616   WBC 7.5 19.7* 11.5*   RBC 3.95 3.95 3.65*   HGB 11.9 11.7* 11.0*   HCT 40.7 38.7 34.3*   .0* 98.0 94.0   MCH 30.1 29.6 30.1   MCHC 29.2 30.2 32.1   RDW 14.3 14.3 14.2    269 237   MPV 10.1 9.8 9.8     Chemistry:  Recent Labs     21  0707 21  0932 21  0616   NA --  141 136   K  --  4.0 3.7   CL  --  100 98   CO2  --  23 27   GLUCOSE  --  115* 95   BUN  --  40* 48*   CREATININE  --  2.31* 2.44*   MG 1.7  --   --    ANIONGAP  --  18* 11   LABGLOM  --  20* 19*   GFRAA  --  25* 23*   CALCIUM  --  8.7 8.7   PROBNP 1,118*  --  1,345*     Recent Labs     05/16/21  0707 05/17/21  0634 05/17/21  1108 05/17/21  1701 05/17/21  1949 05/18/21  0655 05/18/21  1122   TSH 2.31  --   --   --   --   --   --    CHOL 239*  --   --   --   --   --   --    HDL 51  --   --   --   --   --   --    LDLCHOLESTEROL 153*  --   --   --   --   --   --    CHOLHDLRATIO 4.7  --   --   --   --   --   --    TRIG 173*  --   --   --   --   --   --    VLDL NOT REPORTED*  --   --   --   --   --   --    POCGLU  --  140* 139* 119* 166* 94 85     ABG:  Lab Results   Component Value Date    POCPH 7.436 01/14/2020    POCPCO2 38.2 01/14/2020    POCPO2 71.6 01/14/2020    POCHCO3 25.7 01/14/2020    NBEA NOT REPORTED 01/14/2020    PBEA 2 01/14/2020    MYA0SAB 27 01/14/2020    RTKS5GWS 95 01/14/2020    FIO2 NOT REPORTED 01/14/2020     Lab Results   Component Value Date/Time    SPECIAL 10 R H 05/17/2021 12:30 PM     Lab Results   Component Value Date/Time    CULTURE NO GROWTH 22 HOURS 05/17/2021 12:30 PM       Radiology:  XR CHEST (2 VW)    Result Date: 5/16/2021  No significant change in chest findings. XR CHEST (2 VW)    Result Date: 5/15/2021  Findings compatible with mild interstitial edema. XR FEMUR RIGHT (MIN 2 VIEWS)    Result Date: 5/16/2021  No acute bony process is seen involving the right femur, knee or lower leg. Degenerative changes as described above. XR KNEE RIGHT (MIN 4 VIEWS)    Result Date: 5/16/2021  No acute bony process is seen involving the right femur, knee or lower leg. Degenerative changes as described above. XR TIBIA FIBULA RIGHT (2 VIEWS)    Result Date: 5/16/2021  No acute bony process is seen involving the right femur, knee or lower leg. Degenerative changes as described above. Physical Examination:        General appearance:  alert, cooperative and no distress  Mental Status:  oriented to person, place and time and normal affect  Lungs:  clear to auscultation bilaterally, normal effort  Heart:  regular rate and rhythm, no murmur  Abdomen:  soft, nontender, nondistended, normal bowel sounds, no masses, hepatomegaly, splenomegaly  Extremities:  no edema, redness, tenderness in the calves  Skin:  no gross lesions, rashes, induration    Assessment:        Hospital Problems         Last Modified POA    * (Principal) Pulmonary edema cardiac cause ([address] 5/15/2021 Yes    COPD (chronic obstructive pulmonary disease) ([address] 5/15/2021 Yes    Essential hypertension (Chronic) 5/15/2021 Yes    Type 2 diabetes mellitus with kidney complication, with long-term current use of insulin ([address] 5/15/2021 Yes    Stage 4 chronic kidney disease ([address] 5/15/2021 Yes    Zenker diverticulum 5/15/2021 Yes    Overview Signed 5/15/2021 12:34 PM by LUIS Higgins - CNS     Video swallow 12/12/2020 showed penetration of thin and mildly thickened consistencies resulting from reflux caused by Zenker's diverticulum         Recurrent aspiration pneumonia ([address] (Chronic) 5/15/2021 Yes          Plan:        1. Recurrent aspiration pneumonia secondary to Zenker's diverticula -check swallow evaluation again, change antibiotics to Rocephin and Flagyl for anaerobic coverage. Discontinue azithromycin, hold off on further diuretic therapy. Elevated procalcitonin, patient has a penicillin allergy  2. Acute hypoxic respiratory failure-multifactorial, would not likely benefit from further diuretic therapy. Adjust antibiotics, may consult infectious disease tomorrow if still not improved. Check 2D echocardiogram as BNP is still uptrending. Negative troponins  3. Zenker's diverticulum-suspect additional hospitalization for aspiration pneumonia, will discuss with patient surgical intervention versus PEG tube  4.  Acute kidney injury -unclear etiology, discontinue ACE inhibitor and diuretic. Change Lovenox to heparin  5. History of VTE, Protein S Deficiency -unclear why the patient is not on anticoagulation. Will hold for now  6. History of myocardial infarction-check 2D echocardiogram  7. History of CVA, continue antiplatelet therapy  8. Essential hypertension  9. Type 2 diabetes  10.  COPD    Kal Malave DO  5/18/2021  12:59 PM

## 2021-05-18 NOTE — PROGRESS NOTES
Physician Progress Note      PATIENT:               Aime Bah  CSN #:                  [id number]  :                       1940  ADMIT DATE:       5/15/2021 9:44 AM  DISCH DATE:  Nonda Shone  PROVIDER #:        Ananda GRAY          QUERY TEXT:    Pt admitted with pulmonary edema, aspiration PNA, and respiratory failure. If   possible, please document in progress notes and discharge summary further   specificity regarding the type and acuity of CHF:    The medical record reflects the following:  Risk Factors: CHF, HTN, CAD. Recurrent aspiration PNA. COPD. Clinical Indicators: BNP 1118, Echo 19 showing Left ventricle is normal   in size. Global left ventricular systolic function  is normal. Calculated ejection fraction via 3D Heart Model is 55% Grade I   (mild) left ventricular diastolic dysfunction. Mild mitral regurgitation. CXR    showing Mild edema or pneumonitis  Treatment: IV Lasix 40 daily, labs/monitoring. CXR. Joseline Kinsey RN, CDS  [email or web address]  Options provided:  -- Acute on Chronic Diastolic CHF/HFpEF  -- Acute on Chronic Systolic CHF/HFrEF  -- Acute on Chronic Systolic and Diastolic CHF  -- Chronic Diastolic CHF/HFpEF  -- Chronic Systolic and Diastolic CHF  -- Other - I will add my own diagnosis  -- Disagree - Not applicable / Not valid  -- Disagree - Clinically unable to determine / Unknown  -- Refer to Clinical Documentation Reviewer    PROVIDER RESPONSE TEXT:    This patient is in acute on chronic diastolic CHF/HFpEF.     Query created by: Jossue Blair on 2021 6:32 AM      Electronically signed by:  Ananda GRAY 2021 7:03 AM

## 2021-05-19 LAB
ALBUMIN SERPL-MCNC: 3.5 G/DL (ref 3.5–5.2)
ANION GAP SERPL CALCULATED.3IONS-SCNC: 12 MMOL/L (ref 9–17)
BUN BLDV-MCNC: 42 MG/DL (ref 8–23)
BUN/CREAT BLD: ABNORMAL (ref 9–20)
CALCIUM SERPL-MCNC: 9 MG/DL (ref 8.6–10.4)
CHLORIDE BLD-SCNC: 96 MMOL/L (ref 98–107)
CO2: 28 MMOL/L (ref 20–31)
CREAT SERPL-MCNC: 1.96 MG/DL (ref 0.5–0.9)
GFR AFRICAN AMERICAN: 30 ML/MIN
GFR NON-AFRICAN AMERICAN: 24 ML/MIN
GFR SERPL CREATININE-BSD FRML MDRD: ABNORMAL ML/MIN/{1.73_M2}
GFR SERPL CREATININE-BSD FRML MDRD: ABNORMAL ML/MIN/{1.73_M2}
GLUCOSE BLD-MCNC: 102 MG/DL (ref 70–99)
GLUCOSE BLD-MCNC: 115 MG/DL (ref 65–105)
GLUCOSE BLD-MCNC: 122 MG/DL (ref 65–105)
GLUCOSE BLD-MCNC: 207 MG/DL (ref 65–105)
GLUCOSE BLD-MCNC: 92 MG/DL (ref 65–105)
GLUCOSE BLD-MCNC: 99 MG/DL (ref 65–105)
HCT VFR BLD CALC: 35.7 % (ref 36.3–47.1)
HEMOGLOBIN: 11.3 G/DL (ref 11.9–15.1)
LV EF: 55 %
LVEF MODALITY: NORMAL
MAGNESIUM: 1.9 MG/DL (ref 1.6–2.6)
MCH RBC QN AUTO: 29.8 PG (ref 25.2–33.5)
MCHC RBC AUTO-ENTMCNC: 31.7 G/DL (ref 28.4–34.8)
MCV RBC AUTO: 94.2 FL (ref 82.6–102.9)
NRBC AUTOMATED: 0 PER 100 WBC
PDW BLD-RTO: 13.9 % (ref 11.8–14.4)
PHOSPHORUS: 3.6 MG/DL (ref 2.6–4.5)
PLATELET # BLD: 256 K/UL (ref 138–453)
PMV BLD AUTO: 9.6 FL (ref 8.1–13.5)
POTASSIUM SERPL-SCNC: 3.6 MMOL/L (ref 3.7–5.3)
RBC # BLD: 3.79 M/UL (ref 3.95–5.11)
SODIUM BLD-SCNC: 136 MMOL/L (ref 135–144)
WBC # BLD: 8.3 K/UL (ref 3.5–11.3)

## 2021-05-19 PROCEDURE — 99232 SBSQ HOSP IP/OBS MODERATE 35: CPT | Performed by: INTERNAL MEDICINE

## 2021-05-19 PROCEDURE — 6370000000 HC RX 637 (ALT 250 FOR IP): Performed by: INTERNAL MEDICINE

## 2021-05-19 PROCEDURE — 6370000000 HC RX 637 (ALT 250 FOR IP): Performed by: CLINICAL NURSE SPECIALIST

## 2021-05-19 PROCEDURE — 83735 ASSAY OF MAGNESIUM: CPT

## 2021-05-19 PROCEDURE — 94640 AIRWAY INHALATION TREATMENT: CPT

## 2021-05-19 PROCEDURE — 6360000002 HC RX W HCPCS: Performed by: INTERNAL MEDICINE

## 2021-05-19 PROCEDURE — 97535 SELF CARE MNGMENT TRAINING: CPT

## 2021-05-19 PROCEDURE — 85027 COMPLETE CBC AUTOMATED: CPT

## 2021-05-19 PROCEDURE — 6360000002 HC RX W HCPCS: Performed by: CLINICAL NURSE SPECIALIST

## 2021-05-19 PROCEDURE — 2500000003 HC RX 250 WO HCPCS: Performed by: INTERNAL MEDICINE

## 2021-05-19 PROCEDURE — 36415 COLL VENOUS BLD VENIPUNCTURE: CPT

## 2021-05-19 PROCEDURE — 93306 TTE W/DOPPLER COMPLETE: CPT

## 2021-05-19 PROCEDURE — 82947 ASSAY GLUCOSE BLOOD QUANT: CPT

## 2021-05-19 PROCEDURE — 2060000000 HC ICU INTERMEDIATE R&B

## 2021-05-19 PROCEDURE — 2700000000 HC OXYGEN THERAPY PER DAY

## 2021-05-19 PROCEDURE — 2580000003 HC RX 258: Performed by: CLINICAL NURSE SPECIALIST

## 2021-05-19 PROCEDURE — 80069 RENAL FUNCTION PANEL: CPT

## 2021-05-19 RX ADMIN — METRONIDAZOLE 500 MG: 500 INJECTION, SOLUTION INTRAVENOUS at 10:26

## 2021-05-19 RX ADMIN — GABAPENTIN 200 MG: 100 CAPSULE ORAL at 14:30

## 2021-05-19 RX ADMIN — PANTOPRAZOLE SODIUM 40 MG: 40 TABLET, DELAYED RELEASE ORAL at 07:55

## 2021-05-19 RX ADMIN — CARVEDILOL 25 MG: 12.5 TABLET, FILM COATED ORAL at 21:15

## 2021-05-19 RX ADMIN — HEPARIN SODIUM 5000 UNITS: 5000 INJECTION INTRAVENOUS; SUBCUTANEOUS at 21:16

## 2021-05-19 RX ADMIN — TIOTROPIUM BROMIDE INHALATION SPRAY 2 PUFF: 3.12 SPRAY, METERED RESPIRATORY (INHALATION) at 08:13

## 2021-05-19 RX ADMIN — GABAPENTIN 200 MG: 100 CAPSULE ORAL at 10:24

## 2021-05-19 RX ADMIN — CEFTRIAXONE SODIUM 1000 MG: 1 INJECTION, POWDER, FOR SOLUTION INTRAMUSCULAR; INTRAVENOUS at 21:16

## 2021-05-19 RX ADMIN — METRONIDAZOLE 500 MG: 500 INJECTION, SOLUTION INTRAVENOUS at 23:15

## 2021-05-19 RX ADMIN — GABAPENTIN 200 MG: 100 CAPSULE ORAL at 21:16

## 2021-05-19 RX ADMIN — HEPARIN SODIUM 5000 UNITS: 5000 INJECTION INTRAVENOUS; SUBCUTANEOUS at 05:28

## 2021-05-19 RX ADMIN — INSULIN LISPRO 2 UNITS: 100 INJECTION, SOLUTION INTRAVENOUS; SUBCUTANEOUS at 21:17

## 2021-05-19 RX ADMIN — HEPARIN SODIUM 5000 UNITS: 5000 INJECTION INTRAVENOUS; SUBCUTANEOUS at 14:00

## 2021-05-19 RX ADMIN — DONEPEZIL HYDROCHLORIDE 10 MG: 10 TABLET, FILM COATED ORAL at 21:16

## 2021-05-19 RX ADMIN — METRONIDAZOLE 500 MG: 500 INJECTION, SOLUTION INTRAVENOUS at 05:25

## 2021-05-19 ASSESSMENT — PAIN DESCRIPTION - LOCATION: LOCATION: KNEE

## 2021-05-19 ASSESSMENT — PAIN SCALES - GENERAL: PAINLEVEL_OUTOF10: 0

## 2021-05-19 ASSESSMENT — PAIN DESCRIPTION - ORIENTATION: ORIENTATION: RIGHT

## 2021-05-19 ASSESSMENT — PAIN DESCRIPTION - PAIN TYPE: TYPE: CHRONIC PAIN

## 2021-05-19 NOTE — PROGRESS NOTES
protein as deficiency, pulmonary embolism, CVA, myocardial infarction, COPD, type 2 diabetes, essential hypertension presents to the emergency department after fall at home. Patient has a history of aspiration pneumonia most recently December 13 to December 18, 2020, 4/6/2021. She has refused surgery in the past.  She presents after tripping and falling on blankets at home members brought by EMS. In the emergency department patient was found to have a WBC of 18.1, creatinine 1.61 and a BNP of 654. Chest x-ray showed pulmonary vascular congestion, patient was given 1 dose of Lasix 40 mg. Since admission patient's kidney function has worsened with diuretic therapy, patient currently on high flow nasal cannula requiring 40% FiO2    Review of Systems:     Constitutional:  negative for chills, fevers, sweats  Respiratory:  negative for cough, dyspnea on exertion, shortness of breath, wheezing  Cardiovascular:  negative for chest pain, chest pressure/discomfort, lower extremity edema, palpitations  Gastrointestinal:  negative for abdominal pain, constipation, diarrhea, nausea, vomiting  Neurological:  negative for dizziness, headache    Medications: Allergies:     Allergies   Allergen Reactions    Doxycycline Hyclate     Norco [Hydrocodone-Acetaminophen] Other (See Comments)     Nausea, dizziness    Pcn [Penicillins]     Sulfa Antibiotics        Current Meds:   Scheduled Meds:    heparin (porcine)  5,000 Units Subcutaneous 3 times per day    gabapentin  200 mg Oral TID    metroNIDAZOLE  500 mg Intravenous Q6H    [Held by provider] amLODIPine  10 mg Oral Daily    [Held by provider] carvedilol  25 mg Oral BID    clopidogrel  75 mg Oral Daily    donepezil  10 mg Oral Nightly    [Held by provider] FLUoxetine  40 mg Oral Daily    insulin lispro  0-12 Units Subcutaneous TID WC    insulin lispro  0-6 Units Subcutaneous Nightly    pantoprazole  40 mg Oral QAM AC    tiotropium  2 puff Inhalation Daily    sodium chloride flush  5-40 mL Intravenous 2 times per day    cefTRIAXone (ROCEPHIN) IV  1,000 mg Intravenous Q24H     Continuous Infusions:    dextrose      sodium chloride       PRN Meds: glucose, dextrose, glucagon (rDNA), dextrose, albuterol, sodium chloride flush, sodium chloride, nicotine, promethazine **OR** ondansetron, polyethylene glycol, acetaminophen **OR** acetaminophen    Data:     Past Medical History:   has a past medical history of Anxiety, Arthritis, Atherosclerosis, Body mass index (bmi) 25.0-25.9, adult, CHF exacerbation ([facility]), COPD (chronic obstructive pulmonary disease) ([facility]), CVA (cerebral vascular accident) ([facility] Depression, Diabetes mellitus ([facility] Diverticula, esophagus congenital, Former smoker, Hypercholesteremia, Hypertension, Hypokalemia, Joint pain, knee, Myocardial infarct, old, Pneumonia, Protein S deficiency ([facility] Pulmonary embolism ([facility] Reflux esophagitis, Tinea corporis, Tremor, URI (upper respiratory infection), UTI (urinary tract infection), and Zenker's diverticulum. Social History:   reports that she has quit smoking. Her smoking use included cigarettes. She quit after 25.00 years of use. She has never used smokeless tobacco. She reports current alcohol use. She reports that she does not use drugs. Family History: History reviewed. No pertinent family history. Vitals:  BP (!) 143/81   Pulse 73   Temp 97.9 °F (36.6 °C) (Oral)   Resp 15   Ht 5' (1.524 m)   Wt 186 lb 15.2 oz (84.8 kg)   SpO2 92%   BMI 36.51 kg/m²   Temp (24hrs), Av.4 °F (36.9 °C), Min:97.9 °F (36.6 °C), Max:99.1 °F (37.3 °C)    Recent Labs     21  0752 21  0844 21  1146   POCGLU 156* 92 122* 115*       I/O (24Hr):     Intake/Output Summary (Last 24 hours) at 2021 1359  Last data filed at 2021 0542  Gross per 24 hour   Intake 120 ml   Output 1025 ml   Net -905 ml       Labs:  Hematology:  Recent Labs     21  0932 21  9338 05/19/21  0609   WBC 19.7* 11.5* 8.3   RBC 3.95 3.65* 3.79*   HGB 11.7* 11.0* 11.3*   HCT 38.7 34.3* 35.7*   MCV 98.0 94.0 94.2   MCH 29.6 30.1 29.8   MCHC 30.2 32.1 31.7   RDW 14.3 14.2 13.9    237 256   MPV 9.8 9.8 9.6     Chemistry:  Recent Labs     05/17/21  0932 05/18/21  0616 05/19/21  0609    136 136   K 4.0 3.7 3.6*    98 96*   CO2 23 27 28   GLUCOSE 115* 95 102*   BUN 40* 48* 42*   CREATININE 2.31* 2.44* 1.96*   MG  --   --  1.9   ANIONGAP 18* 11 12   LABGLOM 20* 19* 24*   GFRAA 25* 23* 30*   CALCIUM 8.7 8.7 9.0   PHOS  --   --  3.6   PROBNP  --  1,345*  --      Recent Labs     05/18/21  1122 05/18/21  1534 05/18/21  2005 05/19/21  0609 05/19/21  0752 05/19/21  0844 05/19/21  1146   LABALBU  --   --   --  3.5  --   --   --    POCGLU 85 86 156*  --  92 122* 115*     ABG:  Lab Results   Component Value Date    POCPH 7.436 01/14/2020    POCPCO2 38.2 01/14/2020    POCPO2 71.6 01/14/2020    POCHCO3 25.7 01/14/2020    NBEA NOT REPORTED 01/14/2020    PBEA 2 01/14/2020    UGA1PNT 27 01/14/2020    GSLV0UFJ 95 01/14/2020    FIO2 NOT REPORTED 01/14/2020     Lab Results   Component Value Date/Time    SPECIAL 10 R H 05/17/2021 12:30 PM     Lab Results   Component Value Date/Time    CULTURE NO GROWTH 2 DAYS 05/17/2021 12:30 PM       Radiology:  XR CHEST (2 VW)    Result Date: 5/16/2021  No significant change in chest findings. XR CHEST (2 VW)    Result Date: 5/15/2021  Findings compatible with mild interstitial edema. XR FEMUR RIGHT (MIN 2 VIEWS)    Result Date: 5/16/2021  No acute bony process is seen involving the right femur, knee or lower leg. Degenerative changes as described above. XR KNEE RIGHT (MIN 4 VIEWS)    Result Date: 5/16/2021  No acute bony process is seen involving the right femur, knee or lower leg. Degenerative changes as described above.      XR TIBIA FIBULA RIGHT (2 VIEWS)    Result Date: 5/16/2021  No acute bony process is seen involving the right femur, knee or lower leg. Degenerative changes as described above. Physical Examination:        General appearance:  alert, cooperative and no distress  Mental Status:  oriented to person, place and time and normal affect  Lungs:  clear to auscultation bilaterally, normal effort  Heart:  regular rate and rhythm, no murmur  Abdomen:  soft, nontender, nondistended, normal bowel sounds, no masses, hepatomegaly, splenomegaly  Extremities:  no edema, redness, tenderness in the calves  Skin:  no gross lesions, rashes, induration    Assessment:        Hospital Problems         Last Modified POA    * (Principal) Recurrent aspiration pneumonia ([address] (Chronic) 5/19/2021 Yes    COPD (chronic obstructive pulmonary disease) ([address] 5/15/2021 Yes    Essential hypertension (Chronic) 5/15/2021 Yes    Type 2 diabetes mellitus with kidney complication, with long-term current use of insulin ([address] 5/15/2021 Yes    Stage 4 chronic kidney disease ([address] 5/15/2021 Yes    Zenker diverticulum 5/15/2021 Yes    Overview Signed 5/15/2021 12:34 PM by LUIS Dennison     Video swallow 12/12/2020 showed penetration of thin and mildly thickened consistencies resulting from reflux caused by Zenker's diverticulum         Pulmonary edema cardiac cause ([address] 5/19/2021 Yes          Plan:        Recurrent aspiration pneumonia secondary to Zenker's diverticula -patient still has a extensive Zenker's diverticula, she is refusing surgery and we discussed informational session with hospice. We will continue IV antibiotics for aspiration pneumonia  Acute hypoxic respiratory failure-resolved  Zenker's diverticulum-we had a long discussion regarding surgical treatment, she does not want any further surgical evaluation. She understands that she will likely continue to have aspiration events which could cause her eventual death.   She is agreeable with an informational meeting with hospice  Acute kidney injury -kidney function still improving, continue to monitor, hold ACE inhibitor  History of VTE, Protein S Deficiency -unclear why the patient is not on anticoagulation. Will hold for now  History of myocardial infarction-check 2D echocardiogram  History of CVA, continue antiplatelet therapy  Essential hypertension  Type 2 diabetes  COPD  Discharge plan: Hold ACE inhibitor and diuretics. Kidney function still improving.   Patient is on room air now, monitor next 24 hours, if no further changes can be discharged home tomorrow    Mattjeronimo Austin DO  5/19/2021  1:59 PM

## 2021-05-19 NOTE — PROGRESS NOTES
ocOccupational Therapy  Facility/Department: [facility] CAR 2  Daily Treatment Note  NAME: Apple Layton  : 1940  MRN: [id number]    Date of Service: 2021    Discharge Recommendations:  Patient would benefit from continued therapy after discharge       Assessment   Performance deficits / Impairments: Decreased functional mobility ; Decreased ADL status; Decreased safe awareness;Decreased endurance;Decreased high-level IADLs  Prognosis: Good  Patient Education: OT POC, transfer/walker safety, importance of participation in therapy, use of incentive spirometer - pt verbalized/demo understanding. REQUIRES OT FOLLOW UP: Yes  Activity Tolerance  Activity Tolerance: Patient Tolerated treatment well  Safety Devices  Safety Devices in place: Yes  Type of devices: Call light within reach;Gait belt;Left in chair;Nurse notified         Patient Diagnosis(es): The primary encounter diagnosis was Fatigue, unspecified type. A diagnosis of Acute pulmonary edema (HCC) was also pertinent to this visit. has a past medical history of Anxiety, Arthritis, Atherosclerosis, Body mass index (bmi) 25.0-25.9, adult, CHF exacerbation (HCC), COPD (chronic obstructive pulmonary disease) (HCC), CVA (cerebral vascular accident) ([address] Depression, Diabetes mellitus ([address] Diverticula, esophagus congenital, Former smoker, Hypercholesteremia, Hypertension, Hypokalemia, Joint pain, knee, Myocardial infarct, old, Pneumonia, Protein S deficiency ([address] Pulmonary embolism ([address] Reflux esophagitis, Tinea corporis, Tremor, URI (upper respiratory infection), UTI (urinary tract infection), and Zenker's diverticulum. has a past surgical history that includes Carotid endarterectomy (Bilateral); Tonsillectomy; and Endoscopy, colon, diagnostic.     Restrictions  Restrictions/Precautions  Restrictions/Precautions: Fall Risk  Required Braces or Orthoses?: No  Position Activity Restriction  Other position/activity restrictions: up with assist  Subjective General  Patient assessed for rehabilitation services?: Yes  Family / Caregiver Present: Yes (friend)  Pain Assessment  Pain Assessment: Faces  Mcleod-Baker Pain Rating: Hurts little more  Pain Type: Chronic pain  Pain Location: Knee  Pain Orientation: Right  Pain Descriptors: Aching;Discomfort; Sore  Pain Frequency: Intermittent  Non-Pharmaceutical Pain Intervention(s): Repositioned;Rest;Therapeutic presence  Vital Signs  Patient Currently in Pain: Yes   Orientation  Orientation  Overall Orientation Status: Within Functional Limits  Objective    ADL  Feeding: Beverage management; Independent;Setup  Grooming: Setup; Increased time to complete;Supervision (Oral/denture care seated in chair.)  UE Bathing: Setup;Supervision (Wash face/hands seated in chair.)  Additional Comments: Pt transferred to seated in chair to complete simple grooming. Pt educated on use of incentive spirometer throughout the day, pt verbalized/demo understanding. Pt educated on importance of OOB activity with good return. Balance  Sitting Balance: Stand by assistance (Seated EOB.)  Standing Balance: Contact guard assistance  Standing Balance  Time: 4 minutes  Activity: Static standing EOB using RW, functional mobility through room. Functional Mobility  Functional - Mobility Device: Rolling Walker  Activity: Other  Assist Level: Contact guard assistance  Functional Mobility Comments: Slow pace, no LOB noted. Bed mobility  Supine to Sit: Contact guard assistance  Scooting: Contact guard assistance  Comment: Pt stated not to touch RLE, d/t hollow knee, during transfers.   Transfers  Sit to stand: Minimal assistance  Stand to sit: Minimal assistance  Cognition  Overall Cognitive Status: [facility]  Plan   Plan  Times per week: 3-4x/wk  Goals  Short term goals  Time Frame for Short term goals: pt will, by discharge  Short term goal 1: dem UB ADL with CGA and AE PRN  Short term goal 2: dem LB ADL/toileting with mod A and AE PRN  Short term goal 3: complete func mob/transfer with min A and LRD PRN  Short term goal 4: dem improved safety awareness by requiring 2 or less v/c's for safety during functional activity  Short term goal 5: dem improved sequencing/initiation by requiring one or less v/c for each during functional activity  Short term goal 6: engage in static/dynamic standing for 8+ minutes with CGA and LRD PRN  Short term goal 7: engage in functional activity for 20+ minutes for improved functional endurance  Short term goal 8: engage in functional activity requiring gross and fine motor control of BUE for 10+ minutes. Therapy Time   Individual Concurrent Group Co-treatment   Time In 1510         Time Out 1552         Minutes 42         Timed Code Treatment Minutes: 42 Minutes     Pt supine in bed upon therapist arrival. Pleasant and agreeable to therapy. See above for LOF for all tasks. Pt retired to seated in chair at end of session with call light within reach.     RADHA Ferrara/GEMA

## 2021-05-19 NOTE — PROGRESS NOTES
Physical Therapy    DATE: 2021    NAME: Mejia Anders  MRN: [id number]   : 1940      Patient not seen this date for Physical Therapy due to:    Patient Declined: Pt stated \"I just got back into bed. \" Pt declines tx. RN present. Will check on pt tomorrow.       Electronically signed by Tawnya Putnam PTA on 2021 at 3:05 PM

## 2021-05-19 NOTE — CARE COORDINATION
Transitional planning-talked with Filomena at [facility]. She talked with the patient's daughter about Hospice. They will have a meeting tomorrow to decide.

## 2021-05-19 NOTE — PLAN OF CARE
Problem: Skin Integrity:  Goal: Will show no infection signs and symptoms  Description: Will show no infection signs and symptoms  5/19/2021 0704 by Feliz Ely RN  Outcome: Ongoing  5/18/2021 1829 by Paxton Barger RN  Outcome: Ongoing  Goal: Absence of new skin breakdown  Description: Absence of new skin breakdown  5/19/2021 0704 by Feliz Ely RN  Outcome: Ongoing  5/18/2021 1829 by Paxton Barger RN  Outcome: Ongoing     Problem: Falls - Risk of:  Goal: Will remain free from falls  Description: Will remain free from falls  5/19/2021 0704 by Feliz Ely RN  Outcome: Ongoing  5/18/2021 1829 by Paxton Barger RN  Outcome: Ongoing  Goal: Absence of physical injury  Description: Absence of physical injury  5/19/2021 0704 by Feliz Ely RN  Outcome: Ongoing  5/18/2021 1829 by Paxton Barger RN  Outcome: Ongoing     Problem: Pain:  Goal: Pain level will decrease  Description: Pain level will decrease  5/19/2021 0704 by Feliz Ely RN  Outcome: Ongoing  5/18/2021 1829 by Paxton Barger RN  Outcome: Ongoing  Goal: Control of acute pain  Description: Control of acute pain  5/19/2021 0704 by Feliz Ely RN  Outcome: Ongoing  5/18/2021 1829 by Paxton Barger RN  Outcome: Ongoing  Goal: Control of chronic pain  Description: Control of chronic pain  5/19/2021 0704 by Feliz Ely RN  Outcome: Ongoing  5/18/2021 1829 by Paxton Barger RN  Outcome: Ongoing     Problem: Gas Exchange - Impaired:  Goal: Levels of oxygenation will improve  Description: Levels of oxygenation will improve  5/19/2021 0704 by Feliz Ely RN  Outcome: Ongoing  5/18/2021 1829 by Paxton Barger RN  Outcome: Ongoing   Electronically signed by Feliz Ely RN on 5/19/2021 at 7:05 AM

## 2021-05-20 LAB
ALBUMIN SERPL-MCNC: 3.3 G/DL (ref 3.5–5.2)
ANION GAP SERPL CALCULATED.3IONS-SCNC: 10 MMOL/L (ref 9–17)
BNP INTERPRETATION: ABNORMAL
BUN BLDV-MCNC: 34 MG/DL (ref 8–23)
BUN/CREAT BLD: ABNORMAL (ref 9–20)
CALCIUM SERPL-MCNC: 9.4 MG/DL (ref 8.6–10.4)
CHLORIDE BLD-SCNC: 101 MMOL/L (ref 98–107)
CO2: 28 MMOL/L (ref 20–31)
CREAT SERPL-MCNC: 1.63 MG/DL (ref 0.5–0.9)
GFR AFRICAN AMERICAN: 37 ML/MIN
GFR NON-AFRICAN AMERICAN: 30 ML/MIN
GFR SERPL CREATININE-BSD FRML MDRD: ABNORMAL ML/MIN/{1.73_M2}
GFR SERPL CREATININE-BSD FRML MDRD: ABNORMAL ML/MIN/{1.73_M2}
GLUCOSE BLD-MCNC: 105 MG/DL (ref 65–105)
GLUCOSE BLD-MCNC: 108 MG/DL (ref 65–105)
GLUCOSE BLD-MCNC: 115 MG/DL (ref 70–99)
GLUCOSE BLD-MCNC: 125 MG/DL (ref 65–105)
HCT VFR BLD CALC: 35.6 % (ref 36.3–47.1)
HEMOGLOBIN: 11.4 G/DL (ref 11.9–15.1)
MAGNESIUM: 1.9 MG/DL (ref 1.6–2.6)
MCH RBC QN AUTO: 29.8 PG (ref 25.2–33.5)
MCHC RBC AUTO-ENTMCNC: 32 G/DL (ref 28.4–34.8)
MCV RBC AUTO: 93 FL (ref 82.6–102.9)
NRBC AUTOMATED: 0 PER 100 WBC
PDW BLD-RTO: 13.7 % (ref 11.8–14.4)
PHOSPHORUS: 3.4 MG/DL (ref 2.6–4.5)
PLATELET # BLD: 275 K/UL (ref 138–453)
PMV BLD AUTO: 9.5 FL (ref 8.1–13.5)
POTASSIUM SERPL-SCNC: 3.9 MMOL/L (ref 3.7–5.3)
PRO-BNP: 1638 PG/ML
RBC # BLD: 3.83 M/UL (ref 3.95–5.11)
SODIUM BLD-SCNC: 139 MMOL/L (ref 135–144)
WBC # BLD: 8.2 K/UL (ref 3.5–11.3)

## 2021-05-20 PROCEDURE — 97530 THERAPEUTIC ACTIVITIES: CPT

## 2021-05-20 PROCEDURE — 80069 RENAL FUNCTION PANEL: CPT

## 2021-05-20 PROCEDURE — 2500000003 HC RX 250 WO HCPCS: Performed by: INTERNAL MEDICINE

## 2021-05-20 PROCEDURE — 1200000000 HC SEMI PRIVATE

## 2021-05-20 PROCEDURE — 2580000003 HC RX 258: Performed by: CLINICAL NURSE SPECIALIST

## 2021-05-20 PROCEDURE — 2700000000 HC OXYGEN THERAPY PER DAY

## 2021-05-20 PROCEDURE — 85027 COMPLETE CBC AUTOMATED: CPT

## 2021-05-20 PROCEDURE — 6370000000 HC RX 637 (ALT 250 FOR IP): Performed by: INTERNAL MEDICINE

## 2021-05-20 PROCEDURE — 83735 ASSAY OF MAGNESIUM: CPT

## 2021-05-20 PROCEDURE — 6360000002 HC RX W HCPCS: Performed by: CLINICAL NURSE SPECIALIST

## 2021-05-20 PROCEDURE — 6360000002 HC RX W HCPCS: Performed by: INTERNAL MEDICINE

## 2021-05-20 PROCEDURE — 6370000000 HC RX 637 (ALT 250 FOR IP): Performed by: CLINICAL NURSE SPECIALIST

## 2021-05-20 PROCEDURE — 94640 AIRWAY INHALATION TREATMENT: CPT

## 2021-05-20 PROCEDURE — 83880 ASSAY OF NATRIURETIC PEPTIDE: CPT

## 2021-05-20 PROCEDURE — 82947 ASSAY GLUCOSE BLOOD QUANT: CPT

## 2021-05-20 PROCEDURE — 99232 SBSQ HOSP IP/OBS MODERATE 35: CPT | Performed by: INTERNAL MEDICINE

## 2021-05-20 PROCEDURE — 97116 GAIT TRAINING THERAPY: CPT

## 2021-05-20 PROCEDURE — 36415 COLL VENOUS BLD VENIPUNCTURE: CPT

## 2021-05-20 RX ORDER — MOXIFLOXACIN HYDROCHLORIDE 400 MG/1
400 TABLET ORAL DAILY
Qty: 5 TABLET | Refills: 0 | Status: SHIPPED | OUTPATIENT
Start: 2021-05-20 | End: 2021-05-25

## 2021-05-20 RX ADMIN — HEPARIN SODIUM 5000 UNITS: 5000 INJECTION INTRAVENOUS; SUBCUTANEOUS at 05:44

## 2021-05-20 RX ADMIN — GABAPENTIN 200 MG: 100 CAPSULE ORAL at 14:23

## 2021-05-20 RX ADMIN — METRONIDAZOLE 500 MG: 500 INJECTION, SOLUTION INTRAVENOUS at 12:06

## 2021-05-20 RX ADMIN — GABAPENTIN 200 MG: 100 CAPSULE ORAL at 20:35

## 2021-05-20 RX ADMIN — GABAPENTIN 200 MG: 100 CAPSULE ORAL at 09:59

## 2021-05-20 RX ADMIN — SODIUM CHLORIDE, PRESERVATIVE FREE 10 ML: 5 INJECTION INTRAVENOUS at 09:59

## 2021-05-20 RX ADMIN — CARVEDILOL 25 MG: 12.5 TABLET, FILM COATED ORAL at 20:35

## 2021-05-20 RX ADMIN — PANTOPRAZOLE SODIUM 40 MG: 40 TABLET, DELAYED RELEASE ORAL at 09:59

## 2021-05-20 RX ADMIN — TIOTROPIUM BROMIDE INHALATION SPRAY 2 PUFF: 3.12 SPRAY, METERED RESPIRATORY (INHALATION) at 08:25

## 2021-05-20 RX ADMIN — METRONIDAZOLE 500 MG: 500 INJECTION, SOLUTION INTRAVENOUS at 05:44

## 2021-05-20 RX ADMIN — ACETAMINOPHEN 650 MG: 325 TABLET ORAL at 10:14

## 2021-05-20 RX ADMIN — HEPARIN SODIUM 5000 UNITS: 5000 INJECTION INTRAVENOUS; SUBCUTANEOUS at 21:29

## 2021-05-20 RX ADMIN — METRONIDAZOLE 500 MG: 500 INJECTION, SOLUTION INTRAVENOUS at 18:08

## 2021-05-20 RX ADMIN — HEPARIN SODIUM 5000 UNITS: 5000 INJECTION INTRAVENOUS; SUBCUTANEOUS at 14:24

## 2021-05-20 RX ADMIN — DONEPEZIL HYDROCHLORIDE 10 MG: 10 TABLET, FILM COATED ORAL at 20:35

## 2021-05-20 RX ADMIN — METRONIDAZOLE 500 MG: 500 INJECTION, SOLUTION INTRAVENOUS at 22:41

## 2021-05-20 RX ADMIN — CLOPIDOGREL 75 MG: 75 TABLET, FILM COATED ORAL at 09:59

## 2021-05-20 RX ADMIN — CARVEDILOL 25 MG: 12.5 TABLET, FILM COATED ORAL at 09:59

## 2021-05-20 RX ADMIN — CEFTRIAXONE SODIUM 1000 MG: 1 INJECTION, POWDER, FOR SOLUTION INTRAMUSCULAR; INTRAVENOUS at 21:03

## 2021-05-20 ASSESSMENT — PAIN SCALES - GENERAL
PAINLEVEL_OUTOF10: 6
PAINLEVEL_OUTOF10: 6
PAINLEVEL_OUTOF10: 0

## 2021-05-20 ASSESSMENT — PAIN DESCRIPTION - PAIN TYPE: TYPE: CHRONIC PAIN

## 2021-05-20 ASSESSMENT — PAIN DESCRIPTION - LOCATION: LOCATION: KNEE

## 2021-05-20 ASSESSMENT — PAIN DESCRIPTION - ORIENTATION: ORIENTATION: RIGHT

## 2021-05-20 NOTE — DISCHARGE INSTR - COC
([facility] A41.9, R65.20    Acute-on-chronic kidney injury ([facility] N17.9, N18.9    Pulmonary edema cardiac cause (HCC) I50.1    Recurrent aspiration pneumonia ([facility] J69.0    Fatigue R53.83       Isolation/Infection:   Isolation            No Isolation          Patient Infection Status       Infection Onset Added Last Indicated Last Indicated By Review Planned Expiration Resolved Resolved By    None active    Resolved    COVID-19 Rule Out 05/16/21 05/16/21 05/16/21 COVID-19, Rapid (Ordered)   05/16/21 Rule-Out Test Resulted            Nurse Assessment:  Last Vital Signs: BP (!) 153/64   Pulse 77   Temp 98.6 °F (37 °C) (Oral)   Resp 18   Ht 5' (1.524 m)   Wt 186 lb 15.2 oz (84.8 kg)   SpO2 92%   BMI 36.51 kg/m²     Last documented pain score (0-10 scale): Pain Level: 6  Last Weight:   Wt Readings from Last 1 Encounters:   05/16/21 186 lb 15.2 oz (84.8 kg)     Mental Status:  {IP PT MENTAL STATUS:20030:::0}    IV Access:  { DAQUAN IV ACCESS:420997597:::0}    Nursing Mobility/ADLs:  Walking   {CHP DME ADLs:241065221:::0}  Transfer  {CHP DME ADLs:168317041:::0}  Bathing  {CHP DME ADLs:102465342:::0}  Dressing  {CHP DME ADLs:642711003:::0}  Toileting  {CHP DME ADLs:200213729:::0}  Feeding  {CHP DME ADLs:271918291:::0}  Med Admin  {P DME ADLs:555962955:::0}  Med Delivery   { DAQUAN MED Delivery:316285305:::0}    Wound Care DoNocumentation and Therapy:        Elimination:  Continence:   · Bowel:   · Bladder: Yes  Urinary Catheter: None   Colostomy/Ileostomy/Ileal Conduit: No       Date of Last BM: 05/20/2021      Intake/Output Summary (Last 24 hours) at 5/20/2021 1347  Last data filed at 5/20/2021 1312  Gross per 24 hour   Intake 400 ml   Output 150 ml   Net 250 ml     I/O last 3 completed shifts:  In: -   Out: 500 [Urine:500]    Safety Concerns:     [address] BUT[address] Safety Concerns:637547085:::0}    Impairments/Disabilities:      [address] Impairments/Disabilities:138683700:::0}    Nutrition Therapy:  Current Nutrition Therapy: [address] DAQUAN Diet List:532351940:::0}    Routes of Feeding: {CHP DME Other Feedings:026114935:::0}  Liquids: {Slp liquid thickness:31968}  Daily Fluid Restriction: no  Last Modified Barium Swallow with Video (Video Swallowing Test): {Done Not Done BHEQ:658749391:::2}    Treatments at the Time of Hospital Discharge:   Respiratory Treatments: ***  Oxygen Therapy:  is not on home oxygen therapy. Ventilator:    - No ventilator support    Rehab Therapies: Physical Therapy and Occupational Therapy  Weight Bearing Status/Restrictions: No weight bearing restirctions  Other Medical Equipment (for information only, NOT a DME order):  cane  Other Treatments: NA    Patient's personal belongings (please select all that are sent with patient):  Glasses, Dentures upper and lower    RN SIGNATURE:  Electronically signed by Miller Olivares RN on 5/21/21 at 11:15 AM EDT    CASE MANAGEMENT/SOCIAL WORK SECTION    Inpatient Status Date: ***    Readmission Risk Assessment Score:  Readmission Risk              Risk of Unplanned Readmission:  22           Discharging to Facility/ Agency   · Name:   · Address:  · Phone:  · Fax:    Dialysis Facility (if applicable)   · Name:  · Address:  · Dialysis Schedule:  · Phone:  · Fax:    / signature: {Esignature:425668232:::0}    PHYSICIAN SECTION    Prognosis: {Prognosis:6020272744:::0}    Condition at Discharge: Darrick Ponce Patient Condition:806382676:::0}    Rehab Potential (if transferring to Rehab): {Prognosis:1181402893:::0}    Recommended Labs or Other Treatments After Discharge: ***    Physician Certification: I certify the above information and transfer of Mehnaz Beltran  is necessary for the continuing treatment of the diagnosis listed and that she requires {Admit to Appropriate Level of Care:91855:::0} for {GREATER/LESS:047230333} 30 days.      Update Admission H&P: {CHP DME Changes in HandP:072976630:::0}    PHYSICIAN SIGNATURE:  {Esignature:203258702:::0}

## 2021-05-20 NOTE — PROGRESS NOTES
Physical Therapy  Daily Treatment Note  NAME: Jayne Castro  : 1940  MRN: [id number]    Date of Service: 2021    Discharge Recommendations:  Patient would benefit from continued therapy after discharge   PT Equipment Recommendations  Equipment Needed: Yes  Mobility Devices: Ap Clan: Rolling    Assessment   Body structures, Functions, Activity limitations: Decreased functional mobility ; Decreased ADL status; Decreased ROM; Decreased strength;Decreased safe awareness;Decreased cognition;Decreased endurance;Decreased coordination;Decreased fine motor control;Decreased balance  Assessment: Pt Mod-I for bed mobility. Pt was able to AMB ~40ft with a RW and CGA. Pt AMB slowly, but she was frequently pausing to cough and spit into a tissue. Pt was fatigued after AMB and sat in the recliner to rest. Pt would benefit from continued therapy to increase functional mobility and return to prior level of function. Prognosis: Good  PT Education: Goals;PT Role;Plan of Care;Precautions;Transfer Training;General Safety;Gait Training;Functional Mobility Training; Adaptive Device Training; Injury Prevention;Pressure Relief  Patient Education: safe use of RW and safety potential concerns at home  REQUIRES PT FOLLOW UP: Yes  Activity Tolerance  Activity Tolerance: Patient limited by fatigue;Patient limited by pain; Patient limited by endurance; Other  Activity Tolerance: coughing spells     Patient Diagnosis(es): The primary encounter diagnosis was Fatigue, unspecified type. A diagnosis of Acute pulmonary edema (HCC) was also pertinent to this visit.      has a past medical history of Anxiety, Arthritis, Atherosclerosis, Body mass index (bmi) 25.0-25.9, adult, CHF exacerbation (HCC), COPD (chronic obstructive pulmonary disease) (HCC), CVA (cerebral vascular accident) ([facility] [address] Depression, Diabetes mellitus ([facility] [address] Diverticula, esophagus congenital, Former smoker, Hypercholesteremia, Hypertension, Hypokalemia, Joint pain, knee, Myocardial infarct, old, Pneumonia, Protein S deficiency ([facility] [address] Pulmonary embolism ([facility] [address] Reflux esophagitis, Tinea corporis, Tremor, URI (upper respiratory infection), UTI (urinary tract infection), and Zenker's diverticulum. has a past surgical history that includes Carotid endarterectomy (Bilateral); Tonsillectomy; and Endoscopy, colon, diagnostic. Restrictions  Restrictions/Precautions  Restrictions/Precautions: Fall Risk  Required Braces or Orthoses?: No  Position Activity Restriction  Other position/activity restrictions: up with assist     Subjective   General  Chart Reviewed: Yes  Response To Previous Treatment: Patient with no complaints from previous session. Family / Caregiver Present: No  Subjective  Subjective: RN and pt agreeable to PT. Pt was supine in bed upon arrival. Pt was cooperative and pleasant throughout session. Pain Screening  Patient Currently in Pain: Yes  Pain Assessment  Pain Assessment: 0-10  Pain Level: 6  Pain Type: Chronic pain  Pain Location: Knee  Pain Orientation: Right  Non-Pharmaceutical Pain Intervention(s): Repositioned;Rest;Ambulation/Increased Activity; Distraction  Response to Pain Intervention: None  Vital Signs  Patient Currently in Pain: Yes       Orientation  Orientation  Overall Orientation Status: Within Normal Limits    Objective   Bed mobility  Supine to Sit: Modified independent  Scooting: Modified independent  Comment: hob elevated; pt used the bedrail  Transfers  Sit to Stand: Contact guard assistance  Stand to sit: Contact guard assistance  Comment: used RW with cues for safe hand placement  Ambulation  Ambulation?: Yes  More Ambulation?: No  Ambulation 1  Surface: level tile  Device: Rolling Walker  Assistance: Contact guard assistance  Gait Deviations: Slow Adriana;Decreased step length;Decreased step height  Distance: ~40ft     Balance  Posture: Fair  Sitting - Static: Fair;+  Sitting - Dynamic: Fair  Standing - Static: Fair;+  Standing - Dynamic: Fair  Exercises  Hip Abduction: BLE x5  Knee Long Arc Quad: BLE x10  Ankle Pumps: BLE x10       Goals  Short term goals  Time Frame for Short term goals: 14 visits  Short term goal 1: Perform bed mobility and transfers with MIN A  Short term goal 2: Ambulate 150 ft with RW and MIN A  Short term goal 3: Demo Fair+ Dynamic and Staitc standing balance  Short term goal 4: Demo Good - posture    Plan    Plan  Times per week: 5-6x/wk  Current Treatment Recommendations: Strengthening, ROM, Balance Training, Functional Mobility Training, Transfer Training, Gait Training, Endurance Training, Safety Education & Training, Patient/Caregiver Education & Training, Home Exercise Program  Safety Devices  Type of devices:  All fall risk precautions in place, Call light within reach, Gait belt, Patient at risk for falls, Nurse notified, Left in chair  Restraints  Initially in place: No     Therapy Time   Individual Concurrent Group Co-treatment   Time In 1434         Time Out 1504         Minutes 30         Timed Code Treatment Minutes: [address] [facility]

## 2021-05-20 NOTE — PROGRESS NOTES
[facility]  Office: [address], DO, Maryconstantino Mendoza, DO, Gonzalez Shailesh, DO, John Vanegas, DO, Vernon Gaytan MD, Verenice Ortega MD, Chloé Avitia MD, Bonnielee Nissen, MD, Rachel Heredia MD, Farzana Rao MD, Chary Kan MD, Susana Arrieta MD, Tigre Ross, DO, Margaret Roberts MD, Moshe Rodriguez, DO, Melvina Leung MD,  Juan Carlos Bella DO, Sunita Delgado MD, Aj Ramos MD, Isadora Molina MD, Rolando Tuttle MD, Sindy Shah, Kacey Terrell, CNP, Rebecca Barker, CNP, Tram Miles, CNS, Hailey Rockwell, CNP, Best Cristobal, CNP, Real Membreno, CNP, Dipti Tierney, CNP, Salome Ortega, CNP, Rhea Zamora, PA-C, Rod Sousa, [facility], Estelle Chan, CNP, Henri Solano, CNP, Temitope Edwards, CNP, Jaciel Goldman, CNP, Radha Castillo, CNP, Elaine Evangelista, [address] [place] [address]    Progress Note    5/20/2021    1:42 PM    Name:   Ramón Rodriguez  MRN:     [id number]     Acct:      [de-identified]   Room:   2014/2014-01  IP Day:  5  Admit Date:  5/15/2021  9:44 AM    PCP:   Manuela Mar MD  Code Status:  DNR-CCA    Subjective:     C/C:   Chief Complaint   Patient presents with    Fatigue     Interval History Status: not changed. We discussed hospice again today, patient is a meeting this afternoon. Patient will be discharged home with 5-day course of antibiotics for aspiration pneumonia, she understands these may continue to recur due to difficult surgical issues. She has no complaints, she is currently on room air    Brief History:     79year old female past medical history for Zenker's diverticulum, protein as deficiency, pulmonary embolism, CVA, myocardial infarction, COPD, type 2 diabetes, essential hypertension presents to the emergency department after fall at home. Patient has a history of aspiration pneumonia most recently December 13 to December 18, 2020, 4/6/2021.   She has refused surgery in the past.  She presents after tripping and falling on blankets at home members brought by EMS. In the emergency department patient was found to have a WBC of 18.1, creatinine 1.61 and a BNP of 654. Chest x-ray showed pulmonary vascular congestion, patient was given 1 dose of Lasix 40 mg. Since admission patient's kidney function has worsened with diuretic therapy, patient currently on high flow nasal cannula requiring 40% FiO2    Review of Systems:     Constitutional:  negative for chills, fevers, sweats  Respiratory:  negative for cough, dyspnea on exertion, shortness of breath, wheezing  Cardiovascular:  negative for chest pain, chest pressure/discomfort, lower extremity edema, palpitations  Gastrointestinal:  negative for abdominal pain, constipation, diarrhea, nausea, vomiting  Neurological:  negative for dizziness, headache    Medications: Allergies:     Allergies   Allergen Reactions    Doxycycline Hyclate     Norco [Hydrocodone-Acetaminophen] Other (See Comments)     Nausea, dizziness    Pcn [Penicillins]     Sulfa Antibiotics        Current Meds:   Scheduled Meds:    heparin (porcine)  5,000 Units Subcutaneous 3 times per day    gabapentin  200 mg Oral TID    metroNIDAZOLE  500 mg Intravenous Q6H    [Held by provider] amLODIPine  10 mg Oral Daily    carvedilol  25 mg Oral BID    clopidogrel  75 mg Oral Daily    donepezil  10 mg Oral Nightly    [Held by provider] FLUoxetine  40 mg Oral Daily    insulin lispro  0-12 Units Subcutaneous TID WC    insulin lispro  0-6 Units Subcutaneous Nightly    pantoprazole  40 mg Oral QAM AC    tiotropium  2 puff Inhalation Daily    sodium chloride flush  5-40 mL Intravenous 2 times per day    cefTRIAXone (ROCEPHIN) IV  1,000 mg Intravenous Q24H     Continuous Infusions:    dextrose      sodium chloride       PRN Meds: glucose, dextrose, glucagon (rDNA), dextrose, albuterol, sodium chloride flush, sodium chloride, nicotine, promethazine **OR** ondansetron, polyethylene glycol, acetaminophen **OR** acetaminophen    Data:     Past Medical History:   has a past medical history of Anxiety, Arthritis, Atherosclerosis, Body mass index (bmi) 25.0-25.9, adult, CHF exacerbation ([facility]), COPD (chronic obstructive pulmonary disease) ([facility]), CVA (cerebral vascular accident) ([facility] [address] Depression, Diabetes mellitus ([facility] [address] Diverticula, esophagus congenital, Former smoker, Hypercholesteremia, Hypertension, Hypokalemia, Joint pain, knee, Myocardial infarct, old, Pneumonia, Protein S deficiency ([facility] [address] Pulmonary embolism ([facility] [address] Reflux esophagitis, Tinea corporis, Tremor, URI (upper respiratory infection), UTI (urinary tract infection), and Zenker's diverticulum. Social History:   reports that she has quit smoking. Her smoking use included cigarettes. She quit after 25.00 years of use. She has never used smokeless tobacco. She reports current alcohol use. She reports that she does not use drugs. Family History: History reviewed. No pertinent family history. Vitals:  BP (!) 153/64   Pulse 77   Temp 98.6 °F (37 °C) (Oral)   Resp 18   Ht 5' (1.524 m)   Wt 186 lb 15.2 oz (84.8 kg)   SpO2 92%   BMI 36.51 kg/m²   Temp (24hrs), Av.3 °F (36.8 °C), Min:98 °F (36.7 °C), Max:98.6 °F (37 °C)    Recent Labs     21  1146 21  1622 21  1949 21  1156   POCGLU 115* 99 207* 108*       I/O (24Hr):     Intake/Output Summary (Last 24 hours) at 2021 1342  Last data filed at 2021 1312  Gross per 24 hour   Intake 400 ml   Output 150 ml   Net 250 ml       Labs:  Hematology:  Recent Labs     21  0616 21  0609 21  0649   WBC 11.5* 8.3 8.2   RBC 3.65* 3.79* 3.83*   HGB 11.0* 11.3* 11.4*   HCT 34.3* 35.7* 35.6*   MCV 94.0 94.2 93.0   MCH 30.1 29.8 29.8   MCHC 32.1 31.7 32.0   RDW 14.2 13.9 13.7    256 275   MPV 9.8 9.6 9.5     Chemistry:  Recent Labs     21  0616 21  0609 21  0649    136 139   K 3.7 3.6* 3.9   CL 98 96* 101   CO2 27 28 28   GLUCOSE 95 102* 115*   BUN 48* 42* 34*   CREATININE 2.44* 1.96* 1.63*   MG  --  1.9 1.9   ANIONGAP 11 12 10   LABGLOM 19* 24* 30*   GFRAA 23* 30* 37*   CALCIUM 8.7 9.0 9.4   PHOS  --  3.6 3.4   PROBNP 1,345*  --  1,638*     Recent Labs     05/19/21  0609 05/19/21  0752 05/19/21  0844 05/19/21  1146 05/19/21  1622 05/19/21  1949 05/20/21  0649 05/20/21  1156   LABALBU 3.5  --   --   --   --   --  3.3*  --    POCGLU  --  92 122* 115* 99 207*  --  108*     ABG:  Lab Results   Component Value Date    POCPH 7.436 01/14/2020    POCPCO2 38.2 01/14/2020    POCPO2 71.6 01/14/2020    POCHCO3 25.7 01/14/2020    NBEA NOT REPORTED 01/14/2020    PBEA 2 01/14/2020    QDA7LMN 27 01/14/2020    XJWQ1WWK 95 01/14/2020    FIO2 NOT REPORTED 01/14/2020     Lab Results   Component Value Date/Time    SPECIAL 10 R H 05/17/2021 12:30 PM     Lab Results   Component Value Date/Time    CULTURE NO GROWTH 3 DAYS 05/17/2021 12:30 PM       Radiology:  XR CHEST (2 VW)    Result Date: 5/16/2021  No significant change in chest findings. XR CHEST (2 VW)    Result Date: 5/15/2021  Findings compatible with mild interstitial edema. XR FEMUR RIGHT (MIN 2 VIEWS)    Result Date: 5/16/2021  No acute bony process is seen involving the right femur, knee or lower leg. Degenerative changes as described above. XR KNEE RIGHT (MIN 4 VIEWS)    Result Date: 5/16/2021  No acute bony process is seen involving the right femur, knee or lower leg. Degenerative changes as described above. XR TIBIA FIBULA RIGHT (2 VIEWS)    Result Date: 5/16/2021  No acute bony process is seen involving the right femur, knee or lower leg. Degenerative changes as described above.        Physical Examination:        General appearance:  alert, cooperative and no distress  Mental Status:  oriented to person, place and time and normal affect  Lungs:  clear to auscultation bilaterally, normal effort  Heart:  regular rate and rhythm, no murmur  Abdomen:  soft, nontender, nondistended, normal bowel sounds, no masses, hepatomegaly, splenomegaly  Extremities:  no edema, redness, tenderness in the calves  Skin:  no gross lesions, rashes, induration    Assessment:        Hospital Problems         Last Modified POA    * (Principal) Recurrent aspiration pneumonia ([address] (Chronic) 5/19/2021 Yes    COPD (chronic obstructive pulmonary disease) ([address] 5/15/2021 Yes    Essential hypertension (Chronic) 5/15/2021 Yes    Type 2 diabetes mellitus with kidney complication, with long-term current use of insulin ([address] 5/15/2021 Yes    Stage 4 chronic kidney disease ([address] 5/15/2021 Yes    Zenker diverticulum 5/15/2021 Yes    Overview Signed 5/15/2021 12:34 PM by LUIS Mora - CNS     Video swallow 12/12/2020 showed penetration of thin and mildly thickened consistencies resulting from reflux caused by Zenker's diverticulum         Pulmonary edema cardiac cause ([address] 5/19/2021 Yes          Plan:        1. Recurrent aspiration pneumonia secondary to Zenker's diverticula- completed 3-day course of IV antibiotics for aspiration pneumonia, will transition to oral medications discharge today  2. Zenker's diverticulum-we had a long discussion regarding surgical treatment, she does not want any further surgical evaluation. She understands that she will likely continue to have aspiration events which could cause her eventual death. She is agreeable with an informational meeting with hospice  3. Acute kidney injury -restart ACE inhibitor at discharge, back to baseline  4. History of VTE, Protein S Deficiency -unclear why the patient is not on anticoagulation. Will hold for now  5. History of myocardial infarction-check 2D echocardiogram  6. History of CVA, continue antiplatelet therapy  7. Essential hypertension  8. Type 2 diabetes  9. COPD  10. Discharge plan: Hospice meeting this afternoon, CHIQUITA resolved, acute respiratory failure resolved.   Will discharge home on antibiotics today after hospice meeting.     Izzy Campbell DO  5/20/2021  1:42 PM

## 2021-05-20 NOTE — PLAN OF CARE
Problem: Skin Integrity:  Goal: Will show no infection signs and symptoms  Description: Will show no infection signs and symptoms  Outcome: Ongoing  Goal: Absence of new skin breakdown  Description: Absence of new skin breakdown  Outcome: Ongoing     Problem: Falls - Risk of:  Goal: Will remain free from falls  Description: Will remain free from falls  Outcome: Ongoing  Goal: Absence of physical injury  Description: Absence of physical injury  Outcome: Ongoing     Problem: Pain:  Goal: Pain level will decrease  Description: Pain level will decrease  Outcome: Ongoing  Goal: Control of acute pain  Description: Control of acute pain  Outcome: Ongoing  Goal: Control of chronic pain  Description: Control of chronic pain  Outcome: Ongoing     Problem: Gas Exchange - Impaired:  Goal: Levels of oxygenation will improve  Description: Levels of oxygenation will improve  Outcome: Ongoing

## 2021-05-20 NOTE — CARE COORDINATION
[facility] into see patient. Not wanting to go to a SNF, wants to go home with hospice.  Set up to go tomorrow-hospice will supply walker with wheels

## 2021-05-20 NOTE — PROGRESS NOTES
Benitez Leung was evaluated today and a DME order was entered for a wheeled walker because she requires this to successfully complete daily living tasks of eating, bathing and personal cares. A wheeled walker is necessary due to the patient's unsteady gait, upper body weakness, and inability to  an ambulation device; and she can ambulate only by pushing a walker instead of a lesser assistive device such as a cane, crutch, or standard walker. The need for this equipment was discussed with the patient and she understands and is in agreement.

## 2021-05-21 VITALS
TEMPERATURE: 97.8 F | OXYGEN SATURATION: 96 % | DIASTOLIC BLOOD PRESSURE: 94 MMHG | HEIGHT: 60 IN | BODY MASS INDEX: 37.35 KG/M2 | WEIGHT: 190.26 LBS | HEART RATE: 79 BPM | SYSTOLIC BLOOD PRESSURE: 183 MMHG | RESPIRATION RATE: 18 BRPM

## 2021-05-21 LAB
ALBUMIN SERPL-MCNC: 3.3 G/DL (ref 3.5–5.2)
ANION GAP SERPL CALCULATED.3IONS-SCNC: 11 MMOL/L (ref 9–17)
BUN BLDV-MCNC: 29 MG/DL (ref 8–23)
BUN/CREAT BLD: ABNORMAL (ref 9–20)
CALCIUM SERPL-MCNC: 9.3 MG/DL (ref 8.6–10.4)
CHLORIDE BLD-SCNC: 102 MMOL/L (ref 98–107)
CO2: 26 MMOL/L (ref 20–31)
CREAT SERPL-MCNC: 1.65 MG/DL (ref 0.5–0.9)
CULTURE: NORMAL
CULTURE: NORMAL
GFR AFRICAN AMERICAN: 36 ML/MIN
GFR NON-AFRICAN AMERICAN: 30 ML/MIN
GFR SERPL CREATININE-BSD FRML MDRD: ABNORMAL ML/MIN/{1.73_M2}
GFR SERPL CREATININE-BSD FRML MDRD: ABNORMAL ML/MIN/{1.73_M2}
GLUCOSE BLD-MCNC: 117 MG/DL (ref 70–99)
GLUCOSE BLD-MCNC: 122 MG/DL (ref 65–105)
HCT VFR BLD CALC: 39.1 % (ref 36.3–47.1)
HEMOGLOBIN: 12.2 G/DL (ref 11.9–15.1)
Lab: NORMAL
Lab: NORMAL
MAGNESIUM: 2 MG/DL (ref 1.6–2.6)
MCH RBC QN AUTO: 29.5 PG (ref 25.2–33.5)
MCHC RBC AUTO-ENTMCNC: 31.2 G/DL (ref 28.4–34.8)
MCV RBC AUTO: 94.7 FL (ref 82.6–102.9)
NRBC AUTOMATED: 0 PER 100 WBC
PDW BLD-RTO: 13.9 % (ref 11.8–14.4)
PHOSPHORUS: 3.1 MG/DL (ref 2.6–4.5)
PLATELET # BLD: 281 K/UL (ref 138–453)
PMV BLD AUTO: 9.6 FL (ref 8.1–13.5)
POTASSIUM SERPL-SCNC: 4.2 MMOL/L (ref 3.7–5.3)
RBC # BLD: 4.13 M/UL (ref 3.95–5.11)
SODIUM BLD-SCNC: 139 MMOL/L (ref 135–144)
SPECIMEN DESCRIPTION: NORMAL
SPECIMEN DESCRIPTION: NORMAL
WBC # BLD: 7.8 K/UL (ref 3.5–11.3)

## 2021-05-21 PROCEDURE — 2700000000 HC OXYGEN THERAPY PER DAY

## 2021-05-21 PROCEDURE — 6370000000 HC RX 637 (ALT 250 FOR IP): Performed by: CLINICAL NURSE SPECIALIST

## 2021-05-21 PROCEDURE — 99238 HOSP IP/OBS DSCHRG MGMT 30/<: CPT | Performed by: INTERNAL MEDICINE

## 2021-05-21 PROCEDURE — 6370000000 HC RX 637 (ALT 250 FOR IP): Performed by: INTERNAL MEDICINE

## 2021-05-21 PROCEDURE — 2500000003 HC RX 250 WO HCPCS: Performed by: INTERNAL MEDICINE

## 2021-05-21 PROCEDURE — 85027 COMPLETE CBC AUTOMATED: CPT

## 2021-05-21 PROCEDURE — 36415 COLL VENOUS BLD VENIPUNCTURE: CPT

## 2021-05-21 PROCEDURE — 83735 ASSAY OF MAGNESIUM: CPT

## 2021-05-21 PROCEDURE — 6360000002 HC RX W HCPCS: Performed by: INTERNAL MEDICINE

## 2021-05-21 PROCEDURE — 80069 RENAL FUNCTION PANEL: CPT

## 2021-05-21 PROCEDURE — 94640 AIRWAY INHALATION TREATMENT: CPT

## 2021-05-21 PROCEDURE — 82947 ASSAY GLUCOSE BLOOD QUANT: CPT

## 2021-05-21 PROCEDURE — 94761 N-INVAS EAR/PLS OXIMETRY MLT: CPT

## 2021-05-21 RX ADMIN — CARVEDILOL 25 MG: 12.5 TABLET, FILM COATED ORAL at 09:08

## 2021-05-21 RX ADMIN — TIOTROPIUM BROMIDE INHALATION SPRAY 2 PUFF: 3.12 SPRAY, METERED RESPIRATORY (INHALATION) at 08:35

## 2021-05-21 RX ADMIN — METRONIDAZOLE 500 MG: 500 INJECTION, SOLUTION INTRAVENOUS at 10:49

## 2021-05-21 RX ADMIN — METRONIDAZOLE 500 MG: 500 INJECTION, SOLUTION INTRAVENOUS at 04:23

## 2021-05-21 RX ADMIN — AMLODIPINE BESYLATE 10 MG: 10 TABLET ORAL at 09:09

## 2021-05-21 RX ADMIN — GABAPENTIN 200 MG: 100 CAPSULE ORAL at 09:07

## 2021-05-21 RX ADMIN — CLOPIDOGREL 75 MG: 75 TABLET, FILM COATED ORAL at 09:08

## 2021-05-21 RX ADMIN — HEPARIN SODIUM 5000 UNITS: 5000 INJECTION INTRAVENOUS; SUBCUTANEOUS at 06:14

## 2021-05-21 RX ADMIN — PANTOPRAZOLE SODIUM 40 MG: 40 TABLET, DELAYED RELEASE ORAL at 09:08

## 2021-05-21 ASSESSMENT — PAIN SCALES - GENERAL: PAINLEVEL_OUTOF10: 0

## 2021-05-21 NOTE — PROGRESS NOTES
[facility]  Office: [address], DO, Calvin Browning, DO, Nae Lopez, DO, Que Wagnerelen Vanegas, DO, Terence Jones MD, Aviva Mliian MD, Nneka Mcnamara MD, Willette Klinefelter, MD, Macrina Brannon MD, Martha Donato MD, Cheri Brady MD, Sherron Rosado MD, Joe Zapata, DO, Glen Chand MD, Rosette Felton DO, Jl Bradley MD,  Louis Julian DO, Shawn Florez MD, Ana Simmons MD, Laura Barry MD, Demi Mejia MD, Queenie Dutta, Yomi Springer, CNP, Tommy Dill, CNP, Yulia Lanier, CNS, Susan James, CNP, Nova Franklin, CNP, Xochitl Higgins, CNP, Alondra Angel, CNP, Elisha Stafford, CNP, Dainal Mckeon PA-C, Donovan Weeks, [facility], Waleska Culp, CNP, Carlota Redd, CNP, Mario Olivares, CNP, Marland Cowden, CNP, Laly Orr, CNP, Prashant Aly, [facility]    Progress Note    5/21/2021    10:10 AM    Name:   Hernandez Reynaga  MRN:     [id number]     Acct:      [de-identified]   Room:   2014/2014-01   Day:  6  Admit Date:  5/15/2021  9:44 AM    PCP:   Nay Mora MD  Code Status:  DNR-CCA    Subjective:     C/C:   Chief Complaint   Patient presents with    Fatigue     Interval History Status: not changed. Patient was seen and examined, elected for hospice yesterday and will discharge to home hospice. Patient with no complaints overnight, discussed outpatient follow-up with PCP and antibiotics. All questions were answered and patient was discharged    Brief History:     79year old female past medical history for Zenker's diverticulum, protein as deficiency, pulmonary embolism, CVA, myocardial infarction, COPD, type 2 diabetes, essential hypertension presents to the emergency department after fall at home. Patient has a history of aspiration pneumonia most recently December 13 to December 18, 2020, 4/6/2021.   She has refused surgery in the past.  She presents after tripping and falling on blankets at home members brought by EMS. In the emergency department patient was found to have a WBC of 18.1, creatinine 1.61 and a BNP of 654. Chest x-ray showed pulmonary vascular congestion, patient was given 1 dose of Lasix 40 mg. Since admission patient's kidney function has worsened with diuretic therapy, patient currently on high flow nasal cannula requiring 40% FiO2    Review of Systems:     Constitutional:  negative for chills, fevers, sweats  Respiratory:  negative for cough, dyspnea on exertion, shortness of breath, wheezing  Cardiovascular:  negative for chest pain, chest pressure/discomfort, lower extremity edema, palpitations  Gastrointestinal:  negative for abdominal pain, constipation, diarrhea, nausea, vomiting  Neurological:  negative for dizziness, headache    Medications: Allergies:     Allergies   Allergen Reactions    Doxycycline Hyclate     Norco [Hydrocodone-Acetaminophen] Other (See Comments)     Nausea, dizziness    Pcn [Penicillins]     Sulfa Antibiotics        Current Meds:   Scheduled Meds:    heparin (porcine)  5,000 Units Subcutaneous 3 times per day    gabapentin  200 mg Oral TID    metroNIDAZOLE  500 mg Intravenous Q6H    amLODIPine  10 mg Oral Daily    carvedilol  25 mg Oral BID    clopidogrel  75 mg Oral Daily    donepezil  10 mg Oral Nightly    [Held by provider] FLUoxetine  40 mg Oral Daily    insulin lispro  0-12 Units Subcutaneous TID WC    insulin lispro  0-6 Units Subcutaneous Nightly    pantoprazole  40 mg Oral QAM AC    tiotropium  2 puff Inhalation Daily    sodium chloride flush  5-40 mL Intravenous 2 times per day    cefTRIAXone (ROCEPHIN) IV  1,000 mg Intravenous Q24H     Continuous Infusions:    dextrose      sodium chloride       PRN Meds: glucose, dextrose, glucagon (rDNA), dextrose, albuterol, sodium chloride flush, sodium chloride, nicotine, promethazine **OR** ondansetron, polyethylene glycol, acetaminophen **OR** acetaminophen    Data:     Past Medical History:   has a past medical history of Anxiety, Arthritis, Atherosclerosis, Body mass index (bmi) 25.0-25.9, adult, CHF exacerbation ([facility]), COPD (chronic obstructive pulmonary disease) ([facility]), CVA (cerebral vascular accident) ([address] Depression, Diabetes mellitus ([address] Diverticula, esophagus congenital, Former smoker, Hypercholesteremia, Hypertension, Hypokalemia, Joint pain, knee, Myocardial infarct, old, Pneumonia, Protein S deficiency ([address] Pulmonary embolism ([address] Reflux esophagitis, Tinea corporis, Tremor, URI (upper respiratory infection), UTI (urinary tract infection), and Zenker's diverticulum. Social History:   reports that she has quit smoking. Her smoking use included cigarettes. She quit after 25.00 years of use. She has never used smokeless tobacco. She reports current alcohol use. She reports that she does not use drugs. Family History: History reviewed. No pertinent family history. Vitals:  BP (!) 183/94   Pulse 79   Temp 97.8 °F (36.6 °C) (Oral)   Resp 18   Ht 5' (1.524 m)   Wt 190 lb 4.1 oz (86.3 kg)   SpO2 96%   BMI 37.16 kg/m²   Temp (24hrs), Av.2 °F (36.8 °C), Min:97.8 °F (36.6 °C), Max:98.6 °F (37 °C)    Recent Labs     21  1156 21  1609 21  1956 21  0723   POCGLU 108* 125* 105 122*       I/O (24Hr):     Intake/Output Summary (Last 24 hours) at 2021 1010  Last data filed at 2021 1834  Gross per 24 hour   Intake 500 ml   Output 600 ml   Net -100 ml       Labs:  Hematology:  Recent Labs     21  0609 21  0649 21  0659   WBC 8.3 8.2 7.8   RBC 3.79* 3.83* 4.13   HGB 11.3* 11.4* 12.2   HCT 35.7* 35.6* 39.1   MCV 94.2 93.0 94.7   MCH 29.8 29.8 29.5   MCHC 31.7 32.0 31.2   RDW 13.9 13.7 13.9    275 281   MPV 9.6 9.5 9.6     Chemistry:  Recent Labs     21  0609 21  0649 21  0659    139 139   K 3.6* 3.9 4.2   CL 96* 101 102   CO2 28 28 26   GLUCOSE 102* 115* 117*   BUN 42* 34* 29*   CREATININE 1.96* 1.63* 1.65*   MG 1.9 1.9 2.0   ANIONGAP 12 10 11   LABGLOM 24* 30* 30*   GFRAA 30* 37* 36*   CALCIUM 9.0 9.4 9.3   PHOS 3.6 3.4 3.1   PROBNP  --  1,638*  --      Recent Labs     05/19/21  0609 05/19/21  1622 05/19/21  1949 05/20/21  0649 05/20/21  1156 05/20/21  1609 05/20/21  1956 05/21/21  0659 05/21/21  0723   LABALBU 3.5  --   --  3.3*  --   --   --  3.3*  --    POCGLU  --  99 207*  --  108* 125* 105  --  122*     ABG:  Lab Results   Component Value Date    POCPH 7.436 01/14/2020    POCPCO2 38.2 01/14/2020    POCPO2 71.6 01/14/2020    POCHCO3 25.7 01/14/2020    NBEA NOT REPORTED 01/14/2020    PBEA 2 01/14/2020    NPJ2VZM 27 01/14/2020    LJPE8BOS 95 01/14/2020    FIO2 NOT REPORTED 01/14/2020     Lab Results   Component Value Date/Time    SPECIAL 10 R H 05/17/2021 12:30 PM     Lab Results   Component Value Date/Time    CULTURE NO GROWTH 4 DAYS 05/17/2021 12:30 PM       Radiology:  XR CHEST (2 VW)    Result Date: 5/16/2021  No significant change in chest findings. XR CHEST (2 VW)    Result Date: 5/15/2021  Findings compatible with mild interstitial edema. XR FEMUR RIGHT (MIN 2 VIEWS)    Result Date: 5/16/2021  No acute bony process is seen involving the right femur, knee or lower leg. Degenerative changes as described above. XR KNEE RIGHT (MIN 4 VIEWS)    Result Date: 5/16/2021  No acute bony process is seen involving the right femur, knee or lower leg. Degenerative changes as described above. XR TIBIA FIBULA RIGHT (2 VIEWS)    Result Date: 5/16/2021  No acute bony process is seen involving the right femur, knee or lower leg. Degenerative changes as described above.        Physical Examination:        General appearance:  alert, cooperative and no distress  Mental Status:  oriented to person, place and time and normal affect  Lungs:  clear to auscultation bilaterally, normal effort  Heart:  regular rate and rhythm, no murmur  Abdomen:  soft, nontender, nondistended, normal bowel sounds, no masses, hepatomegaly, splenomegaly  Extremities:  no edema, redness, tenderness in the calves  Skin:  no gross lesions, rashes, induration    Assessment:        Hospital Problems         Last Modified POA    * (Principal) Recurrent aspiration pneumonia ([address] (Chronic) 5/19/2021 Yes    COPD (chronic obstructive pulmonary disease) ([address] 5/15/2021 Yes    Essential hypertension (Chronic) 5/15/2021 Yes    Type 2 diabetes mellitus with kidney complication, with long-term current use of insulin ([address] 5/15/2021 Yes    Stage 4 chronic kidney disease ([address] 5/15/2021 Yes    Zenker diverticulum 5/15/2021 Yes    Overview Signed 5/15/2021 12:34 PM by LUIS Etienne - CNS     Video swallow 12/12/2020 showed penetration of thin and mildly thickened consistencies resulting from reflux caused by Zenker's diverticulum         Pulmonary edema cardiac cause ([address] 5/19/2021 Yes          Plan:        1. Recurrent aspiration pneumonia secondary to Zenker's diverticula-patient was discharged with moxifloxacin for aspiration pneumonia. Follow-up with primary care physician outpatient  2. Zenker's diverticulum-we had a long discussion regarding surgical treatment, she does not want any further surgical evaluation. She understands that she will likely continue to have aspiration events which could cause her eventual death. She is agreeable with an informational meeting with hospice  3. Acute kidney injury -restart ACE inhibitor at discharge, back to baseline  4. History of VTE, Protein S Deficiency -unclear why the patient is not on anticoagulation. Will hold for now  5. History of myocardial infarction-check 2D echocardiogram  6. History of CVA, continue antiplatelet therapy  7. Essential hypertension  8. Type 2 diabetes  9. COPD  10. Discharge plan: Patient elected for home hospice, continue 3 additional days of oral antibiotics. Kidney function improved and was restarted on her ACE inhibitor.     Matt Austin DO  5/21/2021  10:10

## 2021-05-21 NOTE — PROGRESS NOTES
Discharge instructions given to patient, patient verbalized understanding regarding medications,activity and follow up appointments. Prescriptions sent with patient.

## 2021-05-21 NOTE — DISCHARGE SUMMARY
[facility]  Office: [address], , Abbi Bourgeois, DO, Ashlyn Rick, DO, Guy Chang Guilherme, DO, Raisa Howe MD, Kale Peres MD, Vahid Haque MD, Norma Anderson MD, Cielo Desai MD, Leonel Duran MD, Edita Xie MD, Tim Lima MD, Shoshana Adan, DO, Selina Sethi MD, Mennie Holter, DO, Scott Floyd MD,  Praful Espinosa, DO, Nohemy Gaviria MD, Tami Hudson MD, Christ Bermeo MD, Avery Hartman MD, [address], [facility], [facility], CNP, Homero Rodriguez, [facility], Heather Kang, CNS, Fercho Scott, CNP, Esther Joyner, CNP, Emory Lincoln, CNP, Nico Brice, CNP, Sobeida Guthrie, CNP, Karon Matthews PA-C, Lona Marion, [facility], Melonie Reaves, CNP, Maynor Pryor, CNP, Luis Koch, CNP, Laurie Ferreira, CNP, Emir Fermin, [facility], Arlene Galarza, [address] [facility]. [address] [facility]    Discharge Summary     Patient ID: Syd Felton  :  1940   MRN: [id number]     ACCOUNT:  [de-identified]   Patient's PCP: Karan Billy MD  Admit Date: 5/15/2021   Discharge Date: 2021     Length of Stay: 6  Code Status:  DNR-CCA  Admitting Physician: Tami Hudson MD  Discharge Physician: Mennie Holter, DO     Active Discharge Diagnoses:     Hospital Problem Lists:  Principal Problem:    Recurrent aspiration pneumonia ([facility]  Active Problems:    COPD (chronic obstructive pulmonary disease) ([facility]    Essential hypertension    Type 2 diabetes mellitus with kidney complication, with long-term current use of insulin ([facility]    Stage 4 chronic kidney disease ([facility]    Zenker diverticulum    Pulmonary edema cardiac cause ([facility]  Resolved Problems:    * No resolved hospital problems.  *      Admission Condition:  fair     Discharged Condition: fair    Hospital Stay:     Hospital Course:  Syd Felton is a 79 y.o. female who was admitted for the management of   Recurrent aspiration pneumonia ([facility] , presented to ER with Fatigue    79year-old female the past medical history for Zenker diverticulum presents to emergency department for shortness of breath and fatigue. Patient has been evaluated multiple times in the past for her Zenker diverticula, she was recommended surgery but due to the high risk and size of her diverticula she opted for medical management. Patient has had 2 other hospitalizations for aspiration pneumonia likely attributed to his Zenker's diverticula. Patient was started on IV antibiotics and tolerated therapy well. Initially she was requiring high flow nasal cannula to maintain her oxygenation but eventually improved and weaned to room air. We discussed her multiple hospitalizations and we introduced the idea of palliative care and hospice. Patient met with hospice on 5/20/2021 and decided to enroll outpatient. Patient was discharged home to continue hospice care at home, CODE STATUS was also discussed and she elected for DNR CCA.         Significant therapeutic interventions: none    Significant Diagnostic Studies:   Labs / Micro:  CBC:   Lab Results   Component Value Date    WBC 7.8 05/21/2021    RBC 4.13 05/21/2021    HGB 12.2 05/21/2021    HCT 39.1 05/21/2021    MCV 94.7 05/21/2021    MCH 29.5 05/21/2021    MCHC 31.2 05/21/2021    RDW 13.9 05/21/2021     05/21/2021     BMP:    Lab Results   Component Value Date    GLUCOSE 117 05/21/2021     05/21/2021    K 4.2 05/21/2021     05/21/2021    CO2 26 05/21/2021    ANIONGAP 11 05/21/2021    BUN 29 05/21/2021    CREATININE 1.65 05/21/2021    BUNCRER NOT REPORTED 05/21/2021    CALCIUM 9.3 05/21/2021    LABGLOM 30 05/21/2021    GFRAA 36 05/21/2021    GFR      05/21/2021    GFR NOT REPORTED 05/21/2021     CMP:    Lab Results   Component Value Date    GLUCOSE 117 05/21/2021     05/21/2021    K 4.2 05/21/2021     05/21/2021    CO2 26 05/21/2021    BUN 29 05/21/2021    CREATININE 1.65 05/21/2021    ANIONGAP 11 05/21/2021    ALKPHOS 61 05/15/2021    ALT 11 05/15/2021 AST 18 05/15/2021    BILITOT 0.47 05/15/2021    LABALBU 3.3 05/21/2021    ALBUMIN 1.1 05/15/2021    LABGLOM 30 05/21/2021    GFRAA 36 05/21/2021    GFR      05/21/2021    GFR NOT REPORTED 05/21/2021    PROT 7.4 05/15/2021    CALCIUM 9.3 05/21/2021      Radiology:  XR CHEST (2 VW)    Result Date: 5/16/2021  No significant change in chest findings. XR CHEST (2 VW)    Result Date: 5/15/2021  Findings compatible with mild interstitial edema. XR FEMUR RIGHT (MIN 2 VIEWS)    Result Date: 5/16/2021  No acute bony process is seen involving the right femur, knee or lower leg. Degenerative changes as described above. XR KNEE RIGHT (3 VIEWS)    Result Date: 5/16/2021  No acute bony process is seen involving the right femur, knee or lower leg. Degenerative changes as described above. XR TIBIA FIBULA RIGHT (2 VIEWS)    Result Date: 5/16/2021  No acute bony process is seen involving the right femur, knee or lower leg. Degenerative changes as described above. US RENAL COMPLETE    Result Date: 5/18/2021  Atrophic left kidney with stones but no evidence for obstructive uropathy. Normal right kidney Normal bladder     XR CHEST PORTABLE    Result Date: 5/16/2021  Mild edema or pneumonitis     FL MODIFIED BARIUM SWALLOW W VIDEO    Result Date: 5/18/2021  Normal swallowing of thick liquid, thin liquid and puree however a significant amount collects in a Zenker's diverticulum which is consequently refluxed back into the pharynx, part of which is then aspirated. No laryngeal penetration or aspiration of soft solid. Please see separate speech pathology report for full discussion of findings and recommendations.        Consultations:    Consults:     Final Specialist Recommendations/Findings:   IP CONSULT TO HOSPITALIST  IP CONSULT TO DIETITIAN  IP CONSULT TO HOSPICE      The patient was seen and examined on day of discharge and this discharge summary is in conjunction with any daily progress note from day of discharge.     Discharge plan:     Disposition: Home    Physician Follow Up:     [id number]  [phone]    Schedule an appointment as soon as possible for a visit in 1 week  follow up appointment after hospital discharge       Requiring Further Evaluation/Follow Up POST HOSPITALIZATION/Incidental Findings: none    Diet: regular diet    Activity: As tolerated    Instructions to Patient: none    Discharge Medications:      Medication List        START taking these medications      moxifloxacin 400 MG tablet  Commonly known as: AVELOX  Take 1 tablet by mouth daily for 5 days            CONTINUE taking these medications      albuterol sulfate  (90 Base) MCG/ACT inhaler  Commonly known as: Ventolin HFA  Inhale 2 puffs into the lungs every 6 hours as needed for Wheezing or Shortness of Breath     ALPRAZolam 0.5 MG tablet  Commonly known as: XANAX     amLODIPine 10 MG tablet  Commonly known as: NORVASC  Take 1 tablet by mouth daily     carvedilol 25 MG tablet  Commonly known as: COREG     clopidogrel 75 MG tablet  Commonly known as: PLAVIX     cyclobenzaprine 10 MG tablet  Commonly known as: FLEXERIL     donepezil 10 MG tablet  Commonly known as: ARICEPT  Take 1 tablet by mouth nightly     FLUoxetine 20 MG/5ML solution  Commonly known as: PROZAC  Take 10 mLs by mouth daily     gabapentin 300 MG capsule  Commonly known as: NEURONTIN     glipiZIDE 5 MG tablet  Commonly known as: Glucotrol  Take 0.5 tablets by mouth daily     lisinopril 5 MG tablet  Commonly known as: PRINIVIL;ZESTRIL     omeprazole 20 MG delayed release capsule  Commonly known as: PRILOSEC     * tiotropium 18 MCG inhalation capsule  Commonly known as: SPIRIVA  Inhale 1 capsule into the lungs daily     * tiotropium 2.5 MCG/ACT Aers inhaler  Commonly known as: Spiriva Respimat  Inhale 2 puffs into the lungs daily           * This list has 2 medication(s) that are the same as other medications prescribed for you. Read the directions carefully, and ask your doctor or other care provider to review them with you. Where to Get Your Medications        These medications were sent to [facility] [address] [facility], [address] [facility]  [address], [address] [address]      Phone: [phone]   moxifloxacin 400 MG tablet         No discharge procedures on file. Time Spent on discharge is  38 mins in patient examination, evaluation, counseling as well as medication reconciliation, prescriptions for required medications, discharge plan and follow up. Electronically signed by   Brigid Cummings DO  5/21/2021  10:11 AM      Thank you Dr. Autumn John MD for the opportunity to be involved in this patient's care.

## 2021-05-21 NOTE — PROGRESS NOTES
CLINICAL PHARMACY NOTE: MEDS TO BEDS    Total # of Prescriptions Filled: 1   The following medications were delivered to the patient:  · Moxifloxacin 400mg    Additional Documentation: delivered to patient in room 2014 5/21 at 12:25pm. Co-pay paid with cash ($7.50).  ROSENDO

## 2021-05-23 LAB
CULTURE: NORMAL
Lab: NORMAL
SPECIMEN DESCRIPTION: NORMAL

## 2022-09-20 ENCOUNTER — APPOINTMENT (OUTPATIENT)
Dept: GENERAL RADIOLOGY | Age: 82
DRG: 280 | End: 2022-09-20
Payer: MEDICARE

## 2022-09-20 ENCOUNTER — APPOINTMENT (OUTPATIENT)
Dept: VASCULAR LAB | Age: 82
DRG: 280 | End: 2022-09-20
Payer: MEDICARE

## 2022-09-20 ENCOUNTER — HOSPITAL ENCOUNTER (INPATIENT)
Age: 82
LOS: 3 days | Discharge: HOME HEALTH | DRG: 280 | End: 2022-09-23
Attending: STUDENT IN AN ORGANIZED HEALTH CARE EDUCATION/TRAINING PROGRAM | Admitting: INTERNAL MEDICINE
Payer: MEDICARE

## 2022-09-20 ENCOUNTER — APPOINTMENT (OUTPATIENT)
Dept: NON INVASIVE DIAGNOSTICS | Age: 82
DRG: 280 | End: 2022-09-20
Payer: MEDICARE

## 2022-09-20 DIAGNOSIS — I50.33 ACUTE ON CHRONIC DIASTOLIC CONGESTIVE HEART FAILURE (HCC): ICD-10-CM

## 2022-09-20 DIAGNOSIS — I50.43 ACUTE ON CHRONIC COMBINED SYSTOLIC AND DIASTOLIC CONGESTIVE HEART FAILURE (HCC): ICD-10-CM

## 2022-09-20 DIAGNOSIS — R09.02 HYPOXIA: Primary | ICD-10-CM

## 2022-09-20 DIAGNOSIS — N30.00 ACUTE CYSTITIS WITHOUT HEMATURIA: ICD-10-CM

## 2022-09-20 DIAGNOSIS — I50.9 CONGESTIVE HEART FAILURE, UNSPECIFIED HF CHRONICITY, UNSPECIFIED HEART FAILURE TYPE (HCC): ICD-10-CM

## 2022-09-20 PROBLEM — I48.91 ATRIAL FIBRILLATION (HCC): Status: ACTIVE | Noted: 2022-09-20

## 2022-09-20 PROBLEM — I50.1 PULMONARY EDEMA WITH CONGESTIVE HEART FAILURE (HCC): Status: ACTIVE | Noted: 2022-09-20

## 2022-09-20 PROBLEM — J96.02 ACUTE RESPIRATORY FAILURE WITH HYPOXIA AND HYPERCAPNIA (HCC): Status: ACTIVE | Noted: 2022-09-20

## 2022-09-20 PROBLEM — J96.01 ACUTE RESPIRATORY FAILURE WITH HYPOXIA AND HYPERCAPNIA (HCC): Status: ACTIVE | Noted: 2022-09-20

## 2022-09-20 PROBLEM — I21.4 NSTEMI (NON-ST ELEVATED MYOCARDIAL INFARCTION) (HCC): Status: ACTIVE | Noted: 2022-09-20

## 2022-09-20 PROBLEM — I25.10 CAD (CORONARY ARTERY DISEASE): Status: ACTIVE | Noted: 2022-09-20

## 2022-09-20 PROBLEM — N39.0 UTI (URINARY TRACT INFECTION): Status: ACTIVE | Noted: 2022-09-20

## 2022-09-20 LAB
ABSOLUTE EOS #: 0.4 K/UL (ref 0–0.4)
ABSOLUTE LYMPH #: 2.7 K/UL (ref 1–4.8)
ABSOLUTE MONO #: 0.8 K/UL (ref 0.1–1.3)
ADENOVIRUS PCR: NOT DETECTED
ALBUMIN SERPL-MCNC: 3.7 G/DL (ref 3.5–5.2)
ALLEN TEST: ABNORMAL
ALLEN TEST: ABNORMAL
ALP BLD-CCNC: 103 U/L (ref 35–104)
ALT SERPL-CCNC: 9 U/L (ref 5–33)
ANION GAP SERPL CALCULATED.3IONS-SCNC: 12 MMOL/L (ref 9–17)
ANION GAP SERPL CALCULATED.3IONS-SCNC: 16 MMOL/L (ref 9–17)
AST SERPL-CCNC: 24 U/L
BACTERIA: ABNORMAL
BASOPHILS # BLD: 1 % (ref 0–2)
BASOPHILS ABSOLUTE: 0.1 K/UL (ref 0–0.2)
BILIRUB SERPL-MCNC: 0.4 MG/DL (ref 0.3–1.2)
BILIRUBIN URINE: NEGATIVE
BORDETELLA PARAPERTUSSIS: NOT DETECTED
BORDETELLA PERTUSSIS PCR: NOT DETECTED
BUN BLDV-MCNC: 46 MG/DL (ref 8–23)
BUN BLDV-MCNC: 47 MG/DL (ref 8–23)
C-REACTIVE PROTEIN: 47.7 MG/L (ref 0–5)
CALCIUM SERPL-MCNC: 9.2 MG/DL (ref 8.6–10.4)
CALCIUM SERPL-MCNC: 9.7 MG/DL (ref 8.6–10.4)
CARBOXYHEMOGLOBIN: 0.9 % (ref 0–5)
CHLAMYDIA PNEUMONIAE BY PCR: NOT DETECTED
CHLORIDE BLD-SCNC: 95 MMOL/L (ref 98–107)
CHLORIDE BLD-SCNC: 97 MMOL/L (ref 98–107)
CO2: 23 MMOL/L (ref 20–31)
CO2: 25 MMOL/L (ref 20–31)
COLOR: YELLOW
CORONAVIRUS 229E PCR: NOT DETECTED
CORONAVIRUS HKU1 PCR: NOT DETECTED
CORONAVIRUS NL63 PCR: NOT DETECTED
CORONAVIRUS OC43 PCR: NOT DETECTED
CREAT SERPL-MCNC: 2.14 MG/DL (ref 0.5–0.9)
CREAT SERPL-MCNC: 2.14 MG/DL (ref 0.5–0.9)
EKG ATRIAL RATE: 122 BPM
EKG P AXIS: 68 DEGREES
EKG P-R INTERVAL: 156 MS
EKG Q-T INTERVAL: 310 MS
EKG QRS DURATION: 100 MS
EKG QTC CALCULATION (BAZETT): 441 MS
EKG R AXIS: 61 DEGREES
EKG T AXIS: 97 DEGREES
EKG VENTRICULAR RATE: 122 BPM
EOSINOPHILS RELATIVE PERCENT: 3 % (ref 0–4)
EPITHELIAL CELLS UA: ABNORMAL /HPF
GFR AFRICAN AMERICAN: 27 ML/MIN
GFR AFRICAN AMERICAN: 27 ML/MIN
GFR NON-AFRICAN AMERICAN: 22 ML/MIN
GFR NON-AFRICAN AMERICAN: 22 ML/MIN
GFR SERPL CREATININE-BSD FRML MDRD: ABNORMAL ML/MIN/{1.73_M2}
GFR SERPL CREATININE-BSD FRML MDRD: ABNORMAL ML/MIN/{1.73_M2}
GLUCOSE BLD-MCNC: 147 MG/DL (ref 65–105)
GLUCOSE BLD-MCNC: 152 MG/DL (ref 65–105)
GLUCOSE BLD-MCNC: 157 MG/DL (ref 65–105)
GLUCOSE BLD-MCNC: 157 MG/DL (ref 65–105)
GLUCOSE BLD-MCNC: 177 MG/DL (ref 70–99)
GLUCOSE BLD-MCNC: 282 MG/DL (ref 70–99)
GLUCOSE URINE: NEGATIVE
HCO3 ARTERIAL: 24.5 MMOL/L (ref 22–26)
HCO3 ARTERIAL: 25.6 MMOL/L (ref 22–26)
HCT VFR BLD CALC: 36.8 % (ref 36–46)
HCT VFR BLD CALC: 40.5 % (ref 36–46)
HEMOGLOBIN: 11.9 G/DL (ref 12–16)
HEMOGLOBIN: 12.9 G/DL (ref 12–16)
HUMAN METAPNEUMOVIRUS PCR: NOT DETECTED
INFLUENZA A BY PCR: NOT DETECTED
INFLUENZA A: NOT DETECTED
INFLUENZA B BY PCR: NOT DETECTED
INFLUENZA B: NOT DETECTED
INR BLD: 1.1
KETONES, URINE: NEGATIVE
LACTIC ACID: 2.2 MMOL/L (ref 0.5–2.2)
LACTIC ACID: 4.1 MMOL/L (ref 0.5–2.2)
LEUKOCYTE ESTERASE, URINE: ABNORMAL
LIPASE: 57 U/L (ref 13–60)
LV EF: 25 %
LVEF MODALITY: NORMAL
LYMPHOCYTES # BLD: 19 % (ref 24–44)
MAGNESIUM: 3.4 MG/DL (ref 1.6–2.6)
MCH RBC QN AUTO: 28.4 PG (ref 26–34)
MCH RBC QN AUTO: 28.4 PG (ref 26–34)
MCHC RBC AUTO-ENTMCNC: 31.9 G/DL (ref 31–37)
MCHC RBC AUTO-ENTMCNC: 32.3 G/DL (ref 31–37)
MCV RBC AUTO: 88 FL (ref 80–100)
MCV RBC AUTO: 89.2 FL (ref 80–100)
METHEMOGLOBIN: 0.5 % (ref 0–1.9)
METHEMOGLOBIN: 0.9 % (ref 0–1.9)
MONOCYTES # BLD: 6 % (ref 1–7)
MYCOPLASMA PNEUMONIAE PCR: NOT DETECTED
NEGATIVE BASE EXCESS, ART: 3 MMOL/L (ref 0–2)
NITRITE, URINE: NEGATIVE
O2 DEVICE/FLOW/%: ABNORMAL
O2 DEVICE/FLOW/%: ABNORMAL
O2 SAT, ARTERIAL: 93.8 % (ref 95–98)
O2 SAT, ARTERIAL: 94.7 % (ref 95–98)
PARAINFLUENZA 1 PCR: NOT DETECTED
PARAINFLUENZA 2 PCR: NOT DETECTED
PARAINFLUENZA 3 PCR: NOT DETECTED
PARAINFLUENZA 4 PCR: NOT DETECTED
PATIENT TEMP: 37
PATIENT TEMP: 37
PCO2 ARTERIAL: 35.1 MMHG (ref 35–45)
PCO2 ARTERIAL: 58.2 MMHG (ref 35–45)
PDW BLD-RTO: 16.4 % (ref 11.5–14.9)
PDW BLD-RTO: 16.7 % (ref 11.5–14.9)
PH ARTERIAL: 7.23 (ref 7.35–7.45)
PH ARTERIAL: 7.47 (ref 7.35–7.45)
PH UA: 5 (ref 5–8)
PLATELET # BLD: 305 K/UL (ref 150–450)
PLATELET # BLD: 421 K/UL (ref 150–450)
PMV BLD AUTO: 7.3 FL (ref 6–12)
PMV BLD AUTO: 7.5 FL (ref 6–12)
PO2 ARTERIAL: 78.9 MMHG (ref 80–100)
PO2 ARTERIAL: 94.7 MMHG (ref 80–100)
POSITIVE BASE EXCESS, ART: 1.9 MMOL/L (ref 0–2)
POTASSIUM SERPL-SCNC: 4.3 MMOL/L (ref 3.7–5.3)
POTASSIUM SERPL-SCNC: 4.4 MMOL/L (ref 3.7–5.3)
PRO-BNP: ABNORMAL PG/ML
PROCALCITONIN: 0.15 NG/ML
PROTEIN UA: ABNORMAL
PROTHROMBIN TIME: 13.9 SEC (ref 11.8–14.6)
PT. POSITION: ABNORMAL
PT. POSITION: ABNORMAL
RBC # BLD: 4.18 M/UL (ref 4–5.2)
RBC # BLD: 4.55 M/UL (ref 4–5.2)
RBC UA: ABNORMAL /HPF
RESP SYNCYTIAL VIRUS PCR: NOT DETECTED
RESPIRATORY RATE: 16
RESPIRATORY RATE: 26
RHINO/ENTEROVIRUS PCR: NOT DETECTED
SAMPLE SITE: ABNORMAL
SAMPLE SITE: ABNORMAL
SARS-COV-2 RNA, RT PCR: NOT DETECTED
SARS-COV-2, PCR: NOT DETECTED
SEDIMENTATION RATE, ERYTHROCYTE: 89 MM/HR (ref 0–30)
SEG NEUTROPHILS: 71 % (ref 36–66)
SEGMENTED NEUTROPHILS ABSOLUTE COUNT: 10.1 K/UL (ref 1.3–9.1)
SODIUM BLD-SCNC: 134 MMOL/L (ref 135–144)
SODIUM BLD-SCNC: 134 MMOL/L (ref 135–144)
SOURCE: NORMAL
SPECIFIC GRAVITY UA: 1.02 (ref 1–1.03)
SPECIMEN DESCRIPTION: NORMAL
SPECIMEN DESCRIPTION: NORMAL
TEXT FOR RESPIRATORY: ABNORMAL
TOTAL PROTEIN: 8.6 G/DL (ref 6.4–8.3)
TROPONIN, HIGH SENSITIVITY: 203 NG/L (ref 0–14)
TROPONIN, HIGH SENSITIVITY: 46 NG/L (ref 0–14)
TROPONIN, HIGH SENSITIVITY: 47 NG/L (ref 0–14)
TURBIDITY: ABNORMAL
URINE HGB: ABNORMAL
UROBILINOGEN, URINE: NORMAL
WBC # BLD: 14.1 K/UL (ref 3.5–11)
WBC # BLD: 15 K/UL (ref 3.5–11)
WBC UA: ABNORMAL /HPF

## 2022-09-20 PROCEDURE — 94761 N-INVAS EAR/PLS OXIMETRY MLT: CPT

## 2022-09-20 PROCEDURE — 96366 THER/PROPH/DIAG IV INF ADDON: CPT

## 2022-09-20 PROCEDURE — 36600 WITHDRAWAL OF ARTERIAL BLOOD: CPT

## 2022-09-20 PROCEDURE — 6370000000 HC RX 637 (ALT 250 FOR IP): Performed by: STUDENT IN AN ORGANIZED HEALTH CARE EDUCATION/TRAINING PROGRAM

## 2022-09-20 PROCEDURE — C8929 TTE W OR WO FOL WCON,DOPPLER: HCPCS

## 2022-09-20 PROCEDURE — 83735 ASSAY OF MAGNESIUM: CPT

## 2022-09-20 PROCEDURE — 80048 BASIC METABOLIC PNL TOTAL CA: CPT

## 2022-09-20 PROCEDURE — 2700000000 HC OXYGEN THERAPY PER DAY

## 2022-09-20 PROCEDURE — 99285 EMERGENCY DEPT VISIT HI MDM: CPT

## 2022-09-20 PROCEDURE — 83605 ASSAY OF LACTIC ACID: CPT

## 2022-09-20 PROCEDURE — 6360000002 HC RX W HCPCS: Performed by: STUDENT IN AN ORGANIZED HEALTH CARE EDUCATION/TRAINING PROGRAM

## 2022-09-20 PROCEDURE — 0202U NFCT DS 22 TRGT SARS-COV-2: CPT

## 2022-09-20 PROCEDURE — 83880 ASSAY OF NATRIURETIC PEPTIDE: CPT

## 2022-09-20 PROCEDURE — 96375 TX/PRO/DX INJ NEW DRUG ADDON: CPT

## 2022-09-20 PROCEDURE — 36415 COLL VENOUS BLD VENIPUNCTURE: CPT

## 2022-09-20 PROCEDURE — 2580000003 HC RX 258: Performed by: STUDENT IN AN ORGANIZED HEALTH CARE EDUCATION/TRAINING PROGRAM

## 2022-09-20 PROCEDURE — 87040 BLOOD CULTURE FOR BACTERIA: CPT

## 2022-09-20 PROCEDURE — 2580000003 HC RX 258: Performed by: NURSE PRACTITIONER

## 2022-09-20 PROCEDURE — 86140 C-REACTIVE PROTEIN: CPT

## 2022-09-20 PROCEDURE — 84145 PROCALCITONIN (PCT): CPT

## 2022-09-20 PROCEDURE — 96365 THER/PROPH/DIAG IV INF INIT: CPT

## 2022-09-20 PROCEDURE — 93970 EXTREMITY STUDY: CPT

## 2022-09-20 PROCEDURE — 87636 SARSCOV2 & INF A&B AMP PRB: CPT

## 2022-09-20 PROCEDURE — 93005 ELECTROCARDIOGRAM TRACING: CPT | Performed by: STUDENT IN AN ORGANIZED HEALTH CARE EDUCATION/TRAINING PROGRAM

## 2022-09-20 PROCEDURE — 51702 INSERT TEMP BLADDER CATH: CPT

## 2022-09-20 PROCEDURE — 85027 COMPLETE CBC AUTOMATED: CPT

## 2022-09-20 PROCEDURE — 2500000003 HC RX 250 WO HCPCS: Performed by: STUDENT IN AN ORGANIZED HEALTH CARE EDUCATION/TRAINING PROGRAM

## 2022-09-20 PROCEDURE — 71045 X-RAY EXAM CHEST 1 VIEW: CPT

## 2022-09-20 PROCEDURE — 82805 BLOOD GASES W/O2 SATURATION: CPT

## 2022-09-20 PROCEDURE — 85610 PROTHROMBIN TIME: CPT

## 2022-09-20 PROCEDURE — 87186 SC STD MICRODIL/AGAR DIL: CPT

## 2022-09-20 PROCEDURE — 87088 URINE BACTERIA CULTURE: CPT

## 2022-09-20 PROCEDURE — 84484 ASSAY OF TROPONIN QUANT: CPT

## 2022-09-20 PROCEDURE — 6360000004 HC RX CONTRAST MEDICATION: Performed by: INTERNAL MEDICINE

## 2022-09-20 PROCEDURE — 87086 URINE CULTURE/COLONY COUNT: CPT

## 2022-09-20 PROCEDURE — 87641 MR-STAPH DNA AMP PROBE: CPT

## 2022-09-20 PROCEDURE — 80053 COMPREHEN METABOLIC PANEL: CPT

## 2022-09-20 PROCEDURE — 6360000002 HC RX W HCPCS: Performed by: NURSE PRACTITIONER

## 2022-09-20 PROCEDURE — 82947 ASSAY GLUCOSE BLOOD QUANT: CPT

## 2022-09-20 PROCEDURE — 99291 CRITICAL CARE FIRST HOUR: CPT | Performed by: INTERNAL MEDICINE

## 2022-09-20 PROCEDURE — 2060000000 HC ICU INTERMEDIATE R&B

## 2022-09-20 PROCEDURE — 93010 ELECTROCARDIOGRAM REPORT: CPT | Performed by: INTERNAL MEDICINE

## 2022-09-20 PROCEDURE — 85652 RBC SED RATE AUTOMATED: CPT

## 2022-09-20 PROCEDURE — 94660 CPAP INITIATION&MGMT: CPT

## 2022-09-20 PROCEDURE — 83690 ASSAY OF LIPASE: CPT

## 2022-09-20 PROCEDURE — 85025 COMPLETE CBC W/AUTO DIFF WBC: CPT

## 2022-09-20 PROCEDURE — 81001 URINALYSIS AUTO W/SCOPE: CPT

## 2022-09-20 RX ORDER — ALBUTEROL SULFATE 90 UG/1
2 AEROSOL, METERED RESPIRATORY (INHALATION) EVERY 6 HOURS PRN
Status: CANCELLED | OUTPATIENT
Start: 2022-09-20

## 2022-09-20 RX ORDER — FUROSEMIDE 10 MG/ML
40 INJECTION INTRAMUSCULAR; INTRAVENOUS ONCE
Status: COMPLETED | OUTPATIENT
Start: 2022-09-20 | End: 2022-09-20

## 2022-09-20 RX ORDER — IPRATROPIUM BROMIDE AND ALBUTEROL SULFATE 2.5; .5 MG/3ML; MG/3ML
1 SOLUTION RESPIRATORY (INHALATION)
Status: DISCONTINUED | OUTPATIENT
Start: 2022-09-20 | End: 2022-09-20

## 2022-09-20 RX ORDER — NITROGLYCERIN 20 MG/100ML
5-200 INJECTION INTRAVENOUS CONTINUOUS
Status: DISCONTINUED | OUTPATIENT
Start: 2022-09-20 | End: 2022-09-20

## 2022-09-20 RX ORDER — SODIUM CHLORIDE 0.9 % (FLUSH) 0.9 %
5-40 SYRINGE (ML) INJECTION EVERY 12 HOURS SCHEDULED
Status: DISCONTINUED | OUTPATIENT
Start: 2022-09-20 | End: 2022-09-23 | Stop reason: HOSPADM

## 2022-09-20 RX ORDER — BUMETANIDE 1 MG/1
2 TABLET ORAL DAILY
Status: DISCONTINUED | OUTPATIENT
Start: 2022-09-20 | End: 2022-09-21

## 2022-09-20 RX ORDER — ASPIRIN 81 MG/1
324 TABLET, CHEWABLE ORAL ONCE
Status: COMPLETED | OUTPATIENT
Start: 2022-09-20 | End: 2022-09-20

## 2022-09-20 RX ORDER — HYDRALAZINE HYDROCHLORIDE 25 MG/1
25 TABLET, FILM COATED ORAL 2 TIMES DAILY
Status: DISCONTINUED | OUTPATIENT
Start: 2022-09-20 | End: 2022-09-23 | Stop reason: HOSPADM

## 2022-09-20 RX ORDER — NITROGLYCERIN 0.4 MG/1
0.4 TABLET SUBLINGUAL ONCE
Status: COMPLETED | OUTPATIENT
Start: 2022-09-20 | End: 2022-09-20

## 2022-09-20 RX ORDER — CARVEDILOL 3.12 MG/1
3.12 TABLET ORAL 2 TIMES DAILY
Status: CANCELLED | OUTPATIENT
Start: 2022-09-20

## 2022-09-20 RX ORDER — ACETAMINOPHEN 325 MG/1
650 TABLET ORAL EVERY 6 HOURS PRN
Status: DISCONTINUED | OUTPATIENT
Start: 2022-09-20 | End: 2022-09-23 | Stop reason: HOSPADM

## 2022-09-20 RX ORDER — GABAPENTIN 300 MG/1
300 CAPSULE ORAL 3 TIMES DAILY
Status: DISCONTINUED | OUTPATIENT
Start: 2022-09-20 | End: 2022-09-21

## 2022-09-20 RX ORDER — ONDANSETRON 2 MG/ML
4 INJECTION INTRAMUSCULAR; INTRAVENOUS EVERY 6 HOURS PRN
Status: DISCONTINUED | OUTPATIENT
Start: 2022-09-20 | End: 2022-09-23 | Stop reason: HOSPADM

## 2022-09-20 RX ORDER — LISINOPRIL 5 MG/1
5 TABLET ORAL DAILY
Status: CANCELLED | OUTPATIENT
Start: 2022-09-20

## 2022-09-20 RX ORDER — LISINOPRIL 5 MG/1
5 TABLET ORAL DAILY
Status: DISCONTINUED | OUTPATIENT
Start: 2022-09-20 | End: 2022-09-20

## 2022-09-20 RX ORDER — DONEPEZIL HYDROCHLORIDE 10 MG/1
10 TABLET, FILM COATED ORAL NIGHTLY
Status: DISCONTINUED | OUTPATIENT
Start: 2022-09-20 | End: 2022-09-23 | Stop reason: HOSPADM

## 2022-09-20 RX ORDER — ONDANSETRON 4 MG/1
4 TABLET, ORALLY DISINTEGRATING ORAL EVERY 8 HOURS PRN
Status: DISCONTINUED | OUTPATIENT
Start: 2022-09-20 | End: 2022-09-23 | Stop reason: HOSPADM

## 2022-09-20 RX ORDER — INSULIN LISPRO 100 [IU]/ML
0-4 INJECTION, SOLUTION INTRAVENOUS; SUBCUTANEOUS
Status: DISCONTINUED | OUTPATIENT
Start: 2022-09-20 | End: 2022-09-23 | Stop reason: HOSPADM

## 2022-09-20 RX ORDER — FLUOXETINE HYDROCHLORIDE 20 MG/5ML
40 LIQUID ORAL DAILY
Status: CANCELLED | OUTPATIENT
Start: 2022-09-20

## 2022-09-20 RX ORDER — INSULIN GLARGINE 100 [IU]/ML
10 INJECTION, SOLUTION SUBCUTANEOUS NIGHTLY
Status: DISCONTINUED | OUTPATIENT
Start: 2022-09-20 | End: 2022-09-23 | Stop reason: HOSPADM

## 2022-09-20 RX ORDER — INSULIN LISPRO 100 [IU]/ML
0-4 INJECTION, SOLUTION INTRAVENOUS; SUBCUTANEOUS NIGHTLY
Status: CANCELLED | OUTPATIENT
Start: 2022-09-20

## 2022-09-20 RX ORDER — DONEPEZIL HYDROCHLORIDE 10 MG/1
10 TABLET, FILM COATED ORAL NIGHTLY
Status: CANCELLED | OUTPATIENT
Start: 2022-09-20

## 2022-09-20 RX ORDER — CLOPIDOGREL BISULFATE 75 MG/1
75 TABLET ORAL DAILY
Status: DISCONTINUED | OUTPATIENT
Start: 2022-09-20 | End: 2022-09-23 | Stop reason: HOSPADM

## 2022-09-20 RX ORDER — HYDRALAZINE HYDROCHLORIDE 25 MG/1
25 TABLET, FILM COATED ORAL 2 TIMES DAILY
Status: CANCELLED | OUTPATIENT
Start: 2022-09-20

## 2022-09-20 RX ORDER — ALPRAZOLAM 0.25 MG/1
0.25 TABLET ORAL NIGHTLY PRN
Status: DISCONTINUED | OUTPATIENT
Start: 2022-09-20 | End: 2022-09-23 | Stop reason: HOSPADM

## 2022-09-20 RX ORDER — DEXTROSE MONOHYDRATE 100 MG/ML
INJECTION, SOLUTION INTRAVENOUS CONTINUOUS PRN
Status: DISCONTINUED | OUTPATIENT
Start: 2022-09-20 | End: 2022-09-23 | Stop reason: HOSPADM

## 2022-09-20 RX ORDER — ALBUTEROL SULFATE 90 UG/1
2 AEROSOL, METERED RESPIRATORY (INHALATION) EVERY 6 HOURS PRN
Status: DISCONTINUED | OUTPATIENT
Start: 2022-09-20 | End: 2022-09-20

## 2022-09-20 RX ORDER — DEXTROSE MONOHYDRATE 100 MG/ML
INJECTION, SOLUTION INTRAVENOUS CONTINUOUS PRN
Status: DISCONTINUED | OUTPATIENT
Start: 2022-09-20 | End: 2022-09-20 | Stop reason: SDUPTHER

## 2022-09-20 RX ORDER — SODIUM CHLORIDE 9 MG/ML
INJECTION, SOLUTION INTRAVENOUS PRN
Status: DISCONTINUED | OUTPATIENT
Start: 2022-09-20 | End: 2022-09-23 | Stop reason: HOSPADM

## 2022-09-20 RX ORDER — CLOPIDOGREL BISULFATE 75 MG/1
75 TABLET ORAL DAILY
Status: CANCELLED | OUTPATIENT
Start: 2022-09-20

## 2022-09-20 RX ORDER — INSULIN LISPRO 100 [IU]/ML
0-4 INJECTION, SOLUTION INTRAVENOUS; SUBCUTANEOUS
Status: CANCELLED | OUTPATIENT
Start: 2022-09-20

## 2022-09-20 RX ORDER — INSULIN LISPRO 100 [IU]/ML
0-4 INJECTION, SOLUTION INTRAVENOUS; SUBCUTANEOUS
Status: DISCONTINUED | OUTPATIENT
Start: 2022-09-20 | End: 2022-09-20 | Stop reason: SDUPTHER

## 2022-09-20 RX ORDER — ENOXAPARIN SODIUM 100 MG/ML
40 INJECTION SUBCUTANEOUS DAILY
Status: DISCONTINUED | OUTPATIENT
Start: 2022-09-20 | End: 2022-09-20

## 2022-09-20 RX ORDER — INSULIN LISPRO 100 [IU]/ML
0-4 INJECTION, SOLUTION INTRAVENOUS; SUBCUTANEOUS NIGHTLY
Status: DISCONTINUED | OUTPATIENT
Start: 2022-09-20 | End: 2022-09-23 | Stop reason: HOSPADM

## 2022-09-20 RX ORDER — CARVEDILOL 3.12 MG/1
3.12 TABLET ORAL 2 TIMES DAILY
Status: DISCONTINUED | OUTPATIENT
Start: 2022-09-20 | End: 2022-09-23 | Stop reason: HOSPADM

## 2022-09-20 RX ORDER — FLUOXETINE HYDROCHLORIDE 20 MG/1
40 CAPSULE ORAL DAILY
Status: DISCONTINUED | OUTPATIENT
Start: 2022-09-20 | End: 2022-09-23 | Stop reason: HOSPADM

## 2022-09-20 RX ORDER — BUMETANIDE 1 MG/1
2 TABLET ORAL DAILY
Status: CANCELLED | OUTPATIENT
Start: 2022-09-20

## 2022-09-20 RX ORDER — HEPARIN SODIUM 5000 [USP'U]/ML
5000 INJECTION, SOLUTION INTRAVENOUS; SUBCUTANEOUS EVERY 8 HOURS SCHEDULED
Status: DISCONTINUED | OUTPATIENT
Start: 2022-09-20 | End: 2022-09-21

## 2022-09-20 RX ORDER — INSULIN LISPRO 100 [IU]/ML
0-4 INJECTION, SOLUTION INTRAVENOUS; SUBCUTANEOUS NIGHTLY
Status: DISCONTINUED | OUTPATIENT
Start: 2022-09-20 | End: 2022-09-20 | Stop reason: SDUPTHER

## 2022-09-20 RX ORDER — BUMETANIDE 1 MG/1
2 TABLET ORAL DAILY
COMMUNITY

## 2022-09-20 RX ORDER — HYDRALAZINE HYDROCHLORIDE 25 MG/1
25 TABLET, FILM COATED ORAL 2 TIMES DAILY
COMMUNITY

## 2022-09-20 RX ORDER — ALPRAZOLAM 0.25 MG/1
0.25 TABLET ORAL NIGHTLY PRN
Status: CANCELLED | OUTPATIENT
Start: 2022-09-20

## 2022-09-20 RX ORDER — SODIUM CHLORIDE 0.9 % (FLUSH) 0.9 %
10 SYRINGE (ML) INJECTION PRN
Status: DISCONTINUED | OUTPATIENT
Start: 2022-09-20 | End: 2022-09-23 | Stop reason: HOSPADM

## 2022-09-20 RX ORDER — FUROSEMIDE 10 MG/ML
40 INJECTION INTRAMUSCULAR; INTRAVENOUS 2 TIMES DAILY
Status: DISCONTINUED | OUTPATIENT
Start: 2022-09-20 | End: 2022-09-20

## 2022-09-20 RX ORDER — AMLODIPINE BESYLATE 5 MG/1
10 TABLET ORAL DAILY
Status: CANCELLED | OUTPATIENT
Start: 2022-09-20

## 2022-09-20 RX ORDER — DEXTROSE MONOHYDRATE 100 MG/ML
INJECTION, SOLUTION INTRAVENOUS CONTINUOUS PRN
Status: CANCELLED | OUTPATIENT
Start: 2022-09-20

## 2022-09-20 RX ORDER — DOXYCYCLINE HYCLATE 50 MG/1
324 CAPSULE, GELATIN COATED ORAL
COMMUNITY

## 2022-09-20 RX ORDER — ACETAMINOPHEN 650 MG/1
650 SUPPOSITORY RECTAL EVERY 6 HOURS PRN
Status: DISCONTINUED | OUTPATIENT
Start: 2022-09-20 | End: 2022-09-23 | Stop reason: HOSPADM

## 2022-09-20 RX ORDER — FUROSEMIDE 10 MG/ML
40 INJECTION INTRAMUSCULAR; INTRAVENOUS DAILY
Status: DISCONTINUED | OUTPATIENT
Start: 2022-09-20 | End: 2022-09-20

## 2022-09-20 RX ORDER — GABAPENTIN 300 MG/1
300 CAPSULE ORAL 3 TIMES DAILY
Status: CANCELLED | OUTPATIENT
Start: 2022-09-20

## 2022-09-20 RX ORDER — AMLODIPINE BESYLATE 10 MG/1
10 TABLET ORAL DAILY
Status: DISCONTINUED | OUTPATIENT
Start: 2022-09-20 | End: 2022-09-23 | Stop reason: HOSPADM

## 2022-09-20 RX ADMIN — CLOPIDOGREL BISULFATE 75 MG: 75 TABLET ORAL at 09:36

## 2022-09-20 RX ADMIN — CARVEDILOL 3.12 MG: 3.12 TABLET, FILM COATED ORAL at 09:36

## 2022-09-20 RX ADMIN — DONEPEZIL HYDROCHLORIDE 10 MG: 10 TABLET, FILM COATED ORAL at 20:20

## 2022-09-20 RX ADMIN — CEFEPIME 2000 MG: 2 INJECTION, POWDER, FOR SOLUTION INTRAVENOUS at 04:22

## 2022-09-20 RX ADMIN — CARVEDILOL 3.12 MG: 3.12 TABLET, FILM COATED ORAL at 20:20

## 2022-09-20 RX ADMIN — FUROSEMIDE 40 MG: 10 INJECTION, SOLUTION INTRAMUSCULAR; INTRAVENOUS at 09:36

## 2022-09-20 RX ADMIN — ASPIRIN 81 MG 324 MG: 81 TABLET ORAL at 02:25

## 2022-09-20 RX ADMIN — SODIUM CHLORIDE, PRESERVATIVE FREE 10 ML: 5 INJECTION INTRAVENOUS at 20:22

## 2022-09-20 RX ADMIN — FUROSEMIDE 40 MG: 10 INJECTION, SOLUTION INTRAMUSCULAR; INTRAVENOUS at 02:25

## 2022-09-20 RX ADMIN — AZITHROMYCIN DIHYDRATE 500 MG: 500 INJECTION, POWDER, LYOPHILIZED, FOR SOLUTION INTRAVENOUS at 12:00

## 2022-09-20 RX ADMIN — NITROGLYCERIN 200 MCG/MIN: 20 INJECTION INTRAVENOUS at 00:29

## 2022-09-20 RX ADMIN — FLUOXETINE 40 MG: 20 CAPSULE ORAL at 09:36

## 2022-09-20 RX ADMIN — HEPARIN SODIUM 5000 UNITS: 5000 INJECTION INTRAVENOUS; SUBCUTANEOUS at 21:42

## 2022-09-20 RX ADMIN — HYDRALAZINE HYDROCHLORIDE 25 MG: 25 TABLET, FILM COATED ORAL at 20:20

## 2022-09-20 RX ADMIN — AMLODIPINE BESYLATE 10 MG: 10 TABLET ORAL at 09:37

## 2022-09-20 RX ADMIN — HEPARIN SODIUM 5000 UNITS: 5000 INJECTION INTRAVENOUS; SUBCUTANEOUS at 15:50

## 2022-09-20 RX ADMIN — SODIUM CHLORIDE, PRESERVATIVE FREE 10 ML: 5 INJECTION INTRAVENOUS at 10:10

## 2022-09-20 RX ADMIN — SODIUM CHLORIDE: 9 INJECTION, SOLUTION INTRAVENOUS at 11:57

## 2022-09-20 RX ADMIN — DOXYCYCLINE 100 MG: 100 INJECTION, POWDER, LYOPHILIZED, FOR SOLUTION INTRAVENOUS at 15:54

## 2022-09-20 RX ADMIN — NITROGLYCERIN 0.4 MG: 0.4 TABLET SUBLINGUAL at 00:19

## 2022-09-20 RX ADMIN — PERFLUTREN 1.65 MG: 6.52 INJECTION, SUSPENSION INTRAVENOUS at 11:10

## 2022-09-20 RX ADMIN — ALPRAZOLAM 0.25 MG: 0.25 TABLET ORAL at 23:46

## 2022-09-20 RX ADMIN — GABAPENTIN 300 MG: 300 CAPSULE ORAL at 20:20

## 2022-09-20 RX ADMIN — GABAPENTIN 300 MG: 300 CAPSULE ORAL at 15:50

## 2022-09-20 RX ADMIN — CEFEPIME 1000 MG: 1 INJECTION, POWDER, FOR SOLUTION INTRAMUSCULAR; INTRAVENOUS at 16:57

## 2022-09-20 RX ADMIN — HYDRALAZINE HYDROCHLORIDE 25 MG: 25 TABLET, FILM COATED ORAL at 09:37

## 2022-09-20 ASSESSMENT — PAIN SCALES - GENERAL
PAINLEVEL_OUTOF10: 0

## 2022-09-20 ASSESSMENT — ENCOUNTER SYMPTOMS
EYE DISCHARGE: 0
DIARRHEA: 0
ABDOMINAL PAIN: 0
RHINORRHEA: 0
EYE REDNESS: 0
NAUSEA: 0
SORE THROAT: 0
VOMITING: 0
SHORTNESS OF BREATH: 1

## 2022-09-20 ASSESSMENT — PAIN - FUNCTIONAL ASSESSMENT: PAIN_FUNCTIONAL_ASSESSMENT: NONE - DENIES PAIN

## 2022-09-20 NOTE — ED PROVIDER NOTES
EMERGENCY DEPARTMENT ENCOUNTER    Pt Name: Giles Couch  MRN: [id number]  [place] 1940  Date of evaluation: 9/20/22  CHIEF COMPLAINT       Chief Complaint   Patient presents with    Shortness of Breath     HISTORY OF PRESENT ILLNESS   79year-old history of hypertension, hyperlipidemia, diabetes, COPD and CHF presents with shortness of breath    Patient had acute onset of shortness of breath this evening    Speaking in one-word sentences    Stating she is having difficulty with breathing and leg swelling    No fevers chills productive cough    No abdominal pain vomiting diarrhea    She is a DNR CCA            REVIEW OF SYSTEMS     Review of Systems   Constitutional:  Negative for chills and fever. HENT:  Negative for rhinorrhea and sore throat. Eyes:  Negative for discharge and redness. Respiratory:  Positive for shortness of breath. Cardiovascular:  Negative for chest pain. Gastrointestinal:  Negative for abdominal pain, diarrhea, nausea and vomiting. Genitourinary:  Negative for dysuria, frequency and urgency. Musculoskeletal:  Negative for arthralgias and myalgias. Skin:  Negative for rash. Neurological:  Negative for weakness and numbness. Psychiatric/Behavioral:  Negative for suicidal ideas. All other systems reviewed and are negative.   PASTMEDICAL HISTORY     Past Medical History:   Diagnosis Date    Anxiety     Arthritis     Atherosclerosis     Body mass index (bmi) 25.0-25.9, adult     CHF exacerbation ([address] 1/13/2020    COPD (chronic obstructive pulmonary disease) (HCC)     CVA (cerebral vascular accident) ([address]     Depression     Diabetes mellitus ([address]     Diverticula, esophagus congenital     Former smoker     Hypercholesteremia     Hypertension     Hypokalemia     Joint pain, knee     Myocardial infarct, old     2007    Pneumonia     Pneumonia due to infectious organism, unspecified laterality, unspecified part of lung    Protein S deficiency ([address]     Pulmonary embolism ([facility]     Reflux esophagitis     Tinea corporis     Tremor     URI (upper respiratory infection)     UTI (urinary tract infection)     Zenker's diverticulum      Past Problem List  Patient Active Problem List   Diagnosis Code    Pneumonia due to organism J18.9    COPD (chronic obstructive pulmonary disease) ([facility] J44.9    Diabetes mellitus ([facility] E11.9    Essential hypertension I10    Simple chronic bronchitis (HCC) J41.0    Type 2 diabetes mellitus with kidney complication, with long-term current use of insulin ([facility]) E11.29, Z79.4    Stage 4 chronic kidney disease (HCC) N18.4    Aspiration pneumonitis ([facility]) J69.0    Lactic acidosis E87.2    Hypertensive urgency I16.0    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    CHF exacerbation ([facility]) I50.9    Severe sepsis ([facility]) A41.9, R65.20    Acute-on-chronic kidney injury ([facility] N17.9, N18.9    Pulmonary edema cardiac cause ([facility]) I50.1    Recurrent aspiration pneumonia ([facility]) J69.0    Fatigue R53.83    Pulmonary edema with congestive heart failure ([facility]) I50.1     SURGICAL HISTORY       Past Surgical History:   Procedure Laterality Date    CAROTID ENDARTERECTOMY Bilateral     ENDOSCOPY, COLON, DIAGNOSTIC      TONSILLECTOMY       CURRENT MEDICATIONS       Previous Medications    ALBUTEROL SULFATE HFA (VENTOLIN HFA) 108 (90 BASE) MCG/ACT INHALER    Inhale 2 puffs into the lungs every 6 hours as needed for Wheezing or Shortness of Breath    ALPRAZOLAM (XANAX) 0.5 MG TABLET    Take 0.5 mg by mouth nightly as needed for Sleep.     AMLODIPINE (NORVASC) 10 MG TABLET    Take 1 tablet by mouth daily    CARVEDILOL (COREG) 25 MG TABLET    Take 25 mg by mouth 2 times daily    CLOPIDOGREL (PLAVIX) 75 MG TABLET    Take 75 mg by mouth daily    CYCLOBENZAPRINE (FLEXERIL) 10 MG TABLET    Take 10 mg by mouth 3 times daily as needed for Muscle spasms    DONEPEZIL (ARICEPT) 10 MG TABLET    Take 1 tablet by mouth nightly    FLUOXETINE (PROZAC) 20 MG/5ML SOLUTION    Take 10 mLs by mouth daily    GABAPENTIN to person, place, and time. Psychiatric:         Mood and Affect: Mood normal.       MEDICAL DECISION MAKINyear-old presents with acute onset shortness of breath    Upon arrival patient is tachypneic speaking in one-word sentences hypoxic to 77% on initial CPAP by EMS    She has bilateral lower extremity swelling, crackles on examination bedside ultrasound shows diffuse B-lines she is hypertensive    High suspicion at this time will be flash pulmonary edema and acute on chronic heart failure    Differential also includes COPD, pneumonia    Immediately placed on BiPAP started on nitro drip    She is a DNR CCA I confirmed this with the patient and thus we will not intubate  ED Course as of 22 0334   e Sep 20, 2022   0254 CBC with Auto Differential(!):    WBC 14.1(!)   RBC 4.55   Hemoglobin Quant 12.9   Hematocrit 40.5   MCV 89.2   MCH 28.4   MCHC 31.9   RDW 16.7(!)   Platelet Count 831   MPV 7.3   Seg Neutrophils 71(!)   Lymphocytes 19(!)   Monocytes 6   Eosinophils % 3   Basophils 1   Segs Absolute 10.10(!)   Absolute Lymph # 2.70   Absolute Mono # 0.80   Absolute Eos # 0.40   Basophils Absolute 0.10  Leukocytosis [[place]]   0254 CMP(!):    Glucose, Random 282(!)   BUN,BUNPL 46(!)   Creatinine 2.14(!)   CALCIUM, SERUM, 387328 9.7   Sodium 134(!)   Potassium 4.3   Chloride 95(!)   CO2 23   Anion Gap 16   Alk Phos 103   ALT 9   AST 24   Bilirubin 0.4   Total Protein 8.6(!)   Albumin 3.7   GFR Non- 22(!)   GFR  27(!)   GFR Comment       CHIQUITA otherwise normal [[place]]   0254 Lipase:    Lipase 57  Normal [[place]]   0254 COVID-19 & Influenza Combo:    Specimen Description . NASOPHARYNGEAL SWAB   SOURCE, [id number] . NASOPHARYNGEAL SWAB   SARS-CoV-2 RNA, RT PCR Not Detected   INFLUENZA A Not Detected   INFLUENZA B Not Detected  Normal [[place]]   0320 Lactic Acid(!):    Lactic Acid 4.1(!)  Elevated I suspect work of breathing but we will give antibiotics and obtain cultures and repeat now that patient is more comfortable with her respirations [[place]]   0320 COVID-19 & Influenza Combo:    Specimen Description . NASOPHARYNGEAL SWAB   SOURCE, [id number] . NASOPHARYNGEAL SWAB   SARS-CoV-2 RNA, RT PCR Not Detected   INFLUENZA A Not Detected   INFLUENZA B Not Detected  Normal [[place]]   0320 Brain Natriuretic Peptide(!):    Pro-BNP 30,575(!)  Significantly elevated [[place]]   0320 Troponin Now and Q 1 Hour(!):    Troponin, High Sensitivity 47(!)  Mildly elevated but stable [[place]]   0320 Microscopic Urinalysis(!):    WBC, UA TOO NUMEROUS TO COUNT   RBC, UA TOO NUMEROUS TO COUNT   Epithelial Cells, UA 6 TO 9   Bacteria, UA MANY(!)  UTI giving antibiotics [[place]]   0320 XR CHEST PORTABLE  Pulmonary edema versus atypical pneumonia [[place]]   0326 Discussed with Dr. Yelitza Prater Pulmonary who accepts to ICU [[place]]   0332 Discussed with Dr. Joe Pepper who agrees with current plan [[place]]      ED Course User Index  [[place]] Amber Caceres MD       CRITICAL CARE:     Due to the immediate potential for life-threatening deterioration due to hypoxic respiratory failure , I spent 51 minutes providing critical care. This time is excluding time spent performing procedures.      PROCEDURES:    EKG 12 Lead    Date/Time: 9/20/2022 1:26 AM  Performed by: Amber Caceres MD  Authorized by: Amber Caceres MD     ECG reviewed by ED Physician in the absence of a cardiologist: yes    Interpretation:     Interpretation: normal    Rate:     ECG rate:  122    ECG rate assessment: tachycardic    Rhythm:     Rhythm: sinus tachycardia    Ectopy:     Ectopy: none    QRS:     QRS axis:  Normal    QRS intervals:  Normal    QRS conduction: normal    ST segments:     ST segments:  Normal  T waves:     T waves: normal    Q waves:     Abnormal Q-waves: not present      DIAGNOSTIC RESULTS   EKG:All EKG's are interpreted by the Emergency Department Physician who either signs or Co-signs this chart in the absence of a cardiologist.        RADIOLOGY:All plain film, CT, MRI, and formal ultrasound images (except ED bedside ultrasound) are read by the radiologist, see reports below, unless otherwisenoted in MDM or here. XR CHEST PORTABLE   Final Result   Diffuse parenchymal infiltrates seen throughout the lungs bilaterally which   may represent diffuse interstitial pulmonary edema or multilobar pneumonia. LABS: All lab results were reviewed by myself, and all abnormals are listed below.   Labs Reviewed   CBC WITH AUTO DIFFERENTIAL - Abnormal; Notable for the following components:       Result Value    WBC 14.1 (*)     RDW 16.7 (*)     Seg Neutrophils 71 (*)     Lymphocytes 19 (*)     Segs Absolute 10.10 (*)     All other components within normal limits   COMPREHENSIVE METABOLIC PANEL - Abnormal; Notable for the following components:    Glucose 282 (*)     BUN 46 (*)     Creatinine 2.14 (*)     Sodium 134 (*)     Chloride 95 (*)     Total Protein 8.6 (*)     GFR Non- 22 (*)     GFR  27 (*)     All other components within normal limits   MAGNESIUM - Abnormal; Notable for the following components:    Magnesium 3.4 (*)     All other components within normal limits   TROPONIN - Abnormal; Notable for the following components:    Troponin, High Sensitivity 46 (*)     All other components within normal limits   TROPONIN - Abnormal; Notable for the following components:    Troponin, High Sensitivity 47 (*)     All other components within normal limits   BRAIN NATRIURETIC PEPTIDE - Abnormal; Notable for the following components:    Pro-BNP 30,575 (*)     All other components within normal limits   LACTIC ACID - Abnormal; Notable for the following components:    Lactic Acid 4.1 (*)     All other components within normal limits   PROCALCITONIN - Abnormal; Notable for the following components:    Procalcitonin 0.15 (*)     All other components within normal limits   BLOOD GAS, ARTERIAL - Abnormal; Notable for the following components:    pH, Arterial 7.233 (*)     pCO2, Arterial 58.2 (*)     Negative Base Excess, Art 3.0 (*)     O2 Sat, Arterial 93.8 (*)     All other components within normal limits   URINALYSIS WITH REFLEX TO CULTURE - Abnormal; Notable for the following components:    Turbidity UA Turbid (*)     Urine Hgb LARGE (*)     Protein, UA 2+ (*)     Leukocyte Esterase, Urine LARGE (*)     All other components within normal limits   MICROSCOPIC URINALYSIS - Abnormal; Notable for the following components:    Bacteria, UA MANY (*)     All other components within normal limits   COVID-19 & INFLUENZA COMBO   CULTURE, BLOOD 2   CULTURE, BLOOD 1   CULTURE, URINE   LIPASE   PROTIME-INR   LACTIC ACID       EMERGENCY DEPARTMENTCOURSE:     79year-old presenting with acute onset shortness of breath    Had significant work of breathing upon arrival hypertensive tachycardic tachypneic with accessory muscle use and hypoxic    High suspicion at this time is pulmonary edema and acute on chronic CHF    She does have an elevated lactic acidosis though she is afebrile. She has a urinary tract infection    Given concern for severe volume overload we are not giving her 30 cc/kg bolus    Cultures were obtained and we will give cefepime for broad-spectrum coverage    She will be diuresed with Lasix    After starting nitro drip and BiPAP she has significant improvement in her work of breathing and is comfortable at this time    We will admit to the intermediate ICU pulmonary was consulted and cardiology consulted as well        Vitals:    Vitals:    09/20/22 0115 09/20/22 0124 09/20/22 0140 09/20/22 0310   BP: 137/79 119/86 (!) 157/90 (!) 116/59   Pulse: (!) 109 (!) 108 (!) 105 84   Resp: 23 22 21 16   Temp:       TempSrc:       SpO2: 95% 97%  96%       The patient was given the following medications while in the emergency department:  Orders Placed This Encounter   Medications    nitroGLYCERIN 50 mg in dextrose 5% 250 mL infusion     Order Specific Question:   Titrate Infusion?      Answer: Yes     Order Specific Question:   Initial Infusion Dose: Answer:   100 mcg/min     Order Specific Question:   Goal of Therapy is: Answer: Other     Order Specific Question:   Other Goal:     Answer:   pulmonary edema rule out     Order Specific Question:   Contact Provider if:     Answer:   SBP less than 90 mmHg    aspirin chewable tablet 324 mg    furosemide (LASIX) injection 40 mg    nitroGLYCERIN (NITROSTAT) SL tablet 0.4 mg    cefepime (MAXIPIME) 2,000 mg in sodium chloride 0.9 % 50 mL IVPB mini-bag     Order Specific Question:   Antimicrobial Indications     Answer:   Pneumonia (HAP)     CONSULTS:  IP CONSULT TO CARDIOLOGY  IP CONSULT TO INTERNAL MEDICINE  IP CONSULT TO PULMONOLOGY    FINAL IMPRESSION      1. Hypoxia    2. Congestive heart failure, unspecified HF chronicity, unspecified heart failure type [facility])          DISPOSITION/PLAN   DISPOSITION Admitted 09/20/2022 03:25:02 AM      PATIENT REFERRED TO:  No follow-up provider specified. DISCHARGE MEDICATIONS:  New Prescriptions    No medications on file     The care is provided during an unprecedented national emergency due to the novel coronavirus, COVID 19.   MD Smieon White MD  09/20/22 [address], [place]  09/20/22 [id number]

## 2022-09-20 NOTE — CONSULTS
[facility]    Consult                        Today's Date: 9/20/2022  Patient Name: Shayla Navas  Date of admission: 9/20/2022 12:17 AM  Patient's age: 79 y.o., 1940  Admission Dx: Hypoxia [R09.02]  Pulmonary edema with congestive heart failure ([address] [I50.1]  Congestive heart failure, unspecified HF chronicity, unspecified heart failure type ([address] [I50.9]    Reason for Consult:  Cardiac evaluation    Requesting Physician: Kemi Del Valle MD    CHIEF COMPLAINT:  Shortness of breath    History Obtained From:  patient, electronic medical record    HISTORY OF PRESENT ILLNESS:      The patient is a 79 y.o. female who is admitted to the hospital for shortness of breath. The patient is an 70-year-old  female with past medical history of COPD, HTN, recurrent aspiration pneumonia COPD, and DM type II, who presented to the ED for acute onset of shortness of breath that started earlier today. Patient reports that she went to her PCP appointment earlier today and was feeling okay and then suddenly tonight developed shortness of breath. Patient DNRCCA status    Seen by us in 2020 for SOB and pneumonia. No further CV work-up at that time. Echo from 2019 showed preserved EF at that time. She denies any prior cardiac history or following with cardiologist.    Past Medical History:   has a past medical history of Anxiety, Arthritis, Atherosclerosis, Body mass index (bmi) 25.0-25.9, adult, CHF exacerbation (HCC), COPD (chronic obstructive pulmonary disease) ([address] CVA (cerebral vascular accident) ([address] Depression, Diabetes mellitus ([address] Diverticula, esophagus congenital, Former smoker, Hypercholesteremia, Hypertension, Hypokalemia, Joint pain, knee, Myocardial infarct, old, Pneumonia, Protein S deficiency ([address] Pulmonary embolism ([address] Reflux esophagitis, Tinea corporis, Tremor, URI (upper respiratory infection), UTI (urinary tract infection), and Zenker's diverticulum.     Past Surgical History:   has a past surgical history that includes Carotid endarterectomy (Bilateral); Tonsillectomy; and Endoscopy, colon, diagnostic. Home Medications:    Prior to Admission medications    Medication Sig Start Date End Date Taking? Authorizing Provider   bumetanide (BUMEX) 1 MG tablet Take 2 mg by mouth daily    Historical Provider, MD   ferrous gluconate (FERGON) 324 (38 Fe) MG tablet Take 324 mg by mouth daily (with breakfast)    Historical Provider, MD   hydrALAZINE (APRESOLINE) 25 MG tablet Take 25 mg by mouth in the morning and at bedtime    Historical Provider, MD   lisinopril (PRINIVIL;ZESTRIL) 5 MG tablet Take 5 mg by mouth daily 5/22/20   Historical Provider, MD   tiotropium (SPIRIVA RESPIMAT) 2.5 MCG/ACT AERS inhaler Inhale 2 puffs into the lungs daily 2/11/20   Filippo Maldonado MD   tiotropium (SPIRIVA) 18 MCG inhalation capsule Inhale 1 capsule into the lungs daily 11/1/19   LUIS Rutledge CNP   amLODIPine (NORVASC) 10 MG tablet Take 1 tablet by mouth daily 8/23/19   Akiko Presley MD   albuterol sulfate HFA (VENTOLIN HFA) 108 (90 Base) MCG/ACT inhaler Inhale 2 puffs into the lungs every 6 hours as needed for Wheezing or Shortness of Breath 7/16/19   Darryle Mailman, MD   FLUoxetine (PROZAC) 20 MG/5ML solution Take 10 mLs by mouth daily 7/16/19   Darryle Mailman, MD   donepezil (ARICEPT) 10 MG tablet Take 1 tablet by mouth nightly 7/16/19   Darryle Mailman, MD   glipiZIDE (GLUCOTROL) 5 MG tablet Take 0.5 tablets by mouth daily 7/16/19   Darryle Mailman, MD   carvedilol (COREG) 25 MG tablet Take 3.125 mg by mouth 2 times daily    Historical Provider, MD   ALPRAZolam Valaria Paling) 0.5 MG tablet Take 0.25 mg by mouth nightly as needed for Sleep. Historical Provider, MD   gabapentin (NEURONTIN) 300 MG capsule Take 300 mg by mouth 3 times daily.     Historical Provider, MD   clopidogrel (PLAVIX) 75 MG tablet Take 75 mg by mouth daily    Historical Provider, MD   cyclobenzaprine (FLEXERIL) 10 MG tablet Take 10 mg by mouth 3 times daily as needed for Muscle spasms    Historical Provider, MD   omeprazole (PRILOSEC) 20 MG capsule Take 20 mg by mouth daily    Historical Provider, MD       Allergies:  Doxycycline hyclate, Norco [hydrocodone-acetaminophen], Pcn [penicillins], and Sulfa antibiotics    Social History:   reports that she has quit smoking. Her smoking use included cigarettes. She has never used smokeless tobacco. She reports current alcohol use. She reports that she does not use drugs. Family History: family history is not on file. No h/o sudden cardiac death. No for premature CAD    REVIEW OF SYSTEMS:    Constitutional: there has been no unanticipated weight loss. There's been No change in energy level, No change in activity level. Eyes: No visual changes or diplopia. No scleral icterus. ENT: No Headaches, hearing loss or vertigo. No mouth sores or sore throat. Cardiovascular: see above  Respiratory: see above  Gastrointestinal: No abdominal pain, appetite loss, blood in stools. Genitourinary: No dysuria, trouble voiding, or hematuria. Musculoskeletal:  No gait disturbance, No weakness or joint complaints. Integumentary: No rash or pruritis. Neurological: No headache or diplopia. No tingling  Psychiatric: No anxiety, or depression. Endocrine: No temperature intolerance. Hematologic/Lymphatic: No abnormal bruising or bleeding, blood clots or swollen lymph nodes. Allergic/Immunologic: No nasal congestion or hives. PHYSICAL EXAM:      BP (!) 159/96   Pulse (!) 115   Temp 97.9 °F (36.6 °C) (Oral)   Resp 15   Ht 5' 1\" (1.549 m)   Wt 150 lb (68 kg)   SpO2 95%   BMI 28.34 kg/m²    Constitutional and General Appearance: alert, cooperative, no distress and appears stated age  HEENT: PERRL, no cervical lymphadenopathy. No masses palpable.  Normal oral mucosa  Respiratory:  Normal excursion and expansion without use of accessory muscles  Resp Auscultation: Good respiratory effort. No for increased work of breathing. On auscultation: course rales noted bilaterally right > left, clear upper lobes  On NC without distress  Cardiovascular:  Heart tones are crisp and normal. regular S1 and S2.  Jugular venous pulsation Normal  The carotid upstroke is normal in amplitude and contour without delay or bruit \  SR 81  Abdomen:   soft  Bowel sounds present  Obese  Extremities:   Trace to +1 edema  Neurological:  Alert and oriented. DATA:    Diagnostics:    EKG: sinus tachycardia. ECHO: previously taken 2021 and show as below. Ejection fraction: 50%  Stress Test: not obtained. Cardiac Angiography: not obtained. Echo 5/19/21  Summary  Left ventricle is normal in size, mild to moderate left ventricular  hypertrophy,  Global left ventricular systolic function is normal, calculated ejection  fraction is 55%. Grade II (moderate) left ventricular diastolic dysfunction. Left atrium is moderately dilated. Bowing interatrial septal motion. Possible bi-directional shunt seen by  color Doppler. Consider Bubble Study when indicated. Aortic valve is trileaflet, calcified, without restriction of motion. Trivial aortic insufficiency. Mitral valve is sclerotic with annular calcification. Mild mitral regurgitation. Trivial tricuspid regurgitation. Echo 9/7/22 @ [facility]  Left Ventricle: Systolic function is mildly to moderately decreased   with an ejection fraction of 40-45%. Left Atrium: Left atrium is moderately dilated. Left atrium volume   index is moderately increased. The left atrial volume index is 41.6 mL/m2. Right Ventricle: Right ventricular size is moderately dilated. The   right ventricular basal diameter is 43.0 mm. Systolic function is   moderately reduced. Abnormal tricuspid annular plane systolic excursion. Right Atrium: Right atrium is moderately dilated. The right atrial area   is 12.3 cm2. Aortic Valve: There is trace regurgitation.  There is mild stenosis. The   calculated aortic valve area is 1.37 cm2. The calculated aortic valve peak   gradient is 12.82 mmHg. The calculated aortic valve mean gradient is 6.00   mmHg. Mitral Valve: There is moderate regurgitation. There is moderate   stenosis. The mean gradient is 5.00 mmHg. The peak gradient is 9.86 mmHg. Tricuspid Valve: RVSP calculated at 43 mmHg. RVSP is based on RA   pressure of 3 mmHg. Labs:     CBC:   Recent Labs     09/20/22 0035 09/20/22 0614   WBC 14.1* 15.0*   HGB 12.9 11.9*   HCT 40.5 36.8    305     BMP:   Recent Labs     09/20/22 0035 09/20/22 0614   * 134*   K 4.3 4.4   CO2 23 25   BUN 46* 47*   CREATININE 2.14* 2.14*   LABGLOM 22* 22*   GLUCOSE 282* 177*     BNP: No results for input(s): BNP in the last 72 hours. PT/INR:   Recent Labs     09/20/22 0035   PROTIME 13.9   INR 1.1     APTT:No results for input(s): APTT in the last 72 hours. CARDIAC ENZYMES:No results for input(s): CKTOTAL, CKMB, CKMBINDEX, TROPONINI in the last 72 hours.   FASTING LIPID PANEL:  Lab Results   Component Value Date/Time    HDL 51 05/16/2021 07:07 AM    TRIG 173 05/16/2021 07:07 AM     LIVER PROFILE:  Recent Labs     09/20/22 0035   AST 24   ALT 9   LABALBU 3.7       IMPRESSION:    Patient Active Problem List   Diagnosis    Pneumonia due to organism    COPD (chronic obstructive pulmonary disease) ([facility]    Diabetes mellitus ([facility]    Essential hypertension    Simple chronic bronchitis (HCC)    Type 2 diabetes mellitus with kidney complication, with long-term current use of insulin (HCC)    Stage 4 chronic kidney disease (HCC)    Aspiration pneumonitis (HCC)    Sepsis (HCC)    Lactic acidosis    Hypertensive urgency    Zenker diverticulum    CHF exacerbation (HCC)    Severe sepsis (HCC)    Acute-on-chronic kidney injury ([facility]    Pulmonary edema cardiac cause (HCC)    Recurrent aspiration pneumonia (HCC)    Fatigue    Pulmonary edema with congestive heart failure ([facility]    NSTEMI (non-ST elevated myocardial infarction) ([facility]    Acute respiratory failure with hypoxia and hypercapnia (HCC)    UTI (urinary tract infection)    CAD (coronary artery disease)     Acute CHF - likely diastolic  Hx of HTN  Urinary tract infection  CKD  COPD  Hx of CVA    RECOMMENDATIONS:  Breathing improved with diuresis and IV NTG gtt - wean off as able. Will continue IV diuresis and reassess daily. Hx of CKD - monitor renal function closely. Echo completed this am, will f/u on results. Recent echo done 9/7/22 at [facility] as above. Trop elevation in setting of acute CHF and UTI. Will trend trop. Consider ischemic work-up with likely stress once euvolemic and acute issues improve  Resume home Coreg & Norvasc. Hold on ACE for now. On Plavix for hx of CVA at home - per primary  Follows with Dr. Rissa Jerome with [facility]      Discussed with patient and Nurse    Dana Yanez, [address] [place] [facility] Consult    Attending Cardiologist Addendum: I have reviewed the history, physical, subjective, objective, assessment, and plan with the CNP and agree with the note. I have made changes to the note above as needed. Thank you for allowing me to participate in the care of this patient, please do not hesitate to call if you have any questions. Maria Guadalupe Munguia, [id number] [facility]  [employer]  [phone]

## 2022-09-20 NOTE — ED NOTES
Dr. Robet Snellen spoke with Dr. Anjali Temple (Cardiology) and informed him of consult     Bentley Yeh RN  09/20/22 [id number]

## 2022-09-20 NOTE — H&P
[facility]     HISTORY AND PHYSICAL EXAMINATION            Date:   9/20/2022  Patient name:  Delgado Valadez  Date of admission:  9/20/2022 12:17 AM  MRN:   [id number]  Account:  [de-identified]  YOB: 1940  PCP:    Mychal Barber MD  Room:   ELAINE/ELAINE  Code Status:    DNR-CCA    Chief Complaint:     Chief Complaint   Patient presents with    Shortness of Breath       History Obtained From:     patient    History of Present Illness: The patient is a pleasant  79 y.o.  female with history of congestive heart failure, stage IV chronic kidney disease, COPD on 2 L oxygen at home, type 2 diabetes, Afib, and hypertension who presents to the ED with Shortness of Breath and is currently admitted for the management of acute type II respiratory failure secondary to presence heart failure exacerbation versus multifocal pneumonia and hypertensive urgency    Ms. Ramez Britton, says that yesterday after she went for lunch at some grocery with her son Gaviota navarro double fried manoj], and that her medications filled admission, she took her home meds the morning, did not at night, and around 8 PM she started feeling short of breath. Patient then presented to the ED with extreme shortness of breath. Patient states that she has been discharged from [facility] a week ago to do the same symptoms as she remained hospitalized for a week. At that time, patient medications were changed [she was started on Bumex]. ACE inhibitor's were discontinued due to renal dysfunction. She also had decreased ejection fraction 40 to 69%, and diastolic dysfunction as well as mitral regurg. On this presentation patient had a blood pressure of 189/109, and a pulse of 129. She also had an O2 sat of 90 on positive airway pressure.   She had an elevated troponin of 47, proBNP 30,575 compared to baseline of 13 and 1000, respectively. EKG did not show acute ischemic changes. However, on Telemetry after admission to ICU, patient was in Atrial fibrillation. Chest x-ray showed diffuse parenchymal infiltrates seen throughout the lungs on bilateral fields which may be secondary to pulmonary edema versus multilobular pneumonia. Patient had a white blood cell count of 15, ESR of 89, CRP 47.7 and procalcitonin of 0.15. Patient was started on IV nitroglycerin drip due to pulmonary urgency versus emergency and acute setting of flash pulmonary edema, versus pulmonary edema secondary to acute exacerbation of heart failure, patient was then given Lasix 40 mg IV. Cardiology were consulted for elevated troponins and heart failure. Also, Pulmonology/critical care are on board. Past Medical History:     Past Medical History:   Diagnosis Date    Anxiety     Arthritis     Atherosclerosis     Body mass index (bmi) 25.0-25.9, adult     CHF exacerbation ([facility] 1/13/2020    COPD (chronic obstructive pulmonary disease) (HCC)     CVA (cerebral vascular accident) ([facility]     Depression     Diabetes mellitus ([facility]     Diverticula, esophagus congenital     Former smoker     Hypercholesteremia     Hypertension     Hypokalemia     Joint pain, knee     Myocardial infarct, old     2007    Pneumonia     Pneumonia due to infectious organism, unspecified laterality, unspecified part of lung    Protein S deficiency ([address]     Pulmonary embolism (HCC)     Reflux esophagitis     Tinea corporis     Tremor     URI (upper respiratory infection)     UTI (urinary tract infection)     Zenker's diverticulum         Past SurgicalHistory:     Past Surgical History:   Procedure Laterality Date    CAROTID ENDARTERECTOMY Bilateral     ENDOSCOPY, COLON, DIAGNOSTIC      TONSILLECTOMY          Medications Prior to Admission:        Prior to Admission medications    Medication Sig Start Date End Date Taking?  Authorizing Provider bumetanide (BUMEX) 1 MG tablet Take 2 mg by mouth daily    Historical Provider, MD   ferrous gluconate (FERGON) 324 (38 Fe) MG tablet Take 324 mg by mouth daily (with breakfast)    Historical Provider, MD   hydrALAZINE (APRESOLINE) 25 MG tablet Take 25 mg by mouth in the morning and at bedtime    Historical Provider, MD   lisinopril (PRINIVIL;ZESTRIL) 5 MG tablet Take 5 mg by mouth daily 5/22/20   Historical Provider, MD   tiotropium (SPIRIVA RESPIMAT) 2.5 MCG/ACT AERS inhaler Inhale 2 puffs into the lungs daily 2/11/20   Randell Huber MD   tiotropium (SPIRIVA) 18 MCG inhalation capsule Inhale 1 capsule into the lungs daily 11/1/19   LUIS Rollins CNP   amLODIPine (NORVASC) 10 MG tablet Take 1 tablet by mouth daily 8/23/19   Parisa Mayes MD   albuterol sulfate HFA (VENTOLIN HFA) 108 (90 Base) MCG/ACT inhaler Inhale 2 puffs into the lungs every 6 hours as needed for Wheezing or Shortness of Breath 7/16/19   Odessa Barba MD   FLUoxetine (PROZAC) 20 MG/5ML solution Take 10 mLs by mouth daily 7/16/19   Odessa Barba MD   donepezil (ARICEPT) 10 MG tablet Take 1 tablet by mouth nightly 7/16/19   Odessa Barba MD   glipiZIDE (GLUCOTROL) 5 MG tablet Take 0.5 tablets by mouth daily 7/16/19   Odessa Barba MD   carvedilol (COREG) 25 MG tablet Take 3.125 mg by mouth 2 times daily    Historical Provider, MD   ALPRAZolam Emma Sobia) 0.5 MG tablet Take 0.25 mg by mouth nightly as needed for Sleep. Historical Provider, MD   gabapentin (NEURONTIN) 300 MG capsule Take 300 mg by mouth 3 times daily.     Historical Provider, MD   clopidogrel (PLAVIX) 75 MG tablet Take 75 mg by mouth daily    Historical Provider, MD   cyclobenzaprine (FLEXERIL) 10 MG tablet Take 10 mg by mouth 3 times daily as needed for Muscle spasms    Historical Provider, MD   omeprazole (PRILOSEC) 20 MG capsule Take 20 mg by mouth daily    Historical Provider, MD        Allergies:     Doxycycline hyclate, Norco multiple SC injections on her left paraumbilical region, associated with overlying discoloration, secondary to hematoma breakdown. Neurologic - There are no new focal motor or sensory deficits  Skin - Bilateral lower extremity redness and dryness of skin. Bruising from [place] sites on abdomen  Extremities - Bilateral lower limb pitting edema  +1    Investigations:     Laboratory Testing:  Recent Results (from the past 24 hour(s))   Arterial Blood Gases    Collection Time: 09/20/22 12:31 AM   Result Value Ref Range    pH, Arterial 7.233 (LL) 7.350 - 7.450    pCO2, Arterial 58.2 (HH) 35.0 - 45.0 mmHg    pO2, Arterial 94.7 80.0 - 100.0 mmHg    HCO3, Arterial 24.5 22.0 - 26.0 mmol/L    Negative Base Excess, Art 3.0 (H) 0.0 - 2.0 mmol/L    O2 Sat, Arterial 93.8 (L) 95 - 98 %    Carboxyhemoglobin 0.9 0 - 5 %    Methemoglobin 0.5 0.0 - 1.9 %    Pt Temp 37     O2 Device/Flow/% BIPAP     Respiratory Rate 26     [place] Test PASS     Sample Site Left Radial Artery     Pt.  Position SEMI-FOWLERS     Text for Respiratory RESULTS GIVEN TO DR. Monserrat Jefferson    CBC with Auto Differential    Collection Time: 09/20/22 12:35 AM   Result Value Ref Range    WBC 14.1 (H) 3.5 - 11.0 k/uL    RBC 4.55 4.0 - 5.2 m/uL    Hemoglobin 12.9 12.0 - 16.0 g/dL    Hematocrit 40.5 36 - 46 %    MCV 89.2 80 - 100 fL    MCH 28.4 26 - 34 pg    MCHC 31.9 31 - 37 g/dL    RDW 16.7 (H) 11.5 - 14.9 %    Platelets 485 322 - 141 k/uL    MPV 7.3 6.0 - 12.0 fL    Seg Neutrophils 71 (H) 36 - 66 %    Lymphocytes 19 (L) 24 - 44 %    Monocytes 6 1 - 7 %    Eosinophils % 3 0 - 4 %    Basophils 1 0 - 2 %    Segs Absolute 10.10 (H) 1.3 - 9.1 k/uL    Absolute Lymph # 2.70 1.0 - 4.8 k/uL    Absolute Mono # 0.80 0.1 - 1.3 k/uL    Absolute Eos # 0.40 0.0 - 0.4 k/uL    Basophils Absolute 0.10 0.0 - 0.2 k/uL   CMP    Collection Time: 09/20/22 12:35 AM   Result Value Ref Range    Glucose 282 (H) 70 - 99 mg/dL    BUN 46 (H) 8 - 23 mg/dL    Creatinine 2.14 (H) 0.50 - 0.90 mg/dL    Calcium 9.7 8.6 - 10.4 mg/dL    Sodium 134 (L) 135 - 144 mmol/L    Potassium 4.3 3.7 - 5.3 mmol/L    Chloride 95 (L) 98 - 107 mmol/L    CO2 23 20 - 31 mmol/L    Anion Gap 16 9 - 17 mmol/L    Alkaline Phosphatase 103 35 - 104 U/L    ALT 9 5 - 33 U/L    AST 24 <32 U/L    Total Bilirubin 0.4 0.3 - 1.2 mg/dL    Total Protein 8.6 (H) 6.4 - 8.3 g/dL    Albumin 3.7 3.5 - 5.2 g/dL    GFR Non- 22 (L) >60 mL/min    GFR  27 (L) >60 mL/min    GFR Comment         Lipase    Collection Time: 09/20/22 12:35 AM   Result Value Ref Range    Lipase 57 13 - 60 U/L   Magnesium    Collection Time: 09/20/22 12:35 AM   Result Value Ref Range    Magnesium 3.4 (H) 1.6 - 2.6 mg/dL   Protime-INR    Collection Time: 09/20/22 12:35 AM   Result Value Ref Range    Protime 13.9 11.8 - 14.6 sec    INR 1.1    Troponin Now and Q 1 Hour    Collection Time: 09/20/22 12:35 AM   Result Value Ref Range    Troponin, High Sensitivity 46 (H) 0 - 14 ng/L   Brain Natriuretic Peptide    Collection Time: 09/20/22 12:35 AM   Result Value Ref Range    Pro-BNP 30,575 (H) <300 pg/mL   Lactic Acid    Collection Time: 09/20/22 12:35 AM   Result Value Ref Range    Lactic Acid 4.1 (H) 0.5 - 2.2 mmol/L   Procalcitonin    Collection Time: 09/20/22 12:35 AM   Result Value Ref Range    Procalcitonin 0.15 (H) <0.09 ng/mL   EKG 12 Lead    Collection Time: 09/20/22 12:44 AM   Result Value Ref Range    Ventricular Rate 122 BPM    Atrial Rate 122 BPM    P-R Interval 156 ms    QRS Duration 100 ms    Q-T Interval 310 ms    QTc Calculation (Bazett) 441 ms    P Axis 68 degrees    R Axis 61 degrees    T Axis 97 degrees   COVID-19 & Influenza Combo    Collection Time: 09/20/22 12:51 AM    Specimen: Nasopharyngeal Swab   Result Value Ref Range    Specimen Description . NASOPHARYNGEAL SWAB     Source . NASOPHARYNGEAL SWAB     SARS-CoV-2 RNA, RT PCR Not Detected Not Detected    INFLUENZA A Not Detected Not Detected    INFLUENZA B Not Detected Not Detected   Troponin Now and Q 1 Hour    Collection Time: 09/20/22  1:48 AM   Result Value Ref Range    Troponin, High Sensitivity 47 (H) 0 - 14 ng/L   Urinalysis with Reflex to Culture    Collection Time: 09/20/22  2:36 AM    Specimen: Urine   Result Value Ref Range    Color, UA Yellow Yellow    Turbidity UA Turbid (A) Clear    Glucose, Ur NEGATIVE NEGATIVE    Bilirubin Urine NEGATIVE NEGATIVE    Ketones, Urine NEGATIVE NEGATIVE    Specific Gravity, UA 1.019 1.000 - 1.030    Urine Hgb LARGE (A) NEGATIVE    pH, UA 5.0 5.0 - 8.0    Protein, UA 2+ (A) NEGATIVE    Urobilinogen, Urine Normal Normal    Nitrite, Urine NEGATIVE NEGATIVE    Leukocyte Esterase, Urine LARGE (A) NEGATIVE   Microscopic Urinalysis    Collection Time: 09/20/22  2:36 AM   Result Value Ref Range    WBC, UA TOO NUMEROUS TO COUNT /HPF    RBC, UA TOO NUMEROUS TO COUNT /HPF    Epithelial Cells UA 6 TO 9 /HPF    Bacteria, UA MANY (A) None   Lactic Acid    Collection Time: 09/20/22  3:51 AM   Result Value Ref Range    Lactic Acid 2.2 0.5 - 2.2 mmol/L   Basic Metabolic Panel w/ Reflex to MG    Collection Time: 09/20/22  6:14 AM   Result Value Ref Range    Glucose 177 (H) 70 - 99 mg/dL    BUN 47 (H) 8 - 23 mg/dL    Creatinine 2.14 (H) 0.50 - 0.90 mg/dL    Calcium 9.2 8.6 - 10.4 mg/dL    Sodium 134 (L) 135 - 144 mmol/L    Potassium 4.4 3.7 - 5.3 mmol/L    Chloride 97 (L) 98 - 107 mmol/L    CO2 25 20 - 31 mmol/L    Anion Gap 12 9 - 17 mmol/L    GFR Non-African American 22 (L) >60 mL/min    GFR  27 (L) >60 mL/min    GFR Comment         CBC    Collection Time: 09/20/22  6:14 AM   Result Value Ref Range    WBC 15.0 (H) 3.5 - 11.0 k/uL    RBC 4.18 4.0 - 5.2 m/uL    Hemoglobin 11.9 (L) 12.0 - 16.0 g/dL    Hematocrit 36.8 36 - 46 %    MCV 88.0 80 - 100 fL    MCH 28.4 26 - 34 pg    MCHC 32.3 31 - 37 g/dL    RDW 16.4 (H) 11.5 - 14.9 %    Platelets 171 698 - 180 k/uL    MPV 7.5 6.0 - 12.0 fL         Current Facility-Administered Medications   Medication Dose Route Frequency Provider Last Rate Last Admin    nitroGLYCERIN 50 mg in dextrose 5% 250 mL infusion  5-200 mcg/min IntraVENous Continuous Giles Rene MD 12 mL/hr at 09/20/22 0336 40 mcg/min at 09/20/22 0336    cefepime (MAXIPIME) 2,000 mg in sodium chloride 0.9 % 50 mL IVPB mini-bag  2,000 mg IntraVENous Q12H Pilar Fillers, APRN - CNP        enoxaparin (LOVENOX) injection 40 mg  40 mg SubCUTAneous Daily Pilar Fillers, APRN - CNP        ondansetron (ZOFRAN-ODT) disintegrating tablet 4 mg  4 mg Oral Q8H PRN Pilar Fillers, APRN - CNP        Or    ondansetron [facility]) injection 4 mg  4 mg IntraVENous Q6H PRN Pilar Fillers, APRN - CNP        magnesium hydroxide (MILK OF MAGNESIA) 400 MG/5ML suspension 30 mL  30 mL Oral Daily PRN Pilar Fillers, APRN - CNP        acetaminophen (TYLENOL) tablet 650 mg  650 mg Oral Q6H PRN Pilar Fillers, APRN - CNP        Or    acetaminophen (TYLENOL) suppository 650 mg  650 mg Rectal Q6H PRN Pilar Fillers, APRN - CNP        furosemide (LASIX) injection 40 mg  40 mg IntraVENous BID Eleanor Stark MD        insulin glargine (LANTUS) injection vial 10 Units  10 Units SubCUTAneous Nightly Eleanor Stark MD        insulin lispro (HUMALOG) injection vial 0-4 Units  0-4 Units SubCUTAneous TID  Eleanor Stark MD        insulin lispro (HUMALOG) injection vial 0-4 Units  0-4 Units SubCUTAneous Nightly Eleanor Stark MD        glucose chewable tablet 16 g  4 tablet Oral PRN Eleanor Stark MD        dextrose bolus 10% 125 mL  125 mL IntraVENous PRDAVID Stark MD        Or    dextrose bolus 10% 250 mL  250 mL IntraVENous PRDAVID Stark MD        glucagon (rDNA) injection 1 mg  1 mg SubCUTAneous PRDAVID Stark MD        dextrose 10 % infusion   IntraVENous Continuous PRDAVID Stark MD         Current Outpatient Medications   Medication Sig Dispense Refill    bumetanide (BUMEX) 1 MG tablet Take 2 mg by mouth daily      ferrous gluconate (FERGON) 324 (38 Fe) MG tablet Take 324 mg by mouth daily (with breakfast)      hydrALAZINE (APRESOLINE) 25 MG tablet Take 25 mg by mouth in the morning and at bedtime      lisinopril (PRINIVIL;ZESTRIL) 5 MG tablet Take 5 mg by mouth daily      tiotropium (SPIRIVA RESPIMAT) 2.5 MCG/ACT AERS inhaler Inhale 2 puffs into the lungs daily 1 Inhaler 0    tiotropium (SPIRIVA) 18 MCG inhalation capsule Inhale 1 capsule into the lungs daily 30 capsule 3    amLODIPine (NORVASC) 10 MG tablet Take 1 tablet by mouth daily 30 tablet 3    albuterol sulfate HFA (VENTOLIN HFA) 108 (90 Base) MCG/ACT inhaler Inhale 2 puffs into the lungs every 6 hours as needed for Wheezing or Shortness of Breath 1 Inhaler 0    FLUoxetine (PROZAC) 20 MG/5ML solution Take 10 mLs by mouth daily 150 mL 3    donepezil (ARICEPT) 10 MG tablet Take 1 tablet by mouth nightly 30 tablet 3    glipiZIDE (GLUCOTROL) 5 MG tablet Take 0.5 tablets by mouth daily 60 tablet 3    carvedilol (COREG) 25 MG tablet Take 3.125 mg by mouth 2 times daily      ALPRAZolam (XANAX) 0.5 MG tablet Take 0.25 mg by mouth nightly as needed for Sleep.      gabapentin (NEURONTIN) 300 MG capsule Take 300 mg by mouth 3 times daily. clopidogrel (PLAVIX) 75 MG tablet Take 75 mg by mouth daily      cyclobenzaprine (FLEXERIL) 10 MG tablet Take 10 mg by mouth 3 times daily as needed for Muscle spasms      omeprazole (PRILOSEC) 20 MG capsule Take 20 mg by mouth daily         Imaging/Diagnostics:    XR CHEST PORTABLE    Result Date: 9/20/2022  EXAMINATION: ONE XRAY VIEW OF THE CHEST 9/20/2022 12:28 am COMPARISON: 05/16/2021 HISTORY: ORDERING SYSTEM PROVIDED HISTORY: shortness of breath TECHNOLOGIST PROVIDED HISTORY: shortness of breath Reason for Exam: shortness of breath FINDINGS: Cardiac size and mediastinal structures appear within normal limits. Diffuse parenchymal infiltrates are seen throughout the lungs bilaterally. No pneumothorax. No acute osseous abnormality. Osteopenia. Diffuse parenchymal infiltrates seen throughout the lungs bilaterally which may represent diffuse interstitial pulmonary edema or multilobar pneumonia.          Assessment :      Primary Problem  Pulmonary edema with congestive heart failure [facility])    Active Hospital Problems    Diagnosis Date Noted    Pulmonary edema with congestive heart failure ([facility] [I50.1] 09/20/2022     Priority: Medium    NSTEMI (non-ST elevated myocardial infarction) ([facility] [address] [I21.4] 09/20/2022     Priority: Medium    Acute respiratory failure with hypoxia and hypercapnia (HCC) [J96.01, J96.02] 09/20/2022     Priority: Medium    UTI (urinary tract infection) [N39.0] 09/20/2022     Priority: Medium    CAD (coronary artery disease) [I25.10] 09/20/2022     Priority: Medium    Pulmonary edema cardiac cause ([facility] [I50.1] 05/15/2021    CHF exacerbation ([facility] [I50.9] 01/13/2020    Lactic acidosis [E87.2] 08/18/2019    Sepsis ([facility] [A41.9] 08/17/2019    Stage 4 chronic kidney disease ([facility] [N18.4] 08/09/2016    Diabetes mellitus ([facility] [E11.9]     Essential hypertension [I10]     COPD (chronic obstructive pulmonary disease) ([facility] [J44.9]        Plan:     Patient status Admit as inpatient in the  Medical ICU    Acute exacerbated heart failure most likely 2/2 fluid overload due to dietary intake vs medication  non compliance  -Patient came with shortness of breath and saturation of 90 on positive airway pressure with pulmonary edema  -ABG showed acute respiratory failure type II, pH 7.233, PCO2 58  -Chest x-ray showed diffuse parenchymal infiltrates seen through the lungs bilaterally which may represent diffuse interstitial edema or multilobular pneumonia  Patient received 1 dose of IV lasix 40 mg.  -Patient on Plavix 75  -Will continue home carvedilol 3.125 mg twice daily  -proBNP on presentation 30,575, baseline around 1000  -Cardiology were consulted      High troponin most likley 2/2 demand ischemia  History of CAD and remote stent placement  -Troponin were: 46 then :47  -No acute EKG changes in ED  -Patient on carvedilol 3.125 mg twice daily and Plavix 75mg      Atrial fibirllation  -On telemetry patient seemed to be in Afib with PVCs  EKG on  presentation was sinus tachycardia without afib  -Another EKG is pending  -Cardiology on board  -Will manage accordingly       Acute type II respiratory failure secondary to HTN emergency vs acute exacerbated heart failure vs multilobular pneumonia (Unlikely)  -Flash Pulmonary edema vs Pulmonary Edema 2/2 of heart failure   -Diffuse parenchymal infiltrates seen throughout the lungs bilaterally which may represent diffuse interstitial pulmonary edema or multilobar pneumonia. -Currently on NC 2l (baseline)  -Required BiPAP in ED  -Patient sig improved after IV lasix dose. Leukocytosis mosst likely 2/2 UTI  -Secondary to UTI vs Multilobular pneumonia (Unlikely)  -WBC on presentation 15.  -ESR 89, Procal 0.15, CRP 47.7  -UA, had pyuria and bacteruria, patient has dysuria and frequency  -Urine culture pending  -Blood culture sent  -patient started on Cefepime        Hypertensive emergency   -Flash pulmonary edema   -on presentation blood pressure was blood pressure was 189/109, increased to 205/121.  -Patient received Nitroglycerin Infusion in setting of hypertensive emergency  -Continue patient amlodipine  Continue hydration at current dose      COPD  -Pulmonology on board  -On 2L NC at home  -Continue to Tropium 2.52 puffs daily  -Continue albuterol every 6 hours as needed      Diabetes Mellitus type 2  -Hold patient Glipizide  -Will put on low dose sliding scale   -PCOT 4 times. Depression and Mild dementia  -Continue Fluxetine   Continue donepezil      PT/OT  -Renal diet  - consulted.         Consultations:   IP CONSULT TO PULMONOLOGY  IP CONSULT TO CARDIOLOGY  IP CONSULT TO INTERNAL MEDICINE    DVT prophylaxis: heparin (porcine) injection 5,000 TID  GI prophylaxis: Protonix 40 mg daily      Cheyanne Hyatt MD  9/20/2022  7:48 AM    Copy sent to Dr. Fabrizio Rogel MD  Attending Physician Statement  I have discussed the care of Daniel Red and I have examined the patient myselft and taken ros and hpi , including pertinent history and exam findings,  with the resident. I have reviewed the key elements of all parts of the encounter with the resident. I agree with the assessment, plan and orders as documented by the resident. cc 28mins    50-year-old lady with a history of grade 2 diastolic congestive heart failure history of pulmonary hypertension RVSP 43 history of atrial fibrillation was recently discharged from [facility] echocardiogram showed 40 to 45% ejection fraction patient admitted with last 2 days of increasing dyspnea denies any fever chills no flulike illness no sick contacts  Patient's baseline weight is 145 admitted with 150 pounds with elevated proBNP chest x-ray shows diffuse infiltrate differential diagnosis is congestive heart failure versus atypical pneumonia  Acute on chronic diastolic congestive heart failure with moderate pulmonary hypertension IV diuresis Daily weight recheck proBNP  Atypical pneumonia work-up Legionella urine antigen Mycoplasma IgM antibody check viral panel  Cover for atypical pneumonia with antibiotics  Patient admitted to ICU with hypertensive urgency systolic over 046 requiring nitro drip  Due to all comorbid conditions and multiple issues patient admitted to medical ICU high risk of mortality and morbidity given her age and comorbid factors  Ckd   dm2  Electronically signed by Meli Gambino MD

## 2022-09-20 NOTE — H&P
[facility] PULMONARY & CRITICAL CARE SPECIALISTS   CONSULT NOTE:      DATE OF CONSULT 9/20/2022    REASON FOR CONSULTATION:     CHF exacerbation and pressor support      PCP Rivka Haile MD     CHIEF COMPLAINT: SOB    HISTORY OF PRESENT ILLNESS:     Devonte Paula is a 78-year-old female with a PMH significant for HFpEF, stage IV CKD, COPD, type 2 diabetes mellitus, essential hypertension, and hyperlipidemia who presented to [facility] with SOB and was admitted for management of acute hypercapnic hypoxic respiratory failure 2/2 CHF exacerbation versus multifocal pneumonia and hypertensive urgency. Patient was afebrile, tachypneic, tachycardic, hypertensive, upon arrival.  SPO2 reportedly was found to be 77% per EMS. She was then placed on BiPAP in the ED and then started satting above 90%. Labs upon presentation were remarkable for creatinine 2.14, magnesium 3.4, Pro-Dashawn 0.15, LA 4.1-->2.2,  proBNP 30,575, WBC 15, UA showed UTI, ABG 7.23/58.2/94.7/24.5. Chest x-ray showed diffuse parenchymal infiltrates bilaterally which may represent diffuse interstitial pulm edema versus multilobar pneumonia. EKG showed sinus tachycardia with nonspecific ST and T wave abnormality. Code sepsis work-up was initiated in the ED. She received 30 cc/kg fluid bolus per protocol. Patient was also started on a nitro drip for blood pressure control. She was given cefepime and IV Lasix while in the ED. Patient was seen and examined bedside. Patient states at some point yesterday she he began having difficulty breathing and fell onto her couch. Patient denies hitting her head or LOC. Patient reports that she was recently discharged from [facility] approximately 2 weeks ago on to 2 L NC. She denies any fever or chills. Denies any headaches, chest pain, sputum production, palpitations, abdominal pain, nausea, vomiting, or dysuria.         ALLERGIES:  Allergies   Allergen Reactions    Doxycycline Hyclate     Norco [Hydrocodone-Acetaminophen] Other (See Comments)     Nausea, dizziness    Pcn [Penicillins]     Sulfa Antibiotics        HOME MEDICATIONS:  Medications Prior to Admission: bumetanide (BUMEX) 1 MG tablet, Take 2 mg by mouth daily  ferrous gluconate (FERGON) 324 (38 Fe) MG tablet, Take 324 mg by mouth daily (with breakfast)  hydrALAZINE (APRESOLINE) 25 MG tablet, Take 25 mg by mouth in the morning and at bedtime  lisinopril (PRINIVIL;ZESTRIL) 5 MG tablet, Take 5 mg by mouth daily  tiotropium (SPIRIVA RESPIMAT) 2.5 MCG/ACT AERS inhaler, Inhale 2 puffs into the lungs daily  tiotropium (SPIRIVA) 18 MCG inhalation capsule, Inhale 1 capsule into the lungs daily  amLODIPine (NORVASC) 10 MG tablet, Take 1 tablet by mouth daily  albuterol sulfate HFA (VENTOLIN HFA) 108 (90 Base) MCG/ACT inhaler, Inhale 2 puffs into the lungs every 6 hours as needed for Wheezing or Shortness of Breath  FLUoxetine (PROZAC) 20 MG/5ML solution, Take 10 mLs by mouth daily  donepezil (ARICEPT) 10 MG tablet, Take 1 tablet by mouth nightly  glipiZIDE (GLUCOTROL) 5 MG tablet, Take 0.5 tablets by mouth daily  carvedilol (COREG) 25 MG tablet, Take 3.125 mg by mouth 2 times daily  ALPRAZolam (XANAX) 0.5 MG tablet, Take 0.25 mg by mouth nightly as needed for Sleep.  gabapentin (NEURONTIN) 300 MG capsule, Take 300 mg by mouth 3 times daily.   clopidogrel (PLAVIX) 75 MG tablet, Take 75 mg by mouth daily  cyclobenzaprine (FLEXERIL) 10 MG tablet, Take 10 mg by mouth 3 times daily as needed for Muscle spasms  omeprazole (PRILOSEC) 20 MG capsule, Take 20 mg by mouth daily      PAST MEDICAL HISTORY:  Past Medical History:   Diagnosis Date    Anxiety     Arthritis     Atherosclerosis     Body mass index (bmi) 25.0-25.9, adult     CHF exacerbation ([facility]) 1/13/2020    COPD (chronic obstructive pulmonary disease) ([facility])     CVA (cerebral vascular accident) ([facility] [address]     Depression     Diabetes mellitus [facility]     Diverticula, esophagus congenital Former smoker     Hypercholesteremia     Hypertension     Hypokalemia     Joint pain, knee     Myocardial infarct, old     2007    Pneumonia     Pneumonia due to infectious organism, unspecified laterality, unspecified part of lung    Protein S deficiency ([address]     Pulmonary embolism (HCC)     Reflux esophagitis     Tinea corporis     Tremor     URI (upper respiratory infection)     UTI (urinary tract infection)     Zenker's diverticulum        PAST SURGICAL HISTORY:  Past Surgical History:   Procedure Laterality Date    CAROTID ENDARTERECTOMY Bilateral     ENDOSCOPY, COLON, DIAGNOSTIC      TONSILLECTOMY            SOCIAL HISTORY:  Social History     Socioeconomic History    Marital status: Single     Spouse name: Not on file    Number of children: 2    Years of education: Not on file    Highest education level: Not on file   Occupational History    Not on file   Tobacco Use    Smoking status: Former     Years: 25.00     Types: Cigarettes    Smokeless tobacco: Never   Substance and Sexual Activity    Alcohol use: Yes     Comment: socially    Drug use: No    Sexual activity: Not on file   Other Topics Concern    Not on file   Social History Narrative    Not on file     Social Determinants of Health     Financial Resource Strain: Not on file   Food Insecurity: Not on file   Transportation Needs: Not on file   Physical Activity: Not on file   Stress: Not on file   Social Connections: Not on file   Intimate Partner Violence: Not on file   Housing Stability: Not on file       FAMILY HISTORY:  History reviewed. No pertinent family history. REVIEW OF SYSTEMS:  All other systems reviewed and are negative. PHYSICAL EXAM:  Vital Signs Blood pressure (!) 159/96, pulse (!) 115, temperature 97.9 °F (36.6 °C), temperature source Oral, resp. rate 15, height 5' 1\" (1.549 m), weight 150 lb (68 kg), SpO2 95 %.   Oxygen Amount and Delivery:      Admission Weight Weight: 150 lb (68 kg)    General Appearance     Head Normocephalic, without obvious abnormality, atraumatic    Eyes  conjunctivae/corneas clear. PERRL, EOM's intact. Fundi benign. ENT  on 2 L NC  Neck  no adenopathy, no carotid bruit, no JVD, supple, symmetrical, trachea midline and thyroid not enlarged, symmetric, no tenderness/mass/nodules  Lungs  CTA B/L, no wheezing or rales, good air entry  Heart: regular rate and rhythm, S1, S2 normal, no murmur, click, rub or gallop  Abdomen  soft, non-tender; bowel sounds normal; no masses,  no organomegaly  Extremities  2+ B/L LE edema, venous stasis   Kitchen catheter in place  Skin  Skin color, texture, turgor normal. No rashes or lesions  Neurologic: Alert and oriented X 3, normal strength and tone. Imaging    XR CHEST PORTABLE [[id number]] Collected: 09/20/22 0041      Order Status: Completed Updated: 09/20/22 0147     Narrative:       EXAMINATION:   ONE XRAY VIEW OF THE CHEST     9/20/2022 12:28 am     COMPARISON:   05/16/2021     HISTORY:   ORDERING SYSTEM PROVIDED HISTORY: shortness of breath   TECHNOLOGIST PROVIDED HISTORY:   shortness of breath   Reason for Exam: shortness of breath     FINDINGS:   Cardiac size and mediastinal structures appear within normal limits. Diffuse   parenchymal infiltrates are seen throughout the lungs bilaterally. No   pneumothorax. No acute osseous abnormality. Osteopenia. Impression:       Diffuse parenchymal infiltrates seen throughout the lungs bilaterally which   may represent diffuse interstitial pulmonary edema or multilobar pneumonia.           Lab Review  CBC     Lab Results   Component Value Date/Time    WBC 15.0 09/20/2022 06:14 AM    RBC 4.18 09/20/2022 06:14 AM    HGB 11.9 09/20/2022 06:14 AM    HCT 36.8 09/20/2022 06:14 AM     09/20/2022 06:14 AM    MCV 88.0 09/20/2022 06:14 AM    MCH 28.4 09/20/2022 06:14 AM    MCHC 32.3 09/20/2022 06:14 AM    RDW 16.4 09/20/2022 06:14 AM    LYMPHOPCT 19 09/20/2022 12:35 AM    MONOPCT 6 09/20/2022 12:35 AM BASOPCT 1 09/20/2022 12:35 AM    MONOSABS 0.80 09/20/2022 12:35 AM    LYMPHSABS 2.70 09/20/2022 12:35 AM    EOSABS 0.40 09/20/2022 12:35 AM    BASOSABS 0.10 09/20/2022 12:35 AM    DIFFTYPE NOT REPORTED 05/18/2021 06:16 AM       BMP   Lab Results   Component Value Date/Time     09/20/2022 06:14 AM    K 4.4 09/20/2022 06:14 AM    CL 97 09/20/2022 06:14 AM    CO2 25 09/20/2022 06:14 AM    BUN 47 09/20/2022 06:14 AM    CREATININE 2.14 09/20/2022 06:14 AM    GLUCOSE 177 09/20/2022 06:14 AM    CALCIUM 9.2 09/20/2022 06:14 AM       LFTS  Lab Results   Component Value Date/Time    ALKPHOS 103 09/20/2022 12:35 AM    ALT 9 09/20/2022 12:35 AM    AST 24 09/20/2022 12:35 AM    PROT 8.6 09/20/2022 12:35 AM    BILITOT 0.4 09/20/2022 12:35 AM    BILIDIR 0.11 09/13/2019 05:24 PM    IBILI 0.18 09/13/2019 05:24 PM    LABALBU 3.7 09/20/2022 12:35 AM       INR  Recent Labs     09/20/22  0035   PROTIME 13.9   INR 1.1       APTT  No results for input(s): APTT in the last 72 hours. Lactic Acid  Lab Results   Component Value Date/Time    LACTA 2.2 09/20/2022 03:51 AM    LACTA 4.1 09/20/2022 12:35 AM    LACTA NOT REPORTED 09/13/2019 09:14 PM        PRO-BNP   Recent Labs     09/20/22  0035   PROBNP 30,575*           ABGs:   Lab Results   Component Value Date/Time    PHART 7.233 09/20/2022 12:31 AM    PO2ART 94.7 09/20/2022 12:31 AM    IFP3HSU 58.2 09/20/2022 12:31 AM       Lab Results   Component Value Date/Time    MODE NOT REPORTED 01/14/2020 02:14 AM         Impression    Acute hypercapnic hypoxic respiratory failure 2/2 flash pulmonary edema versus multifocal pneumonia  Hypertensive emergency  Sepsis  Urinary tract infection  CHIQUITA on CKD stage IV  Elevated troponins  Combined chronic systolic and diastolic CHF EF 86-70%  Paroxysmal A. fib  Type 2 diabetes mellitus  Essential hypertension  Hyperlipidemia      Plan:      -Continue titrating down nitro drip. Restart home BP meds judiciously. Continue IV Lasix 40 mg twice daily. Would monitor creatinine closely. Avoid nephrotoxic drugs  -We will continue IV cefepime given recent hospitalization. Pro-Dashawn 0.15. Follow-up CRP and ESR  -We will continue with breathing treatments and BiPAP as needed    DVT PPX: heparin 5000 TID    DNR CCA- no intubation    Patient seen and examined independently by me. Above discussed and I agree with resident note except where indicated in the EMR revision history. Also see my additional comments and changes indicated by discrete font, text color, italics, and/or initials. Labs, cultures, and radiographs where available were reviewed. Although she presented with acute hypercapnic respiratory failure, I feel this is due to her hypertension/flash pulmonary edema. I do not feel that she has COPD based on the fact that she quit smoking over 60 years ago, has never had pulmonary function testing, and is on an albuterol inhaler. I would stop her tiotropium. She will need outpatient pulmonary function testing. I agree with the current management of diuresis. I would also get a repeat ABG. She does have significant lower extremity edema as well as pain and she has not been very mobile therefore I would get lower extremity Dopplers. Clinically, she does not have obstructive sleep apnea so I do not think she has OHS/KATALINA as a cause of her hypercapnia. Clinically, she also does not appear to have a pneumonia it is reasonable to cover her with broad-spectrum antibiotics for at least 24 to 48 hours until cultures are back. Check a MRSA swab. I think her cough may be due to her interstitial edema/CHF. I will get a chest x-ray tomorrow to see if her interstitial infiltrates have cleared up. If not, the differential diagnosis pulmonary fibrosis also needs to be considered. QT is elevated, and and we will go ahead and discontinue the Zithromax. From our standpoint, if the ABG repeated is reasonable, okay to transfer her to stepdown.   Electronically signed by Liudmila Morales MD on 9/20/2022 at 12:55 PM

## 2022-09-20 NOTE — PROGRESS NOTES
The patient is an 25-year-old  female with past medical history of COPD, HTN, recurrent aspiration pneumonia COPD, and DM type II, who presented to the ED for acute onset of shortness of breath that started earlier today. Patient reports that she went to her PCP appointment earlier today and was feeling okay and then suddenly tonight developed shortness of breath. At time of exam, patient on BiPAP and unable to provide much input into HPI. Patient currently on nitro drip and received 1 dose of IV Lasix. Patient resting comfortably on BiPAP; awakens easily to verbal and tactile stimuli. Patient being admitted to intermediate ICU for CHF exacerbation and pulmonary edema. Unable to reconcile home meds at this time. We will continue BiPAP-critical care consult initiated. We will continue nitro drip and IV diuresis. Patient is DNR CCA. Noted to be positive for UTI; cefepime initiated in ED and will continue on admission.

## 2022-09-20 NOTE — ED NOTES
Daughter updated on patient's condition.      [facility] Mendo[facility], CRISTINA  09/20/22 7931

## 2022-09-20 NOTE — CARE COORDINATION
CASE MANAGEMENT NOTE:    Admission Date:  9/20/2022 Jennifer Frye is a 79 y.o.  female    Admitted for : Hypoxia [R09.02]  Pulmonary edema with congestive heart failure ([facility] [address] [I50.1]  Congestive heart failure, unspecified HF chronicity, unspecified heart failure type ([facility] [address] [I50.9]    Met with:  Patient    PCP:  Shailesh Perkins                                 Insurance:  Cisco Briec    Is patient alert and oriented at time of discussion:yes    Current Residence/ Living Arrangements:  independently at home  in 1 story home, Has ramp to get in the front door, Son transports patient. Pt does not drive. Current Services PTA:  Yes- current with [facility]    Does patient go to outpatient dialysis: No  If yes, location and chair time: NA  Who is their nephrologist? NA    Is patient agreeable to VNS: Yes    Freedom of choice provided:  Yes    List of [address] provided: Yes    VNS chosen:  Yes- [facility]     DME:  straight cane, shower chair, and other grab bars, rollator, raised toilet seat     Home Oxygen: Yes -2 lpm NC at HS thru [employer]: No    CPAP/BIPAP: No    Supplier: N/A    Potential Assistance Needed: No    SNF needed: No    Freedom of choice and list provided: No    Pharmacy:  [facility]       Is patient currently receiving oral anticoagulation therapy? No    Is the Patient an [facility] with Readmission Risk Score greater than 14%? No  If yes, pt needs a follow up appointment made within 7 days. Family Members/Caregivers that pt would like involved in their care:    Yes    If yes, list name here:  Son Melonie Johnson and Fran Crews    Transportation Provider:  Family             Discharge Plan:  9/20/22 Cisco Brice from home alone in 1 story home DME: rollator, cane, GB, SC, raised toilet seat , home o2 at 2lpm NC at HS thru [employer] . VNS: Current with [facility], referral sent. IV cefepime, PO bumex BID,nitro gtt, 2decho,cardio and pulm, PT/OT cass. DAQUAN NEEDS SIGNED/COMPLETED Following for needs//JF                            Electronically signed by: Yane Will RN on 9/20/2022 at 10:10 AM

## 2022-09-20 NOTE — ED NOTES
Admission Dx: pulmonary edema    Pts Chief Complaints on Arrival: sob    ADL's - Total assistance    Pending Diagnostics:  n/a    Residence PTA: single story home    Special Considerations/Circumstances:  n/a    Vitals: Current vital signs:  BP (!) 159/96   Pulse (!) 115   Temp 97.9 °F (36.6 °C) (Oral)   Resp 15   SpO2 95%                           Jamar Shannon RN  09/20/22 0745

## 2022-09-20 NOTE — PROGRESS NOTES
Pt arrived to floor via stretcher from ED and was transfered to bed. Vitals taken, telemetry applied. Call light within reach, and pt educated on its use. Bed in lowest position, and locked. Side rails up x 2. Denied further questions or needs at this time. Will continue to monitor.

## 2022-09-20 NOTE — DISCHARGE INSTR - COC
Continuity of Care Form    Patient Name: Diego Dang   :  1940  MRN:  [id number]    Admit date:  2022  Discharge date:  2022    Code Status Order: DNR-CCA   Advance Directives:     Admitting Physician:  Lilibeth Decker MD  PCP: Nikia Cano MD    Discharging Nurse: [facility] Unit/Room#:   Discharging Unit Phone Number: [phone]    Emergency Contact:   Extended Emergency Contact Information  Primary Emergency Contact: Lorin Hammond Phone: [phone]  Relation: Child   needed? No  Secondary Emergency Contact: Aidan Harmon Phone: [phone]  Relation: Child   needed?  No    Past Surgical History:  Past Surgical History:   Procedure Laterality Date    CAROTID ENDARTERECTOMY Bilateral     ENDOSCOPY, COLON, DIAGNOSTIC      TONSILLECTOMY         Immunization History:   Immunization History   Administered Date(s) Administered    Influenza, FLUARIX, FLULAVAL, FLUZONE (age 10 mo+) AND AFLURIA, (age 1 y+), PF, 0.5mL 2019       Active Problems:  Patient Active Problem List   Diagnosis Code    Pneumonia due to organism J18.9    COPD (chronic obstructive pulmonary disease) ([facility] [address] J44.9    Diabetes mellitus [facility] E11.9    Essential hypertension I10    Simple chronic bronchitis ([facility] [address] J41.0    Type 2 diabetes mellitus with kidney complication, with long-term current use of insulin (HCC) E11.29, Z79.4    Stage 4 chronic kidney disease (HCC) N18.4    Aspiration pneumonitis ([facility]) J69.0    Sepsis ([facility] [address] A41.9    Lactic acidosis E87.2    Hypertensive urgency I16.0    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    CHF exacerbation ([facility]) I50.9    Severe sepsis ([facility]) A41.9, R65.20    Acute-on-chronic kidney injury ([facility] [address] N17.9, N18.9    Pulmonary edema cardiac cause ([facility]) I50.1    Recurrent aspiration pneumonia (HCC) J69.0    Fatigue R53.83    Pulmonary edema with congestive heart failure ([facility]) I50.1    NSTEMI (non-ST elevated myocardial infarction) ([facility] [address] I21.4    Acute respiratory failure with hypoxia and hypercapnia (HCC) J96.01, J96.02    UTI (urinary tract infection) N39.0    CAD (coronary artery disease) I25.10       Isolation/Infection:   Isolation            No Isolation          Patient Infection Status       Infection Onset Added Last Indicated Last Indicated By Review Planned Expiration Resolved Resolved By    None active    Resolved    COVID-19 (Rule Out) 09/20/22 09/20/22 09/20/22 COVID-19 & Influenza Combo (Ordered)   09/20/22 Rule-Out Test Resulted    COVID-19 (Rule Out) 05/16/21 05/16/21 05/16/21 COVID-19, Rapid (Ordered)   05/16/21 Rule-Out Test Resulted            Nurse Assessment:  Last Vital Signs: BP (!) 159/96   Pulse 90   Temp 97.9 °F (36.6 °C) (Oral)   Resp 13   Ht 5' 1\" (1.549 m)   Wt 150 lb (68 kg)   SpO2 95%   BMI 28.34 kg/m²     Last documented pain score (0-10 scale):    Last Weight:   Wt Readings from Last 1 Encounters:   09/20/22 150 lb (68 kg)     Mental Status:  oriented and alert    IV Access:  - None    Nursing Mobility/ADLs:  Walking   Assisted  Transfer  Assisted  Bathing  Assisted  Dressing  Assisted  Toileting  Assisted  Feeding  Independent  Med Admin  Assisted  Med Delivery   crushed and w/applesauce    Wound Care Documentation and Therapy:        Elimination:  Continence: Bowel: Yes  Bladder: Yes  Urinary Catheter: Removal Date 9/23/2022    Colostomy/Ileostomy/Ileal Conduit: No       Date of Last BM: 9/21/2022    Intake/Output Summary (Last 24 hours) at 9/20/2022 1022  Last data filed at 9/20/2022 0646  Gross per 24 hour   Intake 120 ml   Output 600 ml   Net -480 ml     I/O last 3 completed shifts: In: 120 [I.V.:120]  Out: 600 [Urine:600]    Safety Concerns:      At Risk for Falls    Impairments/Disabilities:      Vision and Hearing    Nutrition Therapy:  Current Nutrition Therapy:   - Oral Diet:  Carb Control 3 carbs/meal (1500kcals/day) and Low Sodium (2gm)    Routes of Feeding: Oral  Liquids: No Restrictions  Daily Fluid Restriction: no  Last Modified Barium Swallow with Video (Video Swallowing Test): not done    Treatments at the Time of Hospital Discharge:   Respiratory Treatments: albuterol inhaler as needed  Oxygen Therapy:  is on oxygen at 2 L/min per nasal cannula. Ventilator:    - No ventilator support    Rehab Therapies: Physical Therapy and Occupational Therapy  Weight Bearing Status/Restrictions: No weight bearing restrictions  Other Medical Equipment (for information only, NOT a DME order):  rollator, grab bars, shower chair, cane and raised toilet seat  Other Treatments: Skilled Nursing assessment and monitoring. Medication education and monitoring per protocol. Patient's personal belongings (please select all that are sent with patient):  Jodi    RN SIGNATURE:  Electronically signed by Corey Louis RN on 9/20/22 at 10:23 AM EDT    CASE MANAGEMENT/SOCIAL WORK SECTION    Inpatient Status Date: 9/20/22    Readmission Risk Assessment Score:  Readmission Risk              Risk of Unplanned Readmission:  20           Discharging to Facility/ [address] home health care  Phone [phone]  Fax [phone]       Dialysis Facility (if applicable)   Name:  Address:  Dialysis Schedule:  Phone:  Fax:    / signature: Electronically signed by Corey Louis RN on 9/20/22 at 10:23 AM EDT    PHYSICIAN SECTION    Prognosis: Fair    Condition at Discharge: Stable    Rehab Potential (if transferring to Rehab): Fair    Recommended Labs or Other Treatments After Discharge:     Physician Certification: I certify the above information and transfer of Moiz Mohan  is necessary for the continuing treatment of the diagnosis listed and that she requires Home Care for greater 30 days.      Update Admission H&P: No change in H&P    PHYSICIAN SIGNATURE:  Electronically signed by Lester Welch MD on 9/23/22 at 10:11 AM EDT

## 2022-09-20 NOTE — ACP (ADVANCE CARE PLANNING)
Outcomes:  [x] ACP discussion completed  [] Existing advance directive reviewed with patient; no changes to patient's previously recorded wishes  [] New Advance Directive completed  [] Portable Do Not Rescitate prepared for Provider review and signature  [] POLST/POST/MOLST/MOST prepared for Provider review and signature      Follow-up plan:    [x] Schedule follow-up conversation to continue planning  [] Referred individual to Provider for additional questions/concerns   [] Advised patient/agent/surrogate to review completed ACP document and update if needed with changes in condition, patient preferences or care setting    [] This note routed to one or more involved healthcare providers

## 2022-09-20 NOTE — ED TRIAGE NOTES
Mode of arrival (squad #, walk in, police, etc) : [address]        Chief complaint(s): shortness of breath        Arrival Note (brief scenario, treatment PTA, etc). : pt arrives via EMS on CPAP. Pt called EMS today for shortness of breath. Pt recently discharged from hospital for CHF. Pt has tachypnea and tachycardia upon arrival. pt was given 1 mg versed, 1 combivent, solumedrol and 2G magnesium by EMS. Pt did have one episode of emesis after 2nd bag of mag. Pt noted to hae yellow tinged sputum when coughing. Pt given 0.4 mg nitro sublingual during triage. C= \"Have you ever felt that you should Cut down on your drinking? \"  No  A= \"Have people Annoyed you by criticizing your drinking? \"  No  G= \"Have you ever felt bad or Guilty about your drinking? \"  No  E= \"Have you ever had a drink as an Eye-opener first thing in the morning to steady your nerves or to help a hangover? \"  No      Deferred []      Reason for deferring: N/A    *If yes to two or more: probable alcohol abuse. *

## 2022-09-21 ENCOUNTER — APPOINTMENT (OUTPATIENT)
Dept: GENERAL RADIOLOGY | Age: 82
DRG: 280 | End: 2022-09-21
Payer: MEDICARE

## 2022-09-21 LAB
ANION GAP SERPL CALCULATED.3IONS-SCNC: 12 MMOL/L (ref 9–17)
ANTI-XA UNFRAC HEPARIN: 0.35 IU/L (ref 0.3–0.7)
ANTI-XA UNFRAC HEPARIN: 0.51 IU/L (ref 0.3–0.7)
BUN BLDV-MCNC: 48 MG/DL (ref 8–23)
CALCIUM SERPL-MCNC: 9.3 MG/DL (ref 8.6–10.4)
CHLORIDE BLD-SCNC: 98 MMOL/L (ref 98–107)
CO2: 26 MMOL/L (ref 20–31)
CREAT SERPL-MCNC: 2.04 MG/DL (ref 0.5–0.9)
EKG ATRIAL RATE: 84 BPM
EKG P AXIS: 55 DEGREES
EKG P-R INTERVAL: 172 MS
EKG Q-T INTERVAL: 516 MS
EKG QRS DURATION: 92 MS
EKG QTC CALCULATION (BAZETT): 609 MS
EKG R AXIS: 35 DEGREES
EKG T AXIS: 138 DEGREES
EKG VENTRICULAR RATE: 84 BPM
GFR AFRICAN AMERICAN: 28 ML/MIN
GFR NON-AFRICAN AMERICAN: 23 ML/MIN
GFR SERPL CREATININE-BSD FRML MDRD: ABNORMAL ML/MIN/{1.73_M2}
GLUCOSE BLD-MCNC: 105 MG/DL (ref 65–105)
GLUCOSE BLD-MCNC: 107 MG/DL (ref 65–105)
GLUCOSE BLD-MCNC: 112 MG/DL (ref 65–105)
GLUCOSE BLD-MCNC: 113 MG/DL (ref 70–99)
GLUCOSE BLD-MCNC: 93 MG/DL (ref 65–105)
HCT VFR BLD CALC: 35 % (ref 36–46)
HEMOGLOBIN: 11 G/DL (ref 12–16)
INR BLD: 1.2
MAGNESIUM: 2 MG/DL (ref 1.6–2.6)
MCH RBC QN AUTO: 28.8 PG (ref 26–34)
MCHC RBC AUTO-ENTMCNC: 31.5 G/DL (ref 31–37)
MCV RBC AUTO: 91.3 FL (ref 80–100)
MRSA, DNA, NASAL: NEGATIVE
PARTIAL THROMBOPLASTIN TIME: 38.3 SEC (ref 24–36)
PDW BLD-RTO: 17.6 % (ref 11.5–14.9)
PLATELET # BLD: 255 K/UL (ref 150–450)
PMV BLD AUTO: 7.1 FL (ref 6–12)
POTASSIUM SERPL-SCNC: 4.1 MMOL/L (ref 3.7–5.3)
PRO-BNP: ABNORMAL PG/ML
PROTHROMBIN TIME: 14.7 SEC (ref 11.8–14.6)
RBC # BLD: 3.83 M/UL (ref 4–5.2)
SODIUM BLD-SCNC: 136 MMOL/L (ref 135–144)
SPECIMEN DESCRIPTION: NORMAL
TROPONIN, HIGH SENSITIVITY: 191 NG/L (ref 0–14)
WBC # BLD: 8.9 K/UL (ref 3.5–11)

## 2022-09-21 PROCEDURE — 2500000003 HC RX 250 WO HCPCS: Performed by: STUDENT IN AN ORGANIZED HEALTH CARE EDUCATION/TRAINING PROGRAM

## 2022-09-21 PROCEDURE — 6370000000 HC RX 637 (ALT 250 FOR IP): Performed by: STUDENT IN AN ORGANIZED HEALTH CARE EDUCATION/TRAINING PROGRAM

## 2022-09-21 PROCEDURE — 6360000002 HC RX W HCPCS: Performed by: INTERNAL MEDICINE

## 2022-09-21 PROCEDURE — 36415 COLL VENOUS BLD VENIPUNCTURE: CPT

## 2022-09-21 PROCEDURE — 80048 BASIC METABOLIC PNL TOTAL CA: CPT

## 2022-09-21 PROCEDURE — 93010 ELECTROCARDIOGRAM REPORT: CPT | Performed by: INTERNAL MEDICINE

## 2022-09-21 PROCEDURE — 2060000000 HC ICU INTERMEDIATE R&B

## 2022-09-21 PROCEDURE — 85730 THROMBOPLASTIN TIME PARTIAL: CPT

## 2022-09-21 PROCEDURE — 94761 N-INVAS EAR/PLS OXIMETRY MLT: CPT

## 2022-09-21 PROCEDURE — 85027 COMPLETE CBC AUTOMATED: CPT

## 2022-09-21 PROCEDURE — 85520 HEPARIN ASSAY: CPT

## 2022-09-21 PROCEDURE — 71045 X-RAY EXAM CHEST 1 VIEW: CPT

## 2022-09-21 PROCEDURE — 2580000003 HC RX 258: Performed by: NURSE PRACTITIONER

## 2022-09-21 PROCEDURE — 6370000000 HC RX 637 (ALT 250 FOR IP): Performed by: INTERNAL MEDICINE

## 2022-09-21 PROCEDURE — 83735 ASSAY OF MAGNESIUM: CPT

## 2022-09-21 PROCEDURE — 2580000003 HC RX 258: Performed by: STUDENT IN AN ORGANIZED HEALTH CARE EDUCATION/TRAINING PROGRAM

## 2022-09-21 PROCEDURE — 93005 ELECTROCARDIOGRAM TRACING: CPT | Performed by: STUDENT IN AN ORGANIZED HEALTH CARE EDUCATION/TRAINING PROGRAM

## 2022-09-21 PROCEDURE — 6360000002 HC RX W HCPCS: Performed by: NURSE PRACTITIONER

## 2022-09-21 PROCEDURE — 6360000002 HC RX W HCPCS: Performed by: STUDENT IN AN ORGANIZED HEALTH CARE EDUCATION/TRAINING PROGRAM

## 2022-09-21 PROCEDURE — 85610 PROTHROMBIN TIME: CPT

## 2022-09-21 PROCEDURE — 82947 ASSAY GLUCOSE BLOOD QUANT: CPT

## 2022-09-21 PROCEDURE — 2500000003 HC RX 250 WO HCPCS: Performed by: INTERNAL MEDICINE

## 2022-09-21 PROCEDURE — 83880 ASSAY OF NATRIURETIC PEPTIDE: CPT

## 2022-09-21 PROCEDURE — 2700000000 HC OXYGEN THERAPY PER DAY

## 2022-09-21 PROCEDURE — 99233 SBSQ HOSP IP/OBS HIGH 50: CPT | Performed by: INTERNAL MEDICINE

## 2022-09-21 PROCEDURE — 84484 ASSAY OF TROPONIN QUANT: CPT

## 2022-09-21 RX ORDER — HEPARIN SODIUM 10000 [USP'U]/100ML
12 INJECTION, SOLUTION INTRAVENOUS CONTINUOUS
Status: DISCONTINUED | OUTPATIENT
Start: 2022-09-21 | End: 2022-09-23

## 2022-09-21 RX ORDER — HEPARIN SODIUM 1000 [USP'U]/ML
2000 INJECTION, SOLUTION INTRAVENOUS; SUBCUTANEOUS PRN
Status: DISCONTINUED | OUTPATIENT
Start: 2022-09-21 | End: 2022-09-23

## 2022-09-21 RX ORDER — BUMETANIDE 0.25 MG/ML
2 INJECTION, SOLUTION INTRAMUSCULAR; INTRAVENOUS 2 TIMES DAILY
Status: DISCONTINUED | OUTPATIENT
Start: 2022-09-21 | End: 2022-09-23

## 2022-09-21 RX ORDER — HEPARIN SODIUM 1000 [USP'U]/ML
4000 INJECTION, SOLUTION INTRAVENOUS; SUBCUTANEOUS ONCE
Status: COMPLETED | OUTPATIENT
Start: 2022-09-21 | End: 2022-09-21

## 2022-09-21 RX ORDER — SPIRONOLACTONE 25 MG/1
12.5 TABLET ORAL DAILY
Status: DISCONTINUED | OUTPATIENT
Start: 2022-09-21 | End: 2022-09-23 | Stop reason: HOSPADM

## 2022-09-21 RX ORDER — HEPARIN SODIUM 1000 [USP'U]/ML
4000 INJECTION, SOLUTION INTRAVENOUS; SUBCUTANEOUS PRN
Status: DISCONTINUED | OUTPATIENT
Start: 2022-09-21 | End: 2022-09-23

## 2022-09-21 RX ORDER — GABAPENTIN 300 MG/1
300 CAPSULE ORAL NIGHTLY
Status: DISCONTINUED | OUTPATIENT
Start: 2022-09-22 | End: 2022-09-23 | Stop reason: HOSPADM

## 2022-09-21 RX ADMIN — DOXYCYCLINE 100 MG: 100 INJECTION, POWDER, LYOPHILIZED, FOR SOLUTION INTRAVENOUS at 02:10

## 2022-09-21 RX ADMIN — BUMETANIDE 2 MG: 1 TABLET ORAL at 08:15

## 2022-09-21 RX ADMIN — DONEPEZIL HYDROCHLORIDE 10 MG: 10 TABLET, FILM COATED ORAL at 22:29

## 2022-09-21 RX ADMIN — HEPARIN SODIUM 4000 UNITS: 1000 INJECTION, SOLUTION INTRAVENOUS; SUBCUTANEOUS at 11:14

## 2022-09-21 RX ADMIN — ALPRAZOLAM 0.25 MG: 0.25 TABLET ORAL at 22:29

## 2022-09-21 RX ADMIN — AMLODIPINE BESYLATE 10 MG: 10 TABLET ORAL at 08:15

## 2022-09-21 RX ADMIN — CLOPIDOGREL BISULFATE 75 MG: 75 TABLET ORAL at 08:15

## 2022-09-21 RX ADMIN — FLUOXETINE 40 MG: 20 CAPSULE ORAL at 08:15

## 2022-09-21 RX ADMIN — CARVEDILOL 3.12 MG: 3.12 TABLET, FILM COATED ORAL at 08:15

## 2022-09-21 RX ADMIN — HYDRALAZINE HYDROCHLORIDE 25 MG: 25 TABLET, FILM COATED ORAL at 08:15

## 2022-09-21 RX ADMIN — HEPARIN SODIUM 12 UNITS/KG/HR: 10000 INJECTION, SOLUTION INTRAVENOUS at 11:15

## 2022-09-21 RX ADMIN — CEFEPIME 1000 MG: 1 INJECTION, POWDER, FOR SOLUTION INTRAMUSCULAR; INTRAVENOUS at 03:26

## 2022-09-21 RX ADMIN — HYDRALAZINE HYDROCHLORIDE 25 MG: 25 TABLET, FILM COATED ORAL at 22:29

## 2022-09-21 RX ADMIN — CARVEDILOL 3.12 MG: 3.12 TABLET, FILM COATED ORAL at 22:28

## 2022-09-21 RX ADMIN — BUMETANIDE 2 MG: 0.25 INJECTION INTRAMUSCULAR; INTRAVENOUS at 11:43

## 2022-09-21 RX ADMIN — EMPAGLIFLOZIN 10 MG: 10 TABLET, FILM COATED ORAL at 11:23

## 2022-09-21 RX ADMIN — CEFTRIAXONE SODIUM 1000 MG: 1 INJECTION, POWDER, FOR SOLUTION INTRAMUSCULAR; INTRAVENOUS at 22:27

## 2022-09-21 RX ADMIN — GABAPENTIN 300 MG: 300 CAPSULE ORAL at 08:15

## 2022-09-21 RX ADMIN — BUMETANIDE 2 MG: 0.25 INJECTION INTRAMUSCULAR; INTRAVENOUS at 22:29

## 2022-09-21 RX ADMIN — SPIRONOLACTONE 12.5 MG: 25 TABLET ORAL at 11:23

## 2022-09-21 RX ADMIN — HEPARIN SODIUM 5000 UNITS: 5000 INJECTION INTRAVENOUS; SUBCUTANEOUS at 05:21

## 2022-09-21 NOTE — CONSULTS
Pulmonary Progress Note  [facility]      Patient - Moiz Mohan,  Age - 79 y.o.    - 1940      Room Number - 2124/2124-01   N -  [id number]   [facility] - [de-identified]  Date of Admission -  2022 12:17 AM        Neeraj Caballero MD  Primary Care Physician - Dea Levi MD     SUBJECTIVE     Patient was seen and examined bedside. No acute events overnight. Temp 96.7 @ 0630. VSS. On 2L NC satting 96%. Creatinine 2.04.  proBNP 36,710. Trops 47-->203-->191. WBC 15-->8.9. VL duplex unremarkable. MRSA swab negative. Chest x-ray shows improved aeration in bilateral lungs. UA shows E. coli with sensitivities pending. Echo shows a reduced EF of 25%. Cardiology was consulted because of elevated tropes. Was started on a heparin drip. Patient will need nephrology clearance prior to cardiac Cath is medically stable. Patient states that her breathing has improved. She denies any fever, chills, or productive cough. Patient still complains of shortness of breath but appears to have improved since yesterday. OBJECTIVE   VITALS    height is 5' 1\" (1.549 m) and weight is 161 lb 2.5 oz (73.1 kg). Her oral temperature is 96.7 °F (35.9 °C) (abnormal). Her blood pressure is 135/78 and her pulse is 78. Her respiration is 16 and oxygen saturation is 96%. Body mass index is 30.45 kg/m².   Temperature Range: Temp: (!) 96.7 °F (35.9 °C) Temp  Av.3 °F (36.3 °C)  Min: 96.7 °F (35.9 °C)  Max: 98.3 °F (36.8 °C)  BP Range:  Systolic (50MIG), DJZ:524 , Min:118 , LMZ:293     Diastolic (19RRI), IMELDA:93, Min:62, Max:115    Pulse Range: Pulse  Av.9  Min: 68  Max: 97  Respiration Range: Resp  Av.7  Min: 16  Max: 22  Current Pulse Ox[de-identified]  SpO2: 96 %  24HR Pulse Ox Range:  SpO2  Av %  Min: 93 %  Max: 98 %  Oxygen Amount and Delivery: O2 Flow Rate (L/min): 2 L/min    Wt Readings from Last 3 Encounters:   09/20/22 161 lb 2.5 oz (73.1 kg)   05/21/21 190 lb 4.1 oz (86.3 kg)   05/28/20 180 lb (81.6 kg)       I/O (24 Hours)    Intake/Output Summary (Last 24 hours) at 9/21/2022 1407  Last data filed at 9/21/2022 1321  Gross per 24 hour   Intake 530 ml   Output 1400 ml   Net -870 ml       EXAM     General Appearance  Awake, alert, oriented, in no acute distress  HEENT - normocephalic, atraumatic.  []  Mallampati  [] Crowded airway   [] Macroglossia  []  Retrognathia  [] Micrognathia  []  Normal tongue size []  Normal Bite  [] [place] sign positive    Neck - Supple,  trachea midline   Lungs - Good air entry, CTA bilaterally, no wheezing or rales  Cardiovascular - Heart sounds are normal.  Regular rate and rhythm   Abdomen - Soft, nontender, nondistended, no masses or organomegaly  Neurologic - There are no focal motor or sensory deficits  Skin - No bruising or bleeding  Extremities - No clubbing, cyanosis, 2+ edema    MEDS      bumetanide  2 mg IntraVENous BID    empagliflozin  10 mg Oral Daily    spironolactone  12.5 mg Oral Daily    cefepime  1,000 mg IntraVENous Q12H    sodium chloride flush  5-40 mL IntraVENous 2 times per day    insulin glargine  10 Units SubCUTAneous Nightly    insulin lispro  0-4 Units SubCUTAneous TID WC    insulin lispro  0-4 Units SubCUTAneous Nightly    amLODIPine  10 mg Oral Daily    carvedilol  3.125 mg Oral BID    clopidogrel  75 mg Oral Daily    donepezil  10 mg Oral Nightly    FLUoxetine  40 mg Oral Daily    gabapentin  300 mg Oral TID    hydrALAZINE  25 mg Oral BID    doxycycline (VIBRAMYCIN) IV  100 mg IntraVENous Q12H      heparin (PORCINE) Infusion 12 Units/kg/hr (09/21/22 1115)    sodium chloride 5 mL/hr at 09/20/22 1157    dextrose       heparin (porcine), heparin (porcine), sodium chloride flush, sodium chloride, ondansetron **OR** ondansetron, magnesium hydroxide, acetaminophen **OR** acetaminophen, glucose, dextrose bolus **OR** dextrose bolus, glucagon (rDNA), dextrose, ALPRAZolam    LABS   CBC   Recent Labs     09/21/22  0508   WBC 8.9   HGB 11.0*   HCT 35.0*   MCV 91.3        BMP:   Lab Results   Component Value Date/Time     09/21/2022 05:08 AM    K 4.1 09/21/2022 05:08 AM    CL 98 09/21/2022 05:08 AM    CO2 26 09/21/2022 05:08 AM    BUN 48 09/21/2022 05:08 AM    LABALBU 3.7 09/20/2022 12:35 AM    CREATININE 2.04 09/21/2022 05:08 AM    CALCIUM 9.3 09/21/2022 05:08 AM    GFRAA 28 09/21/2022 05:08 AM    LABGLOM 23 09/21/2022 05:08 AM     ABGs:  Lab Results   Component Value Date/Time    PHART 7.472 09/20/2022 01:32 PM    PO2ART 78.9 09/20/2022 01:32 PM    PQW1FVL 35.1 09/20/2022 01:32 PM      Lab Results   Component Value Date/Time    MODE NOT REPORTED 01/14/2020 02:14 AM     Ionized Calcium:  No results found for: IONCA  Magnesium:    Lab Results   Component Value Date/Time    MG 3.4 09/20/2022 12:35 AM     Phosphorus:    Lab Results   Component Value Date/Time    PHOS 3.1 05/21/2021 06:59 AM        LIVER PROFILE   Recent Labs     09/20/22  0035   AST 24   ALT 9   LIPASE 57   BILITOT 0.4   ALKPHOS 103     INR   Recent Labs     09/21/22  0935   INR 1.2     PTT   Lab Results   Component Value Date    APTT 38.3 (H) 09/21/2022         RADIOLOGY     Chest x-ray shows significant improvement with decrease interstitial edema        ASSESSMENT/PLAN   Principal Problem:    Pulmonary edema with congestive heart failure (HCC)  Active Problems:    NSTEMI (non-ST elevated myocardial infarction) (HCC)    Acute respiratory failure with hypoxia and hypercapnia (HCC)    UTI (urinary tract infection)    CAD (coronary artery disease)    Atrial fibrillation (HCC)    Diabetes mellitus ([facility]    Essential hypertension    Stage 4 chronic kidney disease (HCC)    Sepsis (HCC)    CHF exacerbation (HCC)    Pulmonary edema cardiac cause ([facility]  Resolved Problems:    * No resolved hospital problems.  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 fib  Type 2 diabetes mellitus  Essential hypertension  Hyperlipidemia        Plan:    -Currently at baseline 2L NC satting 96%  -Reviewed chest x-ray which has shown improvement  -VL duplex negative for any DVT  -Respiratory panel negative  -UA growing E. coli with sensitivities pending  -We will continue with IV cefepime; Will discontinue IV doxycycline  -Recommend repeat EKG to monitor QTC  -Currently on heparin gtt. per cardiology due to elevated Trops  -We will need nephro clearance prior to cardiac cath     DVT PPX: On heparin Gtt     DNR CCA- no intubation        Electronically signed by Bharath Soni MD on 9/21/2022 at 2:07 PM    Patient seen and examined independently by me. Above discussed and I agree with resident note except where indicated in the EMR revision history. Also see my additional comments and changes indicated by discrete font, text color, italics, and/or initials. Labs, cultures, and radiographs where available were reviewed. When we saw her, she was otherwise stable but just felt \"terrible\". She is very nonspecific and denies any chest pain or worsening dyspnea. Her urine is growing out E. coli so we have switched her to ceftriaxone and we have discontinued other antibiotics. Clinically and radiographically, she does not have pneumonia.     Would not pursue cardiac catheterization until her urinary tract infection is treated  Electronically signed by Leela Burroughs MD on 9/21/2022 at 3:37 PM

## 2022-09-21 NOTE — PROGRESS NOTES
Spoke with Dr. Danie Trimble regarding patients 191 Troponin level this AM. No orders received at this time.

## 2022-09-21 NOTE — PLAN OF CARE
Problem: Discharge Planning  Goal: Discharge to home or other facility with appropriate resources  9/21/2022 0536 by Miroslava Ramirez RN  Outcome: Progressing     Problem: Skin/Tissue Integrity  Goal: Absence of new skin breakdown  Description: 1. Monitor for areas of redness and/or skin breakdown  2. Assess vascular access sites hourly  3. Every 4-6 hours minimum:  Change oxygen saturation probe site  4. Every 4-6 hours:  If on nasal continuous positive airway pressure, respiratory therapy assess nares and determine need for appliance change or resting period.   9/21/2022 0536 by Miroslava Ramirez RN  Outcome: Progressing     Problem: ABCDS Injury Assessment  Goal: Absence of physical injury  9/21/2022 0536 by Miroslava Ramirez RN  Outcome: Progressing     Problem: Safety - Adult  Goal: Free from fall injury  9/21/2022 0536 by Miroslava Ramirez RN  Outcome: Progressing     Problem: Chronic Conditions and Co-morbidities  Goal: Patient's chronic conditions and co-morbidity symptoms are monitored and maintained or improved  9/21/2022 0536 by Miroslava Ramirez RN  Outcome: Progressing

## 2022-09-21 NOTE — PROGRESS NOTES
[facility] Card[facility] Cardiology    Progress                       Today's Date: 9/21/2022  Patient Name: Larissa James  Date of admission: 9/20/2022 12:17 AM  Patient's age: 79 y.o., 1940  Admission Dx: Hypoxia [R09.02]  Pulmonary edema with congestive heart failure (HCC) [I50.1]  Congestive heart failure, unspecified HF chronicity, unspecified heart failure type ([address] [I50.9]    Reason for Consult:  Cardiac evaluation    Requesting Physician: Elizabeth Almanza MD    CHIEF COMPLAINT:  Shortness of breath    History Obtained From:  patient, electronic medical record    Subjective :    No cp  SOB improved  Tele- Sinus HR 78      Current Facility-Administered Medications:     heparin (porcine) injection 4,000 Units, 4,000 Units, IntraVENous, Once, Savage Lavon, DO    heparin (porcine) injection 4,000 Units, 4,000 Units, IntraVENous, PRN, Savage Lavon, DO    heparin (porcine) injection 2,000 Units, 2,000 Units, IntraVENous, PRN, Savage Lavon, DO    heparin 25,000 units in dextrose 5% 250 mL (premix) infusion, 12 Units/kg/hr, IntraVENous, Continuous, Savage Lavon, DO    bumetanide (BUMEX) injection 2 mg, 2 mg, IntraVENous, BID, Savage Lavon, DO    empagliflozin (JARDIANCE) tablet 10 mg, 10 mg, Oral, Daily, Savage Lavon, DO    spironolactone (ALDACTONE) tablet 12.5 mg, 12.5 mg, Oral, Daily, Savage Lavon, DO    cefepime (MAXIPIME) 1,000 mg in sodium chloride 0.9 % 50 mL IVPB mini-bag, 1,000 mg, IntraVENous, Q12H, Vanessa Doni, APRN - CNP, Stopped at 09/21/22 0356    sodium chloride flush 0.9 % injection 5-40 mL, 5-40 mL, IntraVENous, 2 times per day, Vanessa Doni, APRN - CNP, 10 mL at 09/20/22 2022    sodium chloride flush 0.9 % injection 10 mL, 10 mL, IntraVENous, PRN, Vanessa Doni, APRN - CNP    0.9 % sodium chloride infusion, , IntraVENous, PRN, Vanessa Doni, APRN - CNP, Last Rate: 5 mL/hr at 09/20/22 1157, New Bag at 09/20/22 1157    ondansetron (ZOFRAN-ODT) disintegrating tablet 4 mg, 4 mg, Oral, Q8H PRN **OR** ondansetron (ZOFRAN) injection 4 mg, 4 mg, IntraVENous, Q6H PRN, LUIS Singh CNP    magnesium hydroxide (MILK OF MAGNESIA) 400 MG/5ML suspension 30 mL, 30 mL, Oral, Daily PRN, LUIS Singh CNP    acetaminophen (TYLENOL) tablet 650 mg, 650 mg, Oral, Q6H PRN **OR** acetaminophen (TYLENOL) suppository 650 mg, 650 mg, Rectal, Q6H PRN, LUIS Singh CNP    insulin glargine (LANTUS) injection vial 10 Units, 10 Units, SubCUTAneous, Nightly, Kaitlin Bucio MD    insulin lispro (HUMALOG) injection vial 0-4 Units, 0-4 Units, SubCUTAneous, TID WC, Kaitlin Bucio MD    insulin lispro (HUMALOG) injection vial 0-4 Units, 0-4 Units, SubCUTAneous, Nightly, Kaitlin Bucio MD    glucose chewable tablet 16 g, 4 tablet, Oral, PRN, Kaitlin Bucio MD    dextrose bolus 10% 125 mL, 125 mL, IntraVENous, PRN **OR** dextrose bolus 10% 250 mL, 250 mL, IntraVENous, PRN, Kaitlin Bucio MD    glucagon (rDNA) injection 1 mg, 1 mg, SubCUTAneous, PRN, Kaitlin Bucio MD    dextrose 10 % infusion, , IntraVENous, Continuous PRN, Kaitlin Bucio MD    ALPRAZolam [place]) tablet 0.25 mg, 0.25 mg, Oral, Nightly PRN, Abelardo Bland MD, 0.25 mg at 09/20/22 2346    amLODIPine (NORVASC) tablet 10 mg, 10 mg, Oral, Daily, Abelardo Bland MD, 10 mg at 09/21/22 0815    carvedilol (COREG) tablet 3.125 mg, 3.125 mg, Oral, BID, Abelardo Bland MD, 3.125 mg at 09/21/22 0815    clopidogrel (PLAVIX) tablet 75 mg, 75 mg, Oral, Daily, Abelardo Bland MD, 75 mg at 09/21/22 0815    donepezil (ARICEPT) tablet 10 mg, 10 mg, Oral, Nightly, Amarilis Rodriguez MD, 10 mg at 09/20/22 2020    FLUoxetine (PROZAC) capsule 40 mg, 40 mg, Oral, Daily, Abelardo Bland MD, 40 mg at 09/21/22 0815    gabapentin (NEURONTIN) capsule 300 mg, 300 mg, Oral, TID, Abelardo Bland MD, 300 mg at 09/21/22 0815    hydrALAZINE (APRESOLINE) tablet 25 mg, 25 mg, Oral, BID, Abelardo Bland MD, 25 mg at 09/21/22 0815    doxycycline (VIBRAMYCIN) 100 mg in dextrose 5 % 100 mL IVPB, 100 mg, IntraVENous, Q12H, Cassia Agustin MD, Stopped at 09/21/22 0310     Allergies   Allergen Reactions    Doxycycline Hyclate     Norco [Hydrocodone-Acetaminophen] Other (See Comments)     Nausea, dizziness    Pcn [Penicillins]     Sulfa Antibiotics            PHYSICAL EXAM:      /68   Pulse 79   Temp (!) 96.7 °F (35.9 °C) (Oral)   Resp 16   Ht 5' 1\" (1.549 m)   Wt 161 lb 2.5 oz (73.1 kg)   SpO2 96%   BMI 30.45 kg/m²    Constitutional and General Appearance: alert, cooperative, no distress and appears stated age  HEENT: PERRL, no cervical lymphadenopathy. No masses palpable. Normal oral mucosa  Respiratory:  Normal excursion and expansion without use of accessory muscles  Resp Auscultation: Good respiratory effort. No for increased work of breathing. On auscultation: course rales noted bilaterally right > left, clear upper lobes  On NC without distress  Cardiovascular:  Heart tones are crisp and normal. regular S1 and S2.  Jugular venous pulsation Normal  The carotid upstroke is normal in amplitude and contour without delay or bruit \  SR 81  Abdomen:   soft  Bowel sounds present  Obese  Extremities:   Trace to +1 edema  Neurological:  Alert and oriented. DATA:    Diagnostics:    EKG: sinus tachycardia. ECHO: previously taken 2021 and show as below. Ejection fraction: 50%  Stress Test: not obtained. Cardiac Angiography: not obtained. Echo 5/19/21  Summary  Left ventricle is normal in size, mild to moderate left ventricular  hypertrophy,  Global left ventricular systolic function is normal, calculated ejection  fraction is 55%. Grade II (moderate) left ventricular diastolic dysfunction. Left atrium is moderately dilated. Bowing interatrial septal motion. Possible bi-directional shunt seen by  color Doppler. Consider Bubble Study when indicated. Aortic valve is trileaflet, calcified, without restriction of motion. Trivial aortic insufficiency.   Mitral valve is sclerotic with annular calcification. Mild mitral regurgitation. Trivial tricuspid regurgitation. Echo 9/7/22 @ [facility]  Left Ventricle: Systolic function is mildly to moderately decreased   with an ejection fraction of 40-45%. Left Atrium: Left atrium is moderately dilated. Left atrium volume   index is moderately increased. The left atrial volume index is 41.6 mL/m2. Right Ventricle: Right ventricular size is moderately dilated. The   right ventricular basal diameter is 43.0 mm. Systolic function is   moderately reduced. Abnormal tricuspid annular plane systolic excursion. Right Atrium: Right atrium is moderately dilated. The right atrial area   is 12.3 cm2. Aortic Valve: There is trace regurgitation. There is mild stenosis. The   calculated aortic valve area is 1.37 cm2. The calculated aortic valve peak   gradient is 12.82 mmHg. The calculated aortic valve mean gradient is 6.00   mmHg. Mitral Valve: There is moderate regurgitation. There is moderate   stenosis. The mean gradient is 5.00 mmHg. The peak gradient is 9.86 mmHg. Tricuspid Valve: RVSP calculated at 43 mmHg. RVSP is based on RA   pressure of 3 mmHg. Labs:     CBC:   Recent Labs     09/20/22  0614 09/21/22  0508   WBC 15.0* 8.9   HGB 11.9* 11.0*   HCT 36.8 35.0*    255       BMP:   Recent Labs     09/20/22  0614 09/21/22  0508   * 136   K 4.4 4.1   CO2 25 26   BUN 47* 48*   CREATININE 2.14* 2.04*   LABGLOM 22* 23*   GLUCOSE 177* 113*       BNP: No results for input(s): BNP in the last 72 hours. PT/INR:   Recent Labs     09/20/22  0035   PROTIME 13.9   INR 1.1       APTT:No results for input(s): APTT in the last 72 hours. CARDIAC ENZYMES:No results for input(s): CKTOTAL, CKMB, CKMBINDEX, TROPONINI in the last 72 hours.   FASTING LIPID PANEL:  Lab Results   Component Value Date/Time    HDL 51 05/16/2021 07:07 AM    TRIG 173 05/16/2021 07:07 AM     LIVER PROFILE:  Recent Labs     09/20/22  0035   AST 24 ALT 9   LABALBU 3.7       Echo 9/20/22:  Echo contrast utilized on this technically difficult study. Left ventricle is mild to moderately dilated. Estimated EF 25%. Severe global hypokinesis. Normal right ventricular size, reduced function. Left atrium appears dilated. Aortic valve is trileaflet. Aortic valve sclerosis without stenosis. Small  LVOT diameter. Trivial aortic insufficiency. Normal aortic root dimension. Mitral annular calcification is seen. Mild to moderate mitral  regurgitation. Mild MS mean gradient 5 mmHg  Normal tricuspid valve structure and function. Insignificant tricuspid  regurgitation, unable to estimate RVSP. IVC appears normal diameter , difficult to assess respiratory variation  No significant pericardial effusion is seen. Trop -316    IMPRESSION:      Acute Sys HF- EF 25% on 9/20/22- (EF was 40-45% on Echo 9/7/22)  Recent hospitalization at [facility] for acute on chronic CHF and possible cellulitis  NSTEMI - Trop peaked at 0 and now 191  Hx of HTN  Urinary tract infection  CKD  COPD  Hx of CVA  DNR- CCA status    RECOMMENDATIONS:  - there is marked decline in LVEF  - I had staff check with Dr. Lopez People office and they confirmed he doesn't come to [address] [place] [address]  - I had an extensive discussion with the patient, she states her DNR status is mostly do not intubate but she is willing to go for a cardiac cath for example  - will start heparin drip  - will diurese  as able with IV bumex  - will need nephro consult prior to possible cath  - continue coreg  - add jardiance  - not candidate for ACE/ARB/ARNI/Aldactone due to advanced CKD    D/w nursing who will notify primary about need for nephrology cass Winter 63, DO, [facility], [address], [address], [address] [facility]  [email or web address]. [employer]  [id number]    Addendum 9/21/22 at 10:51 am-   D/w Dr. Marzena Contreras, she told him given her risk of DENIS / dialysis with cath, she would like to hold off on cardiac cath for now.    Will discuss further with her tomorrow  Continue current plan for now (heparin drip for 48 hours) and DAPT on d/c. Mario Lozano DO, [address], [address], [address] [facility]  [facility]. [facility]  [id number]

## 2022-09-21 NOTE — CONSULTS
NEPHROLOGY CONSULT     Patient :  Gogo Cottrell; 79 y.o. MRN# [id number]  Location:  2124/2124-01  Attending:  Janine Ortega MD  Admit Date:  9/20/2022   Hospital Day: 1      Reason for Consult:  CKD IV      Chief Complaint:  sob  History Obtained From:  patient    History of Present Illness: This is a 79 y.o. female  with PMH of DM type 2 NIDDM, HTN CAD , Paroxysmal Afib CHF with reduced EF  Systolic dysfunction and grade II diastolic dysfucntion, CKD IV with baseline scr averaging 1.7-2.0 mg/dl. Patient was recently admitted to [facility] with CHF exacerbation and was discharged, and presented to [address] 9/20/2022 with c/o sob and confusion. CXR showed diffuse Parenchymal infiltrates through out the lungs bilaterally which may represent diffuse interstitial pulmonary edema or multifocal PNA  Troponins trending up to 191, Pro BNP 36,700  REPEAT echo Showed worsening of EF to 25 % from 55%with severe global hypokinesis. Pt denies any history of  prolonged NSAID use. Patient denies dysuria, gross hematuria, flank pain, nocturia, urgency, passing frothy urine or urinary incontinence. There has been no recent exposure to IV contrast.   There is no history  of paraprotein disease. Pt denies any history of recurrent UTI or kidney stones. Medication review shows use of ACE-inhibitor/diuretics.     Past Medical History:        Diagnosis Date    Anxiety     Arthritis     Atherosclerosis     Body mass index (bmi) 25.0-25.9, adult     CHF exacerbation ([facility] 1/13/2020    COPD (chronic obstructive pulmonary disease) (HCC)     CVA (cerebral vascular accident) ([facility]     Depression     Diabetes mellitus ([facility]     Diverticula, esophagus congenital     Former smoker     Hypercholesteremia     Hypertension     Hypokalemia     Joint pain, knee     Myocardial infarct, old     2007    Pneumonia     Pneumonia due to infectious organism, unspecified laterality, unspecified part of lung    Protein S deficiency ([facility]     Pulmonary embolism (HCC)     Reflux esophagitis     Tinea corporis     Tremor     URI (upper respiratory infection)     UTI (urinary tract infection)     Zenker's diverticulum        Past Surgical History:        Procedure Laterality Date    CAROTID ENDARTERECTOMY Bilateral     ENDOSCOPY, COLON, DIAGNOSTIC      TONSILLECTOMY         Current Medications:    heparin (porcine) injection 4,000 Units, Once  heparin (porcine) injection 4,000 Units, PRN  heparin (porcine) injection 2,000 Units, PRN  heparin 25,000 units in dextrose 5% 250 mL (premix) infusion, Continuous  bumetanide (BUMEX) injection 2 mg, BID  empagliflozin (JARDIANCE) tablet 10 mg, Daily  spironolactone (ALDACTONE) tablet 12.5 mg, Daily  cefepime (MAXIPIME) 1,000 mg in sodium chloride 0.9 % 50 mL IVPB mini-bag, Q12H  sodium chloride flush 0.9 % injection 5-40 mL, 2 times per day  sodium chloride flush 0.9 % injection 10 mL, PRN  0.9 % sodium chloride infusion, PRN  ondansetron (ZOFRAN-ODT) disintegrating tablet 4 mg, Q8H PRN   Or  ondansetron (ZOFRAN) injection 4 mg, Q6H PRN  magnesium hydroxide (MILK OF MAGNESIA) 400 MG/5ML suspension 30 mL, Daily PRN  acetaminophen (TYLENOL) tablet 650 mg, Q6H PRN   Or  acetaminophen (TYLENOL) suppository 650 mg, Q6H PRN  insulin glargine (LANTUS) injection vial 10 Units, Nightly  insulin lispro (HUMALOG) injection vial 0-4 Units, TID WC  insulin lispro (HUMALOG) injection vial 0-4 Units, Nightly  glucose chewable tablet 16 g, PRN  dextrose bolus 10% 125 mL, PRN   Or  dextrose bolus 10% 250 mL, PRN  glucagon (rDNA) injection 1 mg, PRN  dextrose 10 % infusion, Continuous PRN  ALPRAZolam (XANAX) tablet 0.25 mg, Nightly PRN  amLODIPine (NORVASC) tablet 10 mg, Daily  carvedilol (COREG) tablet 3.125 mg, BID  clopidogrel (PLAVIX) tablet 75 mg, Daily  donepezil (ARICEPT) tablet 10 mg, Nightly  FLUoxetine (PROZAC) capsule 40 mg, Daily  gabapentin (NEURONTIN) capsule 300 mg, TID  hydrALAZINE (APRESOLINE) tablet 25 mg, BID  doxycycline (VIBRAMYCIN) 100 mg in dextrose 5 % 100 mL IVPB, Q12H        Allergies:  Doxycycline hyclate, Norco [hydrocodone-acetaminophen], Pcn [penicillins], and Sulfa antibiotics    Social History:   Social History     Socioeconomic History    Marital status: Single     Spouse name: Not on file    Number of children: 2    Years of education: Not on file    Highest education level: Not on file   Occupational History    Not on file   Tobacco Use    Smoking status: Former     Years: 25.00     Types: Cigarettes    Smokeless tobacco: Never   Substance and Sexual Activity    Alcohol use: Yes     Comment: socially    Drug use: No    Sexual activity: Not on file   Other Topics Concern    Not on file   Social History Narrative    Not on file     Social Determinants of Health     Financial Resource Strain: Not on file   Food Insecurity: Not on file   Transportation Needs: Not on file   Physical Activity: Not on file   Stress: Not on file   Social Connections: Not on file   Intimate Partner Violence: Not on file   Housing Stability: Not on file       Family History:   History reviewed. No pertinent family history. Review of Systems:    Constitutional: No fever, no chills, no night sweats, fatigue, generalized weakness, loss of appetite  HEENT:  No headache, otalgia, itchy eyes, epistaxis, nasal discharge or sore throat. Cardiac:  No chest pain, +dyspnea, orthopnea or PND, palpitations  Chest:  No cough, hemoptysis, pleuritic chest pain, wheezing,+SOB  Abdomen:  No abdominal pain, nausea, vomiting, diarrhea, melena, dysphagia hematemesis,constipation, abdominal bloating, flank pain  Neuro:  No CVA, TIA or seizure like activity. Skin:   No rashes, no itching. :   No hematuria, no pyuria, no dysuria, no flank pain. Extremities:  No swelling or joint pains.       Objective:  CURRENT TEMPERATURE:  Temp: (!) 96.7 °F (35.9 °C)  MAXIMUM TEMPERATURE OVER 24HRS:  Temp (24hrs), Av.5 °F (36.4 °C), Min:96.7 °F (35.9 °C), Max:98.3 °F (36.8 °C)    CURRENT RESPIRATORY RATE:  Resp: 16  CURRENT PULSE:  Heart Rate: 79  CURRENT BLOOD PRESSURE:  BP: 133/68  24HR BLOOD PRESSURE RANGE:  Systolic (31QEF), VZS:243 , Min:118 , PCI:387   ; Diastolic (88JRQ), EWY:07, Min:49, Max:115    24HR INTAKE/OUTPUT:    Intake/Output Summary (Last 24 hours) at 9/21/2022 1006  Last data filed at 9/21/2022 0636  Gross per 24 hour   Intake 300 ml   Output 2400 ml   Net -2100 ml     Patient Vitals for the past 96 hrs (Last 3 readings):   Weight   09/20/22 2330 161 lb 2.5 oz (73.1 kg)   09/20/22 0745 150 lb (68 kg)   09/20/22 0600 150 lb (68 kg)       Physical Exam:  GENERAL APPEARANCE: Alert and cooperative, and appears to be in no acute distress. HEAD: normocephalic  EYES:  EOMI. Not pale, anicteric   NOSE:  No nasal discharge. CARDIAC: Normal S1 and S2. No S3, S4 or murmurs. Rhythm is regular. LUNGS:  diminished breath sounds. Rales posteriorly, no wheezing  NECK: Neck supple, non-tender    MUSKULOSKELETAL: Adequately aligned spine. No joint erythema or tenderness. EXTREMITIES: +edema. Peripheral pulses intact. NEURO: Non focal      Labs:   CBC:  Recent Labs     09/20/22  0035 09/20/22  0614 09/21/22  0508   WBC 14.1* 15.0* 8.9   RBC 4.55 4.18 3.83*   HGB 12.9 11.9* 11.0*   HCT 40.5 36.8 35.0*   MCV 89.2 88.0 91.3   MCH 28.4 28.4 28.8   MCHC 31.9 32.3 31.5   RDW 16.7* 16.4* 17.6*    305 255   MPV 7.3 7.5 7.1      BMP:   Recent Labs     09/20/22  0035 09/20/22  0614 09/21/22  0508   * 134* 136   K 4.3 4.4 4.1   CL 95* 97* 98   CO2 23 25 26   BUN 46* 47* 48*   CREATININE 2.14* 2.14* 2.04*   GLUCOSE 282* 177* 113*   CALCIUM 9.7 9.2 9.3      Phosphorus:  No results for input(s): PHOS in the last 72 hours. Magnesium:   Recent Labs     09/20/22  0035   MG 3.4*     Albumin:   Recent Labs     09/20/22  0035   LABALBU 3.7       IRON:  No results for input(s): IRON in the last 72 hours.   Iron Saturation:  Invalid input(s): PERCENTFE  TIBC: No results for input(s): TIBC in the last 72 hours. FERRITIN:  No results for input(s): FERRITIN in the last 72 hours. SPEP: No results for input(s): SPEP in the last 72 hours. Recent Labs     09/20/22  0035   PROT 8.6*       Urinalysis:    Recent Labs     09/20/22  0236   NITRU NEGATIVE   COLORU Yellow   PHUR 5.0   [address] TOO NUMEROUS TO COUNT   RBCUA TOO NUMEROUS TO COUNT   BACTERIA MANY*   [address] 1.019   LEUKOCYTESUR LARGE*   UROBILINOGEN Normal   BILIRUBINUR NEGATIVE   GLUCOSEU NEGATIVE   [address] NEGATIVE         Radiology:  Reviewed as available. Assessment:   CKD 4 secondary to Nephrosclerosis, Left atrophic kidney with kidney stone in left kidney with out hydronephrosis. Scr averaging 1.7-2.0 mg/dl, Scr  near baseline. CHF with acutely reduced EF, systolic dysfunction and grade II diastolic dysfunction. DM type 2  HTN  Hx of CAD  UTI, UA 2+ pro, large Hb, large L.e, numerous WBC and RBC. Plan:  I discussed with the patient regarding need for cardiac catheterization, discussed risks and benefits of cardiac cath. Patient is high risk Approximately 75% risk for contrast induced Nephropathy and Approximately 12 %risk of requiring dialysis. Patient understands the risk and benefits, Patient stated that \"I am 79years old not interested in cardiac catheterization at this time\". Thank you for the consultation.       Electronically signed by Rola Hart MD on 9/21/2022 at 10:06 AM

## 2022-09-21 NOTE — PLAN OF CARE
Problem: Discharge Planning  Goal: Discharge to home or other facility with appropriate resources  9/21/2022 1641 by Mariangel Kruse RN  Outcome: Progressing     Problem: Skin/Tissue Integrity  Goal: Absence of new skin breakdown  Description: 1. Monitor for areas of redness and/or skin breakdown  2. Assess vascular access sites hourly  3. Every 4-6 hours minimum:  Change oxygen saturation probe site  4. Every 4-6 hours:  If on nasal continuous positive airway pressure, respiratory therapy assess nares and determine need for appliance change or resting period.   9/21/2022 1641 by Mariangel Kruse RN  Outcome: Progressing     Problem: ABCDS Injury Assessment  Goal: Absence of physical injury  9/21/2022 1641 by Mariangel Kruse RN  Outcome: Progressing     Problem: Safety - Adult  Goal: Free from fall injury  9/21/2022 1641 by Mariangel Kruse RN  Outcome: Progressing

## 2022-09-21 NOTE — PROGRESS NOTES
Physician Progress Note      Radha Camarena  CSN #:                  [id number]  :                       1940  ADMIT DATE:       2022 12:17 AM  DISCH DATE:  Ajay Callahan  PROVIDER #:        Griffin Sultana          QUERY TEXT:    Pt admitted with acute respiratory failure 2/2 CHF exacerbation. Pt noted to   meet SIRS criteria w/ urine culture positive for E.coli. If possible, please   document in the PNs and d/c summary if you are evaluating and /or treating any   of the following: The medical record reflects the following:  Risk Factors: 79 y.o. female with extensive PMH. Clinical Indicators: ED Provider Note : She does have an elevated LA,   though she is afebrile. She has a UTI. Given concern for severe volume   overload we are not giving her 30 cc/kg bolus. Cultures were obtained & we   will give cefepime for broad-spectrum coverage. H&P : Leukocytosis most   likely 2/2 UTI. Pulm Consult : Pt was afebrile, tachypneic, tachycardic,   hypertensive, upon arrival. Code sepsis work-up was initiated in the ED. She   was given cefepime while in the ED. Sepsis. Temp as low as 96.7. RR max 31. HR   max 135. /73 - 205/121. WBCs 14.1 ( @ 003  Treatment: Cefepime & Vibramycin IV. Options provided:  -- Sepsis d/t E.coli UTI, present on admission  -- E.coli UTI w/out Sepsis, SIRS of non-infectious origin  -- Other - I will add my own diagnosis  -- Disagree - Not applicable / Not valid  -- Disagree - Clinically unable to determine / Unknown  -- Refer to Clinical Documentation Reviewer    PROVIDER RESPONSE TEXT:    Provider is clinically unable to determine a response to this query. Tachypnea and tachycardia are more likely due to the presence of decomensated   heart failure, as noticed by her pulmonary congestion improving on diuresis. Also, she came with hypertensive emergency vs urgency.     Query created by: Jessica Cabrera on 2022 11:22 AM      QUERY TEXT:    Pt admitted with acute respiratory failure secondary to CHF exacerbation. Noted documentation of acute on chronic diastolic CHF in 4/40 H&P and acute   systolic CHF in Cardio PN 9/21. If possible, please document in PNs and d/c   summary:    The medical record reflects the following:  Risk Factors: 79 y.o. female with PMH of CKD Stage 4, COPD - depedent on O2,   A-fib, HTN, and unspecified CHF. Clinical Indicators: In the setting of above risk factors, H&P 9/20: presents   to the ED w/ SOB & admitted for acute type II respiratory failure 2/2 presence   HF exacerbation vs multifocal PNA & hypertensive urgency. Decreased EF 40 to   61%, & diastolic dysfunction as well as mitral regurg. ProBNP 30,575 compared   to baseline of 1000. Pt was started on IV nitro gtt d/t pulmonary urgency vs   emergency & acute setting of flash pulmonary edema, vs pulmonary edema 2/2   acute exacerbation of HF, pt was then given Lasix 40 mg IV. Cardio consulted. Cardio Consult 9/20: Acute CHF - likely  Treatment: Cardio consult, 2D Echo, Bumex & Lasix IV, Heparin & Nitro gtts. Options provided:  -- Acute on Chronic Systolic CHF/HFrEF confirmed present on admission  -- Acute on Chronic Diastolic CHF/HFpEF confirmed present on admission  -- Defer to Cardio consultant documentation regarding acute systolic CHF  -- Other - I will add my own diagnosis  -- Disagree - Not applicable / Not valid  -- Disagree - Clinically unable to determine / Unknown  -- Refer to Clinical Documentation Reviewer    PROVIDER RESPONSE TEXT:    The diagnosis of Acute on Chronic Systolic CHF/HFrEF was confirmed as present   on admission.     Query created by: Jas Smith on 9/21/2022 11:36 AM      Electronically signed by:  Ailyn Jensen 9/21/2022 1:09 PM

## 2022-09-21 NOTE — PROGRESS NOTES
Spoke with Dr. Yahir Alvarez regarding patients recent troponin level, reviewed patient case and labs, informed physician patient reports no chest pain. Physician instruction to continue SubQ heparin injections and acquire another troponin lab at [facility] tomorrow morning. DC every 6 hour draw for Troponin. No further orders at this time.

## 2022-09-21 NOTE — PROGRESS NOTES
[address]    PROGRESS NOTE             9/21/2022    8:41 AM    Name:   Brooklyn Mc  MRN:     [id number]     Acct:      [de-identified]   Room:   [facility]124[facility] Day:  1  Admit Date:  9/20/2022 12:17 AM    PCP:  Chapis Arriaga MD  Code Status:  DNR-CCA    Subjective:     C/C:   Chief Complaint   Patient presents with    Shortness of Breath     Interval History Status: significantly improved. Patient seen and examined at the bed side, no new acute events overnight. Patient currently on 2L NC at her baseline. She reports feeling better. Without Sob or worsening of her swelling as it has slightly improved    Troponin trended upward last night, to 203 from 47 and baseline of 10. This AM troponin was 191. This morning the patient Cr improved slightly to 2.04 compared to 2.14 yesterday. Pro-BNP has increased up top 36,710 from 30,575 yesterday. Blood pressure remains stable at 133/68 pulse at 79. WBC count has also trended down to 9. Zithromax was discontinued due to patient's QT per Critical care.       -Her temp today was 36-35. 9? Notes from nursing staff and Consults had been reviewed, and the overnight progress had been checked with the nursing staff as well. Brief History:     The patient is a pleasant  79 y.o.   female with history of congestive heart failure, stage IV chronic kidney disease, COPD on 2 L oxygen at home, type 2 diabetes, Afib, and hypertension who presents to the ED with Shortness of Breath and is currently admitted for the management of acute type II respiratory failure secondary to presence heart failure exacerbation versus multifocal pneumonia and hypertensive urgency     Ms. Slim Black, says that yesterday after she went for lunch at some grocery with her son Jorge Kraig a double fried steak], and that her medications filled admission, she took her home meds the morning, did not at night, and around 8 PM she started feeling short of breath. Patient then presented to the ED with extreme shortness of breath. Patient states that she has been discharged from [facility] a week ago to do the same symptoms as she remained hospitalized for a week. At that time, patient medications were changed [she was started on Bumex]. ACE inhibitor's were discontinued due to renal dysfunction. She also had decreased ejection fraction 40 to 92%, and diastolic dysfunction as well as mitral regurg. On this presentation patient had a blood pressure of 189/109, and a pulse of 129. She also had an O2 sat of 90 on positive airway pressure. She had an elevated troponin of 47, proBNP 30,575 compared to baseline of 13 and 1000, respectively. EKG did not show acute ischemic changes. However, on Telemetry after admission to ICU, patient was in Atrial fibrillation. Chest x-ray showed diffuse parenchymal infiltrates seen throughout the lungs on bilateral fields which may be secondary to pulmonary edema versus multilobular pneumonia. Patient had a white blood cell count of 15, ESR of 89, CRP 47.7 and procalcitonin of 0.15. Patient was started on IV nitroglycerin drip due to pulmonary urgency versus emergency and acute setting of flash pulmonary edema, versus pulmonary edema secondary to acute exacerbation of heart failure, patient was then given Lasix 40 mg IV. Cardiology were consulted for elevated troponins and heart failure. Also, Pulmonology/critical care are on board. Review of Systems:     CONSTITUTIONAL:  no fevers  Psych: Normal mood and affect, no depression, no suicidal ideation.   EYES: negative for blury vision  HEENT: No headaches, no nasal congestion, no difficulty swallowing  RESPIRATORY: Positive for shortness of breath  CARDIOVASCULAR: negative for chest pain, no palpitations  GASTROINTESTINAL: no nausea, no vomiting, no change in bowel habits, no abdominal pain   GENITOURINARY: Positive for dysuria MUSCULOSKELETAL: no joint pains, no muscle aches, bilateral pitting edema of the lower extremities  NEUROLOGICAL: No weakness or numbness      Medications: Allergies:     Allergies   Allergen Reactions    Doxycycline Hyclate     Norco [Hydrocodone-Acetaminophen] Other (See Comments)     Nausea, dizziness    Pcn [Penicillins]     Sulfa Antibiotics        Current Meds:   Scheduled Meds:    cefepime  1,000 mg IntraVENous Q12H    sodium chloride flush  5-40 mL IntraVENous 2 times per day    insulin glargine  10 Units SubCUTAneous Nightly    insulin lispro  0-4 Units SubCUTAneous TID WC    insulin lispro  0-4 Units SubCUTAneous Nightly    amLODIPine  10 mg Oral Daily    bumetanide  2 mg Oral Daily    carvedilol  3.125 mg Oral BID    clopidogrel  75 mg Oral Daily    donepezil  10 mg Oral Nightly    FLUoxetine  40 mg Oral Daily    gabapentin  300 mg Oral TID    hydrALAZINE  25 mg Oral BID    heparin (porcine)  5,000 Units SubCUTAneous 3 times per day    doxycycline (VIBRAMYCIN) IV  100 mg IntraVENous Q12H     Continuous Infusions:    sodium chloride 5 mL/hr at 09/20/22 1157    dextrose       PRN Meds: sodium chloride flush, sodium chloride, ondansetron **OR** ondansetron, magnesium hydroxide, acetaminophen **OR** acetaminophen, glucose, dextrose bolus **OR** dextrose bolus, glucagon (rDNA), dextrose, ALPRAZolam    Data:     Past Medical History:   has a past medical history of Anxiety, Arthritis, Atherosclerosis, Body mass index (bmi) 25.0-25.9, adult, CHF exacerbation ([facility]), COPD (chronic obstructive pulmonary disease) ([facility] [address] CVA (cerebral vascular accident) ([facility] [address] Depression, Diabetes mellitus ([facility] [address] Diverticula, esophagus congenital, Former smoker, Hypercholesteremia, Hypertension, Hypokalemia, Joint pain, knee, Myocardial infarct, old, Pneumonia, Protein S deficiency ([address] Pulmonary embolism ([address] Reflux esophagitis, Tinea corporis, Tremor, URI (upper respiratory infection), UTI (urinary tract infection), and Zenker's diverticulum. Social History:   reports that she has quit smoking. Her smoking use included cigarettes. She has never used smokeless tobacco. She reports current alcohol use. She reports that she does not use drugs. Family History:   History reviewed. No pertinent family history. Vitals:  /68   Pulse 79   Temp (!) 96.7 °F (35.9 °C) (Oral)   Resp 16   Ht 5' 1\" (1.549 m)   Wt 161 lb 2.5 oz (73.1 kg)   SpO2 96%   BMI 30.45 kg/m²   Temp (24hrs), Av.5 °F (36.4 °C), Min:96.7 °F (35.9 °C), Max:98.3 °F (36.8 °C)      Recent Labs     22  1204 22  1711 22  0620   POCGLU 157* 152* 157* 107*       I/O(24Hr):     Intake/Output Summary (Last 24 hours) at 2022 0841  Last data filed at 2022 0636  Gross per 24 hour   Intake 450 ml   Output 2400 ml   Net -1950 ml       Labs:    Last 3 Troponin:    Lab Results   Component Value Date/Time    TROPONINI 0.00 2016 03:02 AM    TROPONINI 0.00 2016 12:08 AM       Lab Results   Component Value Date/Time    SPECIAL 10 R H 2021 12:30 PM     Lab Results   Component Value Date/Time    CULTURE NO GROWTH 12 HOURS 2022 01:47 AM    CULTURE NO GROWTH 12 HOURS 2022 01:47 AM         Radiology:    ECHO Complete 2D W Doppler W Color    Result Date: 2022  [facility] Transthoracic Echocardiography Report (TTE)  Patient Name Nicole Asp Date of Study                 2022               A   Date of      1940  Gender                        Female  Birth   Age          79 year(s)  Race                             Room Number  2124        Height:                       61.02 inch, 155 cm   Corporate ID [id number]    Weight:                       150 pounds, 68 kg  #   Patient Acct [de-identified]   BSA:           1.67 m^2       BMI:      28.32  #                                                                kg/m^2   MR #         [id number]      [id number] Jessica Hoyt   Accession #  [id number]  Interpreting Physician        Wing Bhat [address]   Fellow                   Referring Nurse Practitioner   Interpreting             Referring Physician           Leif Castro,  [place]  Type of Study   TTE procedure:2D Echocardiogram, M-Mode, Doppler, Color Doppler, Contrast  study. Procedure Date Date: 09/20/2022 Start: 10:36 AM Study Location: [facility] Technical Quality: Limited visualization due to body habitus. Indications:Congestive heart failure. History / Tech. Comments: COPD DM HTN HLD COPD CKD Patient Status: Inpatient Contrast Medium: Definity. Amount - 2 ml Height: 61.02 inches Weight: 150 pounds BSA: 1.67 m^2 BMI: 28.32 kg/m^2 Rhythm: Within normal limits HR: 78 bpm BP: 166/81 mmHg CONCLUSIONS Summary Echo contrast utilized on this technically difficult study. Left ventricle is mild to moderately dilated. Estimated EF 25%. Severe global hypokinesis. Normal right ventricular size, reduced function. Left atrium appears dilated. Aortic valve is trileaflet. Aortic valve sclerosis without stenosis. Small LVOT diameter. Trivial aortic insufficiency. Normal aortic root dimension. Mitral annular calcification is seen. Mild to moderate mitral regurgitation. Mild MS mean gradient 5 mmHg Normal tricuspid valve structure and function. Insignificant tricuspid regurgitation, unable to estimate RVSP. IVC appears normal diameter , difficult to assess respiratory variation No significant pericardial effusion is seen.  Signature ----------------------------------------------------------------------------  Electronically signed by Yolanda Paul(Interpreting physician) on  09/20/2022 05:56 PM ---------------------------------------------------------------------------- ----------------------------------------------------------------------------  Electronically signed by Jessica Hoyt(Sonographer) on 09/20/2022 11:41  AM ---------------------------------------------------------------------------- FINDINGS Left Atrium Left atrium appears dilated. Left Ventricle Left ventricle is mildly dilated. Estimated EF 25%. Global hypokinesis. Right Atrium Right atrium is normal in size. Right Ventricle Normal right ventricular size reduced function. Mitral Valve Mitral annular calcification is seen. Mild to moderate mitral regurgitation. Aortic Valve Aortic valve is trileaflet. Aortic valve sclerosis without stenosis. Small LVOT diameter. Trivial aortic insufficiency. Tricuspid Valve Normal tricuspid valve structure and function. Insignificant tricuspid regurgitation, unable to estimate RVSP. Pulmonic Valve Pulmonic valve not well visualized but Doppler velocities are normal. No pulmonic insufficiency. Pericardial Effusion No significant pericardial effusion is seen. Miscellaneous Normal aortic root dimension.  M-mode / 2D Measurements & Calculations:   LVIDd:6.09 cm(3.7 - 5.6 cm)      Diastolic CIREZJ:501 ml  MOIJ.99 cm(2.2 - 4.0 cm)      Systolic EWFLWY:767 ml  SMYC:9.64 cm(0.6 - 1.1 cm)       Aortic Root:3 cm(2.0 - 3.7 cm)  LVPWd:0.93 cm(0.6 - 1.1 cm)      LA Dimension: 2.6 cm(1.9 - 4.0 cm)  Fractional Shortenin.09 %    LA volume/Index: 35 ml /21m^2  Calculated LVEF (%): 32.34 %     AV Cusp Separation: 0.7 cm                                   LVOT:1.5 cm   Mitral:                              Aortic   Peak E-Wave: 1.41 m/s                Peak Velocity: 1.70 m/s  Peak A-Wave: 1.20 m/s                Mean Velocity: 1.10 m/s  E/A Ratio: 1.18                      Peak Gradient: 11.56 mmHg  Peak Gradient: 7.95 mmHg             Mean Gradient: 5 mmHg  Mean Gradient: 5 mmHg  Deceleration Time: 155 msec                                       Area (continuity): 0.91 cm^2  MR Alias Velocity: 0.39 m/s          AV VTI: 31.7 cm  MR Velocity: 5.50 m/s  MARK Volumetric: 0.07 cm^2  Area (continuity): 0.62 cm^2  Mean Velocity: 0.93 m/s  MR VTI: 183 cm Septal Wall E' velocity:0.03 m/s Lateral Wall E' velocity:0.08 m/s    XR CHEST PORTABLE    Result Date: 9/20/2022  EXAMINATION: ONE XRAY VIEW OF THE CHEST 9/20/2022 12:28 am COMPARISON: 05/16/2021 HISTORY: ORDERING SYSTEM PROVIDED HISTORY: shortness of breath TECHNOLOGIST PROVIDED HISTORY: shortness of breath Reason for Exam: shortness of breath FINDINGS: Cardiac size and mediastinal structures appear within normal limits. Diffuse parenchymal infiltrates are seen throughout the lungs bilaterally. No pneumothorax. No acute osseous abnormality. Osteopenia. Diffuse parenchymal infiltrates seen throughout the lungs bilaterally which may represent diffuse interstitial pulmonary edema or multilobar pneumonia. VL Lower Extremity Bilateral Venous Duplex    Result Date: 9/21/2022    [facility]  Vascular Lower Extremities DVT Study Procedure   Patient Name   Pau Howe Date of Study           09/20/2022                 A   Date of Birth  1940  Gender                  Female   Age            79 year(s)  Race                       Room Number    2124        Height:                 61.02 inch, 155 cm   Corporate ID # [id number]    Weight:                 150 pounds, 68 kg   Patient Acct # [de-identified]   BSA:        1.67 m^2    BMI:      28.32 kg/m^2   MR #           [id number]      Sonographer             Carly Alan   Accession #    [id number]  Interpreting Physician  Annabel Best   Referring                  Referring Physician     Damaris Dandy  Nurse  Practitioner  Procedure Type of Study:   Veins: Lower Extremities DVT Study, Venous Scan Lower Bilateral.  Indications for Study:R/O DVT. Patient Status: In Patient. Technical Quality:Limited visualization. Conclusions   Summary   Simultaneous real time imaging utilizing B-Mode, color doppler and  spectral waveform analysis was performed on the bilateral lower  extremities for venous examination of the deep and superficial systems. Findings are:   Right:  No evidence of deep or superficial venous thrombosis. Left:  No evidence of deep or superficial venous thrombosis. Signature   ----------------------------------------------------------------  Electronically signed by Leandro Haile(Sonographer) on  09/20/2022 03:45 PM  ----------------------------------------------------------------   ----------------------------------------------------------------  Electronically signed by Makenzie Mary(Interpreting  physician) on 09/21/2022 01:34 AM  ----------------------------------------------------------------  Findings:   Right Impression:                    Left Impression:  The common femoral, femoral, and     The common femoral, femoral, and  popliteal veins demonstrate normal   popliteal veins demonstrate normal  compressibility and augmentation. compressibility and augmentation. Non visualization of the posterior   Non visualization of the posterior  tibial and peroneal veins. tibial and peroneal veins. Normal compressibility of the great  Normal compressibility of the great  saphenous vein. saphenous vein. Normal compressibility of the small  Normal compressibility of the small  saphenous vein. saphenous vein. Velocities are measured in cm/s ; Diameters are measured in cm Right Lower Extremities DVT Study Measurements Right 2D Measurements +------------------------------------+----------+---------------+----------+ ! Location                            ! Visualized! Compressibility! Thrombosis! +------------------------------------+----------+---------------+----------+ ! Common Femoral                      !Yes       ! Yes            ! None      ! +------------------------------------+----------+---------------+----------+ ! Prox Femoral                        !Yes       ! Yes            ! None      ! +------------------------------------+----------+---------------+----------+ ! Mid Femoral Femoral             !Phasic!      !                                ! +---------------------------+------+------+--------------------------------+ ! Prox Femoral               !Phasic!      !                                ! +---------------------------+------+------+--------------------------------+ ! Popliteal                  !Phasic!      !                                ! +---------------------------+------+------+--------------------------------+ Left Lower Extremities DVT Study Measurements Left 2D Measurements +------------------------------------+----------+---------------+----------+ ! Location                            ! Visualized! Compressibility! Thrombosis! +------------------------------------+----------+---------------+----------+ ! Common Femoral                      !Yes       ! Yes            ! None      ! +------------------------------------+----------+---------------+----------+ ! Prox Femoral                        !Yes       ! Yes            ! None      ! +------------------------------------+----------+---------------+----------+ ! Mid Femoral                         !Yes       ! Yes            ! None      ! +------------------------------------+----------+---------------+----------+ ! Dist Femoral                        !Yes       ! Yes            ! None      ! +------------------------------------+----------+---------------+----------+ ! Popliteal                           !Yes       ! Yes            ! None      ! +------------------------------------+----------+---------------+----------+ ! Sapheno Femoral Junction            ! Yes       ! Yes            ! None      ! +------------------------------------+----------+---------------+----------+ ! PTV                                 ! No        !               !          ! +------------------------------------+----------+---------------+----------+ ! Hina                            !No        !               !          ! +------------------------------------+----------+---------------+----------+ ! Gastroc                             ! Yes       ! Yes            ! None      ! +------------------------------------+----------+---------------+----------+ ! GSV Thigh                           ! Yes       ! Yes            ! None      ! +------------------------------------+----------+---------------+----------+ ! GSV Knee                            ! Yes       ! Yes            ! None      ! +------------------------------------+----------+---------------+----------+ ! GSV Ankle                           ! Yes       ! Yes            ! None      ! +------------------------------------+----------+---------------+----------+ ! SSV                                 ! Yes       ! Yes            ! None      ! +------------------------------------+----------+---------------+----------+ Left Doppler Measurements +---------------------------+------+------+--------------------------------+ ! Location                   ! Signal!Reflux! Reflux (msec)                   ! +---------------------------+------+------+--------------------------------+ ! Common Femoral             !Phasic!      !                                ! +---------------------------+------+------+--------------------------------+ ! Prox Femoral               !Phasic!      !                                ! +---------------------------+------+------+--------------------------------+ ! Popliteal                  !Phasic!      !                                ! +---------------------------+------+------+--------------------------------+            Physical Examination:        PHYSICAL EXAM:  General Appearance  Alert , awake, not in acute distress  Psych: Normal mood and affect, normal behavior, not agitated, maintaining good eye contact   HEENT - Head is normocephalic, atraumatic.   Neck: supple, no rigidity, normal ROM, no neck swellings, normal thyroid gland  Lungs - Bilateral equal air entry, patient has bibasilar crackles  Cardiovascular - Heart sounds are normal.  Regular rhythm, normal rate without murmur, gallop or rub.   Abdomen - Soft, nontender, nondistended, no masses or organomegaly  Neurologic - There are no new focal motor or sensory deficits  Skin - No bruising or bleeding on exposed skin area  Extremities -bilateral lower limb pitting edema, +1 to +2      Assessment:        Primary Problem  Pulmonary edema with congestive heart failure [facility])    Active Hospital Problems    Diagnosis Date Noted    Pulmonary edema with congestive heart failure ([facility] [address] [I50.1] 09/20/2022     Priority: Medium    NSTEMI (non-ST elevated myocardial infarction) ([facility] [address] [I21.4] 09/20/2022     Priority: Medium    Acute respiratory failure with hypoxia and hypercapnia (HCC) [J96.01, J96.02] 09/20/2022     Priority: Medium    UTI (urinary tract infection) [N39.0] 09/20/2022     Priority: Medium    CAD (coronary artery disease) [I25.10] 09/20/2022     Priority: Medium    Atrial fibrillation ([address] [I48.91] 09/20/2022     Priority: Medium    Pulmonary edema cardiac cause (HCC) [I50.1] 05/15/2021    CHF exacerbation ([facility] [address] [I50.9] 01/13/2020    Sepsis ([address] [A41.9] 08/17/2019    Stage 4 chronic kidney disease ([address] [N18.4] 08/09/2016    Diabetes mellitus ([address] [E11.9]     Essential hypertension [I10]        Plan:        Acute exacerbated heart failure most likely 2/2 fluid overload due to dietary intake vs medication  non compliance  -Patient came with shortness of breath and saturation of 90 on positive airway pressure with pulmonary edema  -ABG showed acute respiratory failure type II, pH 7.233, PCO2 58  -Chest x-ray showed diffuse parenchymal infiltrates seen through the lungs bilaterally which may represent diffuse interstitial edema or multilobular pneumonia  Patient received 1 dose of IV lasix 40 mg.  -Patient on Plavix 75  -Will continue home carvedilol 3.125 mg twice daily  -proBNP on presentation 30,575, baseline around 1000. up top 37,534 today  -Cardiology were consulted        High troponin most [facility] 2/2 demand ischemia  -Troponin were: 46 then :47. Then trended up to 203, today [09/21] trended down to 193 this a.m. History of CAD and remote stent placement  -Carduiology on baord. Possible ischemic workup when patient euvolemic  -No acute EKG changes in ED  -Patient on carvedilol 3.125 mg twice daily and Plavix 75mg        Atrial fibirllation  -On telemetry patient seemed to be in Afib with PVCs  EKG on  presentation was sinus tachycardia without afib, repeated EKG showed that patient was in A. fib  -Cardiology on board  -Will manage accordingly         Acute type II respiratory failure secondary to HTN emergency vs acute exacerbated HF vs multilobular pneumonia (Unlikely)  -Flash Pulmonary edema vs Pulmonary Edema 2/2 of heart failure   -Unlikely to be due to pneumonia, clinical standpoint  -Diffuse parenchymal infiltrates seen throughout the lungs bilaterally which may represent diffuse interstitial pulmonary edema or multilobar pneumonia. -Currently on NC 2l (baseline)  -Required BiPAP in ED  -Patient sig improved after IV lasix dose. Leukocytosis mosst likely 2/2 UTI  -Secondary to UTI vs Multilobular pneumonia (Unlikely)  -WBC on presentation 15. Currently 8.9  -ESR 89, Procal 0.15, CRP 47.7  -UA, had pyuria and bacteruria, patient has dysuria and frequency  -Urine culture pending  -Blood culture sent  -patient started on Cefepime  Zithromax was stopped due to patient prolonged QTC. Hypertensive emergency   -Flash pulmonary edema   -on presentation blood pressure was 189/109, increased to 205/121.  Currently 133/68, HR 79  -Patient received Nitroglycerin Infusion in setting of hypertensive emergency  -Continue patient amlodipine  Continue hydration at current dose        COPD  -Pulmonology on board  -On 2L NC at home  -Continue to Tropium 2.52 puffs daily  -Continue albuterol every 6 hours as needed        Diabetes Mellitus type 2  -Hold patient Glipizide  -Will put on low dose sliding scale   -PCOT 4 times. Depression and Mild dementia  -Continue Fluxetine   Continue donepezil        PT/OT  -Renal diet  - consulted. Consultations:   IP CONSULT TO PULMONOLOGY  IP CONSULT TO CARDIOLOGY  IP CONSULT TO INTERNAL MEDICINE      DVT prophylaxis: heparin (porcine) injection 5,000 TID  GI prophylaxis: Protonix 40 mg daily    Brittany Arce MD  9/21/2022  8:41 AM   Attending Physician Statement  I have discussed the care of Callie Friday and I have examined the patient myselft and taken ros and hpi , including pertinent history and exam findings,  with the resident. I have reviewed the key elements of all parts of the encounter with the resident. I agree with the assessment, plan and orders as documented by the resident.   UTI with E. coli  Sensitivities pending we will continue IV cefepime  Significant drop in ejection fraction plan for cardiac cath noted due to risk of acute on chronic kidney disease cardiac cath will be delayed per nephrology    Electronically signed by Charlotte Moore MD

## 2022-09-21 NOTE — CARE COORDINATION
ONGOING DISCHARGE PLAN:    Patient is alert and oriented x4. Spoke with patient regarding discharge plan and patient confirms that plan is still to discharge to home with vns [address]     Patient started on a heparin drip    Nephro consult for clearance of cardiac cath    On IV bumex    Add Jardiance    Will continue to follow for additional discharge needs.     Electronically signed by Jennifer Brown RN on 9/21/2022 at 11:00 AM

## 2022-09-21 NOTE — PROGRESS NOTES
09/21/22 1955   Encounter Summary   Encounter Overview/Reason  Spiritual/Emotional Needs   Service Provided For: Patient   Referral/Consult From: Rounding   Complexity of Encounter Low   Spiritual/Emotional needs   Type Spiritual Support   Assessment/Intervention/Outcome   Assessment Unable to assess  (patient sleeping)   Intervention Prayer (assurance of)/[place]

## 2022-09-22 LAB
ANION GAP SERPL CALCULATED.3IONS-SCNC: 12 MMOL/L (ref 9–17)
ANTI-XA UNFRAC HEPARIN: 0.43 IU/L (ref 0.3–0.7)
BUN BLDV-MCNC: 54 MG/DL (ref 8–23)
CALCIUM SERPL-MCNC: 9.2 MG/DL (ref 8.6–10.4)
CHLORIDE BLD-SCNC: 97 MMOL/L (ref 98–107)
CO2: 28 MMOL/L (ref 20–31)
CREAT SERPL-MCNC: 2.1 MG/DL (ref 0.5–0.9)
CULTURE: ABNORMAL
EKG ATRIAL RATE: 94 BPM
EKG P AXIS: 53 DEGREES
EKG P-R INTERVAL: 152 MS
EKG Q-T INTERVAL: 380 MS
EKG QRS DURATION: 92 MS
EKG QTC CALCULATION (BAZETT): 475 MS
EKG R AXIS: 31 DEGREES
EKG T AXIS: 177 DEGREES
EKG VENTRICULAR RATE: 94 BPM
GFR AFRICAN AMERICAN: 27 ML/MIN
GFR NON-AFRICAN AMERICAN: 22 ML/MIN
GFR SERPL CREATININE-BSD FRML MDRD: ABNORMAL ML/MIN/{1.73_M2}
GLUCOSE BLD-MCNC: 100 MG/DL (ref 65–105)
GLUCOSE BLD-MCNC: 93 MG/DL (ref 65–105)
GLUCOSE BLD-MCNC: 93 MG/DL (ref 65–105)
GLUCOSE BLD-MCNC: 97 MG/DL (ref 70–99)
GLUCOSE BLD-MCNC: 98 MG/DL (ref 65–105)
HCT VFR BLD CALC: 33.1 % (ref 36–46)
HEMOGLOBIN: 11.1 G/DL (ref 12–16)
MAGNESIUM: 1.9 MG/DL (ref 1.6–2.6)
MCH RBC QN AUTO: 29.2 PG (ref 26–34)
MCHC RBC AUTO-ENTMCNC: 33.6 G/DL (ref 31–37)
MCV RBC AUTO: 86.9 FL (ref 80–100)
PDW BLD-RTO: 16.8 % (ref 11.5–14.9)
PLATELET # BLD: 279 K/UL (ref 150–450)
PMV BLD AUTO: 7.2 FL (ref 6–12)
POTASSIUM SERPL-SCNC: 3.4 MMOL/L (ref 3.7–5.3)
PRO-BNP: ABNORMAL PG/ML
RBC # BLD: 3.8 M/UL (ref 4–5.2)
SODIUM BLD-SCNC: 137 MMOL/L (ref 135–144)
SPECIMEN DESCRIPTION: ABNORMAL
WBC # BLD: 9.2 K/UL (ref 3.5–11)

## 2022-09-22 PROCEDURE — 2580000003 HC RX 258: Performed by: STUDENT IN AN ORGANIZED HEALTH CARE EDUCATION/TRAINING PROGRAM

## 2022-09-22 PROCEDURE — 2580000003 HC RX 258: Performed by: NURSE PRACTITIONER

## 2022-09-22 PROCEDURE — 85520 HEPARIN ASSAY: CPT

## 2022-09-22 PROCEDURE — 85027 COMPLETE CBC AUTOMATED: CPT

## 2022-09-22 PROCEDURE — 83735 ASSAY OF MAGNESIUM: CPT

## 2022-09-22 PROCEDURE — 97530 THERAPEUTIC ACTIVITIES: CPT

## 2022-09-22 PROCEDURE — 2700000000 HC OXYGEN THERAPY PER DAY

## 2022-09-22 PROCEDURE — 6370000000 HC RX 637 (ALT 250 FOR IP): Performed by: NURSE PRACTITIONER

## 2022-09-22 PROCEDURE — 6360000002 HC RX W HCPCS: Performed by: STUDENT IN AN ORGANIZED HEALTH CARE EDUCATION/TRAINING PROGRAM

## 2022-09-22 PROCEDURE — 36415 COLL VENOUS BLD VENIPUNCTURE: CPT

## 2022-09-22 PROCEDURE — 97162 PT EVAL MOD COMPLEX 30 MIN: CPT

## 2022-09-22 PROCEDURE — 2060000000 HC ICU INTERMEDIATE R&B

## 2022-09-22 PROCEDURE — 6370000000 HC RX 637 (ALT 250 FOR IP): Performed by: STUDENT IN AN ORGANIZED HEALTH CARE EDUCATION/TRAINING PROGRAM

## 2022-09-22 PROCEDURE — 97166 OT EVAL MOD COMPLEX 45 MIN: CPT

## 2022-09-22 PROCEDURE — 99233 SBSQ HOSP IP/OBS HIGH 50: CPT | Performed by: INTERNAL MEDICINE

## 2022-09-22 PROCEDURE — 6370000000 HC RX 637 (ALT 250 FOR IP): Performed by: INTERNAL MEDICINE

## 2022-09-22 PROCEDURE — 94761 N-INVAS EAR/PLS OXIMETRY MLT: CPT

## 2022-09-22 PROCEDURE — 80048 BASIC METABOLIC PNL TOTAL CA: CPT

## 2022-09-22 PROCEDURE — 83880 ASSAY OF NATRIURETIC PEPTIDE: CPT

## 2022-09-22 PROCEDURE — 6360000002 HC RX W HCPCS: Performed by: INTERNAL MEDICINE

## 2022-09-22 PROCEDURE — 6370000000 HC RX 637 (ALT 250 FOR IP)

## 2022-09-22 PROCEDURE — 82947 ASSAY GLUCOSE BLOOD QUANT: CPT

## 2022-09-22 PROCEDURE — 2500000003 HC RX 250 WO HCPCS: Performed by: INTERNAL MEDICINE

## 2022-09-22 RX ORDER — POTASSIUM CHLORIDE 20 MEQ/1
40 TABLET, EXTENDED RELEASE ORAL PRN
Status: DISCONTINUED | OUTPATIENT
Start: 2022-09-22 | End: 2022-09-22

## 2022-09-22 RX ORDER — ALPRAZOLAM 0.25 MG/1
0.25 TABLET ORAL ONCE
Status: COMPLETED | OUTPATIENT
Start: 2022-09-22 | End: 2022-09-22

## 2022-09-22 RX ORDER — POTASSIUM CHLORIDE 1.5 G/1.77G
40 POWDER, FOR SOLUTION ORAL ONCE
Status: COMPLETED | OUTPATIENT
Start: 2022-09-22 | End: 2022-09-22

## 2022-09-22 RX ORDER — POTASSIUM CHLORIDE 7.45 MG/ML
10 INJECTION INTRAVENOUS PRN
Status: DISCONTINUED | OUTPATIENT
Start: 2022-09-22 | End: 2022-09-22

## 2022-09-22 RX ADMIN — ALPRAZOLAM 0.25 MG: 0.25 TABLET ORAL at 16:44

## 2022-09-22 RX ADMIN — BUMETANIDE 2 MG: 0.25 INJECTION INTRAMUSCULAR; INTRAVENOUS at 22:19

## 2022-09-22 RX ADMIN — POTASSIUM CHLORIDE 40 MEQ: 1.5 POWDER, FOR SOLUTION ORAL at 08:01

## 2022-09-22 RX ADMIN — HYDRALAZINE HYDROCHLORIDE 25 MG: 25 TABLET, FILM COATED ORAL at 09:42

## 2022-09-22 RX ADMIN — FLUOXETINE 40 MG: 20 CAPSULE ORAL at 09:42

## 2022-09-22 RX ADMIN — SODIUM CHLORIDE, PRESERVATIVE FREE 10 ML: 5 INJECTION INTRAVENOUS at 09:44

## 2022-09-22 RX ADMIN — AMLODIPINE BESYLATE 10 MG: 10 TABLET ORAL at 09:42

## 2022-09-22 RX ADMIN — EMPAGLIFLOZIN 10 MG: 10 TABLET, FILM COATED ORAL at 10:08

## 2022-09-22 RX ADMIN — GABAPENTIN 300 MG: 300 CAPSULE ORAL at 22:19

## 2022-09-22 RX ADMIN — HEPARIN SODIUM 12 UNITS/KG/HR: 10000 INJECTION, SOLUTION INTRAVENOUS at 18:34

## 2022-09-22 RX ADMIN — BUMETANIDE 2 MG: 0.25 INJECTION INTRAMUSCULAR; INTRAVENOUS at 09:44

## 2022-09-22 RX ADMIN — CARVEDILOL 3.12 MG: 3.12 TABLET, FILM COATED ORAL at 22:17

## 2022-09-22 RX ADMIN — CEFTRIAXONE SODIUM 1000 MG: 1 INJECTION, POWDER, FOR SOLUTION INTRAMUSCULAR; INTRAVENOUS at 22:24

## 2022-09-22 RX ADMIN — HYDRALAZINE HYDROCHLORIDE 25 MG: 25 TABLET, FILM COATED ORAL at 22:17

## 2022-09-22 RX ADMIN — CLOPIDOGREL BISULFATE 75 MG: 75 TABLET ORAL at 09:42

## 2022-09-22 RX ADMIN — DONEPEZIL HYDROCHLORIDE 10 MG: 10 TABLET, FILM COATED ORAL at 22:19

## 2022-09-22 RX ADMIN — CARVEDILOL 3.12 MG: 3.12 TABLET, FILM COATED ORAL at 09:42

## 2022-09-22 RX ADMIN — SPIRONOLACTONE 12.5 MG: 25 TABLET ORAL at 09:43

## 2022-09-22 ASSESSMENT — PAIN SCALES - GENERAL
PAINLEVEL_OUTOF10: 0

## 2022-09-22 NOTE — PROGRESS NOTES
Received verbal Order from Dr. Woo Albarado with cardiology to d/c heparin drip in AM after 48 hours. Order modified.

## 2022-09-22 NOTE — PLAN OF CARE
Problem: Discharge Planning  Goal: Discharge to home or other facility with appropriate resources  Outcome: Progressing  Flowsheets (Taken 9/22/2022 6109)  Discharge to home or other facility with appropriate resources:   Identify barriers to discharge with patient and caregiver   Arrange for needed discharge resources and transportation as appropriate   Refer to discharge planning if patient needs post-hospital services based on physician order or complex needs related to functional status, cognitive ability or social support system     Problem: Skin/Tissue Integrity  Goal: Absence of new skin breakdown  Description: 1. Monitor for areas of redness and/or skin breakdown  2. Assess vascular access sites hourly  3. Every 4-6 hours minimum:  Change oxygen saturation probe site  4. Every 4-6 hours:  If on nasal continuous positive airway pressure, respiratory therapy assess nares and determine need for appliance change or resting period.   Outcome: Progressing     Problem: ABCDS Injury Assessment  Goal: Absence of physical injury  Outcome: Progressing  Flowsheets (Taken 9/22/2022 0647)  Absence of Physical Injury: Implement safety measures based on patient assessment     Problem: Safety - Adult  Goal: Free from fall injury  Outcome: Progressing  Flowsheets (Taken 9/22/2022 0693)  Free From Fall Injury:   Based on caregiver fall risk screen, instruct family/caregiver to ask for assistance with transferring infant if caregiver noted to have fall risk factors   Instruct family/caregiver on patient safety

## 2022-09-22 NOTE — PROGRESS NOTES
Physical Therapy  Facility/Department: [address] [place] [address]  Physical Therapy Initial Assessment    Name: Karen Leung  : 1940  MRN: [id number]  Date of Service: 2022    Discharge Recommendations:  Patient would benefit from continued therapy after discharge   PT Equipment Recommendations  Equipment Needed: No      Patient Diagnosis(es): The primary encounter diagnosis was Hypoxia. A diagnosis of Congestive heart failure, unspecified HF chronicity, unspecified heart failure type [facility]) was also pertinent to this visit. Past Medical History:  has a past medical history of Anxiety, Arthritis, Atherosclerosis, Body mass index (bmi) 25.0-25.9, adult, CHF exacerbation (HCC), COPD (chronic obstructive pulmonary disease) ([facility]), CVA (cerebral vascular accident) ([address] Depression, Diabetes mellitus ([address] Diverticula, esophagus congenital, Former smoker, Hypercholesteremia, Hypertension, Hypokalemia, Joint pain, knee, Myocardial infarct, old, Pneumonia, Protein S deficiency ([address] Pulmonary embolism ([address] Reflux esophagitis, Tinea corporis, Tremor, URI (upper respiratory infection), UTI (urinary tract infection), and Zenker's diverticulum. Past Surgical History:  has a past surgical history that includes Carotid endarterectomy (Bilateral); Tonsillectomy; and Endoscopy, colon, diagnostic. Assessment   Assessment: Pt is a 1 assist for mobility with RW and doing well. May benefit from continued PT upon d/c as pt reports furniture walking at home.   Treatment Diagnosis: impaired functional mobility 2* hypoxia  Specific Instructions for Next Treatment: gait, HEP, energy conservation, therex, balance  Therapy Prognosis: Good  Decision Making: Medium Complexity  Exam: ROM, MMT, bed mobility, transfers, amb, balance, endurance  Clinical Presentation: Pt alert, cooperative, pleasant  Barriers to Learning: none  Requires PT Follow-Up: Yes  Activity Tolerance  Activity Tolerance: Patient tolerated treatment well     Plan Plan  Plan: 5-7 times per week  Specific Instructions for Next Treatment: gait, HEP, energy conservation, therex, balance  Current Treatment Recommendations: Strengthening, ROM, Balance training, Functional mobility training, Transfer training, Endurance training, Gait training, Equipment evaluation, education, & procurement, Patient/Caregiver education & training, Safety education & training, Home exercise program, Therapeutic activities  Safety Devices  Type of Devices: All fall risk precautions in place, Call light within reach, Gait belt, Patient at risk for falls, Left in chair, Nurse notified (CRISTINA Ga)  Restraints  Restraints Initially in Place: No     Restrictions  Restrictions/Precautions  Restrictions/Precautions: Fall Risk, General Precautions  Required Braces or Orthoses?: No  Implants present? :  (denies)  Position Activity Restriction  Other position/activity restrictions: PT OT eval and treat     Subjective   Pain: Pt denied pain  General  Chart Reviewed: Yes  Patient assessed for rehabilitation services?: Yes  Additional Pertinent Hx: HTN, DM, COPD, CHF  Family / Caregiver Present: No  Referring Practitioner: Fidelia Castillo MD  Referral Date : 09/20/22  Diagnosis: hypoxia  Follows Commands: Within Functional Limits  Subjective  Subjective: Pt in bed, agreeable to PT OT eval. Pt is utilizing O2. CRISTINA Fam.          Social/Functional History  Social/Functional History  Lives With: Alone  Type of Home: House  Home Layout: One level  Home Access: Ramped entrance, Level entry  Bathroom Shower/Tub: Walk-in shower, Shower chair with back (Pt has shower chair with back and without back; uses shower chair with back while bathing)  Bathroom Toilet: Handicap height (Sink nearby for support; considering purchasing grab bars)  Bathroom Equipment: Grab bars in shower, Shower chair, Hand-held shower  Bathroom Accessibility: Not accessible  Home Equipment: [address], Rollator, [address], quad, Reacher, [address] chair  Has the patient had two or more falls in the past year or any fall with injury in the past year?: No  ADL Assistance: [address]: Independent (Grandson orders groceries ([employer]); neighbors make meals)  Homemaking Responsibilities: Yes  Ambulation Assistance: Independent (typically no device inside the home, cane as needed; uses rollator for community distances)  Transfer Assistance: Independent  Active : No  Patient's  Info: Son provides transportation  IADL Comments: sleeps in recliner/lift chair ([employer]Boy)  Additional Comments: Daughter, son, and grandkids (adults) are available to assist as needed. Pt also reported her neighbors can assist  Vision/Hearing  Vision  Vision: Impaired  Vision Exceptions: Wears glasses at all times  Hearing  Hearing: Within functional limits    Cognition   Orientation  Overall Orientation Status: Within Functional Limits     Objective   Heart Rate: 75  Heart Rate Source: Monitor  BP: 127/62  BP Location: Left upper arm  BP Method: Automatic  Patient Position: Semi fowlers  MAP (Calculated): 83.67  Resp: 16  SpO2: 95 %  O2 Device: Nasal cannula              AROM RLE (degrees)  RLE AROM: WFL  AROM LLE (degrees)  LLE AROM : WFL  AROM RUE (degrees)  RUE General AROM: See OT  AROM LUE (degrees)  LUE General AROM: See OT  Strength RLE  Strength RLE: WFL  Comment: Grossly 3+ to 4-/5  Strength LLE  Strength LLE: WFL  Comment: Grossly 4 to 4-/5  Strength RUE  Comment: See OT  Strength LUE  Comment: See OT     Sensation  Overall Sensation Status: WFL (pt denies)     Bed mobility  Rolling to Right: Stand by assistance  Supine to Sit: Stand by assistance  Sit to Supine: Unable to assess  Scooting: Stand by assistance  Bed Mobility Comments: HOB elevated, minimal cues needed. Transfers  Sit to Stand: Contact guard assistance  Stand to sit: Contact guard assistance  Bed to Chair: Contact guard assistance  Comment: CGA with cues as needed.  Pt using RW for support. Ambulation  Surface: level tile  Device: Rolling Walker  Other Apparatus: O2  Assistance: Contact guard assistance  Quality of Gait: slow zina, no LOB, steady  Gait Deviations: Slow Zina  Distance: 25'  Comments: No signs of fatigue. O2 sats 92% post amb while using oxygen. Cues for technique using RW. Pt prefers her rollator. Balance  Posture: Fair  Sitting - Static: Good;-  Sitting - Dynamic: Good;-  Standing - Static: -;Good  Standing - Dynamic: Fair;+  Comments: Standing balance with RW         AM-PAC Score  AM-PAC Inpatient Mobility Raw Score : 18 (09/22/22 1359)  AM-PAC Inpatient T-Scale Score : 43.63 (09/22/22 1359)  Mobility Inpatient CMS 0-100% Score: 46.58 (09/22/22 1359)  Mobility Inpatient CMS G-Code Modifier : CK (09/22/22 1359)        Goals  Short Term Goals  Time Frame for Short term goals: 2-3 days  Short term goal 1: pt to demo bed mobility IND. Short term goal 2: pt to perform transfers MOD I. Short term goal 3: Pt to amb 100' wtih device MOD I. Short term goal 4: Pt to improve balance to GOOD and strength by 1/2 MMG for safe d/c. Short term goal 5: Pt to demo good technique for HEP. Patient Goals   Patient goals : To go home       Education  Patient Education  Education Given To: Patient  Education Provided: Role of Therapy;Plan of Care;Precautions;Transfer Training; Fall Prevention Strategies; Energy Conservation  Education Method: Verbal;Demonstration  Barriers to Learning: None  Education Outcome: Continued education needed      Therapy Time   Individual Concurrent Group Co-treatment   Time In [address]         Time Out 0941         Minutes 17                 Amada Dutta, PT

## 2022-09-22 NOTE — PROGRESS NOTES
PHARMACY NOTE:    The electrolyte replacement protocol for potassium/magnesium has been discontinued per P&T guidelines because the patient has reduced renal function (CrCl < 30 mL/min). The patient's most recent potassium & magnesium levels are:  Recent Labs     09/20/22  0035 09/20/22  0614 09/21/22  0508 09/21/22  1539 09/22/22  0454   K 4.3 4.4 4.1  --  3.4*   MG 3.4*  --   --  2.0 1.9     Estimated Creatinine Clearance: 18 mL/min (A) (based on SCr of 2.1 mg/dL (H)). For patients with decreased renal function (below 30ml/min) needing potassium/magnesium supplementation, please order individual bolus doses with appropriate monitoring. Please contact the inpatient pharmacy with any concerns. Thank you.   [address] [facility]    9/22/2022   6:35 AM

## 2022-09-22 NOTE — FLOWSHEET NOTE
09/22/22 0630   Treatment Team Notification   Reason for Communication Critical results   Type of Critical Result Laboratory   Critical Lab Result Type Electrolytes  (K 3.4)   Team Member Name Jayne Rausch CNP   Treatment Team Role Advanced Practice Nurse   Method of Communication Secure Message   Notification Time 0630     RN reached out to [facility] on for Attending regarding K of 3.4 this morning; Potassium replacement ordered. Electronically signed by Romelia Berg RN.

## 2022-09-22 NOTE — FLOWSHEET NOTE
09/22/22 6743   Treatment Team Notification   Reason for Communication Critical results   Type of Critical Result Laboratory   Critical Lab Result Type Other (comment)  (Pro-BNP 11,027)   Team Member Name Dr. Marjan Montero Provider   Method of Communication Call   Notification Time 1124     RN called Dr. Leona Tello to communicate Pro-BNP of 07,222 this morning; Patient does not show signs of symptoms of distress and cardiology to see her today. Electronically signed by Randy Jimenez RN.

## 2022-09-22 NOTE — PROGRESS NOTES
Home Oxygen Evaluation    Room air SpO2 at Rest =90%    Room air with exercise/exertion =86%    SpO2 on prescribed O2 level at  2  LPM  at rest =   94%   with exercise/exertion =91%    Nocturnal Oximetry with patient on room air recommended if the resting SpO2 is 89% to 95%.  (Requires additional order)

## 2022-09-22 NOTE — PROGRESS NOTES
Pulmonary Progress Note  [facility]      Patient - Caitlin Brito,  Age - 79 y.o.    - 1940      Room Number - 2124/2124-01   N -  [id number]   [facility]  Date of Admission -  2022 12:17 AM        Ana Serrano MD  Primary Care Physician - Godwin Michel MD     SUBJECTIVE     Patient was seen and examined bedside. No acute events overnight. Patient remains afebrile VSS. On 2L NC satting 97%. Creatinine 2.10.  proBNP 36,710--->61,872. Repeat EKG . Urine culture came back with sensitivities to Rocephin and Cipro. We will continue with IV Rocephin. Patient remains on heparin drip. Currently on Bumex 2 mg IV twice daily. Recommendations are to discontinue IV heparin tomorrow. Reportedly, patient has declined cardiac cath. Patient denies any fever, chills, chest pain, worsening SOB, or palpitations. Patient states that her breathing has improved and has no complaints at this time. OBJECTIVE   VITALS    height is 5' 1\" (1.549 m) and weight is 155 lb 13.8 oz (70.7 kg). Her oral temperature is 98.1 °F (36.7 °C). Her blood pressure is 147/78 (abnormal) and her pulse is 76. Her respiration is 18 and oxygen saturation is 97%. Body mass index is 29.45 kg/m².   Temperature Range: Temp: 98.1 °F (36.7 °C) Temp  Av.3 °F (36.8 °C)  Min: 97.8 °F (36.6 °C)  Max: 99 °F (37.2 °C)  BP Range:  Systolic (48GYR), HNL:835 , Min:130 , OSK:042     Diastolic (63QLI), XJJ:36, Min:64, Max:82    Pulse Range: Pulse  Av.9  Min: 75  Max: 98  Respiration Range: Resp  Av.8  Min: 16  Max: 18  Current Pulse Ox[de-identified]  SpO2: 97 %  24HR Pulse Ox Range:  SpO2  Av.2 %  Min: 91 %  Max: 97 %  Oxygen Amount and Delivery: O2 Flow Rate (L/min): 2 L/min    Wt Readings from Last 3 Encounters:   22 155 lb 13.8 oz (70.7 kg)   21 190 lb 4.1 oz (86.3 kg)   20 180 lb (81.6 kg) I/O (24 Hours)    Intake/Output Summary (Last 24 hours) at 9/22/2022 1012  Last data filed at 9/22/2022 0230  Gross per 24 hour   Intake 240 ml   Output 2125 ml   Net -1885 ml         EXAM     General Appearance  Awake, alert, oriented, in no acute distress  HEENT - normocephalic, atraumatic.  []  Mallampati  [] Crowded airway   [] Macroglossia  []  Retrognathia  [] Micrognathia  []  Normal tongue size []  Normal Bite  [] Forest sign positive    Neck - Supple,  trachea midline   Lungs - Good air entry, CTA bilaterally, no wheezing or rales  Cardiovascular - Heart sounds are normal.  Regular rate and rhythm   Abdomen - Soft, nontender, nondistended, no masses or organomegaly  Neurologic - There are no focal motor or sensory deficits  Skin - No bruising or bleeding  Extremities - No clubbing, cyanosis, 1+ edema    MEDS      bumetanide  2 mg IntraVENous BID    empagliflozin  10 mg Oral Daily    spironolactone  12.5 mg Oral Daily    cefTRIAXone (ROCEPHIN) IV  1,000 mg IntraVENous Q24H    gabapentin  300 mg Oral Nightly    sodium chloride flush  5-40 mL IntraVENous 2 times per day    insulin glargine  10 Units SubCUTAneous Nightly    insulin lispro  0-4 Units SubCUTAneous TID WC    insulin lispro  0-4 Units SubCUTAneous Nightly    amLODIPine  10 mg Oral Daily    carvedilol  3.125 mg Oral BID    clopidogrel  75 mg Oral Daily    donepezil  10 mg Oral Nightly    FLUoxetine  40 mg Oral Daily    hydrALAZINE  25 mg Oral BID      heparin (PORCINE) Infusion 12 Units/kg/hr (09/22/22 0605)    sodium chloride 5 mL/hr at 09/20/22 1157    dextrose       heparin (porcine), heparin (porcine), sodium chloride flush, sodium chloride, [Held by provider] ondansetron **OR** [Held by provider] ondansetron, magnesium hydroxide, acetaminophen **OR** acetaminophen, glucose, dextrose bolus **OR** dextrose bolus, glucagon (rDNA), dextrose, ALPRAZolam    LABS   CBC   Recent Labs     09/22/22  0454   WBC 9.2   HGB 11.1*   HCT 33.1*   MCV 86.9        BMP:   Lab Results   Component Value Date/Time     09/22/2022 04:54 AM    K 3.4 09/22/2022 04:54 AM    CL 97 09/22/2022 04:54 AM    CO2 28 09/22/2022 04:54 AM    BUN 54 09/22/2022 04:54 AM    LABALBU 3.7 09/20/2022 12:35 AM    CREATININE 2.10 09/22/2022 04:54 AM    CALCIUM 9.2 09/22/2022 04:54 AM    GFRAA 27 09/22/2022 04:54 AM    LABGLOM 22 09/22/2022 04:54 AM     ABGs:  Lab Results   Component Value Date/Time    PHART 7.472 09/20/2022 01:32 PM    PO2ART 78.9 09/20/2022 01:32 PM    HDU2UBN 35.1 09/20/2022 01:32 PM      Lab Results   Component Value Date/Time    MODE NOT REPORTED 01/14/2020 02:14 AM     Ionized Calcium:  No results found for: IONCA  Magnesium:    Lab Results   Component Value Date/Time    MG 1.9 09/22/2022 04:54 AM     Phosphorus:    Lab Results   Component Value Date/Time    PHOS 3.1 05/21/2021 06:59 AM        LIVER PROFILE   Recent Labs     09/20/22  0035   AST 24   ALT 9   LIPASE 57   BILITOT 0.4   ALKPHOS 103       INR   Recent Labs     09/21/22  0935   INR 1.2       PTT   Lab Results   Component Value Date    APTT 38.3 (H) 09/21/2022         RADIOLOGY     Chest x-ray shows significant improvement with decrease interstitial edema        ASSESSMENT/PLAN   Principal Problem:    Pulmonary edema with congestive heart failure (HCC)  Active Problems:    NSTEMI (non-ST elevated myocardial infarction) (HCC)    Acute respiratory failure with hypoxia and hypercapnia (HCC)    UTI (urinary tract infection)    CAD (coronary artery disease)    Atrial fibrillation (HCC)    Diabetes mellitus (HCC)    Essential hypertension    Stage 4 chronic kidney disease (HCC)    Sepsis (HCC)    CHF exacerbation (HCC)    Pulmonary edema cardiac cause ([address]  Resolved Problems:    * No resolved hospital problems.  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 fib  Type 2 diabetes mellitus  Essential hypertension  Hyperlipidemia        Plan:      -Urine culture growing E. coli sensitive to Rocephin and Cipro. We will continue IV Rocephin at this time  -Patient has opted not to pursue cardiac cath given the increased risk that she may require dialysis due to contrast-induced nephropathy  -IV Bumex 2 mg twice daily being managed by nephrology  -Heparin GTT is being managed by cardiology  -She will need optimization of  cardiac medications prior to discharge  -Patient's SOB was likely due to flash pulm edema which has since been resolved  -Continue 2L NC as needed; Keep O2 Saturation > 90%    DVT Ppx:on Hep Gtt    DNR CCA- no intubation              Electronically signed by Luis Betancourt MD on 9/22/2022 at 10:12 AM    Patient seen and examined independently by me. Above discussed and I agree with resident note except where indicated in the EMR revision history. Also see my additional comments and changes indicated by discrete font, text color, italics, and/or initials. Labs, cultures, and radiographs where available were reviewed. She continues to improve, and has decided not to proceed with cardiac catheterization. She is getting treated for UTI with Rocephin which I will continue. She has a CPAP at home and she uses it. However, she may need oxygen with exertion. Therefore we will do home oxygen evaluation prior to discharge. Continue mobilization.   Electronically signed by Terence Barraza MD on 9/22/2022 at 1:30 PM

## 2022-09-22 NOTE — PROGRESS NOTES
[address]    Occupational Therapy Evaluation  Date: 22  Patient Name: Irving Beltran       Room: [facility]/4982-  MRN: [id number]  Account: [de-identified]   : 1940  (79 y.o.) Gender: female     Discharge Recommendations:  Further Occupational Therapy is recommended upon facility discharge. OT Equipment Recommendations  Other: TBD    Referring Practitioner: Abelardo Bland MD  Diagnosis: Pulmonary edema with CHF Additional Pertinent Hx: 25-year-old female with a PMH significant for HFpEF, stage IV CKD, COPD, type 2 diabetes mellitus, essential hypertension, and hyperlipidemia who presented to [facility] with SOB and was admitted for management of acute hypercapnic hypoxic respiratory failure 2/2 CHF exacerbation versus multifocal pneumonia and hypertensive urgency. Treatment Diagnosis: Impaired self-care status    Past Medical History:  has a past medical history of Anxiety, Arthritis, Atherosclerosis, Body mass index (bmi) 25.0-25.9, adult, CHF exacerbation (HCC), COPD (chronic obstructive pulmonary disease) (HCC), CVA (cerebral vascular accident) ([address] Depression, Diabetes mellitus ([address] Diverticula, esophagus congenital, Former smoker, Hypercholesteremia, Hypertension, Hypokalemia, Joint pain, knee, Myocardial infarct, old, Pneumonia, Protein S deficiency ([address] Pulmonary embolism ([address] Reflux esophagitis, Tinea corporis, Tremor, URI (upper respiratory infection), UTI (urinary tract infection), and Zenker's diverticulum. Past Surgical History:   has a past surgical history that includes Carotid endarterectomy (Bilateral); Tonsillectomy; and Endoscopy, colon, diagnostic.     Restrictions  Restrictions/Precautions  Restrictions/Precautions: Fall Risk;General Precautions  Required Braces or Orthoses?: No  Implants present? :  (denies)  Position Activity Restriction  Other position/activity restrictions: PT OT eval and treat Vitals  Vitals  Heart Rate: 75  Heart Rate Source: Monitor  BP: 127/62  BP Location: Left upper arm  BP Method: Automatic  Patient Position: Semi fowlers  MAP (Calculated): 83.67  Resp: 16  SpO2: 95 %  O2 Device: Nasal cannula     Subjective  Subjective: \"Occupational therapy? I'm not looking for a job any time soon! \"  Comments: Okay for OT PT eval and treat per RN Keren Carlson  Subjective  Pain: Pt denied pain      Social/Functional History  Social/Functional History  Lives With: Alone  Type of Home: House  Home Layout: One level  Home Access: Ramped entrance, Level entry  Bathroom Shower/Tub: Walk-in shower, Shower chair with back (Pt has shower chair with back and without back; uses shower chair with back while bathing)  Bathroom Toilet: Handicap height (Sink nearby for support; considering purchasing grab bars)  Bathroom Equipment: Grab bars in shower, Shower chair, Hand-held shower  Bathroom Accessibility: Not accessible  Home Equipment: Cane, Rollator, Cane, quad, Reacher, Lift chair  Has the patient had two or more falls in the past year or any fall with injury in the past year?: No  ADL Assistance: [address] [place] [address] Avenue: Independent (Grandson orders groceries ([employer]); neighbors make meals)  Homemaking Responsibilities: Yes  Ambulation Assistance: Independent (typically no device inside the home, cane as needed; uses rollator for community distances)  Transfer Assistance: Independent  Active : No  Patient's  Info: Son provides transportation  IADL Comments: sleeps in recliner/lift chair ([employer]Boy)  Additional Comments: Daughter, son, and grandkids (adults) are available to assist as needed.  Pt also reported her neighbors can assist      Objective  Cognition  Orientation  Overall Orientation Status: Within Functional Limits  Orientation Level: Oriented X4  Cognition  Overall Cognitive Status: WNL   Sensation  Overall Sensation Status: WFL (denies)    Activities of Daily Living  ADL  Feeding: Setup  Grooming: Setup  UE Bathing: Supervision  LE Bathing: Minimal assistance  UE Dressing: Supervision  LE Dressing: Minimal assistance  Toileting: Dependent/Total (mathews cathter)  Additional Comments: ADL scores based on skilled observation and clinical reasoning, unless otherwise noted. Pt is limited due to weakness and low endurance, affecting independence with self care tasks         UE Function  LUE AROM (degrees)  LUE AROM : Exceptions  LUE General AROM: WFL except  L Shoulder Flexion 0-180: 0-30 (Limited d/t rotator cuff injury)  Left Hand AROM (degrees)  Left Hand AROM: WFL  Tone LUE  LUE Tone: Normotonic  LUE Strength  L Hand General: 4/5  LUE Strength Comment: Elbow MMT 4/5    RUE AROM (degrees)  RUE AROM : WFL  Right Hand AROM (degrees)  Right Hand AROM: WFL  Tone RUE  RUE Tone: Normotonic  RUE Strength  R Hand General: 4/5  RUE Strength Comment: Overall 4/5         Fine Motor Skills/Coordination  Coordination  Movements Are Fluid And Coordinated: Yes                Mobility  Bed Mobility  Bed mobility  Supine to Sit: Stand by assistance  Scooting: Stand by assistance  Bed Mobility Comments: HOB elevated with use of handrails. Pt up in chair with nursing students at end of session    Balance  Balance  Sitting Balance: Supervision  Standing Balance: Contact guard assistance       Transfers  Transfers  Sit to stand: Contact guard assistance  Stand to sit: Contact guard assistance  Transfer Comments: Min cues for hand placement for safety    Functional Mobility  Functional - Mobility Device: Rolling Walker  Activity: Other (From bed around to chair)  Assist Level: Contact guard assistance  Functional Mobility Comments: Assist with line mgmt.  No LOB noted    Assessment  Assessment  Performance deficits / Impairments: Decreased functional mobility   Treatment Diagnosis: Impaired self-care status  Prognosis: Good  Decision Making: Medium Complexity  Discharge Recommendations: Home with assist PRN, Home with Home health OT    Activity Tolerance  Activity Tolerance: Patient Tolerated treatment well    Safety Devices  Type of Devices:  All fall risk precautions in place, Call light within reach, Gait belt, Patient at risk for falls, Left in chair, Nurse notified    Patient Education  Patient Education  Education Given To: Patient  Education Provided: Role of Therapy, Plan of Care  Education Method: Verbal  Barriers to Learning: None  Education Outcome: Verbalized understanding, Continued education needed      Functional Outcome Measures  AM-PAC Daily Activity Inpatient   How much help for putting on and taking off regular lower body clothing?: A Little  How much help for Bathing?: A Little  How much help for Toileting?: A Little  How much help for putting on and taking off regular upper body clothing?: A Little  How much help for taking care of personal grooming?: A Little  How much help for eating meals?: A Little  [facility]: 18  AM-PAC Inpatient ADL T-Scale Score : 38.66  ADL Inpatient CMS 0-100% Score: 46.65  ADL Inpatient CMS G-Code Modifier : CK       Goals     Short Term Goals  Time Frame for Short term goals: By discharge  Short Term Goal 1: Pt will perform BADLs mod I and Good safety  Short Term Goal 2: Pt will verbalize/demonstrate use of adaptive equipment/adaptive strategies to increase IND with self care tasks, 100% of the time  Short Term Goal 3: Pt will perform transfers/functional mobility mod I, using least restrictive device and Good safety  Short Term Goal 4: Pt will actively participate in 15+ minutes of therapeutic exercise/functional activity to promote safety and independence with self care and mobility    Plan  Plan  Times per Week: 4-5  Current Treatment Recommendations: Strengthening, ROM, Balance training, Functional mobility training, Endurance training, Safety education & training, Patient/Caregiver education & training, Equipment evaluation, education, & procurement, Self-Care / ADL      OT Individual Minutes  OT Individual Minutes  Time In: 1373  Time Out: 0940  Minutes: 26  Time Code Minutes   Timed Code Treatment Minutes: 10 Minutes        Electronically signed by Anitra Pacheco OT on 9/22/22 at 1:35 PM EDT

## 2022-09-22 NOTE — PROGRESS NOTES
[facility]   Progress Note                   Date:   9/22/2022  Patient name: Sunday Richter  Date of admission:  9/20/2022 12:17 AM  MRN:   [id number]  YOB: 1940  PCP: Mando Kessler MD    Reason for Admission: Hypoxia [R09.02]  Pulmonary edema with congestive heart failure ([facility] [I50.1]  Congestive heart failure, unspecified HF chronicity, unspecified heart failure type ([facility] [I50.9]    Subjective:       Clinical Changes / Abnormalities: No chest pain or dyspnea    Cr 2.1 with GFR 22    Nephrology is on board    Cardiology notes reviewed    Medications:   Scheduled Meds:   bumetanide  2 mg IntraVENous BID    empagliflozin  10 mg Oral Daily    spironolactone  12.5 mg Oral Daily    cefTRIAXone (ROCEPHIN) IV  1,000 mg IntraVENous Q24H    gabapentin  300 mg Oral Nightly    sodium chloride flush  5-40 mL IntraVENous 2 times per day    insulin glargine  10 Units SubCUTAneous Nightly    insulin lispro  0-4 Units SubCUTAneous TID WC    insulin lispro  0-4 Units SubCUTAneous Nightly    amLODIPine  10 mg Oral Daily    carvedilol  3.125 mg Oral BID    clopidogrel  75 mg Oral Daily    donepezil  10 mg Oral Nightly    FLUoxetine  40 mg Oral Daily    hydrALAZINE  25 mg Oral BID     Continuous Infusions:   heparin (PORCINE) Infusion 12 Units/kg/hr (09/22/22 9050)    sodium chloride 5 mL/hr at 09/20/22 1157    dextrose       CBC:   Recent Labs     09/20/22  0614 09/21/22  0508 09/22/22  0454   WBC 15.0* 8.9 9.2   HGB 11.9* 11.0* 11.1*    255 279     BMP:    Recent Labs     09/20/22  0614 09/21/22  0508 09/22/22  0454   * 136 137   K 4.4 4.1 3.4*   CL 97* 98 97*   CO2 25 26 28   BUN 47* 48* 54*   CREATININE 2.14* 2.04* 2.10*   GLUCOSE 177* 113* 97     Hepatic:   Recent Labs     09/20/22  0035   AST 24   ALT 9   BILITOT 0.4   ALKPHOS 103     Troponin: No results for input(s): TROPONINI in the last 72 hours. BNP: No results for input(s): BNP in the last 72 hours.   Lipids: No results for input(s): CHOL, HDL in the last 72 hours. Invalid input(s): LDLCALCU  INR:   Recent Labs     09/20/22  0035 09/21/22  0935   INR 1.1 1.2       Objective:   Vitals: BP (!) 147/78   Pulse 76   Temp 98.1 °F (36.7 °C) (Oral)   Resp 18   Ht 5' 1\" (1.549 m)   Wt 155 lb 13.8 oz (70.7 kg)   SpO2 97%   BMI 29.45 kg/m²   General appearance: alert and cooperative with exam  HEENT: Head: Normocephalic, no lesions, without obvious abnormality. Neck: no JVD  Lungs: CTAB  Heart: RRR s1+s2, no murmurs  Abdomen: soft, non-tender  Extremities: no edema  Neurologic: not done        Assessment / Acute Cardiac Problems:   Acute on chronic systolic CHF  NSTEMI  Cardiomyopathy- LVEF 25%  CKD  COPD  CVA  Dementia- on Aricept  HTN    Patient Active Problem List:     Pneumonia due to organism     COPD (chronic obstructive pulmonary disease) (HCC)     Diabetes mellitus ([facility]     Essential hypertension     Simple chronic bronchitis (HCC)     Type 2 diabetes mellitus with kidney complication, with long-term current use of insulin (HCC)     Stage 4 chronic kidney disease (HCC)     Aspiration pneumonitis (HCC)     Sepsis (HCC)     Lactic acidosis     Hypertensive urgency     Zenker diverticulum     CHF exacerbation (HCC)     Severe sepsis (HCC)     Acute-on-chronic kidney injury (HCC)     Pulmonary edema cardiac cause (HCC)     Recurrent aspiration pneumonia (HCC)     Fatigue     Pulmonary edema with congestive heart failure (HCC)     NSTEMI (non-ST elevated myocardial infarction) (HCC)     Acute respiratory failure with hypoxia and hypercapnia (HCC)     UTI (urinary tract infection)     CAD (coronary artery disease)     Atrial fibrillation ([facility]      Plan of Treatment:   Volume and electrolyte management per nephrology. Currently on bumex 2mg IV BID  Continue coreg and hydralazine with low dose aldactone. On Jardiance also. Not on ACE/ARB or ARNI due to renal status  Continue plavix.  Patient is intolerant to ASA  Continue IV heparin today and DC IV heparin tomorrow AM.  Cardiac cath was discussed yesterday by cardiology and nephrology. This was addressed again today. Patient has declined cardiac cath. She reports understanding risk and benefits. Patient is DNR-CCA  Patient to follow up in CHF clinic  Call cardiology with questions, will sign off.     Junior Christi MD, MD  [facility]  [phone]

## 2022-09-22 NOTE — PROGRESS NOTES
NEPHROLOGY CONSULT     Patient :  Gogo Cottrell; 79 y.o. MRN# [id number]  Location:  2124/2124-01  Attending:  Janine Ortega MD  Admit Date:  9/20/2022   Hospital Day: 2      Reason for Consult:  CKD IV      Chief Complaint:  sob  History Obtained From:  patient    History of Present Illness: This is a 79 y.o. female  with PMH of DM type 2 NIDDM, HTN CAD , Paroxysmal Afib CHF with reduced EF  Systolic dysfunction and grade II diastolic dysfucntion, CKD IV with baseline scr averaging 1.7-2.0 mg/dl. Patient was recently admitted to [facility] with CHF exacerbation and was discharged, and presented to [address] 9/20/2022 with c/o sob and confusion. CXR showed diffuse Parenchymal infiltrates through out the lungs bilaterally which may represent diffuse interstitial pulmonary edema or multifocal PNA  Troponins trending up to 191, Pro BNP 36,700  REPEAT echo Showed worsening of EF to 25 % from 55%with severe global hypokinesis. Pt denies any history of  prolonged NSAID use. Patient denies dysuria, gross hematuria, flank pain, nocturia, urgency, passing frothy urine or urinary incontinence. There has been no recent exposure to IV contrast.   There is no history  of paraprotein disease. Pt denies any history of recurrent UTI or kidney stones. Medication review shows use of ACE-inhibitor/diuretics.     Past Medical History:        Diagnosis Date    Anxiety     Arthritis     Atherosclerosis     Body mass index (bmi) 25.0-25.9, adult     CHF exacerbation ([facility] 1/13/2020    COPD (chronic obstructive pulmonary disease) (HCC)     CVA (cerebral vascular accident) ([facility]     Depression     Diabetes mellitus ([facility]     Diverticula, esophagus congenital     Former smoker     Hypercholesteremia     Hypertension     Hypokalemia     Joint pain, knee     Myocardial infarct, old     2007    Pneumonia     Pneumonia due to infectious organism, unspecified laterality, unspecified part of lung    Protein S deficiency ([facility]     Pulmonary embolism (HCC)     Reflux esophagitis     Tinea corporis     Tremor     URI (upper respiratory infection)     UTI (urinary tract infection)     Zenker's diverticulum        Past Surgical History:        Procedure Laterality Date    CAROTID ENDARTERECTOMY Bilateral     ENDOSCOPY, COLON, DIAGNOSTIC      TONSILLECTOMY         Current Medications:    heparin (porcine) injection 4,000 Units, PRN  heparin (porcine) injection 2,000 Units, PRN  heparin 25,000 units in dextrose 5% 250 mL (premix) infusion, Continuous  bumetanide (BUMEX) injection 2 mg, BID  spironolactone (ALDACTONE) tablet 12.5 mg, Daily  cefTRIAXone (ROCEPHIN) 1,000 mg in sodium chloride 0.9 % 50 mL IVPB mini-bag, Q24H  gabapentin (NEURONTIN) capsule 300 mg, Nightly  sodium chloride flush 0.9 % injection 5-40 mL, 2 times per day  sodium chloride flush 0.9 % injection 10 mL, PRN  0.9 % sodium chloride infusion, PRN  [Held by provider] ondansetron (ZOFRAN-ODT) disintegrating tablet 4 mg, Q8H PRN   Or  [Held by provider] ondansetron (ZOFRAN) injection 4 mg, Q6H PRN  magnesium hydroxide (MILK OF MAGNESIA) 400 MG/5ML suspension 30 mL, Daily PRN  acetaminophen (TYLENOL) tablet 650 mg, Q6H PRN   Or  acetaminophen (TYLENOL) suppository 650 mg, Q6H PRN  insulin glargine (LANTUS) injection vial 10 Units, Nightly  insulin lispro (HUMALOG) injection vial 0-4 Units, TID WC  insulin lispro (HUMALOG) injection vial 0-4 Units, Nightly  glucose chewable tablet 16 g, PRN  dextrose bolus 10% 125 mL, PRN   Or  dextrose bolus 10% 250 mL, PRN  glucagon (rDNA) injection 1 mg, PRN  dextrose 10 % infusion, Continuous PRN  ALPRAZolam (XANAX) tablet 0.25 mg, Nightly PRN  amLODIPine (NORVASC) tablet 10 mg, Daily  carvedilol (COREG) tablet 3.125 mg, BID  clopidogrel (PLAVIX) tablet 75 mg, Daily  donepezil (ARICEPT) tablet 10 mg, Nightly  FLUoxetine (PROZAC) capsule 40 mg, Daily  hydrALAZINE (APRESOLINE) tablet 25 mg, BID      Allergies:  Norco [hydrocodone-acetaminophen], Pcn [penicillins], and Sulfa antibiotics    Social History:   Social History     Socioeconomic History    Marital status: Single     Spouse name: Not on file    Number of children: 2    Years of education: Not on file    Highest education level: Not on file   Occupational History    Not on file   Tobacco Use    Smoking status: Former     Years: 25.00     Types: Cigarettes    Smokeless tobacco: Never   Substance and Sexual Activity    Alcohol use: Yes     Comment: socially    Drug use: No    Sexual activity: Not on file   Other Topics Concern    Not on file   Social History Narrative    Not on file     Social Determinants of Health     Financial Resource Strain: Not on file   Food Insecurity: Not on file   Transportation Needs: Not on file   Physical Activity: Not on file   Stress: Not on file   Social Connections: Not on file   Intimate Partner Violence: Not on file   Housing Stability: Not on file       Family History:   History reviewed. No pertinent family history. Review of Systems:    Constitutional: No fever, no chills, no night sweats, fatigue, generalized weakness, loss of appetite  HEENT:  No headache, otalgia, itchy eyes, epistaxis, nasal discharge or sore throat. Cardiac:  No chest pain, +dyspnea, orthopnea or PND, palpitations  Chest:  No cough, hemoptysis, pleuritic chest pain, wheezing,+SOB  Abdomen:  No abdominal pain, nausea, vomiting, diarrhea, melena, dysphagia hematemesis,constipation, abdominal bloating, flank pain  Neuro:  No CVA, TIA or seizure like activity. Skin:   No rashes, no itching. :   No hematuria, no pyuria, no dysuria, no flank pain. Extremities:  No swelling or joint pains.       Objective:  CURRENT TEMPERATURE:  Temp: 97.8 °F (36.6 °C)  MAXIMUM TEMPERATURE OVER 24HRS:  Temp (24hrs), Av.2 °F (36.8 °C), Min:97.8 °F (36.6 °C), Max:99 °F (37.2 °C)    CURRENT RESPIRATORY RATE:  Resp: 16  CURRENT PULSE:  Heart Rate: 75  CURRENT BLOOD PRESSURE: BP: 127/62  24HR BLOOD PRESSURE RANGE:  Systolic (94GAE), ESJ:102 , Min:127 , WHU:182   ; Diastolic (50VJW), ZJC:15, Min:62, Max:82    24HR INTAKE/OUTPUT:    Intake/Output Summary (Last 24 hours) at 9/22/2022 1310  Last data filed at 9/22/2022 0230  Gross per 24 hour   Intake 240 ml   Output 2125 ml   Net -1885 ml     Patient Vitals for the past 96 hrs (Last 3 readings):   Weight   09/22/22 0000 155 lb 13.8 oz (70.7 kg)   09/20/22 2330 161 lb 2.5 oz (73.1 kg)   09/20/22 0745 150 lb (68 kg)       Physical Exam:  GENERAL APPEARANCE: Alert and cooperative, and appears to be in no acute distress. HEAD: normocephalic  EYES:  EOMI. Not pale, anicteric   NOSE:  No nasal discharge. CARDIAC: Normal S1 and S2. No S3, S4 or murmurs. Rhythm is regular. LUNGS:  diminished breath sounds. Rales posteriorly, no wheezing  NECK: Neck supple, non-tender    MUSKULOSKELETAL: Adequately aligned spine. No joint erythema or tenderness. EXTREMITIES: +edema. Peripheral pulses intact. NEURO: Non focal      Labs:   CBC:  Recent Labs     09/20/22  0614 09/21/22  0508 09/22/22  0454   WBC 15.0* 8.9 9.2   RBC 4.18 3.83* 3.80*   HGB 11.9* 11.0* 11.1*   HCT 36.8 35.0* 33.1*   MCV 88.0 91.3 86.9   MCH 28.4 28.8 29.2   MCHC 32.3 31.5 33.6   RDW 16.4* 17.6* 16.8*    255 279   MPV 7.5 7.1 7.2      BMP:   Recent Labs     09/20/22  0614 09/21/22  0508 09/22/22  0454   * 136 137   K 4.4 4.1 3.4*   CL 97* 98 97*   CO2 25 26 28   BUN 47* 48* 54*   CREATININE 2.14* 2.04* 2.10*   GLUCOSE 177* 113* 97   CALCIUM 9.2 9.3 9.2      Phosphorus:  No results for input(s): PHOS in the last 72 hours. Magnesium:   Recent Labs     09/20/22  0035 09/21/22  1539 09/22/22  0454   MG 3.4* 2.0 1.9     Albumin:   Recent Labs     09/20/22  0035   LABALBU 3.7       IRON:  No results for input(s): IRON in the last 72 hours. Iron Saturation:  Invalid input(s): PERCENTFE  TIBC:  No results for input(s): TIBC in the last 72 hours.   FERRITIN:  No results for input(s): FERRITIN in the last 72 hours. SPEP: No results for input(s): SPEP in the last 72 hours. Recent Labs     09/20/22  0035   PROT 8.6*       Urinalysis:    Recent Labs     09/20/22  0236   NITRU NEGATIVE   COLORU Yellow   PHUR 5.0   [address] TOO NUMEROUS TO COUNT   RBCUA TOO NUMEROUS TO COUNT   BACTERIA MANY*   [address] 1.019   LEUKOCYTESUR LARGE*   UROBILINOGEN Normal   BILIRUBINUR NEGATIVE   GLUCOSEU NEGATIVE   [address] NEGATIVE         Radiology:  Reviewed as available. Assessment:   CKD 4 secondary to Nephrosclerosis, Left atrophic kidney with kidney stone in left kidney with out hydronephrosis. Scr averaging 1.7-2.0 mg/dl, Scr  near baseline. CHF with acutely reduced EF, systolic dysfunction and grade II diastolic dysfunction. DM type 2  HTN  Hx of CAD  UTI, UA 2+ pro, large Hb, large L.e, numerous WBC and RBC. Plan:  I discussed with the patient again regarding ncardiac catheterization, discussed risks and benefits of cardiac cath. Patient is high risk Approximately 75% risk for contrast induced Nephropathy and Approximately 12 %risk of requiring dialysis. Patient understands the risk and benefits, Patient does not want to pursue cardiac catheterization and wants to stick with medical treatment            Thank you for the consultation.       Electronically signed by Chilo Beverly MD on 9/22/2022 at 1:10 PM

## 2022-09-22 NOTE — PROGRESS NOTES
[address] [employer]    PROGRESS NOTE             9/22/2022    7:28 AM    Name:   Irving Beltran  MRN:     [id number]     Acct:      [de-identified]   Room:   [address] [place] [address] Day:  2  Admit Date:  9/20/2022 12:17 AM    PCP:  Steve Murcia MD  Code Status:  DNR-CCA    Subjective:     C/C:   Chief Complaint   Patient presents with    Shortness of Breath     Interval History Status: significantly improved. Patient seen and examined at the bed side while she was sitting on side of bed without nasal cannula, no new acute events overnight. Patient currently on 2L NC at her baseline. She reports feeling better. Without Sob. Troponin trended upward last night, to 203 from 47 and baseline of 10. On 09/21 AM troponin was 191. Cardiology started heparin drip on09/21 for 48 hours. Patient at this time would like to hold cardiac cath. Patient also on IV Bumex for diuresis. Had a discussion with the patient regarding her deferring to undergo cardiac cath. Patient stated feeling might do, and ask about if he could get hold of her cardiologist.      This morning the patient Cr improved slightly to 2.04 compared to 2.14 yesterday. Pro-BNP has increased up top 61,872 from 36,710 09/21 and 30,575 09/20. Blood pressure remains stable at 133/68 pulse at 79. WBC count has also trended down to 9.2. Patient did not have an episode of fever. Zithromax was discontinued due to patient's QT per Critical care. Patient had a repeat EKG was 475. Improving from 609 on 09/20. As needed ondansetron was also discontinued. Notes from nursing staff and Consults had been reviewed, and the overnight progress had been checked with the nursing staff as well. Brief History:     The patient is a pleasant  79 y.o.   female with history of congestive heart failure, stage IV chronic kidney disease, COPD on 2 L oxygen at home, type 2 diabetes, Afib, and hypertension who presents to the ED with Shortness of Breath and is currently admitted for the management of acute type II respiratory failure secondary to presence heart failure exacerbation versus multifocal pneumonia and hypertensive urgency     Ms. Sheth Presume, says that yesterday after she went for lunch at some grocery with her son Celso Forman a double fried steak], and that her medications filled admission, she took her home meds the morning, did not at night, and around 8 PM she started feeling short of breath. Patient then presented to the ED with extreme shortness of breath. Patient states that she has been discharged from [facility] a week ago due to the same symptoms as she remained hospitalized for a week. At that time, patient medications were changed [she was started on Bumex]. ACE inhibitor's were discontinued due to renal dysfunction. She also had decreased ejection fraction 40 to 15%, and diastolic dysfunction as well as mitral regurg. On this presentation patient had a blood pressure of 189/109, and a pulse of 129. She also had an O2 sat of 90 on positive airway pressure. She had an elevated troponin of 47, proBNP 30,575 compared to baseline of 13 and 1000, respectively. EKG did not show acute ischemic changes. However, on Telemetry after admission to ICU, patient was in Atrial fibrillation. Chest x-ray showed diffuse parenchymal infiltrates seen throughout the lungs on bilateral fields which may be secondary to pulmonary edema versus multilobular pneumonia. Patient had a white blood cell count of 15, ESR of 89, CRP 47.7 and procalcitonin of 0.15. Patient was started on IV nitroglycerin drip due to pulmonary urgency versus emergency and acute setting of flash pulmonary edema, versus pulmonary edema secondary to acute exacerbation of heart failure, patient was then given Lasix 40 mg IV. Cardiology were consulted for elevated troponins and heart failure.  Also, Pulmonology/critical care are on board. Review of Systems:     CONSTITUTIONAL:  no fevers  Psych: Normal mood and affect, no depression, no suicidal ideation. EYES: negative for blury vision  HEENT: No headaches, no nasal congestion, no difficulty swallowing  RESPIRATORY: Positive for shortness of breath  CARDIOVASCULAR: negative for chest pain, no palpitations  GASTROINTESTINAL: no nausea, no vomiting, no change in bowel habits, no abdominal pain   GENITOURINARY: Positive for dysuria   MUSCULOSKELETAL: no joint pains, no muscle aches, bilateral pitting edema of the lower extremities  NEUROLOGICAL: No weakness or numbness      Medications: Allergies:     Allergies   Allergen Reactions    Norco [Hydrocodone-Acetaminophen] Other (See Comments)     Nausea, dizziness    Pcn [Penicillins]      Hives    Sulfa Antibiotics      Hives       Current Meds:   Scheduled Meds:    potassium chloride  40 mEq Oral Once    bumetanide  2 mg IntraVENous BID    empagliflozin  10 mg Oral Daily    spironolactone  12.5 mg Oral Daily    cefTRIAXone (ROCEPHIN) IV  1,000 mg IntraVENous Q24H    gabapentin  300 mg Oral Nightly    sodium chloride flush  5-40 mL IntraVENous 2 times per day    insulin glargine  10 Units SubCUTAneous Nightly    insulin lispro  0-4 Units SubCUTAneous TID WC    insulin lispro  0-4 Units SubCUTAneous Nightly    amLODIPine  10 mg Oral Daily    carvedilol  3.125 mg Oral BID    clopidogrel  75 mg Oral Daily    donepezil  10 mg Oral Nightly    FLUoxetine  40 mg Oral Daily    hydrALAZINE  25 mg Oral BID     Continuous Infusions:    heparin (PORCINE) Infusion 12 Units/kg/hr (09/22/22 0605)    sodium chloride 5 mL/hr at 09/20/22 1157    dextrose       PRN Meds: heparin (porcine), heparin (porcine), sodium chloride flush, sodium chloride, [Held by provider] ondansetron **OR** [Held by provider] ondansetron, magnesium hydroxide, acetaminophen **OR** acetaminophen, glucose, dextrose bolus **OR** dextrose bolus, glucagon (rDNA), dextrose, ALPRAZolam    Data:     Past Medical History:   has a past medical history of Anxiety, Arthritis, Atherosclerosis, Body mass index (bmi) 25.0-25.9, adult, CHF exacerbation ([facility]), COPD (chronic obstructive pulmonary disease) ([facility]), CVA (cerebral vascular accident) ([facility] Depression, Diabetes mellitus ([facility] Diverticula, esophagus congenital, Former smoker, Hypercholesteremia, Hypertension, Hypokalemia, Joint pain, knee, Myocardial infarct, old, Pneumonia, Protein S deficiency ([facility] Pulmonary embolism ([facility] Reflux esophagitis, Tinea corporis, Tremor, URI (upper respiratory infection), UTI (urinary tract infection), and Zenker's diverticulum. Social History:   reports that she has quit smoking. Her smoking use included cigarettes. She has never used smokeless tobacco. She reports current alcohol use. She reports that she does not use drugs. Family History:   History reviewed. No pertinent family history. Vitals:  BP (!) 147/78   Pulse 75   Temp 98.1 °F (36.7 °C) (Oral)   Resp 18   Ht 5' 1\" (1.549 m)   Wt 155 lb 13.8 oz (70.7 kg)   SpO2 94%   BMI 29.45 kg/m²   Temp (24hrs), Av.3 °F (36.8 °C), Min:97.8 °F (36.6 °C), Max:99 °F (37.2 °C)      Recent Labs     22  1121 22  1731 22  2107 22  0528   POCGLU 93 105 112* 100       I/O(24Hr):     Intake/Output Summary (Last 24 hours) at 2022 0728  Last data filed at 2022 0230  Gross per 24 hour   Intake 480 ml   Output 2125 ml   Net -1645 ml       Labs:    Last 3 Troponin:    Lab Results   Component Value Date/Time    TROPONINI 0.00 2016 03:02 AM    TROPONINI 0.00 2016 12:08 AM       Lab Results   Component Value Date/Time    SPECIAL 10 R H 2021 12:30 PM     Lab Results   Component Value Date/Time    CULTURE ESCHERICHIA COLI >364365 CFU/ML (A) 2022 02:36 AM         Radiology:    ECHO Complete 2D W Doppler W Color    Result Date: 2022  [address] [facility] Transthoracic Echocardiography Report (TTE)  Patient Name Monica Ortega Date of Study                 09/20/2022               A   Date of      1940  Gender                        Female  Birth   Age          79 year(s)  Race                             Room Number  2124        Height:                       61.02 inch, 155 cm   Corporate ID [id number]    Weight:                       150 pounds, 68 kg  #   Patient Acct [de-identified]   BSA:           1.67 m^2       BMI:      28.32  #                                                                kg/m^2   MR #         [id number]      Sonographer                   Jessica Hoyt   Accession #  [id number]  Interpreting Physician        Wing Bhat [address]   Fellow                   Referring Nurse Practitioner   Interpreting             Referring Physician           Rayo Veronica,  [place]  Type of Study   TTE procedure:2D Echocardiogram, M-Mode, Doppler, Color Doppler, Contrast  study. Procedure Date Date: 09/20/2022 Start: 10:36 AM Study Location: [facility] Technical Quality: Limited visualization due to body habitus. Indications:Congestive heart failure. History / Tech. Comments: COPD DM HTN HLD COPD CKD Patient Status: Inpatient Contrast Medium: Definity. Amount - 2 ml Height: 61.02 inches Weight: 150 pounds BSA: 1.67 m^2 BMI: 28.32 kg/m^2 Rhythm: Within normal limits HR: 78 bpm BP: 166/81 mmHg CONCLUSIONS Summary Echo contrast utilized on this technically difficult study. Left ventricle is mild to moderately dilated. Estimated EF 25%. Severe global hypokinesis. Normal right ventricular size, reduced function. Left atrium appears dilated. Aortic valve is trileaflet. Aortic valve sclerosis without stenosis. Small LVOT diameter. Trivial aortic insufficiency. Normal aortic root dimension. Mitral annular calcification is seen. Mild to moderate mitral regurgitation. Mild MS mean gradient 5 mmHg Normal tricuspid valve structure and function.  Insignificant tricuspid regurgitation, unable to estimate RVSP. IVC appears normal diameter , difficult to assess respiratory variation No significant pericardial effusion is seen. Signature ----------------------------------------------------------------------------  Electronically signed by Yolanda Paul(Interpreting physician) on  2022 05:56 PM ---------------------------------------------------------------------------- ----------------------------------------------------------------------------  Electronically signed by Jessica Hoyt(Sonographer) on 2022 11:41  AM ---------------------------------------------------------------------------- FINDINGS Left Atrium Left atrium appears dilated. Left Ventricle Left ventricle is mildly dilated. Estimated EF 25%. Global hypokinesis. Right Atrium Right atrium is normal in size. Right Ventricle Normal right ventricular size reduced function. Mitral Valve Mitral annular calcification is seen. Mild to moderate mitral regurgitation. Aortic Valve Aortic valve is trileaflet. Aortic valve sclerosis without stenosis. Small LVOT diameter. Trivial aortic insufficiency. Tricuspid Valve Normal tricuspid valve structure and function. Insignificant tricuspid regurgitation, unable to estimate RVSP. Pulmonic Valve Pulmonic valve not well visualized but Doppler velocities are normal. No pulmonic insufficiency. Pericardial Effusion No significant pericardial effusion is seen. Miscellaneous Normal aortic root dimension.  M-mode / 2D Measurements & Calculations:   LVIDd:6.09 cm(3.7 - 5.6 cm)      Diastolic KCWBBB:028 ml  SCEAF:7.48 cm(2.2 - 4.0 cm)      Systolic EJOLQK:699 ml  UDAK:6.41 cm(0.6 - 1.1 cm)       Aortic Root:3 cm(2.0 - 3.7 cm)  LVPWd:0.93 cm(0.6 - 1.1 cm)      LA Dimension: 2.6 cm(1.9 - 4.0 cm)  Fractional Shortenin.09 %    LA volume/Index: 35 ml /21m^2  Calculated LVEF (%): 32.34 %     AV Cusp Separation: 0.7 cm                                   LVOT:1.5 cm   Mitral:                              Aortic Peak E-Wave: 1.41 m/s                Peak Velocity: 1.70 m/s  Peak A-Wave: 1.20 m/s                Mean Velocity: 1.10 m/s  E/A Ratio: 1.18                      Peak Gradient: 11.56 mmHg  Peak Gradient: 7.95 mmHg             Mean Gradient: 5 mmHg  Mean Gradient: 5 mmHg  Deceleration Time: 155 msec                                       Area (continuity): 0.91 cm^2  MR Alias Velocity: 0.39 m/s          AV VTI: 31.7 cm  MR Velocity: 5.50 m/s  MARK Volumetric: 0.07 cm^2  Area (continuity): 0.62 cm^2  Mean Velocity: 0.93 m/s  MR VTI: 183 cm  Septal Wall E' velocity:0.03 m/s Lateral Wall E' velocity:0.08 m/s    XR CHEST PORTABLE    Result Date: 9/20/2022  EXAMINATION: ONE XRAY VIEW OF THE CHEST 9/20/2022 12:28 am COMPARISON: 05/16/2021 HISTORY: ORDERING SYSTEM PROVIDED HISTORY: shortness of breath TECHNOLOGIST PROVIDED HISTORY: shortness of breath Reason for Exam: shortness of breath FINDINGS: Cardiac size and mediastinal structures appear within normal limits. Diffuse parenchymal infiltrates are seen throughout the lungs bilaterally. No pneumothorax. No acute osseous abnormality. Osteopenia. Diffuse parenchymal infiltrates seen throughout the lungs bilaterally which may represent diffuse interstitial pulmonary edema or multilobar pneumonia.      VL Lower Extremity Bilateral Venous Duplex    Result Date: 9/21/2022    [facility]  Vascular Lower Extremities DVT Study Procedure   Patient Name   Edie Brito Date of Study           09/20/2022                 A   Date of Birth  1940  Gender                  Female   Age            79 year(s)  Race                       Room Number    2124        Height:                 61.02 inch, 155 cm   Corporate ID # [id number]    Weight:                 150 pounds, 68 kg   Patient Acct # [de-identified]   BSA:        1.67 m^2    BMI:      28.32 kg/m^2   MR #           [id number]      Sonographer             Praveen Gaytan   Accession #    [id number]  Interpreting Physician  Ananda Jeter   Referring                  Referring Physician     Hugo Vaughn  Nurse  Practitioner  Procedure Type of Study:   Veins: Lower Extremities DVT Study, Venous Scan Lower Bilateral.  Indications for Study:R/O DVT. Patient Status: In Patient. Technical Quality:Limited visualization. Conclusions   Summary   Simultaneous real time imaging utilizing B-Mode, color doppler and  spectral waveform analysis was performed on the bilateral lower  extremities for venous examination of the deep and superficial systems. Findings are:   Right:  No evidence of deep or superficial venous thrombosis. Left:  No evidence of deep or superficial venous thrombosis. Signature   ----------------------------------------------------------------  Electronically signed by Leandro Avelar(Sonographer) on  09/20/2022 03:45 PM  ----------------------------------------------------------------   ----------------------------------------------------------------  Electronically signed by Merlin Beers, Mohammed(Interpreting  physician) on 09/21/2022 01:34 AM  ----------------------------------------------------------------  Findings:   Right Impression:                    Left Impression:  The common femoral, femoral, and     The common femoral, femoral, and  popliteal veins demonstrate normal   popliteal veins demonstrate normal  compressibility and augmentation. compressibility and augmentation. Non visualization of the posterior   Non visualization of the posterior  tibial and peroneal veins. tibial and peroneal veins. Normal compressibility of the great  Normal compressibility of the great  saphenous vein. saphenous vein. Normal compressibility of the small  Normal compressibility of the small  saphenous vein. saphenous vein.   Velocities are measured in cm/s ; Diameters are measured in cm Right Lower Extremities DVT Study Measurements Right 2D Measurements +------------------------------------+----------+---------------+----------+ ! Location                            ! Visualized! Compressibility! Thrombosis! +------------------------------------+----------+---------------+----------+ ! Common Femoral                      !Yes       ! Yes            ! None      ! +------------------------------------+----------+---------------+----------+ ! Prox Femoral                        !Yes       ! Yes            ! None      ! +------------------------------------+----------+---------------+----------+ ! Mid Femoral                         !Yes       ! Yes            ! None      ! +------------------------------------+----------+---------------+----------+ ! Dist Femoral                        !Yes       ! Yes            ! None      ! +------------------------------------+----------+---------------+----------+ ! Popliteal                           !Yes       ! Yes            ! None      ! +------------------------------------+----------+---------------+----------+ ! Sapheno Femoral Junction            ! Yes       ! Yes            ! None      ! +------------------------------------+----------+---------------+----------+ ! PTV                                 ! No        !               !          ! +------------------------------------+----------+---------------+----------+ ! Peroneal                            !No        !               !          ! +------------------------------------+----------+---------------+----------+ ! Gastroc                             ! Yes       ! Yes            ! None      ! +------------------------------------+----------+---------------+----------+ ! GSV Thigh                           ! Yes       ! Yes            ! None      ! +------------------------------------+----------+---------------+----------+ ! GSV Knee                            ! Yes       ! Yes            ! None      ! +------------------------------------+----------+---------------+----------+ ! GSV Ankle !Yes       !Yes            ! None      ! +------------------------------------+----------+---------------+----------+ ! SSV                                 ! Yes       ! Yes            ! None      ! +------------------------------------+----------+---------------+----------+ Right Doppler Measurements +---------------------------+------+------+--------------------------------+ ! Location                   ! Signal!Reflux! Reflux (msec)                   ! +---------------------------+------+------+--------------------------------+ ! Common Femoral             !Phasic!      !                                ! +---------------------------+------+------+--------------------------------+ ! Prox Femoral               !Phasic!      !                                ! +---------------------------+------+------+--------------------------------+ ! Popliteal                  !Phasic!      !                                ! +---------------------------+------+------+--------------------------------+ Left Lower Extremities DVT Study Measurements Left 2D Measurements +------------------------------------+----------+---------------+----------+ ! Location                            ! Visualized! Compressibility! Thrombosis! +------------------------------------+----------+---------------+----------+ ! Common Femoral                      !Yes       ! Yes            ! None      ! +------------------------------------+----------+---------------+----------+ ! Prox Femoral                        !Yes       ! Yes            ! None      ! +------------------------------------+----------+---------------+----------+ ! Mid Femoral                         !Yes       ! Yes            ! None      ! +------------------------------------+----------+---------------+----------+ ! Dist Femoral                        !Yes       ! Yes            ! None      ! +------------------------------------+----------+---------------+----------+ ! Popliteal !Yes       !Yes            ! None      ! +------------------------------------+----------+---------------+----------+ ! Sapheno Femoral Junction            ! Yes       ! Yes            ! None      ! +------------------------------------+----------+---------------+----------+ ! PTV                                 ! No        !               !          ! +------------------------------------+----------+---------------+----------+ ! Peroneal                            !No        !               !          ! +------------------------------------+----------+---------------+----------+ ! Gastroc                             ! Yes       ! Yes            ! None      ! +------------------------------------+----------+---------------+----------+ ! GSV Thigh                           ! Yes       ! Yes            ! None      ! +------------------------------------+----------+---------------+----------+ ! GSV Knee                            ! Yes       ! Yes            ! None      ! +------------------------------------+----------+---------------+----------+ ! GSV Ankle                           ! Yes       ! Yes            ! None      ! +------------------------------------+----------+---------------+----------+ ! SSV                                 ! Yes       ! Yes            ! None      ! +------------------------------------+----------+---------------+----------+ Left Doppler Measurements +---------------------------+------+------+--------------------------------+ ! Location                   ! Signal!Reflux! Reflux (msec)                   ! +---------------------------+------+------+--------------------------------+ ! Common Femoral             !Phasic!      !                                ! +---------------------------+------+------+--------------------------------+ ! Prox Femoral               !Phasic!      !                                ! +---------------------------+------+------+--------------------------------+ ! Popliteal !Phasic!      !                                ! +---------------------------+------+------+--------------------------------+            Physical Examination:        PHYSICAL EXAM:  General Appearance  Alert , awake, not in acute distress  Psych: Normal mood and affect, normal behavior, not agitated, maintaining good eye contact   HEENT - Head is normocephalic, atraumatic. Neck: supple, no rigidity, normal ROM, no neck swellings, normal thyroid gland  Lungs - Bilateral equal air entry, patient has bibasilar crackles, no wheezes or rhonchi. Patient was not on nasal cannula on my examination  Cardiovascular - Heart sounds are normal.  Regular rhythm, normal rate without murmur, gallop or rub.   Abdomen - Soft, nontender, nondistended, no masses or organomegaly  Neurologic - There are no new focal motor or sensory deficits  Skin - No bruising or bleeding on exposed skin area  Extremities -bilateral lower limb pitting edema, +1      Assessment:        Primary Problem  Pulmonary edema with congestive heart failure [facility])    Active Hospital Problems    Diagnosis Date Noted    Pulmonary [facility] [I50.1] 09/20/2022     Priority: Medium    NSTEMI (non-ST elevated myocardial infarction) ([facility] [I21.4] 09/20/2022     Priority: Medium    Acute respiratory failure with hypoxia and hypercapnia (HCC) [J96.01, J96.02] 09/20/2022     Priority: Medium    UTI (urinary tract infection) [N39.0] 09/20/2022     Priority: Medium    CAD (coronary artery disease) [I25.10] 09/20/2022     Priority: Medium    Atrial fibrillation ([facility] [I48.91] 09/20/2022     Priority: Medium    Pulmonary edema cardiac cause [facility] [I50.1] 05/15/2021    CHF exacerbation ([facility] [I50.9] 01/13/2020    Sepsis ([facility] [A41.9] 08/17/2019    Stage 4 chronic kidney disease ([facility] [N18.4] 08/09/2016    Diabetes mellitus ([facility] [E11.9]     Essential hypertension [I10]        Plan:        Acute exacerbated heart failure most likely 2/2 fluid overload due to dietary intake vs medication  non compliance  -proBNP on presentation 30,575, baseline around 1000. up top 36,710, and 61,872.   -EF recent is 25% compared to 40-45%   -Patient came with shortness of breath and saturation of 90 on positive airway pressure with pulmonary edema  -ABG showed acute respiratory failure type II, pH 7.233, PCO2 58  -Chest x-ray showed diffuse parenchymal infiltrates seen through the lungs bilaterally which may represent diffuse interstitial edema or multilobular pneumonia  Patient received 1 dose of IV lasix 40 mg.  -Patient on IV Bumex 2 mg twice daily  -Patient on Plavix 75  -Will continue home carvedilol 3.125 mg twice daily  -Cardiology were consulted        High troponins likely 2/2 new ischemia(As EF worsened sig] versus demand ischemia  -Cardiology on board  History of CAD and remote stent placement  -Repeat echo showed EF of 25%, worsened from 40-45%, earlier in Sep while she was in [facility]  -Troponin were: 46 then :47.   Then trended up to 203, today [09/21] trended down to 193.  -Patient would like to hold off cardiac cath at this moment, she might reconsider as well.  -Patient on IV heparin drip for 48 hours [started 09/21/2022]  -No acute ST elevation or ST depression on EKG in ED  -Patient on carvedilol 3.125 mg twice daily and Plavix 75mg        Atrial fibirllation  -On telemetry patient seemed to be in Afib with PVCs  EKG on  presentation was sinus tachycardia without afib, repeated EKG showed that patient was in A. fib  -Cardiology on board  -Will manage accordingly         Acute type II respiratory failure secondary to HTN emergency vs acute exacerbated HF vs multilobular pneumonia (Unlikely)  -Flash Pulmonary edema vs Pulmonary Edema 2/2 of heart failure   -Unlikely to be due to pneumonia, clinical standpoint  -Diffuse parenchymal infiltrates seen throughout the lungs bilaterally which may represent diffuse interstitial pulmonary edema or multilobar pneumonia. -Currently on NC 2l (baseline)  -Required BiPAP in ED  -Patient sig improved after IV lasix dose. Leukocytosis mosst likely 2/2 UTI  -Secondary to UTI vs Multilobular pneumonia (Unlikely)  -WBC on presentation 15. Currently 9.0  -ESR 89, Procal 0.15, CRP 47.7  -UA, had pyuria and bacteruria, patient has dysuria and frequency  -Urine culture positive for E. coli, sensitive to ceftriaxone  -Blood culture sent  -Discontinue cefepime  Zithromax was stopped due to patient prolonged QTC. Hypertensive emergency   -Flash pulmonary edema   -on presentation blood pressure was 189/109, increased to 205/121. Currently 133/68, HR 79  -Patient received Nitroglycerin Infusion in setting of hypertensive emergency  -Continue patient amlodipine  Continue hydration at current dose        COPD  -Pulmonology on board  -On 2L NC at home  -Continue to Tropium 2.52 puffs daily  -Continue albuterol every 6 hours as needed        Diabetes Mellitus type 2  -Hold patient Glipizide  -Will put on low dose sliding scale   -PCOT 4 times. Depression and Mild dementia  -Continue Fluxetine   Continue donepezil        PT/OT  -Renal diet  - consulted. Consultations:   IP CONSULT TO PULMONOLOGY  IP CONSULT TO CARDIOLOGY  IP CONSULT TO INTERNAL MEDICINE      DVT prophylaxis: heparin (porcine) injection 5,000 TID  GI prophylaxis: Protonix 40 mg daily    Electronically signed by Jade Barron MD on 9/22/2022 at 8:15 AM        Attending Physician Statement  I have discussed the care of Preston Calixto and I have examined the patient myselft and taken ros and hpi , including pertinent history and exam findings,  with the resident. I have reviewed the key elements of all parts of the encounter with the resident. I agree with the assessment, plan and orders as documented by the resident.     Attending Physician Statement  I have discussed the care of Preston Calixto and I have examined the patient

## 2022-09-22 NOTE — DISCHARGE SUMMARY
[address]    Discharge Summary     Patient ID: Callie Friday  :  1940   MRN: [id number]     ACCOUNT:  [de-identified]   Patient's PCP: Adi Best MD  Admit Date: 2022   Discharge Date: 2022     Length of Stay: 3  Code Status:  DNR-CCA  Admitting Physician: Desmond Aleman MD  Discharge Physician: Eileen Miller MD     Active Discharge Diagnoses:       Primary Problem  Pulmonary edema with congestive heart failure [facility] Problems    Diagnosis Date Noted    Pulmonary edema with congestive heart failure ([address] [I50.1] 2022     Priority: Medium    NSTEMI (non-ST elevated myocardial infarction) ([address] [I21.4] 2022     Priority: Medium    Acute respiratory failure with hypoxia and hypercapnia ([address] [J96.01, J96.02] 2022     Priority: Medium    UTI (urinary tract infection) [N39.0] 2022     Priority: Medium    CAD (coronary artery disease) [I25.10] 2022     Priority: Medium    Atrial fibrillation ([address] [I48.91] 2022     Priority: Medium    Pulmonary edema cardiac cause ([address] [I50.1] 05/15/2021    CHF exacerbation ([address] [I50.9] 2020    Sepsis ([address] [A41.9] 2019    Stage 4 chronic kidney disease ([address] [N18.4] 2016    Diabetes mellitus ([address] [E11.9]     Essential hypertension [I10]        Admission Condition:  poor    Discharged Condition: fair    Hospital Stay:       Hospital Course: The patient is a pleasant  79 y.o.  female with history of congestive heart failure, stage IV chronic kidney disease, COPD on 2 L oxygen at home, type 2 diabetes, Afib, and hypertension who was admitted for further management of acute decompensated combined heart failure and hypertensive emergency, after she presented to the ED Shortness of Breath and type II respiratory failure    On presentation patient had a blood pressure of 189/109, pulse of 129.   O2 sat was 90 with positive airway pressure. Troponins were elevated to 47, proBNP 30, 575 compared to baseline of 13 and 1000 respectively. Chest x-ray showed diffuse parenchymal infiltrates on bilateral lower lung fields, had an elevated white count of 15, ESR of 89, CRP 47.7 and Pro-Dashawn of 0.15. Her Creatinine was 2.14 (baseline around 1.6-1.9). Patient was started on IV nitroglycerin drip in the ED due to pulmonary edema and hypertensive emergency, and acute flash edema secondary to the exacerbation of congestive systolic and diastolic heart failure. Patient also received IV Lasix 40 mg..  Patient also had a positive urine analysis for bacteria and white blood cells and was given cefepime in the ED. Consulted cardiology and pulmonology, patient troponin trended upwards during stay to 203 then to  191, patient was started on IV heparin drip for 48 hours, and she denied further cardiac work-up such as cardiac cath. Patient was also given IV Bumex for heart failure, during stay patient proBNP elevated again to 36,710 and 02579, although patient was clinically doing fine. Patient again was told about the benefits versus risk of cardiac cath, she declined again. Repeat echo was done during stay, patient had ejection fraction of 25%. Also during stay patient was found to have pyuria and leukocytosis 15, as prescribed having dysuria and frequency that were recently started, urine culture showed E. coli sensitive to ciprofloxacin and ceftriaxone. Patient was started on IV ceftriaxone. Creatinine remained around admission value of 2.10. IV diuretics were switched to oral.  IV ceftriaxone switched to oral Keflex. Home O2 eval was performed and patient qualifies for oxygen.     Significant therapeutic interventions: IV heparin drip, IV nitroglycerin, IV Rocephin and IV cefepime    Significant Diagnostic Studies:   Labs / Micro:  CBC:   Lab Results   Component Value Date/Time    WBC 7.2 09/23/2022 04:40 AM RBC 3.74 09/23/2022 04:40 AM    HGB 10.9 09/23/2022 04:40 AM    HCT 32.6 09/23/2022 04:40 AM    MCV 87.2 09/23/2022 04:40 AM    MCH 29.3 09/23/2022 04:40 AM    MCHC 33.5 09/23/2022 04:40 AM    RDW 17.3 09/23/2022 04:40 AM     09/23/2022 04:40 AM     U/A:    Lab Results   Component Value Date/Time    COLORU Yellow 09/20/2022 02:36 AM    TURBIDITY Turbid 09/20/2022 02:36 AM    SPECGRAV 1.019 09/20/2022 02:36 AM    HGBUR LARGE 09/20/2022 02:36 AM    PHUR 5.0 09/20/2022 02:36 AM    PROTEINU 2+ 09/20/2022 02:36 AM    GLUCOSEU NEGATIVE 09/20/2022 02:36 AM    KETUA NEGATIVE 09/20/2022 02:36 AM    BILIRUBINUR NEGATIVE 09/20/2022 02:36 AM    UROBILINOGEN Normal 09/20/2022 02:36 AM    NITRU NEGATIVE 09/20/2022 02:36 AM    LEUKOCYTESUR LARGE 09/20/2022 02:36 AM        blood culture: negative and urine culture: positive for Ecoli,     Radiology:    ECHO Complete 2D W Doppler W Color    Result Date: 9/20/2022  [address] [facility] Transthoracic Echocardiography Report (TTE)  Patient Name Maryam Platt Date of Study                 09/20/2022               A   Date of      1940  Gender                        Female  Birth   Age          79 year(s)  Race                             Room Number  2124        Height:                       61.02 inch, 155 cm   Corporate ID [id number]    Weight:                       150 pounds, 68 kg  #   Patient Acct [de-identified]   BSA:           1.67 m^2       BMI:      28.32  #                                                                kg/m^2   MR #         [id number]      Sonographer                   Jessica Hoty   Accession #  [id number]  Interpreting Physician        Wing Bhat [address]   Fellow                   Referring Nurse Practitioner   Interpreting             Referring Physician           Neo Mendoza  Type of Study   TTE procedure:2D Echocardiogram, M-Mode, Doppler, Color Doppler, Contrast  study.   Procedure Date Date: 09/20/2022 Start: 10:36 AM Study Location: [facility] Technical Quality: Limited visualization due to body habitus. Indications:Congestive heart failure. History / Tech. Comments: COPD DM HTN HLD COPD CKD Patient Status: Inpatient Contrast Medium: Definity. Amount - 2 ml Height: 61.02 inches Weight: 150 pounds BSA: 1.67 m^2 BMI: 28.32 kg/m^2 Rhythm: Within normal limits HR: 78 bpm BP: 166/81 mmHg CONCLUSIONS Summary Echo contrast utilized on this technically difficult study. Left ventricle is mild to moderately dilated. Estimated EF 25%. Severe global hypokinesis. Normal right ventricular size, reduced function. Left atrium appears dilated. Aortic valve is trileaflet. Aortic valve sclerosis without stenosis. Small LVOT diameter. Trivial aortic insufficiency. Normal aortic root dimension. Mitral annular calcification is seen. Mild to moderate mitral regurgitation. Mild MS mean gradient 5 mmHg Normal tricuspid valve structure and function. Insignificant tricuspid regurgitation, unable to estimate RVSP. IVC appears normal diameter , difficult to assess respiratory variation No significant pericardial effusion is seen. Signature ----------------------------------------------------------------------------  Electronically signed by Yolanda Paul(Interpreting physician) on  09/20/2022 05:56 PM ---------------------------------------------------------------------------- ----------------------------------------------------------------------------  Electronically signed by Jessica Hoyt(Sonographer) on 09/20/2022 11:41  AM ---------------------------------------------------------------------------- FINDINGS Left Atrium Left atrium appears dilated. Left Ventricle Left ventricle is mildly dilated. Estimated EF 25%. Global hypokinesis. Right Atrium Right atrium is normal in size. Right Ventricle Normal right ventricular size reduced function. Mitral Valve Mitral annular calcification is seen. Mild to moderate mitral regurgitation.  Aortic Valve Aortic valve is trileaflet. Aortic valve sclerosis without stenosis. Small LVOT diameter. Trivial aortic insufficiency. Tricuspid Valve Normal tricuspid valve structure and function. Insignificant tricuspid regurgitation, unable to estimate RVSP. Pulmonic Valve Pulmonic valve not well visualized but Doppler velocities are normal. No pulmonic insufficiency. Pericardial Effusion No significant pericardial effusion is seen. Miscellaneous Normal aortic root dimension. M-mode / 2D Measurements & Calculations:   LVIDd:6.09 cm(3.7 - 5.6 cm)      Diastolic TSVJSY:088 ml  NUKTU:9.97 cm(2.2 - 4.0 cm)      Systolic AWQXHV:104 ml  XUEQ:7.24 cm(0.6 - 1.1 cm)       Aortic Root:3 cm(2.0 - 3.7 cm)  LVPWd:0.93 cm(0.6 - 1.1 cm)      LA Dimension: 2.6 cm(1.9 - 4.0 cm)  Fractional Shortenin.09 %    LA volume/Index: 35 ml /21m^2  Calculated LVEF (%): 32.34 %     AV Cusp Separation: 0.7 cm                                   LVOT:1.5 cm   Mitral:                              Aortic   Peak E-Wave: 1.41 m/s                Peak Velocity: 1.70 m/s  Peak A-Wave: 1.20 m/s                Mean Velocity: 1.10 m/s  E/A Ratio: 1.18                      Peak Gradient: 11.56 mmHg  Peak Gradient: 7.95 mmHg             Mean Gradient: 5 mmHg  Mean Gradient: 5 mmHg  Deceleration Time: 155 msec                                       Area (continuity): 0.91 cm^2  MR Alias Velocity: 0.39 m/s          AV VTI: 31.7 cm  MR Velocity: 5.50 m/s  MARK Volumetric: 0.07 cm^2  Area (continuity): 0.62 cm^2  Mean Velocity: 0.93 m/s  MR VTI: 183 cm  Septal Wall E' velocity:0.03 m/s Lateral Wall E' velocity:0.08 m/s    XR CHEST PORTABLE    Result Date: 2022  EXAMINATION: ONE X-RAY VIEW OF THE CHEST 2022 7:15 am COMPARISON: 2022 HISTORY: ORDERING SYSTEM PROVIDED HISTORY:  Flash pulmonary edema TECHNOLOGIST PROVIDED HISTORY: Flash pulmonary edema Reason for Exam:  Cough, congestion FINDINGS: Cardiomediastinal silhouette is grossly stable.   Patchy airspace opacities previously seen in the lungs have improved. There is mild residual hazy density and interstitial prominence in the lungs. No evidence of pneumothorax or pleural effusion. No free air beneath the diaphragm. Improved aeration in bilateral lungs with mild residual hazy density and interstitial prominence. XR CHEST PORTABLE    Result Date: 9/20/2022  EXAMINATION: ONE XRAY VIEW OF THE CHEST 9/20/2022 12:28 am COMPARISON: 05/16/2021 HISTORY: ORDERING SYSTEM PROVIDED HISTORY: shortness of breath TECHNOLOGIST PROVIDED HISTORY: shortness of breath Reason for Exam: shortness of breath FINDINGS: Cardiac size and mediastinal structures appear within normal limits. Diffuse parenchymal infiltrates are seen throughout the lungs bilaterally. No pneumothorax. No acute osseous abnormality. Osteopenia. Diffuse parenchymal infiltrates seen throughout the lungs bilaterally which may represent diffuse interstitial pulmonary edema or multilobar pneumonia. VL Lower Extremity Bilateral Venous Duplex    Result Date: 9/21/2022    [facility]  Vascular Lower Extremities DVT Study Procedure   Patient Name   Handy Dotson Date of Study           09/20/2022                 A   Date of Birth  1940  Gender                  Female   Age            79 year(s)  Race                       Room Number    2124        Height:                 61.02 inch, 155 cm   Corporate ID # [id number]    Weight:                 150 pounds, 68 kg   Patient Acct # [de-identified]   BSA:        1.67 m^2    BMI:      28.32 kg/m^2   MR #           [id number]      Sonographer             Bebeto Valdes   Accession #    [id number]  Interpreting Physician  Alana Santiago   Referring                  Referring Physician     Khurram Turner  Nurse  Practitioner  Procedure Type of Study:   Veins: Lower Extremities DVT Study, Venous Scan Lower Bilateral.  Indications for Study:R/O DVT. Patient Status: In Patient.  Technical Quality:Limited visualization. Conclusions   Summary   Simultaneous real time imaging utilizing B-Mode, color doppler and  spectral waveform analysis was performed on the bilateral lower  extremities for venous examination of the deep and superficial systems. Findings are:   Right:  No evidence of deep or superficial venous thrombosis. Left:  No evidence of deep or superficial venous thrombosis. Signature   ----------------------------------------------------------------  Electronically signed by Leandro Laws(Sonographer) on  09/20/2022 03:45 PM  ----------------------------------------------------------------   ----------------------------------------------------------------  Electronically signed by Makenzie Holliday(Interpreting  physician) on 09/21/2022 01:34 AM  ----------------------------------------------------------------  Findings:   Right Impression:                    Left Impression:  The common femoral, femoral, and     The common femoral, femoral, and  popliteal veins demonstrate normal   popliteal veins demonstrate normal  compressibility and augmentation. compressibility and augmentation. Non visualization of the posterior   Non visualization of the posterior  tibial and peroneal veins. tibial and peroneal veins. Normal compressibility of the great  Normal compressibility of the great  saphenous vein. saphenous vein. Normal compressibility of the small  Normal compressibility of the small  saphenous vein. saphenous vein. Velocities are measured in cm/s ; Diameters are measured in cm Right Lower Extremities DVT Study Measurements Right 2D Measurements +------------------------------------+----------+---------------+----------+ ! Location                            ! Visualized! Compressibility! Thrombosis! +------------------------------------+----------+---------------+----------+ ! Common Femoral                      !Yes       ! Yes !None      ! +------------------------------------+----------+---------------+----------+ ! Prox Femoral                        !Yes       ! Yes            ! None      ! +------------------------------------+----------+---------------+----------+ ! Mid Femoral                         !Yes       ! Yes            ! None      ! +------------------------------------+----------+---------------+----------+ ! Dist Femoral                        !Yes       ! Yes            ! None      ! +------------------------------------+----------+---------------+----------+ ! Popliteal                           !Yes       ! Yes            ! None      ! +------------------------------------+----------+---------------+----------+ ! Sapheno Femoral Junction            ! Yes       ! Yes            ! None      ! +------------------------------------+----------+---------------+----------+ ! PTV                                 ! No        !               !          ! +------------------------------------+----------+---------------+----------+ ! Peroneal                            !No        !               !          ! +------------------------------------+----------+---------------+----------+ ! Gastroc                             ! Yes       ! Yes            ! None      ! +------------------------------------+----------+---------------+----------+ ! GSV Thigh                           ! Yes       ! Yes            ! None      ! +------------------------------------+----------+---------------+----------+ ! GSV Knee                            ! Yes       ! Yes            ! None      ! +------------------------------------+----------+---------------+----------+ ! GSV Ankle                           ! Yes       ! Yes            ! None      ! +------------------------------------+----------+---------------+----------+ ! SSV                                 ! Yes       ! Yes            ! None      ! +------------------------------------+----------+---------------+----------+ Right Doppler Measurements +---------------------------+------+------+--------------------------------+ ! Location                   ! Signal!Reflux! Reflux (msec)                   ! +---------------------------+------+------+--------------------------------+ ! Common Femoral             !Phasic!      !                                ! +---------------------------+------+------+--------------------------------+ ! Prox Femoral               !Phasic!      !                                ! +---------------------------+------+------+--------------------------------+ ! Popliteal                  !Phasic!      !                                ! +---------------------------+------+------+--------------------------------+ Left Lower Extremities DVT Study Measurements Left 2D Measurements +------------------------------------+----------+---------------+----------+ ! Location                            ! Visualized! Compressibility! Thrombosis! +------------------------------------+----------+---------------+----------+ ! Common Femoral                      !Yes       ! Yes            ! None      ! +------------------------------------+----------+---------------+----------+ ! Prox Femoral                        !Yes       ! Yes            ! None      ! +------------------------------------+----------+---------------+----------+ ! Mid Femoral                         !Yes       ! Yes            ! None      ! +------------------------------------+----------+---------------+----------+ ! Dist Femoral                        !Yes       ! Yes            ! None      ! +------------------------------------+----------+---------------+----------+ ! Popliteal                           !Yes       ! Yes            ! None      ! +------------------------------------+----------+---------------+----------+ ! Sapheno Femoral Junction            ! Yes       ! Yes            ! None      ! +------------------------------------+----------+---------------+----------+ ! PTV !No        !               !          ! +------------------------------------+----------+---------------+----------+ ! Peroneal                            !No        !               !          ! +------------------------------------+----------+---------------+----------+ ! Gastroc                             ! Yes       ! Yes            ! None      ! +------------------------------------+----------+---------------+----------+ ! GSV Thigh                           ! Yes       ! Yes            ! None      ! +------------------------------------+----------+---------------+----------+ ! GSV Knee                            ! Yes       ! Yes            ! None      ! +------------------------------------+----------+---------------+----------+ ! GSV Ankle                           ! Yes       ! Yes            ! None      ! +------------------------------------+----------+---------------+----------+ ! SSV                                 ! Yes       ! Yes            ! None      ! +------------------------------------+----------+---------------+----------+ Left Doppler Measurements +---------------------------+------+------+--------------------------------+ ! Location                   ! Signal!Reflux! Reflux (msec)                   ! +---------------------------+------+------+--------------------------------+ ! Common Femoral             !Phasic!      !                                ! +---------------------------+------+------+--------------------------------+ ! Prox Femoral               !Phasic!      !                                ! +---------------------------+------+------+--------------------------------+ ! Popliteal                  !Phasic!      !                                ! +---------------------------+------+------+--------------------------------+        Consultations:    Consults:     Final Specialist Recommendations/Findings:   IP CONSULT TO PULMONOLOGY  IP CONSULT TO CARDIOLOGY  IP CONSULT TO INTERNAL MEDICINE  IP CONSULT TO SOCIAL WORK  IP CONSULT TO NEPHROLOGY      The patient was seen and examined on day of discharge and this discharge summary is in conjunction with any daily progress note from day of discharge. Discharge plan:       Disposition: Home    Physician Follow Up:     [address] [place] [address] [address]  [phone]  Follow up  post hospital follow up  they will call with a time for follow up    Merrick Sancehz MD  [facility] [address] [id number]    Follow up      [address] [facility]  [phone]  Follow up  As needed  home oxygen    [facility], MD  [address] [address]  [phone]    Call in 1 week(s)      Portia Alvarez, [address]  [place] [id number] [phone]    Call in 1 week(s)      Nnio Solorzano MD  [address], [address]  [address] [address]  [phone]    Follow up  follow up in 4 to 6 weeks       Requiring Further Evaluation/Follow Up POST HOSPITALIZATION/Incidental Findings: Follow-up with pulmonary for further management. Follow-up with the heart failure clinic. For further management of chronic kidney disease follow-up with the nephrologist    Diet: cardiac diet and renal diet    Activity: As tolerated    Instructions to Patient: Call nephrologist within 1 week to schedule an appointment. Call CHF clinic within 1 week to schedule appointment.   Follow-up with pulmonology in 4 to 6 weeks for PFTs    Discharge Medications:      Medication List        START taking these medications      cephALEXin 500 MG capsule  Commonly known as: KEFLEX  Take 1 capsule by mouth 4 times daily for 5 days     ferrous gluconate 324 (38 Fe) MG tablet  Commonly known as: FERGON     spironolactone 25 MG tablet  Commonly known as: ALDACTONE  Take 0.5 tablets by mouth daily  Start taking on: September 24, 2022            CONTINUE taking these medications      albuterol sulfate  (90 Base) MCG/ACT inhaler  Commonly known as: Ventolin HFA  Inhale 2 puffs into the lungs every 6 hours as needed for Wheezing or Shortness of Breath     ALPRAZolam 0.5 MG tablet  Commonly known as: XANAX     bumetanide 1 MG tablet  Commonly known as: BUMEX     carvedilol 25 MG tablet  Commonly known as: COREG     clopidogrel 75 MG tablet  Commonly known as: PLAVIX     cyclobenzaprine 10 MG tablet  Commonly known as: FLEXERIL     donepezil 10 MG tablet  Commonly known as: ARICEPT  Take 1 tablet by mouth nightly     gabapentin 300 MG capsule  Commonly known as: NEURONTIN     glipiZIDE 5 MG tablet  Commonly known as: Glucotrol  Take 0.5 tablets by mouth daily     hydrALAZINE 25 MG tablet  Commonly known as: APRESOLINE     omeprazole 20 MG delayed release capsule  Commonly known as: PRILOSEC            STOP taking these medications      amLODIPine 10 MG tablet  Commonly known as: NORVASC     FLUoxetine 20 MG/5ML solution  Commonly known as: PROZAC     lisinopril 5 MG tablet  Commonly known as: PRINIVIL;ZESTRIL     tiotropium 18 MCG inhalation capsule  Commonly known as: SPIRIVA     tiotropium 2.5 MCG/ACT Aers inhaler  Commonly known as: Spiriva Respimat               Where to Get Your Medications        These medications were sent to [facility] [address], [address] [address]      Phone: [phone]   cephALEXin 500 MG capsule  spironolactone 25 MG tablet         Electronically signed by   Mikel Nicole MD  9/23/2022  4:55 PM      Thank you Dr. Charis Goltz, MD for the opportunity to be involved in this patient's care. Attending Physician Statement  I have discussed the care of Joel Zuñiga and I have examined the patient myselft and taken ros and hpi , including pertinent history and exam findings,  with the resident.  I have reviewed the key elements of all parts of the encounter with the resident. I agree with the assessment, plan and orders as documented by the resident. I spent over 35 mins in direct patient care as above and reviewing medications and counseling for discharge .         Electronically signed by Joie Boucher MD

## 2022-09-22 NOTE — PLAN OF CARE
Problem: Discharge Planning  Goal: Discharge to home or other facility with appropriate resources  9/22/2022 1917 by Suzanne Rossi RN  Outcome: Progressing  9/22/2022 0647 by Joseph Curry RN  Outcome: Progressing  Flowsheets (Taken 9/22/2022 2162)  Discharge to home or other facility with appropriate resources:   Identify barriers to discharge with patient and caregiver   Arrange for needed discharge resources and transportation as appropriate   Refer to discharge planning if patient needs post-hospital services based on physician order or complex needs related to functional status, cognitive ability or social support system     Problem: Skin/Tissue Integrity  Goal: Absence of new skin breakdown  Description: 1. Monitor for areas of redness and/or skin breakdown  2. Assess vascular access sites hourly  3. Every 4-6 hours minimum:  Change oxygen saturation probe site  4. Every 4-6 hours:  If on nasal continuous positive airway pressure, respiratory therapy assess nares and determine need for appliance change or resting period.   9/22/2022 1917 by Suzanne Rossi RN  Outcome: Progressing  9/22/2022 0647 by Joseph Curry RN  Outcome: Progressing     Problem: ABCDS Injury Assessment  Goal: Absence of physical injury  9/22/2022 1917 by Suzanne Rossi RN  Outcome: Progressing  Flowsheets (Taken 9/22/2022 1259)  Absence of Physical Injury: Implement safety measures based on patient assessment  9/22/2022 0647 by Joseph Curry RN  Outcome: Progressing  Flowsheets (Taken 9/22/2022 1078)  Absence of Physical Injury: Implement safety measures based on patient assessment     Problem: Safety - Adult  Goal: Free from fall injury  9/22/2022 1917 by Suzanne Rossi RN  Outcome: Progressing  Flowsheets (Taken 9/22/2022 1259)  Free From Fall Injury: Instruct family/caregiver on patient safety  9/22/2022 0647 by Joseph Curry RN  Outcome: Progressing  Flowsheets (Taken 9/22/2022 6178)  Free From Fall Injury:   Based on caregiver fall risk screen, instruct family/caregiver to ask for assistance with transferring infant if caregiver noted to have fall risk factors   Instruct family/caregiver on patient safety     Problem: Chronic Conditions and Co-morbidities  Goal: Patient's chronic conditions and co-morbidity symptoms are monitored and maintained or improved  Outcome: Progressing

## 2022-09-22 NOTE — CARE COORDINATION
ONGOING DISCHARGE PLAN:    Patient is alert and oriented x4. Spoke with patient regarding discharge plan and patient confirms that plan is still to discharge to home with [facility]    Patient on Heparin Drip    Bumex 2 mg IV BID     Patient has declined cardiac cath    Cardiac signed off      Will continue to follow for additional discharge needs.     Electronically signed by Salvador Marcus RN on 9/22/2022 at 11:27 AM

## 2022-09-22 NOTE — PROGRESS NOTES
PT sates that she is anxious to go home where her 15years old cat is. She sates that she has 3 children and 10 grandchildren whom all she love. Her son who lives near by takes care of her. She appreciated prayer. 09/22/22 1542   Encounter Summary   Encounter Overview/Reason  Spiritual/Emotional Needs   Service Provided For: Patient   Referral/Consult From: [facility];Family members   Last Encounter  09/22/22   Complexity of Encounter Moderate   Spiritual/Emotional needs   Type Spiritual Support   Assessment/Intervention/Outcome   Assessment Anxious; Coping   Intervention Active listening;Prayer (assurance of)/[place];Sustaining Presence/Ministry of presence   Outcome Acceptance;Comfort;Coping;Engaged in conversation;Receptive

## 2022-09-23 VITALS
HEART RATE: 86 BPM | SYSTOLIC BLOOD PRESSURE: 109 MMHG | RESPIRATION RATE: 16 BRPM | WEIGHT: 155.65 LBS | HEIGHT: 61 IN | TEMPERATURE: 97.9 F | BODY MASS INDEX: 29.39 KG/M2 | OXYGEN SATURATION: 95 % | DIASTOLIC BLOOD PRESSURE: 61 MMHG

## 2022-09-23 LAB
ANION GAP SERPL CALCULATED.3IONS-SCNC: 12 MMOL/L (ref 9–17)
ANTI-XA UNFRAC HEPARIN: 0.31 IU/L (ref 0.3–0.7)
BUN BLDV-MCNC: 52 MG/DL (ref 8–23)
CALCIUM SERPL-MCNC: 9.4 MG/DL (ref 8.6–10.4)
CHLORIDE BLD-SCNC: 97 MMOL/L (ref 98–107)
CO2: 30 MMOL/L (ref 20–31)
CREAT SERPL-MCNC: 2.07 MG/DL (ref 0.5–0.9)
GFR AFRICAN AMERICAN: 28 ML/MIN
GFR NON-AFRICAN AMERICAN: 23 ML/MIN
GFR SERPL CREATININE-BSD FRML MDRD: ABNORMAL ML/MIN/{1.73_M2}
GLUCOSE BLD-MCNC: 101 MG/DL (ref 65–105)
GLUCOSE BLD-MCNC: 90 MG/DL (ref 70–99)
GLUCOSE BLD-MCNC: 93 MG/DL (ref 65–105)
GLUCOSE BLD-MCNC: 97 MG/DL (ref 65–105)
HCT VFR BLD CALC: 32.6 % (ref 36–46)
HEMOGLOBIN: 10.9 G/DL (ref 12–16)
MCH RBC QN AUTO: 29.3 PG (ref 26–34)
MCHC RBC AUTO-ENTMCNC: 33.5 G/DL (ref 31–37)
MCV RBC AUTO: 87.2 FL (ref 80–100)
PDW BLD-RTO: 17.3 % (ref 11.5–14.9)
PLATELET # BLD: 269 K/UL (ref 150–450)
PMV BLD AUTO: 7.5 FL (ref 6–12)
POTASSIUM SERPL-SCNC: 3.6 MMOL/L (ref 3.7–5.3)
PRO-BNP: ABNORMAL PG/ML
RBC # BLD: 3.74 M/UL (ref 4–5.2)
SODIUM BLD-SCNC: 139 MMOL/L (ref 135–144)
WBC # BLD: 7.2 K/UL (ref 3.5–11)

## 2022-09-23 PROCEDURE — 99239 HOSP IP/OBS DSCHRG MGMT >30: CPT | Performed by: INTERNAL MEDICINE

## 2022-09-23 PROCEDURE — 82947 ASSAY GLUCOSE BLOOD QUANT: CPT

## 2022-09-23 PROCEDURE — 85520 HEPARIN ASSAY: CPT

## 2022-09-23 PROCEDURE — 83880 ASSAY OF NATRIURETIC PEPTIDE: CPT

## 2022-09-23 PROCEDURE — 6370000000 HC RX 637 (ALT 250 FOR IP): Performed by: INTERNAL MEDICINE

## 2022-09-23 PROCEDURE — 6360000002 HC RX W HCPCS

## 2022-09-23 PROCEDURE — 80048 BASIC METABOLIC PNL TOTAL CA: CPT

## 2022-09-23 PROCEDURE — 2500000003 HC RX 250 WO HCPCS

## 2022-09-23 PROCEDURE — 6370000000 HC RX 637 (ALT 250 FOR IP): Performed by: STUDENT IN AN ORGANIZED HEALTH CARE EDUCATION/TRAINING PROGRAM

## 2022-09-23 PROCEDURE — 2580000003 HC RX 258: Performed by: NURSE PRACTITIONER

## 2022-09-23 PROCEDURE — 85027 COMPLETE CBC AUTOMATED: CPT

## 2022-09-23 PROCEDURE — 97535 SELF CARE MNGMENT TRAINING: CPT

## 2022-09-23 PROCEDURE — 36415 COLL VENOUS BLD VENIPUNCTURE: CPT

## 2022-09-23 PROCEDURE — 2500000003 HC RX 250 WO HCPCS: Performed by: INTERNAL MEDICINE

## 2022-09-23 RX ORDER — BUMETANIDE 1 MG/1
2 TABLET ORAL DAILY
Status: DISCONTINUED | OUTPATIENT
Start: 2022-09-24 | End: 2022-09-23 | Stop reason: HOSPADM

## 2022-09-23 RX ORDER — HEPARIN SODIUM 5000 [USP'U]/ML
5000 INJECTION, SOLUTION INTRAVENOUS; SUBCUTANEOUS EVERY 8 HOURS SCHEDULED
Status: DISCONTINUED | OUTPATIENT
Start: 2022-09-23 | End: 2022-09-23 | Stop reason: HOSPADM

## 2022-09-23 RX ORDER — SPIRONOLACTONE 25 MG/1
12.5 TABLET ORAL DAILY
Qty: 30 TABLET | Refills: 3 | Status: SHIPPED | OUTPATIENT
Start: 2022-09-24

## 2022-09-23 RX ORDER — CEPHALEXIN 500 MG/1
500 CAPSULE ORAL 4 TIMES DAILY
Qty: 20 CAPSULE | Refills: 0 | Status: SHIPPED | OUTPATIENT
Start: 2022-09-23 | End: 2022-09-28

## 2022-09-23 RX ADMIN — AMLODIPINE BESYLATE 10 MG: 10 TABLET ORAL at 08:34

## 2022-09-23 RX ADMIN — CLOPIDOGREL BISULFATE 75 MG: 75 TABLET ORAL at 08:35

## 2022-09-23 RX ADMIN — SODIUM CHLORIDE, PRESERVATIVE FREE 10 ML: 5 INJECTION INTRAVENOUS at 08:35

## 2022-09-23 RX ADMIN — BUMETANIDE 2 MG: 0.25 INJECTION INTRAMUSCULAR; INTRAVENOUS at 08:35

## 2022-09-23 RX ADMIN — HEPARIN SODIUM 5000 UNITS: 5000 INJECTION INTRAVENOUS; SUBCUTANEOUS at 08:35

## 2022-09-23 RX ADMIN — HEPARIN SODIUM 5000 UNITS: 5000 INJECTION INTRAVENOUS; SUBCUTANEOUS at 15:01

## 2022-09-23 RX ADMIN — SPIRONOLACTONE 12.5 MG: 25 TABLET ORAL at 08:34

## 2022-09-23 RX ADMIN — CARVEDILOL 3.12 MG: 3.12 TABLET, FILM COATED ORAL at 08:34

## 2022-09-23 RX ADMIN — HYDRALAZINE HYDROCHLORIDE 25 MG: 25 TABLET, FILM COATED ORAL at 08:35

## 2022-09-23 RX ADMIN — ANTI-FUNGAL POWDER MICONAZOLE NITRATE TALC FREE: 1.42 POWDER TOPICAL at 17:17

## 2022-09-23 RX ADMIN — FLUOXETINE 40 MG: 20 CAPSULE ORAL at 08:34

## 2022-09-23 ASSESSMENT — ENCOUNTER SYMPTOMS
VOMITING: 0
COUGH: 0
DIARRHEA: 0
SHORTNESS OF BREATH: 1
ABDOMINAL PAIN: 0
TROUBLE SWALLOWING: 0
BACK PAIN: 0
NAUSEA: 0
COLOR CHANGE: 0
CONSTIPATION: 0

## 2022-09-23 NOTE — PROGRESS NOTES
[address]   INPATIENT OCCUPATIONAL THERAPY  PROGRESS NOTE  Date: 2022  Patient Name: Herve Stein       Room: 1555/6941-53  MRN: [id number]    : 1940  (79 y.o.)  Gender: female   Referring Practitioner: Cassia Ly MD  Diagnosis: Pulmonary edema with CHF    Restrictions/Precautions  Restrictions/Precautions  Restrictions/Precautions: Fall Risk;General Precautions  Required Braces or Orthoses?: No  Implants present? :  (denies)         Subjective  Subjective  Subjective: \"I can try\" Pt is agreeable to therapy this AM.  Comments: RN Sina Arreguin ok's tx. Objective  Orientation  Overall Orientation Status: Within Functional Limits  Cognition  Overall Cognitive Status: WNL  ADL  Feeding: Setup  Grooming: Setup  UE Bathing: Supervision  LE Bathing: Minimal assistance  UE Dressing: Supervision  LE Dressing: Maximum assistance  LE Dressing Skilled Clinical Factors: A donning/doffing socks. A threading BLEs, pt able to tpull over hips  Toileting: Dependent/Total  Toileting Skilled Clinical Factors: mathews catheter. Pt declines to wipe post BM due to gown and O2 tubing mgmt. Additional Comments: ADL scores based on skilled observation and clinical reasoning, unless otherwise noted. Pt is limited due to weakness and low endurance, affecting independence with self care tasks         Balance  Balance  Sitting Balance: Supervision  Standing Balance: Contact guard assistance  Standing Balance  Time: 2-3 min X2  Activity: functional mobility, LB clothing mgmt  Comment: steady, no LOB. SPO2 assessed at 96% throughout. Transfers/Mobility  Bed mobility  Supine to Sit: Stand by assistance  Sit to Supine: Stand by assistance  Scooting: Stand by assistance  Bed Mobility Comments: HOB elevated, minimal cues needed.     Transfers  Sit to stand: Contact guard assistance  Stand to sit: Contact guard assistance  Transfer Comments: Min cues for hand placement for safety    Toilet Transfers  Toilet - Technique: Ambulating (w/RW)  Equipment Used: Grab bars  Toilet Transfer: Contact guard assistance  Toilet Transfers Comments: VC for hand placement and slow descent onto toilet. Functional Mobility  Functional - Mobility Device: Rolling Walker  Activity: To/from bathroom  Assist Level: Contact guard assistance  Functional Mobility Comments: Assist with line mgmt.  No LOB noted    Functional Activities         Patient Education  Patient Education  Education Given To: Patient  Education Provided: Role of Therapy, Plan of Care, ADL Adaptive Strategies, Energy Conservation, Transfer Training  Education Method: Verbal  Barriers to Learning: None  Education Outcome: Verbalized understanding, Demonstrated understanding      Goals  Short Term Goals  Time Frame for Short term goals: By discharge  Short Term Goal 1: Pt will perform BADLs mod I and Good safety  Short Term Goal 2: Pt will verbalize/demonstrate use of adaptive equipment/adaptive strategies to increase IND with self care tasks, 100% of the time  Short Term Goal 3: Pt will perform transfers/functional mobility mod I, using least restrictive device and Good safety  Short Term Goal 4: Pt will actively participate in 15+ minutes of therapeutic exercise/functional activity to promote safety and independence with self care and mobility  Plan  Times per Week: 4-5  Current Treatment Recommendations: Strengthening, ROM, Balance training, Functional mobility training, Endurance training, Safety education & training, Patient/Caregiver education & training, Equipment evaluation, education, & procurement, Self-Care / ADL      Assessment  Activity Tolerance  Activity Tolerance: Patient Tolerated treatment well  Assessment  Performance deficits / Impairments: Decreased functional mobility   Treatment Diagnosis: Impaired self-care status  Prognosis: Good  Decision Making: Medium Complexity  Discharge Recommendations: Home with assist PRN, Home with Home health OT  OT Equipment Recommendations  Other: TBD  Safety Devices  Type of Devices:  All fall risk precautions in place, Left in bed, Call light within reach, Bed alarm in place, Nurse notified      AM-PAC Daily Activities Inpatient  AM-PAC Daily Activity Inpatient   How much help for putting on and taking off regular lower body clothing?: A Lot  How much help for Bathing?: A Little  How much help for Toileting?: Total  How much help for putting on and taking off regular upper body clothing?: A Little  How much help for taking care of personal grooming?: A Little  How much help for eating meals?: A Little  AM-PAC Inpatient Daily Activity Raw Score: 15  AM-PAC Inpatient ADL T-Scale Score : 34.69  ADL Inpatient CMS 0-100% Score: 56.46  ADL Inpatient CMS G-Code Modifier : CK    OT Minutes  OT Individual Minutes  Time In: 0848  Time Out: 0930  Minutes: 43      KIANA Monsalve

## 2022-09-23 NOTE — PROGRESS NOTES
[id number] [facility]    PROGRESS NOTE             9/23/2022    9:05 AM    Name:   Dread Munoz  MRN:     [id number]     Acct:      [de-identified]   Room:   2124/2124[facility] Day:  3  Admit Date:  9/20/2022 12:17 AM    PCP:  Cecelia Sun MD  Code Status:  DNR-CCA    Subjective:     C/C:   Chief Complaint   Patient presents with    Shortness of Breath     Interval History Status:     Patient reports improvement this morning. Patient seen and examined at bedside, currently on 2 L of oxygen via nasal cannula. States that she is feeling better. Is not comfortable going home without oxygen, home eval for oxygen was performed yesterday. Patient has elected to forego cardiac catheterization and continue with medical management. Cardiology signed off. She will follow-up with CHF clinic outpatient. Blood cell count within normal limits. Creatinine is around 2. Brief History:     The patient is a pleasant  79 y.o.  female with history of congestive heart failure, stage IV chronic kidney disease, COPD on 2 L oxygen at home, type 2 diabetes, Afib, and hypertension who presents to the ED with Shortness of Breath and is currently admitted for the management of acute type II respiratory failure secondary to presence heart failure exacerbation versus multifocal pneumonia and hypertensive urgency     Ms. Haydee Becerril, says that yesterday after she went for lunch at some grocery with her son Taylor Haw a double fried steak], and that her medications filled admission, she took her home meds the morning, did not at night, and around 8 PM she started feeling short of breath. Patient then presented to the ED with extreme shortness of breath. Patient states that she has been discharged from [facility] a week ago due to the same symptoms as she remained hospitalized for a week. At that time, patient medications were changed [she was started on Bumex].   ACE inhibitor's were discontinued due to renal dysfunction. She also had decreased ejection fraction 40 to 92%, and diastolic dysfunction as well as mitral regurg. On this presentation patient had a blood pressure of 189/109, and a pulse of 129. She also had an O2 sat of 90 on positive airway pressure. She had an elevated troponin of 47, proBNP 30,575 compared to baseline of 13 and 1000, respectively. EKG did not show acute ischemic changes. However, on Telemetry after admission to ICU, patient was in Atrial fibrillation. Chest x-ray showed diffuse parenchymal infiltrates seen throughout the lungs on bilateral fields which may be secondary to pulmonary edema versus multilobular pneumonia. Patient had a white blood cell count of 15, ESR of 89, CRP 47.7 and procalcitonin of 0.15. Patient was started on IV nitroglycerin drip due to pulmonary urgency versus emergency and acute setting of flash pulmonary edema, versus pulmonary edema secondary to acute exacerbation of heart failure, patient was then given Lasix 40 mg IV. Cardiology were consulted for elevated troponins and heart failure. Also, Pulmonology/critical care are on board. Review of Systems:     Review of Systems   Constitutional:  Negative for chills and fever. HENT:  Negative for hearing loss and trouble swallowing. Eyes:  Negative for visual disturbance. Respiratory:  Positive for shortness of breath. Negative for cough. Cardiovascular:  Positive for leg swelling. Negative for chest pain. Gastrointestinal:  Negative for abdominal pain, constipation, diarrhea, nausea and vomiting. Genitourinary:  Negative for difficulty urinating and hematuria. Musculoskeletal:  Negative for back pain. Skin:  Negative for color change. Neurological:  Negative for dizziness, numbness and headaches. Psychiatric/Behavioral:  Negative for behavioral problems and confusion. Medications: Allergies:     Allergies   Allergen Reactions    Norco [Hydrocodone-Acetaminophen] Other (See Comments)     Nausea, dizziness    Pcn [Penicillins]      Hives    Sulfa Antibiotics      Hives       Current Meds:   Scheduled Meds:    heparin (porcine)  5,000 Units SubCUTAneous 3 times per day    bumetanide  2 mg IntraVENous BID    spironolactone  12.5 mg Oral Daily    cefTRIAXone (ROCEPHIN) IV  1,000 mg IntraVENous Q24H    gabapentin  300 mg Oral Nightly    sodium chloride flush  5-40 mL IntraVENous 2 times per day    insulin glargine  10 Units SubCUTAneous Nightly    insulin lispro  0-4 Units SubCUTAneous TID WC    insulin lispro  0-4 Units SubCUTAneous Nightly    amLODIPine  10 mg Oral Daily    carvedilol  3.125 mg Oral BID    clopidogrel  75 mg Oral Daily    donepezil  10 mg Oral Nightly    FLUoxetine  40 mg Oral Daily    hydrALAZINE  25 mg Oral BID     Continuous Infusions:    sodium chloride 5 mL/hr at 09/20/22 1157    dextrose       PRN Meds: sodium chloride flush, sodium chloride, [Held by provider] ondansetron **OR** [Held by provider] ondansetron, magnesium hydroxide, acetaminophen **OR** acetaminophen, glucose, dextrose bolus **OR** dextrose bolus, glucagon (rDNA), dextrose, ALPRAZolam    Data:     Past Medical History:   has a past medical history of Anxiety, Arthritis, Atherosclerosis, Body mass index (bmi) 25.0-25.9, adult, CHF exacerbation ([facility]), COPD (chronic obstructive pulmonary disease) ([facility] [address] CVA (cerebral vascular accident) ([facility] [address] Depression, Diabetes mellitus ([facility] [address] Diverticula, esophagus congenital, Former smoker, Hypercholesteremia, Hypertension, Hypokalemia, Joint pain, knee, Myocardial infarct, old, Pneumonia, Protein S deficiency ([facility] [address] Pulmonary embolism ([facility] [address] Reflux esophagitis, Tinea corporis, Tremor, URI (upper respiratory infection), UTI (urinary tract infection), and Zenker's diverticulum. Social History:   reports that she has quit smoking. Her smoking use included cigarettes.  She has never used smokeless tobacco. She reports current alcohol use. She reports that she does not use drugs. Family History: History reviewed. No pertinent family history. Vitals:  BP (!) 139/58   Pulse 89   Temp 98.4 °F (36.9 °C) (Oral)   Resp 16   Ht 5' 1\" (1.549 m)   Wt 155 lb 10.3 oz (70.6 kg)   SpO2 97%   BMI 29.41 kg/m²   Temp (24hrs), Av.3 °F (36.8 °C), Min:97.8 °F (36.6 °C), Max:98.5 °F (36.9 °C)    Recent Labs     22  1107 22  1554 22  2102 22  0506   POCGLU 93 98 93 93       I/O(24Hr): Intake/Output Summary (Last 24 hours) at 2022 0905  Last data filed at 2022 0715  Gross per 24 hour   Intake --   Output 2700 ml   Net -2700 ml       Labs:  [unfilled]    Lab Results   Component Value Date/Time    SPECIAL 10 R H 2021 12:30 PM     Lab Results   Component Value Date/Time    CULTURE ESCHERICHIA COLI >483994 CFU/ML (A) 2022 02:36 AM       [unfilled]    Radiology:    ECHO Complete 2D W Doppler W Color    Result Date: 2022  [facility] Transthoracic Echocardiography Report (TTE)  Patient Name Monica Ortega Date of Study                 2022               A   Date of      1940  Gender                        Female  Birth   Age          79 year(s)  Race                             Room Number  2124        Height:                       61.02 inch, 155 cm   Corporate ID [id number]    Weight:                       150 pounds, 68 kg  #   Patient Acct [de-identified]   BSA:           1.67 m^2       BMI:      28.32  #                                                                kg/m^2   MR #         [id number]      Sonographer                   Susannahdee deeclayJessica   Accession #  [id number]  Interpreting Physician        Wing Bhat [address]   Fellow                   Referring Nurse Practitioner   Interpreting             Referring Physician           Rayo Veronica,  [place]  Type of Study   TTE procedure:2D Echocardiogram, M-Mode, Doppler, Color Doppler, Contrast  study. Procedure Date Date: 09/20/2022 Start: 10:36 AM Study Location: [facility] Technical Quality: Limited visualization due to body habitus. Indications:Congestive heart failure. History / Tech. Comments: COPD DM HTN HLD COPD CKD Patient Status: Inpatient Contrast Medium: Definity. Amount - 2 ml Height: 61.02 inches Weight: 150 pounds BSA: 1.67 m^2 BMI: 28.32 kg/m^2 Rhythm: Within normal limits HR: 78 bpm BP: 166/81 mmHg CONCLUSIONS Summary Echo contrast utilized on this technically difficult study. Left ventricle is mild to moderately dilated. Estimated EF 25%. Severe global hypokinesis. Normal right ventricular size, reduced function. Left atrium appears dilated. Aortic valve is trileaflet. Aortic valve sclerosis without stenosis. Small LVOT diameter. Trivial aortic insufficiency. Normal aortic root dimension. Mitral annular calcification is seen. Mild to moderate mitral regurgitation. Mild MS mean gradient 5 mmHg Normal tricuspid valve structure and function. Insignificant tricuspid regurgitation, unable to estimate RVSP. IVC appears normal diameter , difficult to assess respiratory variation No significant pericardial effusion is seen. Signature ----------------------------------------------------------------------------  Electronically signed by Yolanda Paul(Interpreting physician) on  09/20/2022 05:56 PM ---------------------------------------------------------------------------- ----------------------------------------------------------------------------  Electronically signed by Lev HoytSonographer) on 09/20/2022 11:41  AM ---------------------------------------------------------------------------- FINDINGS Left Atrium Left atrium appears dilated. Left Ventricle Left ventricle is mildly dilated. Estimated EF 25%. Global hypokinesis. Right Atrium Right atrium is normal in size. Right Ventricle Normal right ventricular size reduced function. Mitral Valve Mitral annular calcification is seen. Mild to moderate mitral regurgitation. Aortic Valve Aortic valve is trileaflet. Aortic valve sclerosis without stenosis. Small LVOT diameter. Trivial aortic insufficiency. Tricuspid Valve Normal tricuspid valve structure and function. Insignificant tricuspid regurgitation, unable to estimate RVSP. Pulmonic Valve Pulmonic valve not well visualized but Doppler velocities are normal. No pulmonic insufficiency. Pericardial Effusion No significant pericardial effusion is seen. Miscellaneous Normal aortic root dimension.  M-mode / 2D Measurements & Calculations:   LVIDd:6.09 cm(3.7 - 5.6 cm)      Diastolic FSIIZV:087 ml  KPALQ:1.26 cm(2.2 - 4.0 cm)      Systolic SNFPGX:407 ml  SBZI:5.45 cm(0.6 - 1.1 cm)       Aortic Root:3 cm(2.0 - 3.7 cm)  LVPWd:0.93 cm(0.6 - 1.1 cm)      LA Dimension: 2.6 cm(1.9 - 4.0 cm)  Fractional Shortenin.09 %    LA volume/Index: 35 ml /21m^2  Calculated LVEF (%): 32.34 %     AV Cusp Separation: 0.7 cm                                   LVOT:1.5 cm   Mitral:                              Aortic   Peak E-Wave: 1.41 m/s                Peak Velocity: 1.70 m/s  Peak A-Wave: 1.20 m/s                Mean Velocity: 1.10 m/s  E/A Ratio: 1.18                      Peak Gradient: 11.56 mmHg  Peak Gradient: 7.95 mmHg             Mean Gradient: 5 mmHg  Mean Gradient: 5 mmHg  Deceleration Time: 155 msec                                       Area (continuity): 0.91 cm^2  MR Alias Velocity: 0.39 m/s          AV VTI: 31.7 cm  MR Velocity: 5.50 m/s  MARK Volumetric: 0.07 cm^2  Area (continuity): 0.62 cm^2  Mean Velocity: 0.93 m/s  MR VTI: 183 cm  Septal Wall E' velocity:0.03 m/s Lateral Wall E' velocity:0.08 m/s    XR CHEST PORTABLE    Result Date: 2022  EXAMINATION: ONE X-RAY VIEW OF THE CHEST 2022 7:15 am COMPARISON: 2022 HISTORY: ORDERING SYSTEM PROVIDED HISTORY:  Flash pulmonary edema TECHNOLOGIST PROVIDED HISTORY: Flash pulmonary edema Reason for Exam:  Cough, congestion FINDINGS: Cardiomediastinal silhouette is grossly stable. Patchy airspace opacities previously seen in the lungs have improved. There is mild residual hazy density and interstitial prominence in the lungs. No evidence of pneumothorax or pleural effusion. No free air beneath the diaphragm. Improved aeration in bilateral lungs with mild residual hazy density and interstitial prominence. XR CHEST PORTABLE    Result Date: 9/20/2022  EXAMINATION: ONE XRAY VIEW OF THE CHEST 9/20/2022 12:28 am COMPARISON: 05/16/2021 HISTORY: ORDERING SYSTEM PROVIDED HISTORY: shortness of breath TECHNOLOGIST PROVIDED HISTORY: shortness of breath Reason for Exam: shortness of breath FINDINGS: Cardiac size and mediastinal structures appear within normal limits. Diffuse parenchymal infiltrates are seen throughout the lungs bilaterally. No pneumothorax. No acute osseous abnormality. Osteopenia. Diffuse parenchymal infiltrates seen throughout the lungs bilaterally which may represent diffuse interstitial pulmonary edema or multilobar pneumonia.      VL Lower Extremity Bilateral Venous Duplex    Result Date: 9/21/2022    [facility]  Vascular Lower Extremities DVT Study Procedure   Patient Name   Dayne Acuña Date of Study           09/20/2022                 A   Date of Birth  1940  Gender                  Female   Age            79 year(s)  Race                       Room Number    2124        Height:                 61.02 inch, 155 cm   Corporate ID # [id number]    Weight:                 150 pounds, 68 kg   Patient Acct # [de-identified]   BSA:        1.67 m^2    BMI:      28.32 kg/m^2   MR #           [id number]      Sonographer             Gualberto Stahl   Accession #    [id number]  Interpreting Physician  Mirian Hedrick   Referring                  Referring Physician     Danna Gonsalves  Nurse  Practitioner  Procedure Type of Study:   Veins: Lower Extremities DVT Study, Venous Scan Lower Bilateral.  Indications for Study:R/O DVT. Patient Status: In Patient. Technical Quality:Limited visualization. Conclusions   Summary   Simultaneous real time imaging utilizing B-Mode, color doppler and  spectral waveform analysis was performed on the bilateral lower  extremities for venous examination of the deep and superficial systems. Findings are:   Right:  No evidence of deep or superficial venous thrombosis. Left:  No evidence of deep or superficial venous thrombosis. Signature   ----------------------------------------------------------------  Electronically signed by Leandro Clifford(Sonographer) on  09/20/2022 03:45 PM  ----------------------------------------------------------------   ----------------------------------------------------------------  Electronically signed by Makenzie Vieira(Interpreting  physician) on 09/21/2022 01:34 AM  ----------------------------------------------------------------  Findings:   Right Impression:                    Left Impression:  The common femoral, femoral, and     The common femoral, femoral, and  popliteal veins demonstrate normal   popliteal veins demonstrate normal  compressibility and augmentation. compressibility and augmentation. Non visualization of the posterior   Non visualization of the posterior  tibial and peroneal veins. tibial and peroneal veins. Normal compressibility of the great  Normal compressibility of the great  saphenous vein. saphenous vein. Normal compressibility of the small  Normal compressibility of the small  saphenous vein. saphenous vein. Velocities are measured in cm/s ; Diameters are measured in cm Right Lower Extremities DVT Study Measurements Right 2D Measurements +------------------------------------+----------+---------------+----------+ ! Location                            ! Visualized! Compressibility! Thrombosis!  +------------------------------------+----------+---------------+----------+ +------------------------------------+----------+---------------+----------+ Right Doppler Measurements +---------------------------+------+------+--------------------------------+ ! Location                   ! Signal!Reflux! Reflux (msec)                   ! +---------------------------+------+------+--------------------------------+ ! Common Femoral             !Phasic!      !                                ! +---------------------------+------+------+--------------------------------+ ! Prox Femoral               !Phasic!      !                                ! +---------------------------+------+------+--------------------------------+ ! Popliteal                  !Phasic!      !                                ! +---------------------------+------+------+--------------------------------+ Left Lower Extremities DVT Study Measurements Left 2D Measurements +------------------------------------+----------+---------------+----------+ ! Location                            ! Visualized! Compressibility! Thrombosis! +------------------------------------+----------+---------------+----------+ ! Common Femoral                      !Yes       ! Yes            ! None      ! +------------------------------------+----------+---------------+----------+ ! Prox Femoral                        !Yes       ! Yes            ! None      ! +------------------------------------+----------+---------------+----------+ ! Mid Femoral                         !Yes       ! Yes            ! None      ! +------------------------------------+----------+---------------+----------+ ! Dist Femoral                        !Yes       ! Yes            ! None      ! +------------------------------------+----------+---------------+----------+ ! Popliteal                           !Yes       ! Yes            ! None      ! +------------------------------------+----------+---------------+----------+ ! Sapheno Femoral Junction            ! Yes       ! Yes            ! None      ! +------------------------------------+----------+---------------+----------+ ! PTV                                 ! No        !               !          ! +------------------------------------+----------+---------------+----------+ ! Peroneal                            !No        !               !          ! +------------------------------------+----------+---------------+----------+ ! Gastroc                             ! Yes       ! Yes            ! None      ! +------------------------------------+----------+---------------+----------+ ! GSV Thigh                           ! Yes       ! Yes            ! None      ! +------------------------------------+----------+---------------+----------+ ! GSV Knee                            ! Yes       ! Yes            ! None      ! +------------------------------------+----------+---------------+----------+ ! GSV Ankle                           ! Yes       ! Yes            ! None      ! +------------------------------------+----------+---------------+----------+ ! SSV                                 ! Yes       ! Yes            ! None      ! +------------------------------------+----------+---------------+----------+ Left Doppler Measurements +---------------------------+------+------+--------------------------------+ ! Location                   ! Signal!Reflux! Reflux (msec)                   ! +---------------------------+------+------+--------------------------------+ ! Common Femoral             !Phasic!      !                                ! +---------------------------+------+------+--------------------------------+ ! Prox Femoral               !Phasic!      !                                ! +---------------------------+------+------+--------------------------------+ ! Popliteal                  !Phasic!      !                                ! +---------------------------+------+------+--------------------------------+        Physical Examination:        Physical Exam  Constitutional: Appearance: Normal appearance. HENT:      Head: Normocephalic and atraumatic. Nose: Nose normal.      Mouth/Throat:      Mouth: Mucous membranes are moist.   Eyes:      Extraocular Movements: Extraocular movements intact. Pupils: Pupils are equal, round, and reactive to light. Cardiovascular:      Rate and Rhythm: Normal rate and regular rhythm. Pulses: Normal pulses. Heart sounds: Normal heart sounds. Pulmonary:      Effort: Pulmonary effort is normal.      Breath sounds: Normal breath sounds. Abdominal:      General: Bowel sounds are normal.      Palpations: Abdomen is soft. Musculoskeletal:         General: Normal range of motion. Cervical back: Neck supple. Right lower leg: Edema present. Left lower leg: Edema present. Skin:     General: Skin is warm and dry. Capillary Refill: Capillary refill takes less than 2 seconds. Neurological:      General: No focal deficit present. Mental Status: She is alert and oriented to person, place, and time.    Psychiatric:         Mood and Affect: Mood normal.         Behavior: Behavior normal.         Assessment:        Primary Problem  Pulmonary edema with congestive heart failure [facility])    Active Hospital Problems    Diagnosis Date Noted    Pulmonary edema with congestive heart failure ([facility] [address] [I50.1] 09/20/2022     Priority: Medium    NSTEMI (non-ST elevated myocardial infarction) ([facility] [address] [I21.4] 09/20/2022     Priority: Medium    Acute respiratory failure with hypoxia and hypercapnia (HCC) [J96.01, J96.02] 09/20/2022     Priority: Medium    UTI (urinary tract infection) [N39.0] 09/20/2022     Priority: Medium    CAD (coronary artery disease) [I25.10] 09/20/2022     Priority: Medium    Atrial fibrillation ([address] [I48.91] 09/20/2022     Priority: Medium    Pulmonary edema cardiac cause ([address] [I50.1] 05/15/2021    CHF exacerbation ([address] [I50.9] 01/13/2020    Sepsis ([address] [A41.9] 08/17/2019    Stage 4 chronic kidney disease [facility]) [N18.4] 08/09/2016    Diabetes mellitus ([facility] [address] [E11.9]     Essential hypertension [I10]        Plan:        Acute exacerbated systolic heart failure most likely 2/2 fluid overload due to dietary intake vs medication  non compliance  -proBNP on presentation 30,575, baseline around 1000. up top 36,710, and 61,872.   -EF recent is 25% compared to 40-45%   -Patient came with shortness of breath and saturation of 90 on positive airway pressure with pulmonary edema  -ABG showed acute respiratory failure type II, pH 7.233, PCO2 58  -Chest x-ray showed diffuse parenchymal infiltrates seen through the lungs bilaterally which may represent diffuse interstitial edema or multilobular pneumonia  Patient received 1 dose of IV lasix 40 mg.  -Patient on IV Bumex 2 mg twice daily  -Patient on Plavix 75  -Will continue home carvedilol 3.125 mg twice daily  -Cardiology were consulted, have signed off  - Has had good urine output, will need to remove Kitchen        High troponins likely 2/2 new ischemia(As EF worsened sig] versus demand ischemia  -Cardiology on board  History of CAD and remote stent placement  -Repeat echo showed EF of 25%, worsened from 40-45%, earlier in Sep while she was in [facility][1]  -Troponin were: 46 then :47.   Then trended up to 203, today [09/21] trended down to 193.  -Patient would like to hold off cardiac cath   -Patient on IV heparin drip for 48 hours [started 09/21/2022], heparin drip DC'd this morning  -No acute ST elevation or ST depression on EKG in ED  -Patient on carvedilol 3.125 mg twice daily and Plavix 75mg  - Started DVT prophylaxis        Atrial fibirllation  -On telemetry patient seemed to be in Afib with PVCs  EKG on  presentation was sinus tachycardia without afib, repeated EKG showed that patient was in A. fib  -Cardiology on board  -Will manage accordingly         Acute type II respiratory failure secondary to HTN emergency vs acute exacerbated HF vs multilobular pneumonia patient myselft and taken ros and hpi , including pertinent history and exam findings,  with the resident. I have reviewed the key elements of all parts of the encounter with the resident. I agree with the assessment, plan and orders as documented by the resident.   Ok to [employer]   Today home o2  Oral abx for two days      Electronically signed by Jose Allen MD

## 2022-09-23 NOTE — PROGRESS NOTES
NEPHROLOGY progress note    Patient :  Shayla Navas; 79 y.o. MRN# [id number]  Location:  2124/2124-01  Attending:  Kemi Del Valle MD  Admit Date:  9/20/2022   Hospital Day: 3      Reason for Consult:  CKD IV      Chief Complaint:  sob    Subjective/interval history. Patient seen and examined patient denies any chest pain shortness of breath has improved more than 50% since admission, peripheral edema has improved. Patient is making good urine serum creatinine remains stable around 2.0 mg/dL    History of Present Illness: This is a 79 y.o. female  with PMH of DM type 2 NIDDM, HTN CAD , Paroxysmal Afib CHF with reduced EF  Systolic dysfunction and grade II diastolic dysfucntion, CKD IV with baseline scr averaging 1.7-2.0 mg/dl. Patient was recently admitted to [facility] with CHF exacerbation and was discharged, and presented to [address] 9/20/2022 with c/o sob and confusion. CXR showed diffuse Parenchymal infiltrates through out the lungs bilaterally which may represent diffuse interstitial pulmonary edema or multifocal PNA  Troponins trending up to 191, Pro BNP 36,700  REPEAT echo Showed worsening of EF to 25 % from 55%with severe global hypokinesis.       Past Medical History:        Diagnosis Date    Anxiety     Arthritis     Atherosclerosis     Body mass index (bmi) 25.0-25.9, adult     CHF exacerbation ([address] 1/13/2020    COPD (chronic obstructive pulmonary disease) (HCC)     CVA (cerebral vascular accident) ([address]     Depression     Diabetes mellitus ([address]     Diverticula, esophagus congenital     Former smoker     Hypercholesteremia     Hypertension     Hypokalemia     Joint pain, knee     Myocardial infarct, old     2007    Pneumonia     Pneumonia due to infectious organism, unspecified laterality, unspecified part of lung    Protein S deficiency ([address]     Pulmonary embolism (HCC)     Reflux esophagitis     Tinea corporis     Tremor     URI (upper respiratory infection)     UTI (urinary tract infection)     Zenker's diverticulum        Past Surgical History:        Procedure Laterality Date    CAROTID ENDARTERECTOMY Bilateral     ENDOSCOPY, COLON, DIAGNOSTIC      TONSILLECTOMY         Current Medications:    heparin (porcine) injection 5,000 Units, 3 times per day  [START ON 2022] bumetanide (BUMEX) tablet 2 mg, Daily  miconazole (MICOTIN) 2 % powder, BID  spironolactone (ALDACTONE) tablet 12.5 mg, Daily  cefTRIAXone (ROCEPHIN) 1,000 mg in sodium chloride 0.9 % 50 mL IVPB mini-bag, Q24H  gabapentin (NEURONTIN) capsule 300 mg, Nightly  sodium chloride flush 0.9 % injection 5-40 mL, 2 times per day  sodium chloride flush 0.9 % injection 10 mL, PRN  0.9 % sodium chloride infusion, PRN  [Held by provider] ondansetron (ZOFRAN-ODT) disintegrating tablet 4 mg, Q8H PRN   Or  [Held by provider] ondansetron (ZOFRAN) injection 4 mg, Q6H PRN  magnesium hydroxide (MILK OF MAGNESIA) 400 MG/5ML suspension 30 mL, Daily PRN  acetaminophen (TYLENOL) tablet 650 mg, Q6H PRN   Or  acetaminophen (TYLENOL) suppository 650 mg, Q6H PRN  insulin glargine (LANTUS) injection vial 10 Units, Nightly  insulin lispro (HUMALOG) injection vial 0-4 Units, TID WC  insulin lispro (HUMALOG) injection vial 0-4 Units, Nightly  glucose chewable tablet 16 g, PRN  dextrose bolus 10% 125 mL, PRN   Or  dextrose bolus 10% 250 mL, PRN  glucagon (rDNA) injection 1 mg, PRN  dextrose 10 % infusion, Continuous PRN  ALPRAZolam (XANAX) tablet 0.25 mg, Nightly PRN  amLODIPine (NORVASC) tablet 10 mg, Daily  carvedilol (COREG) tablet 3.125 mg, BID  clopidogrel (PLAVIX) tablet 75 mg, Daily  donepezil (ARICEPT) tablet 10 mg, Nightly  FLUoxetine (PROZAC) capsule 40 mg, Daily  hydrALAZINE (APRESOLINE) tablet 25 mg, BID      Allergies:  Norco [hydrocodone-acetaminophen], Pcn [penicillins], and Sulfa antibiotics          Objective:  CURRENT TEMPERATURE:  Temp: 97.9 °F (36.6 °C)  MAXIMUM TEMPERATURE OVER 24HRS:  Temp (24hrs), Av.3 °F (36.8 °C), Min:97.9 °F (36.6 °C), Max:98.5 °F (36.9 °C)    CURRENT RESPIRATORY RATE:  Resp: 16  CURRENT PULSE:  Heart Rate: 86  CURRENT BLOOD PRESSURE:  BP: 109/61  24HR BLOOD PRESSURE RANGE:  Systolic (24NXL), GOD:060 , Min:109 , KCF:020   ; Diastolic (36GOJ), ALW:98, Min:58, Max:84    24HR INTAKE/OUTPUT:    Intake/Output Summary (Last 24 hours) at 9/23/2022 1440  Last data filed at 9/23/2022 1252  Gross per 24 hour   Intake 360 ml   Output 2575 ml   Net -2215 ml     Patient Vitals for the past 96 hrs (Last 3 readings):   Weight   09/23/22 0708 155 lb 10.3 oz (70.6 kg)   09/22/22 0000 155 lb 13.8 oz (70.7 kg)   09/20/22 2330 161 lb 2.5 oz (73.1 kg)       Physical Exam:  GENERAL APPEARANCE: Alert and cooperative, and appears to be in no acute distress. HEAD: normocephalic  EYES:  EOMI. Not pale, anicteric   NOSE:  No nasal discharge. CARDIAC: Normal S1 and S2. No S3, S4 or murmurs. Rhythm is regular. LUNGS:  diminished breath sounds. Rales posteriorly, no wheezing  NECK: Neck supple, non-tender    MUSKULOSKELETAL: Adequately aligned spine. No joint erythema or tenderness. EXTREMITIES: +edema. Peripheral pulses intact. NEURO: Non focal      Labs:   CBC:  Recent Labs     09/21/22  0508 09/22/22  0454 09/23/22  0440   WBC 8.9 9.2 7.2   RBC 3.83* 3.80* 3.74*   HGB 11.0* 11.1* 10.9*   HCT 35.0* 33.1* 32.6*   MCV 91.3 86.9 87.2   MCH 28.8 29.2 29.3   MCHC 31.5 33.6 33.5   RDW 17.6* 16.8* 17.3*    279 269   MPV 7.1 7.2 7.5      BMP:   Recent Labs     09/21/22  0508 09/22/22  0454 09/23/22  0440    137 139   K 4.1 3.4* 3.6*   CL 98 97* 97*   CO2 26 28 30   BUN 48* 54* 52*   CREATININE 2.04* 2.10* 2.07*   GLUCOSE 113* 97 90   CALCIUM 9.3 9.2 9.4      Phosphorus:  No results for input(s): PHOS in the last 72 hours. Magnesium:   Recent Labs     09/21/22  1539 09/22/22  0454   MG 2.0 1.9     Albumin:   No results for input(s): LABALBU in the last 72 hours.       IRON:  No results for input(s): IRON in the last 72 hours.  Iron Saturation:  Invalid input(s): PERCENTFE  TIBC:  No results for input(s): TIBC in the last 72 hours. FERRITIN:  No results for input(s): FERRITIN in the last 72 hours. SPEP: No results for input(s): SPEP in the last 72 hours. No results for input(s): PROT, ALBCAL, ALBCAL, ALBPCT, LABALPH, A1PCT, LABALPH, A2PCT, LABBETA, BETAPCT, GAMGLOB, GGPCT, PATH in the last 72 hours. Urinalysis:    No results for input(s): Maricruz Mahendra, PHUR, LABCAST, WBCUA, RBCUA, MUCUS, TRICHOMONAS, YEAST, BACTERIA, CLARITYU, SPECGRAV, LEUKOCYTESUR, UROBILINOGEN, BILIRUBINUR, BLOODU, GLUCOSEU, KETUA, AMORPHOUS in the last 72 hours. Radiology:  Reviewed as available. Assessment:   CKD 4 secondary to Nephrosclerosis, Left atrophic kidney with kidney stone in left kidney with out hydronephrosis. Scr averaging 1.7-2.0 mg/dl, Scr  near baseline. CHF with acutely reduced EF, systolic dysfunction and grade II diastolic dysfunction. DM type 2  HTN  Hx of CAD  UTI, UA 2+ pro, large Hb, large L.e, numerous WBC and RBC. Plan:  Continue Bumex  Patient declined cardiac catheterization  Goals of care discussed patient does not want any aggressive measures, continue with medical treatment    Discharge planning in progress no objection on discharge from nephrology standpoint we will follow on as-needed basis. Follow-up outpatient in 3 to 4 weeks          Thank you for the consultation.       Electronically signed by Chase Jarvis MD on 9/23/2022 at 2:40 PM

## 2022-09-23 NOTE — PROGRESS NOTES
CLINICAL PHARMACY NOTE: MEDS TO BEDS    Total # of Prescriptions Filled: 2   The following medications were delivered to the patient:  Spironolactone 25mg  Cephalexin 500mg    Additional Documentation:  Delivered medications to patients room

## 2022-09-23 NOTE — CARE COORDINATION
[id number]        [facility]                                    Req/Control # [Problem retrieving Specimen ID]                                   Order Date:  Sep 23, 2022  [id number]                                          Patient Information      Name:  Moiz Mohan  :  1940  Age:  79 y.o. Address:  [address] [place] [address]   Zip:  [address]  PCP: Dea Levi MD Sex:  F  SSN: [id number]  Home Phone: [phone]  Work Phone:    Patient MRN:  [id number]    Alt Patient ID:  [id number]  PCP Phone: [phone]       Authorizing Provider Information       AUTHORIZING PROVIDER: Terence Barraza MD  Physician ID: [id number]  NPI:  [id number]  Site:   Address: [place] [address] [address]: [facility], [address] [place] [address]  Phone: [phone]  Fax:              [address]  DME Order for Home Oxygen as OP [[id number]] (ORD   #:   [id number]) Priority  Routine Class  Hospital Performed        Associated Diagnosis:  Acute on chronic combined systolic and diastolic congestive heart failure (HCC) (I50.43 [ICD-10-CM])        Comments: You must complete the order parameters below and add the medical necessity documentation for this DME in a separate note.      Stationary Oxygen Concentrator at 2 lpm via Nasal Cannula     Stationary Prescribed at:  Non Continuous while sleeping, walking and exercise     Portable Gaseous O2 System and contents at 2 lpm via Nasal Cannula        Diagnosis: Systolic and diastolic heart failure  Length of need: Lifetime            Scheduling Instructions:                                 Specimen Source             Collection Date    Collection Time    Order Status    Expected Date                 Electronically Signed By  Terence Barraza MD  NPI:  [id number] Date  Sep 23, 2022  3:24 PM              Responsible Party Giovanny Bolivar     Guar-ActID   Relationship Account Type Home Phone   [employer] [id number]* [address] [facility] [address] Self P/F [phone] Employer   Work Phone   NOT EMPLOYED , [address] Information     Primary Insurance  Insurance/Subscriber ID:  23 [address] Name:  Isabell Mohs              Relationship to Patient: [id number],FIDEL ASelfSigned ABN: N    Payor Name:  MEDICARE   Plan:  MEDICARE PART A AND B   Group: [place] [id number]  Worker's Comp Date of Injury:

## 2022-09-23 NOTE — CARE COORDINATION
I21.4    Acute respiratory failure with hypoxia and hypercapnia (HCC) J96.01, J96.02    UTI (urinary tract infection) N39.0    CAD (coronary artery disease) I25.10       Isolation/Infection:   Isolation            No Isolation          Patient Infection Status       Infection Onset Added Last Indicated Last Indicated By Review Planned Expiration Resolved Resolved By    None active    Resolved    COVID-19 (Rule Out) 09/20/22 09/20/22 09/20/22 COVID-19 & Influenza Combo (Ordered)   09/20/22 Rule-Out Test Resulted    COVID-19 (Rule Out) 05/16/21 05/16/21 05/16/21 COVID-19, Rapid (Ordered)   05/16/21 Rule-Out Test Resulted            Nurse Assessment:  Last Vital Signs: BP (!) 159/96   Pulse 90   Temp 97.9 °F (36.6 °C) (Oral)   Resp 13   Ht 5' 1\" (1.549 m)   Wt 150 lb (68 kg)   SpO2 95%   BMI 28.34 kg/m²     Last documented pain score (0-10 scale):    Last Weight:   Wt Readings from Last 1 Encounters:   09/20/22 150 lb (68 kg)     Mental Status:  oriented and alert    IV Access:  - None    Nursing Mobility/ADLs:  Walking   Assisted  Transfer  Assisted  Bathing  Assisted  Dressing  Assisted  Toileting  Assisted  Feeding  Independent  Med Admin  Assisted  Med Delivery   crushed and w/applesauce    Wound Care Documentation and Therapy:        Elimination:  Continence: Bowel: Yes  Bladder: Yes  Urinary Catheter: Removal Date 9/23/2022   Colostomy/Ileostomy/Ileal Conduit: No       Date of Last BM: 9/21/2022    Intake/Output Summary (Last 24 hours) at 9/20/2022 1022  Last data filed at 9/20/2022 0646  Gross per 24 hour   Intake 120 ml   Output 600 ml   Net -480 ml     I/O last 3 completed shifts: In: 120 [I.V.:120]  Out: 600 [Urine:600]    Safety Concerns:      At Risk for Falls    Impairments/Disabilities:      Vision and Hearing    Nutrition Therapy:  Current Nutrition Therapy:   - Oral Diet:  Carb Control 3 carbs/meal (1500kcals/day) and Low Sodium (2gm)    Routes of Feeding: Oral  Liquids: No Restrictions  Daily Fluid Restriction: no  Last Modified Barium Swallow with Video (Video Swallowing Test): not done    Treatments at the Time of Hospital Discharge:   Respiratory Treatments: albuterol inhaler as needed  Oxygen Therapy:  is on oxygen at 2 L/min per nasal cannula. Ventilator:    - No ventilator support    Rehab Therapies: Physical Therapy and Occupational Therapy  Weight Bearing Status/Restrictions: No weight bearing restrictions  Other Medical Equipment (for information only, NOT a DME order):  rollator, grab bars, shower chair, cane and raised toilet seat  Other Treatments: Skilled Nursing assessment and monitoring. Medication education and monitoring per protocol. Patient's personal belongings (please select all that are sent with patient):  Jodi    RN SIGNATURE:  Electronically signed by Carlyle Wyatt RN on 9/20/22 at 10:23 AM EDT    CASE MANAGEMENT/SOCIAL WORK SECTION    Inpatient Status Date: 9/20/22    Readmission Risk Assessment Score:  Readmission Risk              Risk of Unplanned Readmission:  20           Discharging to Facility/ [address] home health care  Phone [phone]  Fax [phone]       Dialysis Facility (if applicable)   Name:  Address:  Dialysis Schedule:  Phone:  Fax:    / signature: Electronically signed by Carlyle Wyatt RN on 9/20/22 at 10:23 AM EDT    PHYSICIAN SECTION    Prognosis: Fair    Condition at Discharge: Stable    Rehab Potential (if transferring to Rehab): Fair    Recommended Labs or Other Treatments After Discharge:     Physician Certification: I certify the above information and transfer of Sherri Rodriguez  is necessary for the continuing treatment of the diagnosis listed and that she requires Home Care for greater 30 days.      Update Admission H&P: No change in H&P    PHYSICIAN SIGNATURE:  Electronically signed by Moose Amador MD on 9/23/22 at 10:11 AM E  Medication List    START taking these medications    START taking these medications   cephALEXin 500 MG capsule  Commonly known as: KEFLEX  Take 1 capsule by mouth 4 times daily for 5 days   ferrous gluconate 324 (38 Fe) MG tablet  Commonly known as: FERGON  Take 324 mg by mouth daily (with breakfast)   spironolactone 25 MG tablet  Commonly known as: ALDACTONE  Take 0.5 tablets by mouth daily  Start taking on: September 24, 2022     CONTINUE taking these medications    CONTINUE taking these medications   albuterol sulfate  (90 Base) MCG/ACT inhaler  Commonly known as: Ventolin HFA  Inhale 2 puffs into the lungs every 6 hours as needed for Wheezing or Shortness of Breath   ALPRAZolam 0.5 MG tablet  Commonly known as: XANAX  Take 0.25 mg by mouth 2 times daily as needed for Sleep. bumetanide 1 MG tablet  Commonly known as: BUMEX  Take 2 mg by mouth daily   carvedilol 25 MG tablet  Commonly known as: COREG  Take 3.125 mg by mouth 2 times daily   clopidogrel 75 MG tablet  Commonly known as: PLAVIX  Take 75 mg by mouth daily   cyclobenzaprine 10 MG tablet  Commonly known as: FLEXERIL  Take 10 mg by mouth 3 times daily as needed for Muscle spasms   donepezil 10 MG tablet  Commonly known as: ARICEPT  Take 1 tablet by mouth nightly   gabapentin 300 MG capsule  Commonly known as: NEURONTIN  Take 300 mg by mouth daily. glipiZIDE 5 MG tablet  Commonly known as: Glucotrol  Take 0.5 tablets by mouth daily   hydrALAZINE 25 MG tablet  Commonly known as: APRESOLINE  Take 25 mg by mouth in the morning and at bedtime   omeprazole 20 MG delayed release capsule  Commonly known as: PRILOSEC  Take 40 mg by mouth daily   * tiotropium 18 MCG inhalation capsule  Commonly known as: SPIRIVA  Inhale 1 capsule into the lungs daily   * tiotropium 2.5 MCG/ACT Aers inhaler  Commonly known as: Spiriva Respimat  Inhale 2 puffs into the lungs daily    very important  * This list has 2 medication(s) that are the same as other medications prescribed for you.  Read the directions carefully, and ask your doctor or other care provider to review them with you. STOP taking these medications    STOP taking these medications   amLODIPine 10 MG tablet  Commonly known as: NORVASC   FLUoxetine 20 MG/5ML solution  Commonly known as: PROZAC   lisinopril 5 MG tablet  Commonly known as: PRINIVIL;ZESTRIL   Where to Get Your Medications      These medications were sent to [address] [facility] [address]  [place] [address], [address] [facility] [address]  Phone: [phone]       cephALEXin 500 MG capsule        spironolactone 25 MG tablet             Printing Report    Report Name Print   Discharge Meds Print     Order Requisition for Shayla Navas  CSN: [id number]   Order Date:  Sep 23, 2022             Patient Information: Shayla Navas       :  1940  Age:  79 y.o. Sex:  F  Home Phone: [phone]  Work Phone:   SSN: [id number]  Address:  [address] [place] [address], [address] [place] [address] MRN:  [id number]  Facility MRN:  [id number]  PCP: Luis Cunha MD  PCP Phone: [phone]           Ordering Dept: [address] [facility]     Site: [address] [place] [address]  Ph: [phone] Fax: Address: [address] Ordering User: Kemi Del Valle MD  Provider ID: [id number]  NPI:  [id number]               Test Ordered: External Referral To [address] [place] [address] Drive   Code: [id number]   ORD #: [id number]  Associated Diagnosis: Hypoxia (R09.02 [ICD-10-CM]); Acute cystitis without hematuria (N30.00 [ICD-10-CM]); Acute on chronic diastolic congestive heart failure (HCC) (I50.33 [YIK-39-NP])  Comments: Certification and medical necessity: I certify that, based on my findings, the following services are medically necessary home health services for Shayla Navas for the following reasons: - Vital signs monitoring: Nurse to perform BP, HR, RR, Temp, and Weight with each visit and teach patient and caregivers on taking daily.   Nurse to call physician Jen  Information     Guar-ActID   Relationship Account Type Home Phone   Brock Servant [id number]* AlBritt Dillon  / [address] Self P/F [phone]   Employer   Work Phone   NOT EMPLOYED , [address] Information     Primary Insurance  Insurance/Subscriber ID:  23 Randi Cunningham Name:  Rosa Isela Hurst              Relationship to Patient: [id number],MJYLL ASelfSigned ABN: N    Payor Name:  MEDICARE   Plan:  MEDICARE PART A AND B   Group: [id number]  Worker's Comp Date of Injury:

## 2022-09-23 NOTE — PLAN OF CARE
Problem: Discharge Planning  Goal: Discharge to home or other facility with appropriate resources  9/23/2022 0519 by Loan Chaves, RN  Outcome: Progressing     Problem: Skin/Tissue Integrity  Goal: Absence of new skin breakdown  Description: 1. Monitor for areas of redness and/or skin breakdown  2. Assess vascular access sites hourly  3. Every 4-6 hours minimum:  Change oxygen saturation probe site  4. Every 4-6 hours:  If on nasal continuous positive airway pressure, respiratory therapy assess nares and determine need for appliance change or resting period.   9/23/2022 0519 by Loan Chaves, RN  Outcome: Progressing     Problem: Safety - Adult  Goal: Free from fall injury  9/23/2022 0519 by Loan Chaves, RN  Outcome: Progressing

## 2022-09-23 NOTE — CARE COORDINATION
Home Oxygen Evaluation     Room air SpO2 at Rest =90%     Room air with exercise/exertion =86%     SpO2 on prescribed O2 level at  2  LPM  at rest =   94%   with exercise/exertion =91%     Nocturnal Oximetry with patient on room air recommended if the resting SpO2 is 89% to 95%. (Requires additional order)             Patient was evaluated today for the diagnosis of COPD, CHF. I entered a DME order for home oxygen because the diagnosis and testing requires the patient to have supplemental oxygen. Condition will improve or be benefited by oxygen use. The patient is not able to perform good mobility in a home setting and therefore does require the use of a portable oxygen system. The need for this equipment was discussed with the patient and she understands and is in agreement. Electronically signed by Joe Hernandez MD on 2022 at 12:30 PM        Pranay Honeycutt. Encounter Date/Time: 2022 [address] [place] [address] Account: [de-identified]    MRN: [id number]    Patient: Karen Leung    Contact Serial #: [id number]      ENCOUNTER          Patient Class: I Private Enc? No Unit RM BD: [facility] PROG -   Hospital Service: MED   Encounter DX: Hypoxia [R09.02]   ADM Provider: Radha Barkley MD   Procedure:     ATT Provider: Radha Barkley MD   REF Provider:        Admission DX: Hypoxia, Pulmonary edema with congestive heart failure ([address] Congestive heart failure, unspecified HF chronicity, unspecified heart failure type ([address] and DX codes: R09.02, I50.1, I50.9      PATIENT                 Name: Karen Leung : 1940 (82 yrs)   Address: [address] [place] [address] Sex: Female   [place] [address]         Marital Status: Single   Employer: NOT EMPLOYED         Hinduism: [address] [facility]   Primary Care Provider: Luis Flaherty MD         Primary Phone: [phone]   EMERGENCY CONTACT   Contact Name Legal Guardian? Relationship to Patient Home Phone Work Phone   1. Sera Fraser  2.  Aidan Harmon No  No Child  Child [phone]              GUARANTOR            Guarantor: Julianne Travis     : 1940   Address: [address] [place] [address] Sex: Female     [place] [address]     Relation to Patient: Self       Home Phone: [phone]   Guarantor ID: [id number]       Work Phone:     Guarantor Employer: NOT EMPLOYED         Status:  [address]        PRIMARY INSURANCE   Payor: AETSAMMY MEDICARE Plan: Felisha Root*   Payor Address: [facility] [id number]  [address]       Group Number: [id number] Insurance Type: [address] Name: Rangel Fields : 1940   Subscriber ID: [id number] Pat. Rel. to Sub: Self   SECONDARY INSURANCE   Payor:   Plan:     Payor Address:  ,           Group Number:   Insurance Type:     Subscriber Name:   Subscriber :     Subscriber ID:   Pat.  Rel. to Sub:           CSN: [id number]

## 2022-09-23 NOTE — PROGRESS NOTES
[address] [facility]    INPATIENT OCCUPATIONAL THERAPY  PROGRESS NOTE  Date: 2022  Patient Name: Larissa James      Room: 8948/8648-04  MRN: [id number]    : 1940  (79 y.o.) Gender: female     Discharge Recommendations:  Further Occupational Therapy is recommended upon facility discharge. OT Equipment Recommendations  Other: TBD    Referring Practitioner: Erickson White MD  Diagnosis: Pulmonary edema with CHF  General  Chart Reviewed: Yes  Patient assessed for rehabilitation services?: Yes  Additional Pertinent Hx: 59-year-old female with a PMH significant for HFpEF, stage IV CKD, COPD, type 2 diabetes mellitus, essential hypertension, and hyperlipidemia who presented to [facility] with SOB and was admitted for management of acute hypercapnic hypoxic respiratory failure 2/2 CHF exacerbation versus multifocal pneumonia and hypertensive urgency. Family / Caregiver Present: No  Referring Practitioner: Erickson White MD  Diagnosis: Pulmonary edema with CHF    Restrictions  Restrictions/Precautions: Fall Risk, General Precautions  Implants present? :  (denies)  Required Braces or Orthoses?: No      Subjective  Subjective: \"I can try\" Pt is agreeable to therapy this AM.  Comments: CRISTINA Leal ok's tx. Objective  Cognition  Overall Cognitive Status: WNL  Bed mobility  Supine to Sit: Stand by assistance  Sit to Supine: Stand by assistance  Scooting: Stand by assistance  Bed Mobility Comments: HOB elevated, minimal cues needed. Balance  Sitting Balance: Supervision  Standing Balance: Contact guard assistance  Standing Balance  Time: 2-3 min X2  Activity: functional mobility, LB clothing mgmt  Comment: steady, no LOB. SPO2 assessed at 96% throughout. Functional Mobility  Functional - Mobility Device: Rolling Walker  Activity: To/from bathroom  Assist Level: Contact guard assistance  Functional Mobility Comments: Assist with line mgmt.  No LOB noted   ADL  Feeding: Setup  Grooming: Setup  UE Bathing: Supervision  LE Bathing: Minimal assistance  UE Dressing: Supervision  LE Dressing: Maximum assistance  LE Dressing Skilled Clinical Factors: A donning/doffing socks. A threading BLEs, pt able to tpull over hips  Toileting: Dependent/Total  Toileting Skilled Clinical Factors: mathews catheter. Pt declines to wipe post BM due to gown and O2 tubing mgmt. Additional Comments: ADL scores based on skilled observation and clinical reasoning, unless otherwise noted. Pt is limited due to weakness and low endurance, affecting independence with self care tasks     Transfers  Sit to stand: Contact guard assistance  Stand to sit: Contact guard assistance  Transfer Comments: Min cues for hand placement for safety    Toilet Transfers  Toilet - Technique: Ambulating (w/RW)  Equipment Used: Grab bars  Toilet Transfer: Contact guard assistance  Toilet Transfers Comments: VC for hand placement and slow descent onto toilet.        Assessment  Performance deficits / Impairments: Decreased functional mobility   Prognosis: Good  Discharge Recommendations: Home with assist PRN;Home with Home health OT  Activity Tolerance: Patient Tolerated treatment well      Patient Education: OT POC, OT role, transfer training, ADL adaptive strategies, energy conservation     Learner:patient  Method: explanation       Outcome: acknowledged understanding of and demonstrated understanding     Plan  Plan  Times per Week: 4-5  Current Treatment Recommendations: Strengthening, ROM, Balance training, Functional mobility training, Endurance training, Safety education & training, Patient/Caregiver education & training, Equipment evaluation, education, & procurement, Self-Care / ADL      Goals  Short Term Goals  Time Frame for Short term goals: By discharge  Short Term Goal 1: Pt will perform BADLs mod I and Good safety  Short Term Goal 2: Pt will verbalize/demonstrate use of adaptive equipment/adaptive strategies to increase IND with self care tasks, 100% of the time  Short Term Goal 3: Pt will perform transfers/functional mobility mod I, using least restrictive device and Good safety  Short Term Goal 4: Pt will actively participate in 15+ minutes of therapeutic exercise/functional activity to promote safety and independence with self care and mobility    OT Individual Minutes  Time In: 0848  Time Out: 0930  Minutes: 42      Electronically signed by KIANA Flannery on 9/23/22 at 11:42 AM EDT

## 2022-09-23 NOTE — PROGRESS NOTES
Patient was evaluated today for the diagnosis of CHF. I entered a DME order for home oxygen because the diagnosis and testing requires the patient to have supplemental oxygen. Condition will improve or be benefited by oxygen use. The patient is not able to perform good mobility in a home setting and therefore does require the use of a portable oxygen system. The need for this equipment was discussed with the patient and she understands and is in agreement.     Electronically signed by Rita Lima MD on 9/23/2022 at 12:30 PM

## 2022-09-23 NOTE — PROGRESS NOTES
Patient educated on discharge instructions which include: medication schedule, reasons for new medications and some side effects and need to follow-up. Patient given new prescriptions during teaching. Patient verbalizes understanding of discharge and states readiness for discharge. All belongings were gathered by patient and in patient's possession. No distress noted at this time.     Current vital signs:  /61   Pulse 86   Temp 97.9 °F (36.6 °C) (Oral)   Resp 16   Ht 5' 1\" (1.549 m)   Wt 155 lb 10.3 oz (70.6 kg)   SpO2 95%   BMI 29.41 kg/m²                MEWS Score: 1

## 2022-09-23 NOTE — PROGRESS NOTES
Rn reached out about ETA an when [facility] will arrive will oxygen portable tank, tech states they will try to reach out an notify  pt is ready for discharge,

## 2022-09-23 NOTE — CARE COORDINATION
Referral sent to [facility] to resume home care services at discharge. I spoke with Chava at Intake to notify of discharge. Also, faxed new orders to [facility] ([phone]) new oxygen orders for 2L with exertion. Currently only wears at night. LMOVM and awaiting call back.  Electronically signed by Nathan Llamas RN on 9/23/2022 at 1:49 PM

## 2022-09-23 NOTE — CARE COORDINATION
ONGOING DISCHARGE PLAN:    Patient is alert and oriented x4. Spoke with patient regarding discharge plan and patient confirms that plan is still home with [facility]. Changed to oral bumex  On rocephin for UTI      Will continue to follow for additional discharge needs.     Electronically signed by Fox Sun RN on 9/23/2022 at 12:44 PM

## 2022-09-23 NOTE — PROGRESS NOTES
Pulmonary Progress Note  [facility]      Patient - Karen Leung,  Age - 79 y.o.    - 1940      Room Number - 2124/2124-01   N -  [id number]   [facility]  Date of Admission -  2022 12:17 AM        Austen Boogie MD  Primary Care Physician - Luis Flaherty MD     SUBJECTIVE     Patient was seen examined bedside. No acute events overnight. Heparin GTT discontinued this AM.  2L NC satting 97%. Bumex changed to oral Bumex. Patient states she does not want to go home without oxygen. Patient desats on room air with exercise/exertion 86%. Denies any fever, chills, chest pain, SOB, or palpitation      OBJECTIVE   VITALS    height is 5' 1\" (1.549 m) and weight is 155 lb 10.3 oz (70.6 kg). Her oral temperature is 98.4 °F (36.9 °C). Her blood pressure is 139/58 (abnormal) and her pulse is 89. Her respiration is 16 and oxygen saturation is 97%. Body mass index is 29.41 kg/m². Temperature Range: Temp: 98.4 °F (36.9 °C) Temp  Av.3 °F (36.8 °C)  Min: 97.8 °F (36.6 °C)  Max: 98.5 °F (36.9 °C)  BP Range:  Systolic (56FZX), IQP:201 , Min:127 , NXX:292     Diastolic (41BZF), ETK:81, Min:58, Max:84    Pulse Range: Pulse  Av  Min: 75  Max: 89  Respiration Range: Resp  Av.7  Min: 16  Max: 18  Current Pulse Ox[de-identified]  SpO2: 97 %  24HR Pulse Ox Range:  SpO2  Av %  Min: 95 %  Max: 98 %  Oxygen Amount and Delivery: O2 Flow Rate (L/min): 2 L/min    Wt Readings from Last 3 Encounters:   22 155 lb 10.3 oz (70.6 kg)   21 190 lb 4.1 oz (86.3 kg)   20 180 lb (81.6 kg)       I/O (24 Hours)    Intake/Output Summary (Last 24 hours) at 2022 1048  Last data filed at 2022 0715  Gross per 24 hour   Intake --   Output 2700 ml   Net -2700 ml         EXAM     General Appearance  Awake, alert, oriented, in no acute distress  HEENT - normocephalic, atraumatic.  [] Mallampati  [] Crowded airway   [] Macroglossia  []  Retrognathia  [] Micrognathia  []  Normal tongue size []  Normal Bite  [] Forest sign positive    Neck - Supple,  trachea midline   Lungs - Good air entry, CTA bilaterally, no wheezing or rales  Cardiovascular - Heart sounds are normal.  Regular rate and rhythm   Abdomen - Soft, nontender, nondistended, no masses or organomegaly  Neurologic - There are no focal motor or sensory deficits  Skin - No bruising or bleeding  Extremities - No clubbing, cyanosis, 1+ edema    MEDS      heparin (porcine)  5,000 Units SubCUTAneous 3 times per day    [START ON 9/24/2022] bumetanide  2 mg Oral Daily    spironolactone  12.5 mg Oral Daily    cefTRIAXone (ROCEPHIN) IV  1,000 mg IntraVENous Q24H    gabapentin  300 mg Oral Nightly    sodium chloride flush  5-40 mL IntraVENous 2 times per day    insulin glargine  10 Units SubCUTAneous Nightly    insulin lispro  0-4 Units SubCUTAneous TID WC    insulin lispro  0-4 Units SubCUTAneous Nightly    amLODIPine  10 mg Oral Daily    carvedilol  3.125 mg Oral BID    clopidogrel  75 mg Oral Daily    donepezil  10 mg Oral Nightly    FLUoxetine  40 mg Oral Daily    hydrALAZINE  25 mg Oral BID      sodium chloride 5 mL/hr at 09/20/22 1157    dextrose       sodium chloride flush, sodium chloride, [Held by provider] ondansetron **OR** [Held by provider] ondansetron, magnesium hydroxide, acetaminophen **OR** acetaminophen, glucose, dextrose bolus **OR** dextrose bolus, glucagon (rDNA), dextrose, ALPRAZolam    LABS   CBC   Recent Labs     09/23/22  0440   WBC 7.2   HGB 10.9*   HCT 32.6*   MCV 87.2          BMP:   Lab Results   Component Value Date/Time     09/23/2022 04:40 AM    K 3.6 09/23/2022 04:40 AM    CL 97 09/23/2022 04:40 AM    CO2 30 09/23/2022 04:40 AM    BUN 52 09/23/2022 04:40 AM    LABALBU 3.7 09/20/2022 12:35 AM    CREATININE 2.07 09/23/2022 04:40 AM    CALCIUM 9.4 09/23/2022 04:40 AM    GFRAA 28 09/23/2022 04:40 AM LABGLOM 23 09/23/2022 04:40 AM     ABGs:  Lab Results   Component Value Date/Time    PHART 7.472 09/20/2022 01:32 PM    PO2ART 78.9 09/20/2022 01:32 PM    QVA7TRZ 35.1 09/20/2022 01:32 PM      Lab Results   Component Value Date/Time    MODE NOT REPORTED 01/14/2020 02:14 AM     Ionized Calcium:  No results found for: IONCA  Magnesium:    Lab Results   Component Value Date/Time    MG 1.9 09/22/2022 04:54 AM     Phosphorus:    Lab Results   Component Value Date/Time    PHOS 3.1 05/21/2021 06:59 AM        LIVER PROFILE   No results for input(s): AST, ALT, LIPASE, BILIDIR, BILITOT, ALKPHOS in the last 72 hours. Invalid input(s): AMYLASE,  ALB    INR   Recent Labs     09/21/22  0935   INR 1.2       PTT   Lab Results   Component Value Date    APTT 38.3 (H) 09/21/2022         RADIOLOGY     Chest x-ray shows significant improvement with decrease interstitial edema        ASSESSMENT/PLAN   Principal Problem:    Pulmonary edema with congestive heart failure (HCC)  Active Problems:    NSTEMI (non-ST elevated myocardial infarction) (HCC)    Acute respiratory failure with hypoxia and hypercapnia (HCC)    UTI (urinary tract infection)    CAD (coronary artery disease)    Atrial fibrillation (HCC)    Diabetes mellitus (HCC)    Essential hypertension    Stage 4 chronic kidney disease (HCC)    Sepsis (HCC)    CHF exacerbation (HCC)    Pulmonary edema cardiac cause ([address]  Resolved Problems:    * No resolved hospital problems.  *      Impression     Acute hypercapnic hypoxic respiratory failure 2/2 flash pulmonary edema versus multifocal pneumonia (improved)  Hypertensive emergency (resolved)  Sepsis  Urinary tract infection- E.coli sensitive to Rocephin  NSTEMI  CHIQUITA on CKD stage IV  Combined chronic systolic and diastolic CHF EF 86%  Paroxysmal A. fib  Type 2 diabetes mellitus  Essential hypertension  Hyperlipidemia        Plan:      -On IV Rocephin for UTI  -Heparin Gtt discontinued this a.m.  -Patient qualifies for home O2 due to desats during exertion  -Will discuss plan with Dr. Shawn Wong        DNR CCA- no intubation              Electronically signed by Cassia Agustin MD on 9/23/2022 at 10:48 AM       Patient seen and examined independently by me. Above discussed and I agree with resident note except where indicated in the EMR revision history. Also see my additional comments and changes indicated by discrete font, text color, italics, and/or initials. Labs, cultures, and radiographs where available were reviewed. She looks comfortable and does qualify for oxygen with exertion. She has oxygen only at night currently. A face-to-face discussion was done on the benefits of oxygen therapy and she is agreeable. She wants to go home and says she has neighbors, visiting nurses, and other relatives close by. She is a non-smoker, and has no documented COPD and does not to be on Spiriva. See my original consult note. She can take oral Cipro for about 4 days more to treat her UTI      Follow-up in the office in 4 to 6 weeks, will need pulmonary function tests.   Electronically signed by Celeste Gramajo MD on 9/23/2022 at 3:18 PM

## 2022-09-25 LAB
CULTURE: NORMAL
CULTURE: NORMAL
SPECIMEN DESCRIPTION: NORMAL
SPECIMEN DESCRIPTION: NORMAL

## 2022-09-29 ENCOUNTER — APPOINTMENT (OUTPATIENT)
Dept: GENERAL RADIOLOGY | Age: 82
DRG: 871 | End: 2022-09-29
Payer: MEDICARE

## 2022-09-29 ENCOUNTER — HOSPITAL ENCOUNTER (INPATIENT)
Age: 82
LOS: 6 days | Discharge: NURSING, REHAB OR LONG-TERM CARE | DRG: 871 | End: 2022-10-05
Attending: EMERGENCY MEDICINE | Admitting: INTERNAL MEDICINE
Payer: MEDICARE

## 2022-09-29 DIAGNOSIS — J96.01 ACUTE RESPIRATORY FAILURE WITH HYPOXIA (HCC): ICD-10-CM

## 2022-09-29 DIAGNOSIS — I16.0 HYPERTENSIVE URGENCY: Primary | ICD-10-CM

## 2022-09-29 PROBLEM — I50.9 HEART FAILURE (HCC): Status: ACTIVE | Noted: 2022-09-29

## 2022-09-29 LAB
ABSOLUTE EOS #: 0 K/UL (ref 0–0.4)
ABSOLUTE EOS #: 0.26 K/UL (ref 0–0.44)
ABSOLUTE IMMATURE GRANULOCYTE: 0.06 K/UL (ref 0–0.3)
ABSOLUTE IMMATURE GRANULOCYTE: 0.19 K/UL (ref 0–0.3)
ABSOLUTE LYMPH #: 0.19 K/UL (ref 1–4.8)
ABSOLUTE LYMPH #: 1.75 K/UL (ref 1.1–3.7)
ABSOLUTE MONO #: 0.38 K/UL (ref 0.1–0.8)
ABSOLUTE MONO #: 0.63 K/UL (ref 0.1–1.2)
ALBUMIN SERPL-MCNC: 3.7 G/DL (ref 3.5–5.2)
ALBUMIN/GLOBULIN RATIO: 0.8 (ref 1–2.5)
ALLEN TEST: POSITIVE
ALP BLD-CCNC: 95 U/L (ref 35–104)
ALT SERPL-CCNC: 9 U/L (ref 5–33)
ANION GAP SERPL CALCULATED.3IONS-SCNC: 12 MMOL/L (ref 9–17)
ANION GAP SERPL CALCULATED.3IONS-SCNC: 12 MMOL/L (ref 9–17)
ANION GAP SERPL CALCULATED.3IONS-SCNC: 21 MMOL/L (ref 9–17)
ANION GAP: 10 MMOL/L (ref 7–16)
AST SERPL-CCNC: 22 U/L
BASOPHILS # BLD: 0 % (ref 0–2)
BASOPHILS # BLD: 1 % (ref 0–2)
BASOPHILS ABSOLUTE: 0 K/UL (ref 0–0.2)
BASOPHILS ABSOLUTE: 0.09 K/UL (ref 0–0.2)
BILIRUB SERPL-MCNC: 0.4 MG/DL (ref 0.3–1.2)
BILIRUBIN DIRECT: 0.1 MG/DL
BILIRUBIN URINE: NEGATIVE
BILIRUBIN, INDIRECT: 0.3 MG/DL (ref 0–1)
BUN BLDV-MCNC: 45 MG/DL (ref 8–23)
BUN BLDV-MCNC: 47 MG/DL (ref 8–23)
BUN BLDV-MCNC: 48 MG/DL (ref 8–23)
CALCIUM SERPL-MCNC: 9 MG/DL (ref 8.6–10.4)
CALCIUM SERPL-MCNC: 9.4 MG/DL (ref 8.6–10.4)
CALCIUM SERPL-MCNC: 9.5 MG/DL (ref 8.6–10.4)
CARBOXYHEMOGLOBIN: 0.6 % (ref 0–5)
CASTS UA: ABNORMAL /LPF (ref 0–8)
CHLORIDE BLD-SCNC: 93 MMOL/L (ref 98–107)
CHLORIDE BLD-SCNC: 97 MMOL/L (ref 98–107)
CHLORIDE BLD-SCNC: 97 MMOL/L (ref 98–107)
CO2: 20 MMOL/L (ref 20–31)
CO2: 25 MMOL/L (ref 20–31)
CO2: 25 MMOL/L (ref 20–31)
COLOR: YELLOW
CREAT SERPL-MCNC: 2.01 MG/DL (ref 0.5–0.9)
CREAT SERPL-MCNC: 2.06 MG/DL (ref 0.5–0.9)
CREAT SERPL-MCNC: 2.13 MG/DL (ref 0.5–0.9)
CULTURE: NO GROWTH
EKG ATRIAL RATE: 101 BPM
EKG P AXIS: 48 DEGREES
EKG P-R INTERVAL: 158 MS
EKG Q-T INTERVAL: 346 MS
EKG QRS DURATION: 102 MS
EKG QTC CALCULATION (BAZETT): 448 MS
EKG R AXIS: 58 DEGREES
EKG T AXIS: -161 DEGREES
EKG VENTRICULAR RATE: 101 BPM
EOSINOPHILS RELATIVE PERCENT: 0 % (ref 1–4)
EOSINOPHILS RELATIVE PERCENT: 2 % (ref 1–4)
EPITHELIAL CELLS UA: ABNORMAL /HPF (ref 0–5)
FIO2: 50
FIO2: ABNORMAL
GFR AFRICAN AMERICAN: 27 ML/MIN
GFR AFRICAN AMERICAN: 28 ML/MIN
GFR AFRICAN AMERICAN: 29 ML/MIN
GFR NON-AFRICAN AMERICAN: 19 ML/MIN
GFR NON-AFRICAN AMERICAN: 22 ML/MIN
GFR NON-AFRICAN AMERICAN: 23 ML/MIN
GFR NON-AFRICAN AMERICAN: 24 ML/MIN
GFR SERPL CREATININE-BSD FRML MDRD: 23 ML/MIN
GFR SERPL CREATININE-BSD FRML MDRD: ABNORMAL ML/MIN/{1.73_M2}
GLUCOSE BLD-MCNC: 131 MG/DL (ref 65–105)
GLUCOSE BLD-MCNC: 136 MG/DL (ref 70–99)
GLUCOSE BLD-MCNC: 140 MG/DL (ref 65–105)
GLUCOSE BLD-MCNC: 153 MG/DL (ref 65–105)
GLUCOSE BLD-MCNC: 160 MG/DL (ref 70–99)
GLUCOSE BLD-MCNC: 164 MG/DL (ref 65–105)
GLUCOSE BLD-MCNC: 171 MG/DL (ref 65–105)
GLUCOSE BLD-MCNC: 192 MG/DL (ref 65–105)
GLUCOSE BLD-MCNC: 258 MG/DL (ref 74–100)
GLUCOSE BLD-MCNC: 266 MG/DL (ref 70–99)
GLUCOSE URINE: ABNORMAL
HCO3 VENOUS: 23.1 MMOL/L (ref 24–30)
HCO3 VENOUS: 26.3 MMOL/L (ref 22–29)
HCT VFR BLD CALC: 37.9 % (ref 36.3–47.1)
HCT VFR BLD CALC: 44.5 % (ref 36.3–47.1)
HEMOGLOBIN: 12.1 G/DL (ref 11.9–15.1)
HEMOGLOBIN: 13.5 G/DL (ref 11.9–15.1)
IMMATURE GRANULOCYTES: 0 %
IMMATURE GRANULOCYTES: 1 %
INR BLD: 1
KETONES, URINE: NEGATIVE
LACTIC ACID, WHOLE BLOOD: 3.3 MMOL/L (ref 0.7–2.1)
LEUKOCYTE ESTERASE, URINE: ABNORMAL
LYMPHOCYTES # BLD: 1 % (ref 24–44)
LYMPHOCYTES # BLD: 11 % (ref 24–43)
MCH RBC QN AUTO: 29 PG (ref 25.2–33.5)
MCH RBC QN AUTO: 29.2 PG (ref 25.2–33.5)
MCHC RBC AUTO-ENTMCNC: 30.3 G/DL (ref 28.4–34.8)
MCHC RBC AUTO-ENTMCNC: 31.9 G/DL (ref 28.4–34.8)
MCV RBC AUTO: 90.9 FL (ref 82.6–102.9)
MCV RBC AUTO: 96.1 FL (ref 82.6–102.9)
MODE: ABNORMAL
MONOCYTES # BLD: 2 % (ref 1–7)
MONOCYTES # BLD: 4 % (ref 3–12)
MORPHOLOGY: ABNORMAL
NEGATIVE BASE EXCESS, VEN: 4 (ref 0–2)
NEGATIVE BASE EXCESS, VEN: 4.7 MMOL/L (ref 0–2)
NITRITE, URINE: NEGATIVE
NRBC AUTOMATED: 0 PER 100 WBC
NRBC AUTOMATED: 0 PER 100 WBC
O2 DEVICE/FLOW/%: ABNORMAL
O2 SAT, VEN: 38 % (ref 60–85)
O2 SAT, VEN: 60.3 % (ref 60–85)
PARTIAL THROMBOPLASTIN TIME: 24.8 SEC (ref 20.5–30.5)
PATIENT TEMP: 37
PCO2, VEN: 56.4 MM HG (ref 39–55)
PCO2, VEN: 65.8 MM HG (ref 41–51)
PDW BLD-RTO: 17 % (ref 11.8–14.4)
PDW BLD-RTO: 17.2 % (ref 11.8–14.4)
PH UA: 5.5 (ref 5–8)
PH VENOUS: 7.21 (ref 7.32–7.43)
PH VENOUS: 7.24 (ref 7.32–7.42)
PLATELET # BLD: 288 K/UL (ref 138–453)
PLATELET # BLD: 389 K/UL (ref 138–453)
PMV BLD AUTO: 10 FL (ref 8.1–13.5)
PMV BLD AUTO: 9.6 FL (ref 8.1–13.5)
PO2, VEN: 27.6 MM HG (ref 30–50)
PO2, VEN: 39.5 MM HG (ref 30–50)
POC BUN: 50 MG/DL (ref 8–26)
POC CHLORIDE: 102 MMOL/L (ref 98–107)
POC CREATININE: 2.42 MG/DL (ref 0.51–1.19)
POC HCO3: 28.7 MMOL/L (ref 21–28)
POC HEMATOCRIT: 51 % (ref 36–46)
POC HEMOGLOBIN: 17.4 G/DL (ref 12–16)
POC IONIZED CALCIUM: 1.21 MMOL/L (ref 1.15–1.33)
POC LACTIC ACID: 6.73 MMOL/L (ref 0.56–1.39)
POC O2 SATURATION: 97 % (ref 94–98)
POC PCO2: 47.2 MM HG (ref 35–48)
POC PH: 7.39 (ref 7.35–7.45)
POC PO2: 94 MM HG (ref 83–108)
POC POTASSIUM: 5.1 MMOL/L (ref 3.5–4.5)
POC SODIUM: 138 MMOL/L (ref 138–146)
POC TCO2: 27 MMOL/L (ref 22–30)
POSITIVE BASE EXCESS, ART: 3 (ref 0–3)
POTASSIUM SERPL-SCNC: 5.1 MMOL/L (ref 3.7–5.3)
POTASSIUM SERPL-SCNC: 5.4 MMOL/L (ref 3.7–5.3)
POTASSIUM SERPL-SCNC: 5.6 MMOL/L (ref 3.7–5.3)
PRO-BNP: ABNORMAL PG/ML
PROTEIN UA: ABNORMAL
PROTHROMBIN TIME: 10.1 SEC (ref 9.1–12.3)
RBC # BLD: 4.17 M/UL (ref 3.95–5.11)
RBC # BLD: 4.63 M/UL (ref 3.95–5.11)
RBC # BLD: ABNORMAL 10*6/UL
RBC UA: ABNORMAL /HPF (ref 0–4)
SAMPLE SITE: ABNORMAL
SARS-COV-2, RAPID: NOT DETECTED
SEG NEUTROPHILS: 82 % (ref 36–65)
SEG NEUTROPHILS: 96 % (ref 36–66)
SEGMENTED NEUTROPHILS ABSOLUTE COUNT: 13.11 K/UL (ref 1.5–8.1)
SEGMENTED NEUTROPHILS ABSOLUTE COUNT: 18.24 K/UL (ref 1.8–7.7)
SODIUM BLD-SCNC: 134 MMOL/L (ref 135–144)
SPECIFIC GRAVITY UA: 1.02 (ref 1–1.03)
SPECIMEN DESCRIPTION: NORMAL
SPECIMEN DESCRIPTION: NORMAL
TOTAL PROTEIN: 8.1 G/DL (ref 6.4–8.3)
TROPONIN, HIGH SENSITIVITY: 53 NG/L (ref 0–14)
TURBIDITY: ABNORMAL
URINE HGB: ABNORMAL
UROBILINOGEN, URINE: NORMAL
WBC # BLD: 15.9 K/UL (ref 3.5–11.3)
WBC # BLD: 19 K/UL (ref 3.5–11.3)
WBC UA: ABNORMAL /HPF (ref 0–5)
YEAST: ABNORMAL

## 2022-09-29 PROCEDURE — 85730 THROMBOPLASTIN TIME PARTIAL: CPT

## 2022-09-29 PROCEDURE — 96375 TX/PRO/DX INJ NEW DRUG ADDON: CPT

## 2022-09-29 PROCEDURE — 85610 PROTHROMBIN TIME: CPT

## 2022-09-29 PROCEDURE — 81001 URINALYSIS AUTO W/SCOPE: CPT

## 2022-09-29 PROCEDURE — 96374 THER/PROPH/DIAG INJ IV PUSH: CPT

## 2022-09-29 PROCEDURE — 83880 ASSAY OF NATRIURETIC PEPTIDE: CPT

## 2022-09-29 PROCEDURE — 2500000003 HC RX 250 WO HCPCS: Performed by: STUDENT IN AN ORGANIZED HEALTH CARE EDUCATION/TRAINING PROGRAM

## 2022-09-29 PROCEDURE — 80048 BASIC METABOLIC PNL TOTAL CA: CPT

## 2022-09-29 PROCEDURE — 87635 SARS-COV-2 COVID-19 AMP PRB: CPT

## 2022-09-29 PROCEDURE — 5A1935Z RESPIRATORY VENTILATION, LESS THAN 24 CONSECUTIVE HOURS: ICD-10-PCS | Performed by: EMERGENCY MEDICINE

## 2022-09-29 PROCEDURE — 2000000000 HC ICU R&B

## 2022-09-29 PROCEDURE — 2580000003 HC RX 258: Performed by: STUDENT IN AN ORGANIZED HEALTH CARE EDUCATION/TRAINING PROGRAM

## 2022-09-29 PROCEDURE — 84484 ASSAY OF TROPONIN QUANT: CPT

## 2022-09-29 PROCEDURE — 94002 VENT MGMT INPAT INIT DAY: CPT

## 2022-09-29 PROCEDURE — 94761 N-INVAS EAR/PLS OXIMETRY MLT: CPT

## 2022-09-29 PROCEDURE — 93005 ELECTROCARDIOGRAM TRACING: CPT | Performed by: STUDENT IN AN ORGANIZED HEALTH CARE EDUCATION/TRAINING PROGRAM

## 2022-09-29 PROCEDURE — 82805 BLOOD GASES W/O2 SATURATION: CPT

## 2022-09-29 PROCEDURE — 74018 RADEX ABDOMEN 1 VIEW: CPT

## 2022-09-29 PROCEDURE — 6370000000 HC RX 637 (ALT 250 FOR IP): Performed by: STUDENT IN AN ORGANIZED HEALTH CARE EDUCATION/TRAINING PROGRAM

## 2022-09-29 PROCEDURE — 6360000002 HC RX W HCPCS: Performed by: STUDENT IN AN ORGANIZED HEALTH CARE EDUCATION/TRAINING PROGRAM

## 2022-09-29 PROCEDURE — 93010 ELECTROCARDIOGRAM REPORT: CPT | Performed by: INTERNAL MEDICINE

## 2022-09-29 PROCEDURE — 82803 BLOOD GASES ANY COMBINATION: CPT

## 2022-09-29 PROCEDURE — 36415 COLL VENOUS BLD VENIPUNCTURE: CPT

## 2022-09-29 PROCEDURE — 87040 BLOOD CULTURE FOR BACTERIA: CPT

## 2022-09-29 PROCEDURE — 80076 HEPATIC FUNCTION PANEL: CPT

## 2022-09-29 PROCEDURE — 94660 CPAP INITIATION&MGMT: CPT

## 2022-09-29 PROCEDURE — 87086 URINE CULTURE/COLONY COUNT: CPT

## 2022-09-29 PROCEDURE — 6360000002 HC RX W HCPCS

## 2022-09-29 PROCEDURE — 80051 ELECTROLYTE PANEL: CPT

## 2022-09-29 PROCEDURE — 83605 ASSAY OF LACTIC ACID: CPT

## 2022-09-29 PROCEDURE — 2500000003 HC RX 250 WO HCPCS

## 2022-09-29 PROCEDURE — 82947 ASSAY GLUCOSE BLOOD QUANT: CPT

## 2022-09-29 PROCEDURE — 71045 X-RAY EXAM CHEST 1 VIEW: CPT

## 2022-09-29 PROCEDURE — 99285 EMERGENCY DEPT VISIT HI MDM: CPT

## 2022-09-29 PROCEDURE — 99291 CRITICAL CARE FIRST HOUR: CPT | Performed by: INTERNAL MEDICINE

## 2022-09-29 PROCEDURE — 84520 ASSAY OF UREA NITROGEN: CPT

## 2022-09-29 PROCEDURE — 0BH18EZ INSERTION OF ENDOTRACHEAL AIRWAY INTO TRACHEA, VIA NATURAL OR ARTIFICIAL OPENING ENDOSCOPIC: ICD-10-PCS | Performed by: EMERGENCY MEDICINE

## 2022-09-29 PROCEDURE — 85025 COMPLETE CBC W/AUTO DIFF WBC: CPT

## 2022-09-29 PROCEDURE — 82330 ASSAY OF CALCIUM: CPT

## 2022-09-29 PROCEDURE — 85014 HEMATOCRIT: CPT

## 2022-09-29 PROCEDURE — 2700000000 HC OXYGEN THERAPY PER DAY

## 2022-09-29 PROCEDURE — 82565 ASSAY OF CREATININE: CPT

## 2022-09-29 RX ORDER — FUROSEMIDE 10 MG/ML
40 INJECTION INTRAMUSCULAR; INTRAVENOUS ONCE
Status: COMPLETED | OUTPATIENT
Start: 2022-09-29 | End: 2022-09-29

## 2022-09-29 RX ORDER — SODIUM CHLORIDE 0.9 % (FLUSH) 0.9 %
5-40 SYRINGE (ML) INJECTION PRN
Status: DISCONTINUED | OUTPATIENT
Start: 2022-09-29 | End: 2022-10-05 | Stop reason: HOSPADM

## 2022-09-29 RX ORDER — POLYETHYLENE GLYCOL 3350 17 G/17G
17 POWDER, FOR SOLUTION ORAL DAILY PRN
Status: DISCONTINUED | OUTPATIENT
Start: 2022-09-29 | End: 2022-10-05 | Stop reason: HOSPADM

## 2022-09-29 RX ORDER — FENTANYL CITRATE 50 UG/ML
50 INJECTION, SOLUTION INTRAMUSCULAR; INTRAVENOUS
Status: DISCONTINUED | OUTPATIENT
Start: 2022-09-29 | End: 2022-10-05 | Stop reason: HOSPADM

## 2022-09-29 RX ORDER — SODIUM CHLORIDE 9 MG/ML
INJECTION, SOLUTION INTRAVENOUS PRN
Status: DISCONTINUED | OUTPATIENT
Start: 2022-09-29 | End: 2022-10-05 | Stop reason: HOSPADM

## 2022-09-29 RX ORDER — VANCOMYCIN HYDROCHLORIDE 1 G/200ML
15 INJECTION, SOLUTION INTRAVENOUS ONCE
Status: DISCONTINUED | OUTPATIENT
Start: 2022-09-29 | End: 2022-09-29

## 2022-09-29 RX ORDER — SENNA AND DOCUSATE SODIUM 50; 8.6 MG/1; MG/1
1 TABLET, FILM COATED ORAL 2 TIMES DAILY
Status: DISCONTINUED | OUTPATIENT
Start: 2022-09-29 | End: 2022-10-05 | Stop reason: HOSPADM

## 2022-09-29 RX ORDER — HEPARIN SODIUM 5000 [USP'U]/ML
5000 INJECTION, SOLUTION INTRAVENOUS; SUBCUTANEOUS EVERY 8 HOURS SCHEDULED
Status: DISCONTINUED | OUTPATIENT
Start: 2022-09-29 | End: 2022-10-05 | Stop reason: HOSPADM

## 2022-09-29 RX ORDER — PROPOFOL 10 MG/ML
INJECTION, EMULSION INTRAVENOUS
Status: COMPLETED
Start: 2022-09-29 | End: 2022-09-29

## 2022-09-29 RX ORDER — ACETAMINOPHEN 650 MG/1
650 SUPPOSITORY RECTAL ONCE
Status: COMPLETED | OUTPATIENT
Start: 2022-09-29 | End: 2022-09-29

## 2022-09-29 RX ORDER — ACETAMINOPHEN 650 MG/1
650 SUPPOSITORY RECTAL EVERY 6 HOURS PRN
Status: DISCONTINUED | OUTPATIENT
Start: 2022-09-29 | End: 2022-10-05 | Stop reason: HOSPADM

## 2022-09-29 RX ORDER — BUMETANIDE 0.25 MG/ML
1 INJECTION, SOLUTION INTRAMUSCULAR; INTRAVENOUS 2 TIMES DAILY
Status: DISCONTINUED | OUTPATIENT
Start: 2022-09-29 | End: 2022-10-05 | Stop reason: HOSPADM

## 2022-09-29 RX ORDER — CARVEDILOL 6.25 MG/1
6.25 TABLET ORAL 2 TIMES DAILY WITH MEALS
Status: DISCONTINUED | OUTPATIENT
Start: 2022-09-29 | End: 2022-10-05 | Stop reason: HOSPADM

## 2022-09-29 RX ORDER — NITROGLYCERIN 20 MG/100ML
5-200 INJECTION INTRAVENOUS CONTINUOUS
Status: DISCONTINUED | OUTPATIENT
Start: 2022-09-29 | End: 2022-09-29

## 2022-09-29 RX ORDER — INSULIN LISPRO 100 [IU]/ML
0-4 INJECTION, SOLUTION INTRAVENOUS; SUBCUTANEOUS
Status: DISCONTINUED | OUTPATIENT
Start: 2022-09-29 | End: 2022-10-05 | Stop reason: HOSPADM

## 2022-09-29 RX ORDER — INSULIN LISPRO 100 [IU]/ML
0-4 INJECTION, SOLUTION INTRAVENOUS; SUBCUTANEOUS NIGHTLY
Status: DISCONTINUED | OUTPATIENT
Start: 2022-09-29 | End: 2022-10-05 | Stop reason: HOSPADM

## 2022-09-29 RX ORDER — MAGNESIUM SULFATE IN WATER 40 MG/ML
2000 INJECTION, SOLUTION INTRAVENOUS ONCE
Status: COMPLETED | OUTPATIENT
Start: 2022-09-29 | End: 2022-09-29

## 2022-09-29 RX ORDER — MAGNESIUM SULFATE IN WATER 40 MG/ML
INJECTION, SOLUTION INTRAVENOUS
Status: DISCONTINUED
Start: 2022-09-29 | End: 2022-09-29

## 2022-09-29 RX ORDER — DEXTROSE MONOHYDRATE 100 MG/ML
INJECTION, SOLUTION INTRAVENOUS CONTINUOUS PRN
Status: DISCONTINUED | OUTPATIENT
Start: 2022-09-29 | End: 2022-10-05 | Stop reason: HOSPADM

## 2022-09-29 RX ORDER — ACETAMINOPHEN 325 MG/1
650 TABLET ORAL EVERY 6 HOURS PRN
Status: DISCONTINUED | OUTPATIENT
Start: 2022-09-29 | End: 2022-10-05 | Stop reason: HOSPADM

## 2022-09-29 RX ORDER — ONDANSETRON 4 MG/1
4 TABLET, ORALLY DISINTEGRATING ORAL EVERY 8 HOURS PRN
Status: DISCONTINUED | OUTPATIENT
Start: 2022-09-29 | End: 2022-10-05 | Stop reason: HOSPADM

## 2022-09-29 RX ORDER — ONDANSETRON 2 MG/ML
4 INJECTION INTRAMUSCULAR; INTRAVENOUS EVERY 6 HOURS PRN
Status: DISCONTINUED | OUTPATIENT
Start: 2022-09-29 | End: 2022-10-05 | Stop reason: HOSPADM

## 2022-09-29 RX ORDER — SODIUM CHLORIDE 0.9 % (FLUSH) 0.9 %
5-40 SYRINGE (ML) INJECTION EVERY 12 HOURS SCHEDULED
Status: DISCONTINUED | OUTPATIENT
Start: 2022-09-29 | End: 2022-10-05 | Stop reason: HOSPADM

## 2022-09-29 RX ORDER — PROPOFOL 10 MG/ML
5-50 INJECTION, EMULSION INTRAVENOUS CONTINUOUS
Status: DISCONTINUED | OUTPATIENT
Start: 2022-09-29 | End: 2022-09-30

## 2022-09-29 RX ADMIN — PROPOFOL 15 MCG/KG/MIN: 10 INJECTION, EMULSION INTRAVENOUS at 20:37

## 2022-09-29 RX ADMIN — SODIUM CHLORIDE: 9 INJECTION, SOLUTION INTRAVENOUS at 10:27

## 2022-09-29 RX ADMIN — CEFEPIME 1000 MG: 1 INJECTION, POWDER, FOR SOLUTION INTRAMUSCULAR; INTRAVENOUS at 10:28

## 2022-09-29 RX ADMIN — ACETAMINOPHEN 650 MG: 650 SUPPOSITORY RECTAL at 06:06

## 2022-09-29 RX ADMIN — PROPOFOL 10 MCG/KG/MIN: 10 INJECTION, EMULSION INTRAVENOUS at 04:55

## 2022-09-29 RX ADMIN — HEPARIN SODIUM 5000 UNITS: 5000 INJECTION INTRAVENOUS; SUBCUTANEOUS at 09:31

## 2022-09-29 RX ADMIN — DEXTROSE MONOHYDRATE 250 ML: 100 INJECTION, SOLUTION INTRAVENOUS at 10:00

## 2022-09-29 RX ADMIN — INSULIN HUMAN 10 UNITS: 100 INJECTION, SOLUTION PARENTERAL at 09:55

## 2022-09-29 RX ADMIN — SODIUM CHLORIDE, PRESERVATIVE FREE 20 ML: 5 INJECTION INTRAVENOUS at 20:39

## 2022-09-29 RX ADMIN — NITROGLYCERIN 50 MCG/MIN: 20 INJECTION INTRAVENOUS at 05:28

## 2022-09-29 RX ADMIN — DOCUSATE SODIUM 50 MG AND SENNOSIDES 8.6 MG 1 TABLET: 8.6; 5 TABLET, FILM COATED ORAL at 09:31

## 2022-09-29 RX ADMIN — VANCOMYCIN HYDROCHLORIDE 1500 MG: 10 INJECTION, POWDER, LYOPHILIZED, FOR SOLUTION INTRAVENOUS at 13:59

## 2022-09-29 RX ADMIN — INSULIN HUMAN 10 UNITS: 100 INJECTION, SOLUTION PARENTERAL at 21:22

## 2022-09-29 RX ADMIN — MAGNESIUM SULFATE IN WATER 2000 MG: 40 INJECTION, SOLUTION INTRAVENOUS at 04:34

## 2022-09-29 RX ADMIN — BUMETANIDE 1 MG: 0.25 INJECTION, SOLUTION INTRAMUSCULAR; INTRAVENOUS at 20:39

## 2022-09-29 RX ADMIN — SODIUM CHLORIDE, PRESERVATIVE FREE 20 MG: 5 INJECTION INTRAVENOUS at 20:38

## 2022-09-29 RX ADMIN — FUROSEMIDE 40 MG: 10 INJECTION, SOLUTION INTRAMUSCULAR; INTRAVENOUS at 09:48

## 2022-09-29 RX ADMIN — SODIUM CHLORIDE, PRESERVATIVE FREE 20 MG: 5 INJECTION INTRAVENOUS at 12:22

## 2022-09-29 RX ADMIN — DEXTROSE MONOHYDRATE 250 ML: 100 INJECTION, SOLUTION INTRAVENOUS at 21:21

## 2022-09-29 RX ADMIN — SODIUM CHLORIDE, PRESERVATIVE FREE 10 ML: 5 INJECTION INTRAVENOUS at 09:32

## 2022-09-29 RX ADMIN — BUMETANIDE 1 MG: 0.25 INJECTION, SOLUTION INTRAMUSCULAR; INTRAVENOUS at 12:44

## 2022-09-29 RX ADMIN — MAGNESIUM SULFATE HEPTAHYDRATE 2000 MG: 40 INJECTION, SOLUTION INTRAVENOUS at 04:34

## 2022-09-29 RX ADMIN — CARVEDILOL 6.25 MG: 6.25 TABLET, FILM COATED ORAL at 16:33

## 2022-09-29 RX ADMIN — HEPARIN SODIUM 5000 UNITS: 5000 INJECTION INTRAVENOUS; SUBCUTANEOUS at 16:33

## 2022-09-29 RX ADMIN — DOCUSATE SODIUM 50 MG AND SENNOSIDES 8.6 MG 1 TABLET: 8.6; 5 TABLET, FILM COATED ORAL at 20:39

## 2022-09-29 ASSESSMENT — PULMONARY FUNCTION TESTS
PIF_VALUE: 16
PIF_VALUE: 14
PIF_VALUE: 18
PIF_VALUE: 18
PIF_VALUE: 15
PIF_VALUE: 15
PIF_VALUE: 11
PIF_VALUE: 21
PIF_VALUE: 11
PIF_VALUE: 20
PIF_VALUE: 15
PIF_VALUE: 10
PIF_VALUE: 6
PIF_VALUE: 16
PIF_VALUE: 18
PIF_VALUE: 28
PIF_VALUE: 17
PIF_VALUE: 16
PIF_VALUE: 18
PIF_VALUE: 19
PIF_VALUE: 16
PIF_VALUE: 18
PIF_VALUE: 16
PIF_VALUE: 14
PIF_VALUE: 10
PIF_VALUE: 20
PIF_VALUE: 16
PIF_VALUE: 16
PIF_VALUE: 13
PIF_VALUE: 20
PIF_VALUE: 11
PIF_VALUE: 21
PIF_VALUE: 20
PIF_VALUE: 14
PIF_VALUE: 10
PIF_VALUE: 7
PIF_VALUE: 8
PIF_VALUE: 17
PIF_VALUE: 21
PIF_VALUE: 22
PIF_VALUE: 14
PIF_VALUE: 23
PIF_VALUE: 10
PIF_VALUE: 16
PIF_VALUE: 19
PIF_VALUE: 11
PIF_VALUE: 19
PIF_VALUE: 17
PIF_VALUE: 15
PIF_VALUE: 22

## 2022-09-29 NOTE — CARE COORDINATION
09/29/22 1641   Service Assessment   Patient Orientation Sedated   Cognition   (unable to assess)   History Provided By Child/Family   Primary Caregiver Self   Accompanied By/Relationship pt's son Rolando Goss Staff   PCP Verified by CM Yes  Austyn Feliciano MD)   Last Visit to PCP Within last 3 months   Prior Functional Level Independent in ADLs/IADLs   Can patient return to prior living arrangement Unknown at present   Ability to make needs known: Unable   Financial Resources Medicare   Social/Functional History   Lives With Alone   Type of [address] One level   [facility]   [address], quad;Rollator   ADL Assistance Independent   Active  No   Patient's  Info family helps with transport   Occupation Retired   Discharge Planning   Current Services Prior To Admission [employer]; Oxygen Therapy   Potential Assistance Needed Home Care   DME Cane;Glucometer;Oxygen Therapy (Comment); Walker  (Home O2 @ 2L NC 24/7, DME Co is [address])   Potential Assistance Purchasing Medications No   Type of Home Care Services OT;PT;Nursing Services   History of falls? 0   Services At/After Discharge   [employer] Information Provided? No   Mode of Transport at Discharge Other (see comment)  (dependent on destination)   Condition of Participation: Discharge Planning   The Plan for Transition of Care is related to the following treatment goals: Intubated/ Sedated FiO2 305, PEEP 10, PT/OT ordered, Current w/ [facility] (verified) monitoring for needs. The Patient and/or Patient Representative was provided with a Choice of Provider? Patient Representative  (Will provide choice is appropriate)   Name of the Patient Representative who was provided with the Choice of Provider and agrees with the Discharge Plan?   Pt's margaret Namkeven   The Patient and/Or Patient Representative agree with the Discharge Plan? (POC dependent on pt progress)   Freedom of Choice list was provided with basic dialogue that supports the patient's individualized plan of care/goals, treatment preferences, and shares the quality data associated with the providers?    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 Referral sent.

## 2022-09-29 NOTE — ED PROVIDER NOTES
[facility]  ED  Emergency Department Encounter  Emergency Medicine Resident     Pt Name: Yolie Menon  MRN: [id number]  [place] 1940  Date of evaluation: 9/29/22  PCP:  Char Marshall MD    CHIEF COMPLAINT       Chief Complaint   Patient presents with    Respiratory Distress       HISTORY Mk  (Location/Symptom, Timing/Onset, Context/Setting, Quality, Duration, Modifying Factors,Severity.)      Yolie Menon is a 79 y.o. female with history of CHF, COPD, diabetes, hypertension, hyperlipidemia who presents via EMS for respiratory distress. Patient called EMS as she acutely developed shortness of breath shortly prior to arrival.  Per EMS patient was saturating in the low 70s. She was placed on CPAP. EMS did administer Combivent as well as 125 mg of Solu-Medrol sublingual nitro. Per EMS patient was recently admitted at [facility] for acute on chronic CHF exacerbation where she was treated with diuretics improved and then was subsequently discharged. On examination patient is awake, in significant respiratory distress, is tachypneic and speaking in 1-2 word sentences. PAST MEDICAL / SURGICAL / SOCIAL / FAMILY HISTORY      has a past medical history of Anxiety, Arthritis, Atherosclerosis, Body mass index (bmi) 25.0-25.9, adult, CHF exacerbation (HCC), COPD (chronic obstructive pulmonary disease) (HCC), CVA (cerebral vascular accident) ([address] Depression, Diabetes mellitus ([address] Diverticula, esophagus congenital, Former smoker, Hypercholesteremia, Hypertension, Hypokalemia, Joint pain, knee, Myocardial infarct, old, Pneumonia, Protein S deficiency ([address] Pulmonary embolism ([address] Reflux esophagitis, Tinea corporis, Tremor, URI (upper respiratory infection), UTI (urinary tract infection), and Zenker's diverticulum. has a past surgical history that includes Carotid endarterectomy (Bilateral); Tonsillectomy; and Endoscopy, colon, diagnostic.     Social History Socioeconomic History    Marital status: Single     Spouse name: Not on file    Number of children: 2    Years of education: Not on file    Highest education level: Not on file   Occupational History    Not on file   Tobacco Use    Smoking status: Former     Years: 25.00     Types: Cigarettes    Smokeless tobacco: Never   Substance and Sexual Activity    Alcohol use: Yes     Comment: socially    Drug use: No    Sexual activity: Not on file   Other Topics Concern    Not on file   Social History Narrative    Not on file     Social Determinants of Health     Financial Resource Strain: Not on file   Food Insecurity: Not on file   Transportation Needs: Not on file   Physical Activity: Not on file   Stress: Not on file   Social Connections: Not on file   Intimate Partner Violence: Not on file   Housing Stability: Not on file       No family history on file. Allergies:  Norco [hydrocodone-acetaminophen], Pcn [penicillins], and Sulfa antibiotics    Home Medications:  Prior to Admission medications    Medication Sig Start Date End Date Taking?  Authorizing Provider   spironolactone (ALDACTONE) 25 MG tablet Take 0.5 tablets by mouth daily 9/24/22   Oly Young MD   bumetanide (BUMEX) 1 MG tablet Take 2 mg by mouth daily    Historical Provider, MD   ferrous gluconate (FERGON) 324 (38 Fe) MG tablet Take 324 mg by mouth daily (with breakfast)    Historical Provider, MD   hydrALAZINE (APRESOLINE) 25 MG tablet Take 25 mg by mouth in the morning and at bedtime    Historical Provider, MD   tiotropium (SPIRIVA RESPIMAT) 2.5 MCG/ACT AERS inhaler Inhale 2 puffs into the lungs daily 2/11/20 9/23/22  Lefty Benito MD   amLODIPine (NORVASC) 10 MG tablet Take 1 tablet by mouth daily  Patient not taking: Reported on 9/20/2022 8/23/19 9/23/22  Claudia Watson MD   albuterol sulfate HFA (VENTOLIN HFA) 108 (90 Base) MCG/ACT inhaler Inhale 2 puffs into the lungs every 6 hours as needed for Wheezing or Shortness of Breath 7/16/19 Troy Brito MD   donepezil (ARICEPT) 10 MG tablet Take 1 tablet by mouth nightly 7/16/19   Troy Brito MD   glipiZIDE (GLUCOTROL) 5 MG tablet Take 0.5 tablets by mouth daily 7/16/19   Troy Brito MD   FLUoxetine (PROZAC) 20 MG/5ML solution Take 10 mLs by mouth daily  Patient not taking: Reported on 9/20/2022 7/16/19 9/23/22  Troy Brito MD   carvedilol (COREG) 25 MG tablet Take 3.125 mg by mouth 2 times daily    Historical Provider, MD   ALPRAZolam Alec Preeti) 0.5 MG tablet Take 0.25 mg by mouth 2 times daily as needed for Sleep. Historical Provider, MD   gabapentin (NEURONTIN) 300 MG capsule Take 300 mg by mouth daily. Historical Provider, MD   clopidogrel (PLAVIX) 75 MG tablet Take 75 mg by mouth daily    Historical Provider, MD   cyclobenzaprine (FLEXERIL) 10 MG tablet Take 10 mg by mouth 3 times daily as needed for Muscle spasms    Historical Provider, MD   omeprazole (PRILOSEC) 20 MG capsule Take 40 mg by mouth daily    Historical Provider, MD       REVIEW OF SYSTEMS    (2-9 systems for level 4, 10 or more for level 5)      Review of Systems   Unable to perform ROS: Acuity of condition     PHYSICAL EXAM   (up to 7 for level 4, 8 or more for level 5)     INITIAL VITALS:    bladder temperature is 101.1 °F (38.4 °C) (abnormal). Her blood pressure is 121/70 and her pulse is 93. Her respiration is 20 and oxygen saturation is 98%. Physical Exam  Constitutional:       Comments: Awake, in significant respiratory distress, patient is ill in appearance   HENT:      Head: Normocephalic and atraumatic. Cardiovascular:      Rate and Rhythm: Regular rhythm. Tachycardia present. Heart sounds: No murmur heard. Pulmonary:      Comments: Able to speak in 1-2 word sentences, tachypneic in the 30s. Diffuse rhonchi versus crackles auscultated  Abdominal:      General: Abdomen is flat. Palpations: Abdomen is soft. Tenderness: There is no abdominal tenderness. Musculoskeletal:      Right lower leg: Edema (2+ pitting) present. Left lower leg: Edema (2+ pitting) present. Skin:     General: Skin is warm and dry. Coloration: Skin is pale. Psychiatric:      Comments: Anxious in appearance       DIFFERENTIAL  DIAGNOSIS     PLAN (LABS / IMAGING / EKG):  Orders Placed This Encounter   Procedures    COVID-19, Rapid    Culture, Urine    Culture, Blood 1    Culture, Blood 1    MRSA DNA Probe, Nasal    XR CHEST PORTABLE    XR ABDOMEN FOR NG/OG/NE TUBE PLACEMENT    CBC with Auto Differential    Basic Metabolic Panel    BLOOD GAS, VENOUS    Troponin    Brain Natriuretic Peptide    ELECTROLYTES PLUS    Hemoglobin and hematocrit, blood    CALCIUM, IONIC (POC)    Urinalysis with Microscopic    Lactic Acid    Blood gas, arterial    Hepatic Function Panel    Protime-INR    APTT    Vancomycin Level, Random    Elevate Head of Bed    Spontaneous Awakening Trial (SAT)    Pharmacy to Dose: Vancomycin    Inpatient consult to Critical Care    Adult NIV/Positive Airway Pressure    ABG draw    Spontaneous Breathing Trial (SBT)    Post Extubation Oxygen Therapy    Manual Mechanical Ventilation    Venous Blood Gas, POC    Creatinine W/GFR Point of Care    POCT urea (BUN)    Lactic Acid, POC    POCT Glucose    EKG 12 Lead    ADMIT TO INPATIENT    Restraints non-violent or non-self-destructive       MEDICATIONS ORDERED:  Orders Placed This Encounter   Medications    DISCONTD: magnesium sulfate 2 GM/50ML infusion     JOSIAH MONROE: liz override    DISCONTD: nitroGLYCERIN 50 mg in dextrose 5% 250 mL infusion     Order Specific Question:   Titrate Infusion? Answer:   Yes     Order Specific Question:   Initial Infusion Dose: Answer: Other     Order Specific Question:   Other (mcg/min): Answer:   8     Order Specific Question:   Goal of Therapy is: Answer:    Other     Order Specific Question:   Other Goal:     Answer:   SBP < 140     Order Specific Question:   Contact Provider if:     Answer:   SBP less than 90 mmHg    propofol 1000 MG/100ML injection     Mirna hill override    propofol injection     Order Specific Question:   Titrate Infusion? Answer:   Yes     Order Specific Question:   Initial Infusion Dose: Answer:   10 mcg/kg/min     Order Specific Question:   Goal of Therapy:     Answer:   RASS of -1 to 0     Order Specific Question:   Contact Provider if:     Answer:   New onset HR less than 50 bpm     Order Specific Question:   Contact Provider if:     Answer:   New onset SBP less than 90 mmHg     Order Specific Question:   Contact Provider if:     Answer:   Patient is receiving maximum dose and is not achieving the goal of therapy     Order Specific Question:   Contact Provider if:     Answer:   Triglycerides greater than 500 mg/dL    nitroGLYCERIN 50 mg in dextrose 5% 250 mL infusion     Order Specific Question:   Titrate Infusion? Answer:   Yes     Order Specific Question:   Initial Infusion Dose: Answer: Other     Order Specific Question:   Other (mcg/min): Answer:   48     Order Specific Question:   Goal of Therapy is: Answer:    Other     Order Specific Question:   Other Goal:     Answer:   titrate medication until SBP drops below 110, then hold at dose     Order Specific Question:   Contact Provider if:     Answer:   SBP less than 90 mmHg    cefepime (MAXIPIME) 1000 mg IVPB minibag     Order Specific Question:   Antimicrobial Indications     Answer:   Pneumonia (HAP)    DISCONTD: vancomycin (VANCOCIN) 1000 mg in dextrose 5% 200 mL IVPB     Order Specific Question:   Antimicrobial Indications     Answer:   Pneumonia (HAP)    acetaminophen (TYLENOL) suppository 650 mg    vancomycin (VANCOCIN) 1500 mg in sodium chloride 0.9 % 250 mL IVPB     Order Specific Question:   Antimicrobial Indications     Answer:   Pneumonia (CAP)     Order Specific Question:   CAP duration of therapy     Answer:   7 days    vancomycin (VANCOCIN) intermittent dosing (placeholder)     Order Specific Question:   Antimicrobial Indications     Answer:   Pneumonia (CAP)     Order Specific Question:   CAP duration of therapy     Answer:   7 days    fentaNYL (SUBLIMAZE) injection 50 mcg    magnesium sulfate 2000 mg in 50 mL IVPB premix    insulin lispro (HUMALOG) injection vial 0-4 Units    insulin lispro (HUMALOG) injection vial 0-4 Units       DDX: Acute on chronic CHF exacerbation, COPD exacerbation, pneumonia, sepsis    Initial MDM/Plan: 79 y.o. female who presents with acute respiratory distress via EMS. Per EMS recent admission for acute on chronic CHF treated with diuresis. Seen and examined. She is awake however appears to be in significant respiratory distress, is tachypneic, speaking in 1-2 word sentences. Vital signs remarkable for tachycardia in the 120s hypoxia on CPAP in the 70s. She was placed on BiPAP with improvement in her oxygen saturations to the mid 80s. Patient observed for short period of time however respiratory status did not improve with BiPAP and concerns for impending respiratory failure. While patient was on BiPAP and we were arranging for RSI we were able to chart review and found during her admission multiple physician notes that stated patient was DNR CCA with no intubation however there was an advance care directive that stated patient was okay with being on a ventilator if she suddenly became ill and was unable to breathe on her own. Patient does have CODE STATUS she documented which demonstrates DNR CCA but does not further clarify on if the patient would be okay with intubation or not. Given patient is in impending respiratory failure with need for intubation to prevent further hypoxia and cardiac arrest decision was made to intubate the patient. Patient's initially intubated on first pass attempt, please see pamela resident's note for details.   I was able to speak with the patient's daughter over the phone who stated the patient does have a DNR CCA but was unable to confirm if the patient was okay with intubation or no intubation. Kitchen was placed the patient was found to be febrile. She was recently treated for urinary tract infection, sepsis labs were obtained. Given the patient is in pulmonary edema with known acute on chronic CHF we will hold off fluids at this time as these would to worsen patient's respiratory status. Will cover with antibiotics and admit to critical care. Sepsis Times and Checklist  Vital Signs: BP: 121/70  Heart Rate: 93  Resp: 20  Temp: (!) 101.1 °F (38.4 °C) SpO2: 98 %  SIRS (>2) Non Pregnant       Temp > 38.3C or < 36C yes   HR > 90 yes   RR > 20 yes   WBC > 12 or < 4 or >10% bands yes  SIRS (>2) Pregnant 20 weeks until 3 days postpartum   Temp > 38C or <36C   HR >110   RR > 24   WBC >15 or < 4 or >10% bands   SIRS (>2) and confirmed or suspected source of infection = Sepsis  Is Sepsis due to likely bacterial infection?: Yes      Sepsis Identified: 0530 - U/a  Sepsis Orders:   CBC(required): Yes   CMP: Yes   PT/PTT: Yes   Blood Cultures x2(required): Yes   Urinalysis and Urine Culture: Yes   Lactate(required): Yes   Antibiotics Given (within 3 hours of sepsis identification, after blood cultures):  Yes    (If unable to obtain IV access for IV antibiotics within 3 hours of identification of sepsis, IM antibiotics is acceptable.)              If lactate >2.0 MUST repeat within 6 hours    If elevated, is elevated lactate from a likely infectious source?:   Lactate most likely elevated from hypoxia however there is concern for underlying infection    IV Fluid Bolus:  Is lactate > 4.0:  Yes  If lactate >  4.0 OR hypotension (MAP<65 mmHg,BP<90 or SBP<85 pregnancy 20 weeks to 3 days  postpartum) (with 2 BP readings) 30 ml/kg crystalloid MUST be ordered.     If 30 ml/kg crystalloid not ordered the following must be documented in the medical record:  Administration of 30 mL/kg of crystalloid fluids would be detrimental or harmful for the patient;   AND that the patient has one of the following conditions, OR that a portion of the crystalloid fluid volume was administered as colloids (if a portion consisted of colloids, there must be an order and documentation that colloids were started or noted as given); Concern for fluid overload, given pulmonary edema on chest x-ray as well as CHF with ejection fraction of 25% and 2 recent admissions for acute on chronic CHF. (If a portion consisted of colloids, there must be an order and documentation that colloids were started or noted as given.)         Septic Shock Identified (Initial lactate > 4.0 or 2 Hypotensive Blood Pressure readings within the first 6 hours): For septic shock sepsis focus physical exam must be completed AND documented (within 6 hours). Official lactic acid 3.3 which has improved from point-of-care lactic acid most likely as patient is now saturating well on the ventilator.   We will continue to hold off IV fluids given the patient is volume overloaded      DIAGNOSTIC RESULTS / EMERGENCY DEPARTMENT COURSE / MDM     LABS:  Labs Reviewed   CBC WITH AUTO DIFFERENTIAL - Abnormal; Notable for the following components:       Result Value    WBC 15.9 (*)     RDW 17.2 (*)     Seg Neutrophils 82 (*)     Lymphocytes 11 (*)     Segs Absolute 13.11 (*)     All other components within normal limits   BASIC METABOLIC PANEL - Abnormal; Notable for the following components:    Glucose 266 (*)     BUN 45 (*)     Creatinine 2.13 (*)     Sodium 134 (*)     Chloride 93 (*)     Anion Gap 21 (*)     GFR Non- 22 (*)     GFR  27 (*)     All other components within normal limits   BLOOD GAS, VENOUS - Abnormal; Notable for the following components:    pH, Ped[place] 7.236 (*)     pCO2, [place] 56.4 (*)     HCO3, Venous 23.1 (*)     Negative Base Excess, Ped[place] 4.7 (*)     All other components within normal limits   TROPONIN - Abnormal; Notable for the following components:    Troponin, High Sensitivity 53 (*)     All other components within normal limits   BRAIN NATRIURETIC PEPTIDE - Abnormal; Notable for the following components:    Pro-BNP 19,511 (*)     All other components within normal limits   ELECTROLYTES PLUS - Abnormal; Notable for the following components:    POC Potassium 5.1 (*)     All other components within normal limits   HGB/HCT - Abnormal; Notable for the following components:    POC Hemoglobin 17.4 (*)     POC Hematocrit 51 (*)     All other components within normal limits   URINALYSIS WITH MICROSCOPIC - Abnormal; Notable for the following components:    Turbidity UA Turbid (*)     Glucose, Ur 1+ (*)     Urine Hgb SMALL (*)     Protein, UA 1+ (*)     Leukocyte Esterase, Urine LARGE (*)     Yeast, UA MODERATE (*)     All other components within normal limits   LACTIC ACID - Abnormal; Notable for the following components:    Lactic Acid, Whole Blood 3.3 (*)     All other components within normal limits   VENOUS BLOOD GAS, POINT OF CARE - Abnormal; Notable for the following components:    pH, Pedrito 7.209 (*)     pCO2, [place] 65.8 (*)     pO2, [place] 27.6 (*)     Negative Base Excess, Pedrito 4 (*)     O2 Sat, Ped[place] 38 (*)     All other components within normal limits   CREATININE W/GFR POINT OF CARE - Abnormal; Notable for the following components:    POC Creatinine 2.42 (*)     GFR Comment 23 (*)     GFR Non- 19 (*)     All other components within normal limits   UREA N (POC) - Abnormal; Notable for the following components:    POC BUN 50 (*)     All other components within normal limits   LACTIC ACID,POINT OF CARE - Abnormal; Notable for the following components:    POC Lactic Acid 6.73 (*)     All other components within normal limits   POCT GLUCOSE - Abnormal; Notable for the following components:    POC Glucose 258 (*)     All other components within normal limits   COVID-19, RAPID   CULTURE, BLOOD 1   CULTURE, URINE   CULTURE, BLOOD 1 MRSA DNA PROBE, NASAL   CALCIUM, IONIC (POC)   HEPATIC FUNCTION PANEL   PROTIME-INR   APTT   BLOOD GAS, ARTERIAL         RADIOLOGY:  XR CHEST PORTABLE    Result Date: 9/29/2022  EXAMINATION: ONE XRAY VIEW OF THE CHEST; ONE SUPINE XRAY VIEW(S) OF THE ABDOMEN 9/29/2022 4:54 am COMPARISON: 09/21/2022 HISTORY: ORDERING SYSTEM PROVIDED HISTORY: SOB, b/l crackles, eval for edema TECHNOLOGIST PROVIDED HISTORY: SOB, b/l crackles, eval for edema Post Intubation Reason for Exam: Port Supine  for POST INTUBATION; ORDERING SYSTEM PROVIDED HISTORY: Confirmation of course of NG/OG/NE tube and location of tip of tube TECHNOLOGIST PROVIDED HISTORY: Confirmation of course of NG/OG/NE tube and location of tip of tube Portable? ->Yes Reason for Exam: port Supine  for OG tube placement FINDINGS: Chest: The endotracheal tube measures 3.1 cm above the dioni. Cardiac size is enlarged. Worsening infiltrates are seen throughout the lungs bilaterally, greatest in the mid and lower lung fields with interstitial an alveolar infiltrates, as well as possible small right-sided pleural effusion. No pneumothorax is identified. No acute osseous abnormality is identified. Abdomen: The enteric catheter side hole is seen at the level of the GE junction with the tip in the gastric fundus directed laterally. Gaseous distention of the stomach. No acute osseous abnormality is identified. Multilevel degenerative changes are seen in the spine. Cardiomegaly with vascular congestion and diffuse increased interstitial changes seen predominantly within the perihilar regions and lung bases suggestive of congestive failure, with a suspected right-sided pleural effusion. ET tube measures 3.1 cm above the dioni. Enteric catheter tip terminates in the gastric fundus directed laterally.      XR ABDOMEN FOR NG/OG/NE TUBE PLACEMENT    Result Date: 9/29/2022  EXAMINATION: ONE XRAY VIEW OF THE CHEST; ONE SUPINE XRAY VIEW(S) OF THE ABDOMEN 9/29/2022 4:54 am COMPARISON: 09/21/2022 HISTORY: ORDERING SYSTEM PROVIDED HISTORY: SOB, b/l crackles, eval for edema TECHNOLOGIST PROVIDED HISTORY: SOB, b/l crackles, eval for edema Post Intubation Reason for Exam: Port Supine  for POST INTUBATION; ORDERING SYSTEM PROVIDED HISTORY: Confirmation of course of NG/OG/NE tube and location of tip of tube TECHNOLOGIST PROVIDED HISTORY: Confirmation of course of NG/OG/NE tube and location of tip of tube Portable? ->Yes Reason for Exam: port Supine  for OG tube placement FINDINGS: Chest: The endotracheal tube measures 3.1 cm above the dioni. Cardiac size is enlarged. Worsening infiltrates are seen throughout the lungs bilaterally, greatest in the mid and lower lung fields with interstitial an alveolar infiltrates, as well as possible small right-sided pleural effusion. No pneumothorax is identified. No acute osseous abnormality is identified. Abdomen: The enteric catheter side hole is seen at the level of the GE junction with the tip in the gastric fundus directed laterally. Gaseous distention of the stomach. No acute osseous abnormality is identified. Multilevel degenerative changes are seen in the spine. Cardiomegaly with vascular congestion and diffuse increased interstitial changes seen predominantly within the perihilar regions and lung bases suggestive of congestive failure, with a suspected right-sided pleural effusion. ET tube measures 3.1 cm above the dioni. Enteric catheter tip terminates in the gastric fundus directed laterally. EKG  EKG Interpretation    Interpreted by me    Rhythm: normal sinus   Rate: normal  Axis: normal  Ectopy: none  Conduction: normal  ST Segments: Depressions inferior laterally  T Waves:  Inversions laterally  Q Waves: none    Clinical Impression: Normal sinus rhythm normal axis normal intervals ST depression to inversion inferior laterally which was present on prior EKG     All EKG's are interpreted by the Emergency Department Physician who either signs or Co-signs this chart in the absence of a cardiologist.    EMERGENCY DEPARTMENT COURSE:     Troponin elevated however improved from prior hospitalization. Brain natruretic peptide also elevated. Chest x-ray at 4:55 AM demonstrated interstitial edema concerning for congestive heart failure as well as right-sided pleural effusion. No obvious infiltrate or pneumonia however given fever and shortness of breath with recent hospitalization will cover for hospital-acquired pneumonia with vancomycin and cefepime which was ordered. Official lactic 3.3. Patient started on nitroglycerin infusion for pulmonary edema and volume overloaded state. Urinalysis demonstrates large excite esterase and pyuria, patient wishes treated for urinary tract infection. Cefepime will also cover patient's urine as well as hospital-acquired pneumonia. Critical care was consulted for admission of the patient given acute respiratory failure with hypoxia. They accepted admission    PROCEDURES:  None    CONSULTS:  PHARMACY TO DOSE VANCOMYCIN  IP CONSULT TO CRITICAL CARE    CRITICAL CARE:  Please see attending note    FINAL IMPRESSION      1. Acute respiratory failure with hypoxia ([facility] [address]          DISPOSITION / PLAN     DISPOSITION Admitted 09/29/2022 06:11:23 AM        PATIENTREFERRED TO:  No follow-up provider specified.     DISCHARGE MEDICATIONS:  New Prescriptions    No medications on file       Brad Gutierrez DO  EmergencyMedicine Resident    (Please note that portions of this note were completed with a voice recognition program.  Efforts were made to edit the dictations but occasionally words are mis-transcribed.)        Brad Gutierrez DO  Resident  09/29/22 [id number]

## 2022-09-29 NOTE — ED NOTES
2G Mag started by Marisela Jones per Dr. Magdalene Mendoza.      Anahi Butterfield RN  09/29/22 [id number]

## 2022-09-29 NOTE — PROGRESS NOTES
Date: 9/29/2022  Time: 0447  Patient identity confirmed:  Yes  Indications: Airway protection  Preoxygenation: yes    Laryngoscope size and type Glidescope  Airway introducer used: No  Evac: No  ETT size:a 7.5 cuffed  Number of attempts:1   Cords visualized:  [x] Clearly  [] Poorly  Breath sounds present bilaterally: Yes   ETCO2   [x] Positive   ETT secured at  23 at the teeth    ETT secured with zeeshan  Chest x-ray ordered: Yes     Difficult airway:    No       If yes, was red tape placed around ETT:   No    Was this a Code Situation:    No             BP: (!) 144/69        Jimmy Card RCP  5:31 AM

## 2022-09-29 NOTE — H&P
Critical Care - History and Physical Examination    Patient's name:  Jorge Mccormick Agenda Record Number: [id number]  Patient's account/billing number: [de-identified]  Patient's YOB: 1940  Age: 79 y.o. Date of Admission: 9/29/2022  4:29 AM  Reason of ICU admission:   Date of History and Physical Examination: 9/29/2022      Primary Care Physician: Leanne Drake MD  Attending Physician: Tena Holter    Code Status: Prior    Chief complaint: Shortness of Breath     Reason for ICU admission:   Respiratory Failure     History Of Present Illness:   History was obtained from Chart Review . Vitaliy Mckeon is a 79 y.o. who presented to the ER via EMS due to shortness of breath. Patient has longstanding history of CHF, hypertension, recurrent pneumonia, diabetes, pulmonary edema, NSTEMI's, coronary artery disease, atrial fibrillation not on anticoagulation, COPD, stage IV CKD. Patient was just discharged from [facility] on 9/23 secondary to hypoxia and CHF exacerbation. He echo was done at that time patient was found to have an EF of 25% with global hypokinesis. Patient was started on Lasix with improvement of symptoms discharged home on home O2. At that time cardiac catheterization was recommended but patient declined as she did not want worsening kidney function and any chance of dialysis. Patient came in today via EMS complaining of shortness of breath. In the emergency department patient was hypoxic in the 60s on BiPAP. Decision was made to intubate for airway protection. Patient is a DNR CCA, daughter was unsure about what her wishes were for intubation. Patient was started on propofol as well as a nitro drip. She was febrile with a leukocytosis of 15.1, Vanco and cefepime ordered for potentially hospital-acquired pneumonia. Patient had labs significant for potassium of 5.1, BUN/creatinine 45/2.13, blood glucose of 266, proBNP of 19,511 and a troponin of 53.     Daughter is at bedside, I did discuss with her at length patient's condition. She is agreeable with continuing care as it is, patient remains a DNR CCA at this time. Past Medical History:        Diagnosis Date    Anxiety     Arthritis     Atherosclerosis     Body mass index (bmi) 25.0-25.9, adult     CHF exacerbation ([facility] 1/13/2020    COPD (chronic obstructive pulmonary disease) ([facility])     CVA (cerebral vascular accident) ([facility] [address]     Depression     Diabetes mellitus ([facility] [address]     Diverticula, esophagus congenital     Former smoker     Hypercholesteremia     Hypertension     Hypokalemia     Joint pain, knee     Myocardial infarct, old     2007    Pneumonia     Pneumonia due to infectious organism, unspecified laterality, unspecified part of lung    Protein S deficiency ([facility] [address]     Pulmonary embolism (HCC)     Reflux esophagitis     Tinea corporis     Tremor     URI (upper respiratory infection)     UTI (urinary tract infection)     Zenker's diverticulum        Past Surgical History:        Procedure Laterality Date    CAROTID ENDARTERECTOMY Bilateral     ENDOSCOPY, COLON, DIAGNOSTIC      TONSILLECTOMY         Allergies: Allergies   Allergen Reactions    Norco [Hydrocodone-Acetaminophen] Other (See Comments)     Nausea, dizziness    Pcn [Penicillins]      Hives    Sulfa Antibiotics      Hives         Home Medications:   Prior to Admission medications    Medication Sig Start Date End Date Taking?  Authorizing Provider   spironolactone (ALDACTONE) 25 MG tablet Take 0.5 tablets by mouth daily 9/24/22   Selina Mcdowell MD   bumetanide (BUMEX) 1 MG tablet Take 2 mg by mouth daily    Historical Provider, MD   ferrous gluconate (FERGON) 324 (38 Fe) MG tablet Take 324 mg by mouth daily (with breakfast)    Historical Provider, MD   hydrALAZINE (APRESOLINE) 25 MG tablet Take 25 mg by mouth in the morning and at bedtime    Historical Provider, MD   tiotropium (SPIRIVA RESPIMAT) 2.5 MCG/ACT AERS inhaler Inhale 2 puffs into the lungs daily 2/11/20 9/23/22 Michelle Sibley MD   amLODIPine (NORVASC) 10 MG tablet Take 1 tablet by mouth daily  Patient not taking: Reported on 9/20/2022 8/23/19 9/23/22  Rachel Ferrera MD   albuterol sulfate HFA (VENTOLIN HFA) 108 (90 Base) MCG/ACT inhaler Inhale 2 puffs into the lungs every 6 hours as needed for Wheezing or Shortness of Breath 7/16/19   Davy Mack MD   donepezil (ARICEPT) 10 MG tablet Take 1 tablet by mouth nightly 7/16/19   Davy Mack MD   glipiZIDE (GLUCOTROL) 5 MG tablet Take 0.5 tablets by mouth daily 7/16/19   Davy Mack MD   FLUoxetine (PROZAC) 20 MG/5ML solution Take 10 mLs by mouth daily  Patient not taking: Reported on 9/20/2022 7/16/19 9/23/22  Davy Mack MD   carvedilol (COREG) 25 MG tablet Take 3.125 mg by mouth 2 times daily    Historical Provider, MD   ALPRAZolam Jolanta Regulus) 0.5 MG tablet Take 0.25 mg by mouth 2 times daily as needed for Sleep. Historical Provider, MD   gabapentin (NEURONTIN) 300 MG capsule Take 300 mg by mouth daily. Historical Provider, MD   clopidogrel (PLAVIX) 75 MG tablet Take 75 mg by mouth daily    Historical Provider, MD   cyclobenzaprine (FLEXERIL) 10 MG tablet Take 10 mg by mouth 3 times daily as needed for Muscle spasms    Historical Provider, MD   omeprazole (PRILOSEC) 20 MG capsule Take 40 mg by mouth daily    Historical Provider, MD       Social History:   TOBACCO:   reports that she has quit smoking. Her smoking use included cigarettes. She has never used smokeless tobacco.  ETOH:   reports current alcohol use. DRUGS:  reports no history of drug use. OCCUPATION:      Family History:   No family history on file.         REVIEW OF SYSTEMS (ROS):  Unable to be obtained as patient is intubated and sedated      Physical Exam:    Vitals: /68   Pulse (!) 101   Temp (!) 101.8 °F (38.8 °C) (Bladder)   Resp 24   SpO2 95%     Body weight:   Wt Readings from Last 3 Encounters:   09/23/22 155 lb 10.3 oz (70.6 kg)   05/21/21 190 lb 4.1 oz (86.3 kg)   05/28/20 180 lb (81.6 kg)       Body Mass Index : There is no height or weight on file to calculate BMI. PHYSICAL EXAMINATION :  Constitutional: Intubated, Sedated, moving all 4 extremities   EENT: PERRLA, EOMI, sclera clear, anicteric, oropharynx clear, no lesions, neck supple with midline trachea. Neck: Supple, symmetrical, trachea midline, no adenopathy, thyroid symmetric, no jvd skin normal  Respiratory: intubated, rales heard throughout   Cardiovascular: regular rate and rhythm, normal S1, S2, no murmur noted and 2+ pulses throughout  Abdomen: soft, nontender, nondistended, no masses or organomegaly  Neurological: sedated, opens eyes, withdrawls to painful stimuli   Extremities:  peripheral pulses normal, 3+ pitting edema bilateral LE      Laboratory findings:-    CBC:   Recent Labs     09/29/22 0437   WBC 15.9*   HGB 13.5        BMP:    Recent Labs     09/29/22 0434 09/29/22 0437   NA  --  134*   K  --  5.1   CL  --  93*   CO2  --  20   BUN  --  45*   CREATININE 2.42* 2.13*   GLUCOSE  --  266*     S. Calcium:  Recent Labs     09/29/22 0437   CALCIUM 9.5     S. Ionized Calcium:No results for input(s): IONCA in the last 72 hours. S. Magnesium:No results for input(s): MG in the last 72 hours. S. Phosphorus:No results for input(s): PHOS in the last 72 hours. S. Glucose:  Recent Labs     09/29/22 0434   POCGLU 258*     Glycosylated hemoglobin A1C: No results for input(s): LABA1C in the last 72 hours. INR:   Recent Labs     09/29/22 0437   INR 1.0     Hepatic functions:   Recent Labs     09/29/22 0437   ALKPHOS PENDING   ALT PENDING   AST PENDING   PROT PENDING   BILITOT PENDING   BILIDIR PENDING   LABALBU PENDING     Pancreatic functions:No results for input(s): LACTA, AMYLASE in the last 72 hours. S. Lactic Acid: No results for input(s): LACTA in the last 72 hours. Cardiac enzymes:No results for input(s): CKTOTAL, CKMB, CKMBINDEX, TROPONINI in the last 72 hours.   BNP:No results for input(s): BNP in the last 72 hours. Lipid profile: No results for input(s): CHOL, TRIG, HDL, LDL, LDLCALC in the last 72 hours. Blood Gases: No results found for: PH, PCO2, PO2, HCO3, O2SAT  Thyroid functions:   Lab Results   Component Value Date/Time    TSH 2.31 05/16/2021 07:07 AM        Urinalysis:     Microbiology:  Cultures during this admission:     Blood cultures:                 [] None drawn      [x] Negative             []  Positive (Details:  )  Urine Culture:                   [] None drawn      [x] Negative             []  Positive (Details:  )  Sputum Culture:               [x] None drawn       [] Negative             []  Positive (Details:  )   Endotracheal aspirate:     [x] None drawn       [] Negative             []  Positive (Details:  )         -----------------------------------------------------------------  Radiological reports:    CXR:  XR CHEST PORTABLE   Final Result   Cardiomegaly with vascular congestion and diffuse increased interstitial   changes seen predominantly within the perihilar regions and lung bases   suggestive of congestive failure, with a suspected right-sided pleural   effusion. ET tube measures 3.1 cm above the dioni. Enteric catheter tip terminates in the gastric fundus directed laterally. XR ABDOMEN FOR NG/OG/NE TUBE PLACEMENT   Final Result   Cardiomegaly with vascular congestion and diffuse increased interstitial   changes seen predominantly within the perihilar regions and lung bases   suggestive of congestive failure, with a suspected right-sided pleural   effusion. ET tube measures 3.1 cm above the dioni. Enteric catheter tip terminates in the gastric fundus directed laterally. Electrocardiogram:     Last Echocardiogram findings:   9/20  Echo contrast utilized on this technically difficult study. Left ventricle is mild to moderately dilated. Estimated EF 25%. Severe global hypokinesis.   Normal right ventricular size, reduced function. Left atrium appears dilated. Aortic valve is trileaflet. Aortic valve sclerosis without stenosis. Small  LVOT diameter. Trivial aortic insufficiency. Normal aortic root dimension. Mitral annular calcification is seen. Mild to moderate mitral  regurgitation. Mild MS mean gradient 5 mmHg  Normal tricuspid valve structure and function. Insignificant tricuspid  regurgitation, unable to estimate RVSP. IVC appears normal diameter , difficult to assess respiratory variation  No significant pericardial effusion is seen. Assessment and Plan     Patient Active Problem List   Diagnosis    Pneumonia due to organism    COPD (chronic obstructive pulmonary disease) ([facility] [address]    Diabetes mellitus ([address]    Essential hypertension    Simple chronic bronchitis ([address]    Type 2 diabetes mellitus with kidney complication, with long-term current use of insulin (HCC)    Stage 4 chronic kidney disease (HCC)    Aspiration pneumonitis (HCC)    Sepsis (HCC)    Lactic acidosis    Hypertensive urgency    Zenker diverticulum    CHF exacerbation (HCC)    Severe sepsis (HCC)    Acute-on-chronic kidney injury ([address]    Pulmonary edema cardiac cause ([facility])    Recurrent aspiration pneumonia (HCC)    Fatigue    Pulmonary edema with congestive heart failure ([facility])    NSTEMI (non-ST elevated myocardial infarction) ([address]    Acute respiratory failure with hypoxia and hypercapnia (HCC)    UTI (urinary tract infection)    CAD (coronary artery disease)    Atrial fibrillation ([address]    Heart failure ([address]         Additional assessment:  CHF exacerbation  Hospital Acquired Pneumonia       Plan:  Neuro:  Intubated and Sedated on Propofol   PRN fent- Pain control    Resp:  COPD hx   Respiratory failure likely due to pulmonary edema   Home Spiriva and albuterol   PRVC 18/340/10/70%  VBG 7.23/56.4/39.5/23.1  CXR with with cardiomegaly and vascular congestion.  Right sided pleural effusion   Pulmonary toilet   Lasix ordered  Concern for [facility] - [facility] acquired pneumonia   Started on Vancomycin and cefepime        CV:  CHF with recent EF of 25%  Pt recently refused cardiac cath   Hx of HTN   On nitro gtt @50   Start on Lasix     GI/Nutrition:  NPO   H2 blocker     /Fluids/Electrolytes:  Stage IV CKD   BUN/Cr 45/2.13   MIVF at [facility]   K 5.1 f/u repeat  Monitor and replace electrolytes as needed      Heme:  H&H 13.5/44.5  Plt 389   Heparin sub Q for DVT ppx  Daily CBC     ID:  Leukocytosis of 15.9   LA 3.3   Concern for hospital acquired pneumonia   Started on Vanc and cefepime - continue   Recent UTI- f/u cultures     Endo:  Hx of DM   Start SSI     Prophylaxis:  DVT: Heparin SubQ  GI: H2 blocker     Dispo:   Admit to ICU         Indigo Rivera DO   Internal Medicine - PGY-3  Intensive Care Unit  9/29/2022, 6:12 AM

## 2022-09-29 NOTE — VCC REMOTE MONITORING
Spoke with primary RN Lilibeth Reilly regarding 3 hour  Sepsis bundle.      Thanks,  Juani Beltran RN, [address] [place] [address]  [phone]

## 2022-09-29 NOTE — PROGRESS NOTES
[address] [facility] Pharmacokinetic Monitoring Service - Vancomycin     Thania Sales is a 79 y.o. female starting on vancomycin therapy for pneumonia. Pharmacy consulted by Myla Olmedo for monitoring and adjustment. Target Concentration: Goal AUC/CHINMAY 400-600 mg*hr/L    Additional Antimicrobials: cefepime x 1 dose in ED    Pertinent Laboratory Values: Wt Readings from Last 1 Encounters:   09/23/22 155 lb 10.3 oz (70.6 kg)     Temp Readings from Last 1 Encounters:   09/29/22 (!) 101.8 °F (38.8 °C) (Bladder)     Estimated Creatinine Clearance: 18 mL/min (A) (based on SCr of 2.13 mg/dL (H)). Recent Labs     09/29/22  0434 09/29/22  0437   CREATININE 2.42* 2.13*   WBC  --  15.9*     Procalcitonin:    Pertinent Cultures:  Culture Date Source Results   pending     MRSA Nasal Swab: not ordered. Order placed by pharmacy. Plan:  Concentration-guided dosing due to renal impairment/insufficiency  Start vancomycin 1500 mg for one dose. Additional doses will be based on levels due to poor renal function.     Renal labs as indicated   Vancomycin concentration ordered for 09/30/2022 @ 0600   Pharmacy will continue to monitor patient and adjust therapy as indicated    Thank you for the consult,  Edie De Santiago, [facility]  9/29/2022 6:25 AM

## 2022-09-29 NOTE — PROGRESS NOTES
[facility])  Occupational Therapy Not Seen Note    DATE: 2022    NAME: Josiah Rodrigues  MRN: [id number]   : 1940      Patient not seen this date for Occupational Therapy due to:    Patient is not appropriate for active participation in OT evaluation/treatment at this time d/t intubated/sedated    Next Scheduled Treatment: check back 2022    Electronically signed by IZA Chapman on 2022 at 11:56 AM

## 2022-09-29 NOTE — ED NOTES
Report received from CRISTINA German   Pt. Resting on stretcher, eyes open, RR even with vent   Pt.  Updated on POC  Will continue to monitor   Family at bedside      Khalida Arteaga RN  09/29/22 0152

## 2022-09-29 NOTE — ED NOTES
Preparing to intubate, RT at bedside. Resident and Dr. Eva Lance at bedside.       Elaine Jefferson RN  09/29/22 1569

## 2022-09-29 NOTE — ED NOTES
Pt presents to the ER via EMS with c/o respiratory distress. Pt wears 2L home o2, EMS on arrival patient high 70's spo2. Best spo2 ems could get pt on c-pap was low 80's. Pt presents to the ER able to talk A&O x4 but with great effort and accessory muscle use. Pt has been seen at [facility] over a week ago for similar problems according to EMS and almost needed intubated. Pt was seen before that at [facility]. Pt has hx of heart failure, CHF, kidney disease. Pt placed on full cardiac monitor, ekg taken, lines established, labs drawn. Point of care labs drawn. Dr. Earlene Black at bedside with Dr. Eva Lance and RT.       Elaine Jefferson, CRISTINA  09/29/22 6061

## 2022-09-29 NOTE — ED PROVIDER NOTES
[facility]   Emergency Department  Faculty Attestation       I performed a history and physical examination of the patient and discussed management with the resident. I reviewed the residents note and agree with the documented findings including all diagnostic interpretations and plan of care. Any areas of disagreement are noted on the chart. I was personally present for the key portions of any procedures. I have documented in the chart those procedures where I was not present during the key portions. I have reviewed the emergency nurses triage note. I agree with the chief complaint, past medical history, past surgical history, allergies, medications, social and family history as documented unless otherwise noted below. Documentation of the HPI, Physical Exam and Medical Decision Making performed by scribmigel is based on my personal performance of the HPI, PE and MDM. For Physician Assistant/ Nurse Practitioner cases/documentation I have personally evaluated this patient and have completed at least one if not all key elements of the E/M (history, physical exam, and MDM). Additional findings are as noted. Pertinent Comments     Primary Care Physician: Elias Antonio MD      ED Triage Vitals   BP Temp Temp Source Heart Rate Resp SpO2 Height Weight   09/29/22 0434 09/29/22 0436 09/29/22 0436 09/29/22 0431 09/29/22 0432 09/29/22 0431 -- --   (!) 153/85 97 °F (36.1 °C) Axillary (!) 123 (!) 33 (!) 58 %     Core temperature at 101.8      This is a 79 y.o. female who presents to the Emergency Department with acute onset shortness of breath. Was hypoxic upon EMS arrival.  Initially placed on BiPAP, but saturations still in the mid 80s, patient not mentating well. Therefore decision was made to intubate. At that time she was obtunded only answering 1-2 questions, but then falling back asleep. Heart sounds were tachycardic. Lungs with diffuse crackles throughout. Tachypnea. Poor air entry.   Abdomen soft.  Pitting edema bilateral lower extremities. Acute respiratory distress likely due to CHF exacerbation. Of note in chart patient is a DNR CCA, however there was an ACP documentation from an RN stating that she did want intubation. No obvious paperwork stating requests or thoughts regarding intubation. Therefore due to the fact this may be a reversible cause, we will go and proceed with intubation at this time. See separate intubation procedure note. ET tube in good place. Basic labs, patient was found to have a leukocytosis and was found to be febrile. Given the leukocytosis, fever, and tachycardia tachypnea. Concern for possible sepsis although source is not identified at this time. Therefore began empiric treatment with cefepime and vancomycin. Of note initial point-of-care lactic was 6.5, however given that patient does have a low EF and appears to be in acute pulmonary edema, 30 cc/kg of fluids would be harmful for the patient this time. Nitro drip started. Admit to ICU    EKG Interpretation    Interpreted by emergency department physician    Clinical Impression: sinus tachycardia w/ nonspecific findings    Melba Casper MD    Critical Care: There was significant risk of life threatening deterioration of patient's condition requiring my direct management. Critical care time 30 minutes, excluding any documented procedures.     Melba Casper MD  Attending Emergency Physician        Melba Casper MD  09/29/22 8822

## 2022-09-29 NOTE — ED NOTES
RN called [address], RN for report   To finish report at call back       Star Trevizo RN  09/29/22 0033

## 2022-09-29 NOTE — ED NOTES
The following labs were labeled with appropriate pt sticker and tubed to lab:     [] Blue     [] Lavender   [] on ice  [] Green/yellow  [x] Green/black [x] on ice  [] Larence Gosselin  [] on ice  [] Yellow  [] Red  [] Pink  [] ABG  [] VBG    [] COVID-19 swab    [] Rapid  [] PCR  [] Flu swab  [] Peds Viral Panel     [] Urine Sample  [] Pelvic Cultures  [x] Blood Cultures  [] X 2  [] STREP Cultures         Merrick Jefferson RN  09/29/22 4400

## 2022-09-29 NOTE — ED NOTES
Unable to obtain 3rd peripheral IV after multiple ultrasound attempts. Pt currently has bilateral peripherals infusing. Unable to start abx at this time d/t medication interactions.       Shital Fulton RN  09/29/22 2664

## 2022-09-29 NOTE — ED NOTES
Pt intubated, 23 cm at the teeth. Positive color change. Bi-lateral breath sounds, no epigastric sounds.             Romulo Walker RN  09/29/22 1835

## 2022-09-29 NOTE — ED NOTES
The following labs were labeled with appropriate pt sticker and tubed to lab:     [x] Blue     [x] Lavender   [] on ice  [x] Green/yellow  [x] Green/black [] on ice  [] Vega Crate  [] on ice  [] Yellow  [x] Red  [] Pink  [] ABG  [x] VBG    [] COVID-19 swab    [] Rapid  [] PCR  [] Flu swab  [] Peds Viral Panel     [] Urine Sample  [] Pelvic Cultures  [] Blood Cultures  [] X 2  [] STREP Cultures         Nickie Leon RN  09/29/22 3066

## 2022-09-30 ENCOUNTER — APPOINTMENT (OUTPATIENT)
Dept: GENERAL RADIOLOGY | Age: 82
DRG: 871 | End: 2022-09-30
Payer: MEDICARE

## 2022-09-30 LAB
ABSOLUTE EOS #: <0.03 K/UL (ref 0–0.44)
ABSOLUTE IMMATURE GRANULOCYTE: 0.06 K/UL (ref 0–0.3)
ABSOLUTE LYMPH #: 1 K/UL (ref 1.1–3.7)
ABSOLUTE MONO #: 0.59 K/UL (ref 0.1–1.2)
ALLEN TEST: POSITIVE
ANION GAP SERPL CALCULATED.3IONS-SCNC: 14 MMOL/L (ref 9–17)
BASOPHILS # BLD: 0 % (ref 0–2)
BASOPHILS ABSOLUTE: <0.03 K/UL (ref 0–0.2)
BUN BLDV-MCNC: 47 MG/DL (ref 8–23)
CALCIUM SERPL-MCNC: 9.5 MG/DL (ref 8.6–10.4)
CHLORIDE BLD-SCNC: 96 MMOL/L (ref 98–107)
CO2: 25 MMOL/L (ref 20–31)
CREAT SERPL-MCNC: 2.27 MG/DL (ref 0.5–0.9)
EOSINOPHILS RELATIVE PERCENT: 0 % (ref 1–4)
FIO2: 30
GFR AFRICAN AMERICAN: 25 ML/MIN
GFR NON-AFRICAN AMERICAN: 21 ML/MIN
GFR SERPL CREATININE-BSD FRML MDRD: ABNORMAL ML/MIN/{1.73_M2}
GLUCOSE BLD-MCNC: 100 MG/DL (ref 74–100)
GLUCOSE BLD-MCNC: 105 MG/DL (ref 65–105)
GLUCOSE BLD-MCNC: 113 MG/DL (ref 65–105)
GLUCOSE BLD-MCNC: 121 MG/DL (ref 65–105)
GLUCOSE BLD-MCNC: 85 MG/DL (ref 65–105)
GLUCOSE BLD-MCNC: 86 MG/DL (ref 65–105)
GLUCOSE BLD-MCNC: 88 MG/DL (ref 65–105)
GLUCOSE BLD-MCNC: 89 MG/DL (ref 65–105)
GLUCOSE BLD-MCNC: 93 MG/DL (ref 65–105)
GLUCOSE BLD-MCNC: 94 MG/DL (ref 70–99)
GLUCOSE BLD-MCNC: 98 MG/DL (ref 65–105)
HCT VFR BLD CALC: 35.2 % (ref 36.3–47.1)
HEMOGLOBIN: 11.8 G/DL (ref 11.9–15.1)
IMMATURE GRANULOCYTES: 0 %
LYMPHOCYTES # BLD: 7 % (ref 24–43)
MCH RBC QN AUTO: 30 PG (ref 25.2–33.5)
MCHC RBC AUTO-ENTMCNC: 33.5 G/DL (ref 28.4–34.8)
MCV RBC AUTO: 89.6 FL (ref 82.6–102.9)
MODE: ABNORMAL
MONOCYTES # BLD: 4 % (ref 3–12)
NRBC AUTOMATED: 0 PER 100 WBC
O2 DEVICE/FLOW/%: ABNORMAL
PDW BLD-RTO: 17 % (ref 11.8–14.4)
PLATELET # BLD: 258 K/UL (ref 138–453)
PMV BLD AUTO: 9.8 FL (ref 8.1–13.5)
POC HCO3: 27.8 MMOL/L (ref 21–28)
POC LACTIC ACID: 0.82 MMOL/L (ref 0.56–1.39)
POC O2 SATURATION: 98 % (ref 94–98)
POC PCO2: 35.5 MM HG (ref 35–48)
POC PH: 7.5 (ref 7.35–7.45)
POC PO2: 98.9 MM HG (ref 83–108)
POSITIVE BASE EXCESS, ART: 5 (ref 0–3)
POTASSIUM SERPL-SCNC: 5.2 MMOL/L (ref 3.7–5.3)
RBC # BLD: 3.93 M/UL (ref 3.95–5.11)
RBC # BLD: ABNORMAL 10*6/UL
SAMPLE SITE: ABNORMAL
SEG NEUTROPHILS: 89 % (ref 36–65)
SEGMENTED NEUTROPHILS ABSOLUTE COUNT: 13.72 K/UL (ref 1.5–8.1)
SODIUM BLD-SCNC: 135 MMOL/L (ref 135–144)
VANCOMYCIN RANDOM: 20.2 UG/ML
WBC # BLD: 15.4 K/UL (ref 3.5–11.3)

## 2022-09-30 PROCEDURE — 6370000000 HC RX 637 (ALT 250 FOR IP): Performed by: STUDENT IN AN ORGANIZED HEALTH CARE EDUCATION/TRAINING PROGRAM

## 2022-09-30 PROCEDURE — 82947 ASSAY GLUCOSE BLOOD QUANT: CPT

## 2022-09-30 PROCEDURE — 6360000002 HC RX W HCPCS: Performed by: STUDENT IN AN ORGANIZED HEALTH CARE EDUCATION/TRAINING PROGRAM

## 2022-09-30 PROCEDURE — 71045 X-RAY EXAM CHEST 1 VIEW: CPT

## 2022-09-30 PROCEDURE — 2500000003 HC RX 250 WO HCPCS: Performed by: STUDENT IN AN ORGANIZED HEALTH CARE EDUCATION/TRAINING PROGRAM

## 2022-09-30 PROCEDURE — 85025 COMPLETE CBC W/AUTO DIFF WBC: CPT

## 2022-09-30 PROCEDURE — 1200000000 HC SEMI PRIVATE

## 2022-09-30 PROCEDURE — 2580000003 HC RX 258: Performed by: STUDENT IN AN ORGANIZED HEALTH CARE EDUCATION/TRAINING PROGRAM

## 2022-09-30 PROCEDURE — 36415 COLL VENOUS BLD VENIPUNCTURE: CPT

## 2022-09-30 PROCEDURE — 80202 ASSAY OF VANCOMYCIN: CPT

## 2022-09-30 PROCEDURE — 94761 N-INVAS EAR/PLS OXIMETRY MLT: CPT

## 2022-09-30 PROCEDURE — 36600 WITHDRAWAL OF ARTERIAL BLOOD: CPT

## 2022-09-30 PROCEDURE — 82803 BLOOD GASES ANY COMBINATION: CPT

## 2022-09-30 PROCEDURE — 94003 VENT MGMT INPAT SUBQ DAY: CPT

## 2022-09-30 PROCEDURE — 83605 ASSAY OF LACTIC ACID: CPT

## 2022-09-30 PROCEDURE — 2700000000 HC OXYGEN THERAPY PER DAY

## 2022-09-30 PROCEDURE — 80048 BASIC METABOLIC PNL TOTAL CA: CPT

## 2022-09-30 PROCEDURE — 99291 CRITICAL CARE FIRST HOUR: CPT | Performed by: INTERNAL MEDICINE

## 2022-09-30 RX ADMIN — BUMETANIDE 1 MG: 0.25 INJECTION, SOLUTION INTRAMUSCULAR; INTRAVENOUS at 20:33

## 2022-09-30 RX ADMIN — CEFEPIME 1000 MG: 1 INJECTION, POWDER, FOR SOLUTION INTRAMUSCULAR; INTRAVENOUS at 12:23

## 2022-09-30 RX ADMIN — HEPARIN SODIUM 5000 UNITS: 5000 INJECTION INTRAVENOUS; SUBCUTANEOUS at 17:23

## 2022-09-30 RX ADMIN — FENTANYL CITRATE 50 MCG: 50 INJECTION, SOLUTION INTRAMUSCULAR; INTRAVENOUS at 22:25

## 2022-09-30 RX ADMIN — SODIUM CHLORIDE, PRESERVATIVE FREE 20 MG: 5 INJECTION INTRAVENOUS at 08:59

## 2022-09-30 RX ADMIN — BUMETANIDE 1 MG: 0.25 INJECTION, SOLUTION INTRAMUSCULAR; INTRAVENOUS at 08:58

## 2022-09-30 RX ADMIN — SODIUM CHLORIDE, PRESERVATIVE FREE 10 ML: 5 INJECTION INTRAVENOUS at 20:33

## 2022-09-30 RX ADMIN — SODIUM CHLORIDE, PRESERVATIVE FREE 20 MG: 5 INJECTION INTRAVENOUS at 20:33

## 2022-09-30 RX ADMIN — FENTANYL CITRATE 50 MCG: 50 INJECTION, SOLUTION INTRAMUSCULAR; INTRAVENOUS at 05:12

## 2022-09-30 RX ADMIN — CARVEDILOL 6.25 MG: 6.25 TABLET, FILM COATED ORAL at 08:59

## 2022-09-30 RX ADMIN — SODIUM CHLORIDE, PRESERVATIVE FREE 10 ML: 5 INJECTION INTRAVENOUS at 09:02

## 2022-09-30 RX ADMIN — ACETAMINOPHEN 650 MG: 325 TABLET ORAL at 06:52

## 2022-09-30 RX ADMIN — FENTANYL CITRATE 50 MCG: 50 INJECTION, SOLUTION INTRAMUSCULAR; INTRAVENOUS at 09:30

## 2022-09-30 RX ADMIN — HEPARIN SODIUM 5000 UNITS: 5000 INJECTION INTRAVENOUS; SUBCUTANEOUS at 09:02

## 2022-09-30 RX ADMIN — CARVEDILOL 6.25 MG: 6.25 TABLET, FILM COATED ORAL at 17:23

## 2022-09-30 RX ADMIN — VANCOMYCIN HYDROCHLORIDE 500 MG: 500 INJECTION, POWDER, LYOPHILIZED, FOR SOLUTION INTRAVENOUS at 10:58

## 2022-09-30 RX ADMIN — DOCUSATE SODIUM 50 MG AND SENNOSIDES 8.6 MG 1 TABLET: 8.6; 5 TABLET, FILM COATED ORAL at 08:59

## 2022-09-30 RX ADMIN — HEPARIN SODIUM 5000 UNITS: 5000 INJECTION INTRAVENOUS; SUBCUTANEOUS at 01:01

## 2022-09-30 ASSESSMENT — PAIN SCALES - GENERAL
PAINLEVEL_OUTOF10: 8
PAINLEVEL_OUTOF10: 0
PAINLEVEL_OUTOF10: 7
PAINLEVEL_OUTOF10: 3

## 2022-09-30 ASSESSMENT — PAIN DESCRIPTION - PAIN TYPE
TYPE: CHRONIC PAIN
TYPE: CHRONIC PAIN

## 2022-09-30 ASSESSMENT — PULMONARY FUNCTION TESTS
PIF_VALUE: 16
PIF_VALUE: 10
PIF_VALUE: 2
PIF_VALUE: 14
PIF_VALUE: 17
PIF_VALUE: 15
PIF_VALUE: 11
PIF_VALUE: 17
PIF_VALUE: 15
PIF_VALUE: 15

## 2022-09-30 ASSESSMENT — PAIN DESCRIPTION - ORIENTATION
ORIENTATION: RIGHT;LEFT
ORIENTATION: RIGHT;LEFT
ORIENTATION: RIGHT

## 2022-09-30 ASSESSMENT — PAIN DESCRIPTION - LOCATION
LOCATION: FOOT

## 2022-09-30 ASSESSMENT — PAIN DESCRIPTION - FREQUENCY
FREQUENCY: INTERMITTENT
FREQUENCY: INTERMITTENT

## 2022-09-30 ASSESSMENT — PAIN DESCRIPTION - ONSET
ONSET: GRADUAL
ONSET: GRADUAL

## 2022-09-30 NOTE — PLAN OF CARE
Problem: Respiratory - Adult  Goal: Achieves optimal ventilation and oxygenation  Outcome: Progressing     Problem: Neurosensory - Adult  Goal: Achieves stable or improved neurological status  Outcome: Progressing  Goal: Absence of seizures  Outcome: Progressing  Goal: Remains free of injury related to seizures activity  Outcome: Progressing  Goal: Achieves maximal functionality and self care  Outcome: Progressing     Problem: Skin/Tissue Integrity - Adult  Goal: Skin integrity remains intact  Outcome: Progressing  Goal: Incisions, wounds, or drain sites healing without S/S of infection  Outcome: Progressing  Goal: Oral mucous membranes remain intact  Outcome: Progressing     Problem: Musculoskeletal - Adult  Goal: Return mobility to safest level of function  Outcome: Progressing  Goal: Maintain proper alignment of affected body part  Outcome: Progressing  Goal: Return ADL status to a safe level of function  Outcome: Progressing     Problem: Genitourinary - Adult  Goal: Absence of urinary retention  Outcome: Progressing  Goal: Urinary catheter remains patent  Outcome: Progressing     Problem: Metabolic/Fluid and Electrolytes - Adult  Goal: Electrolytes maintained within normal limits  Outcome: Progressing  Goal: Hemodynamic stability and optimal renal function maintained  Outcome: Progressing  Goal: Glucose maintained within prescribed range  Outcome: Progressing     Problem: Cardiovascular - Adult  Goal: Maintains optimal cardiac output and hemodynamic stability  Outcome: Progressing  Goal: Absence of cardiac dysrhythmias or at baseline  Outcome: Progressing     Problem: Discharge Planning  Goal: Discharge to home or other facility with appropriate resources  Outcome: Progressing     Problem: Skin/Tissue Integrity  Goal: Absence of new skin breakdown  Description: 1. Monitor for areas of redness and/or skin breakdown  2. Assess vascular access sites hourly  3.   Every 4-6 hours minimum:  Change oxygen saturation probe site  4. Every 4-6 hours:  If on nasal continuous positive airway pressure, respiratory therapy assess nares and determine need for appliance change or resting period.   Outcome: Progressing     Problem: Safety - Adult  Goal: Free from fall injury  Outcome: Progressing     Problem: Pain  Goal: Verbalizes/displays adequate comfort level or baseline comfort level  Outcome: Progressing

## 2022-09-30 NOTE — PROGRESS NOTES
[address] [facility] Pharmacokinetic Monitoring Service - Vancomycin    Consulting Provider: Latoya Luong   Indication: HAP  Target Concentration: Goal AUC/CHINMAY 400-600 mg*hr/L  Day of Therapy: 2  Additional Antimicrobials: cefepime    Pertinent Laboratory Values: Wt Readings from Last 1 Encounters:   09/30/22 164 lb 14.5 oz (74.8 kg)     Temp Readings from Last 1 Encounters:   09/30/22 98.6 °F (37 °C) (Bladder)     Estimated Creatinine Clearance: 17 mL/min (A) (based on SCr of 2.27 mg/dL (H)). Recent Labs     09/29/22  0848 09/29/22  1937 09/30/22  0305   CREATININE 2.01* 2.06* 2.27*   WBC 19.0*  --  15.4*     Procalcitonin: N/A    MRSA Nasal Swab: was ordered by provider, awaiting results. Recent vancomycin administrations                     vancomycin (VANCOCIN) 1500 mg in sodium chloride 0.9 % 250 mL IVPB (mg) 1,500 mg New Bag 09/29/22 1359                    Assessment:  Date/Time Current Dose Concentration Timing of Concentration (h) AUC   09/30/2022 1500 mg once 20.2 14 hr NA   Note: Serum concentrations collected for AUC dosing may appear elevated if collected in close proximity to the dose administered, this is not necessarily an indication of toxicity    Plan:  Patient kinetic paramaters within past range.    Initiate vancomycin 500 mg IV q24h  Repeat vancomycin concentration ordered for TBD @ TBD   Pharmacy will continue to monitor patient and adjust therapy as indicated    Thank you for the consult,  SEKOU Hicks [facility]  9/30/2022 9:40 AM

## 2022-09-30 NOTE — CARE COORDINATION
Transitional Planning  Pt extubated today currently on 6L oxygen and is on home oxygen.   Needs PT OT cb  Pt is current with [facility] and will resume at discharge goal was home w/HC continue to monitor progress now extub

## 2022-09-30 NOTE — PLAN OF CARE
Problem: Safety - Medical Restraint  Goal: Remains free of injury from restraints (Restraint for Interference with Medical Device)  Description: INTERVENTIONS:  1. Determine that other, less restrictive measures have been tried or would not be effective before applying the restraint  2. Evaluate the patient's condition at the time of restraint application  3. Inform patient/family regarding the reason for restraint  4.  Q2H: Monitor safety, psychosocial status, comfort, nutrition and hydration  9/29/2022 2306 by Sumanth Stuart RN  Outcome: Progressing  Flowsheets  Taken 9/29/2022 2200 by Sumanth Stuart RN  Remains free of injury from restraints (restraint for interference with medical device):   Determine that other, less restrictive measures have been tried or would not be effective before applying the restraint   Evaluate the patient's condition at the time of restraint application   Every 2 hours: Monitor safety, psychosocial status, comfort, nutrition and hydration  Taken 9/29/2022 2000 by Sumanth Stuart RN  Remains free of injury from restraints (restraint for interference with medical device):   Determine that other, less restrictive measures have been tried or would not be effective before applying the restraint   Evaluate the patient's condition at the time of restraint application   Every 2 hours: Monitor safety, psychosocial status, comfort, nutrition and hydration  Taken 9/29/2022 1800 by Marlyn Ortega RN  Remains free of injury from restraints (restraint for interference with medical device): Every 2 hours: Monitor safety, psychosocial status, comfort, nutrition and hydration  9/29/2022 1721 by Marlyn Ortega RN  Outcome: Progressing  Flowsheets  Taken 9/29/2022 1400  Remains free of injury from restraints (restraint for interference with medical device): Every 2 hours: Monitor safety, psychosocial status, comfort, nutrition and hydration  Taken 9/29/2022 1200  Remains free of injury from restraints (restraint for interference with medical device): Every 2 hours: Monitor safety, psychosocial status, comfort, nutrition and hydration  Taken 9/29/2022 1000  Remains free of injury from restraints (restraint for interference with medical device): Every 2 hours: Monitor safety, psychosocial status, comfort, nutrition and hydration  Taken 9/29/2022 0815  Remains free of injury from restraints (restraint for interference with medical device): Every 2 hours: Monitor safety, psychosocial status, comfort, nutrition and hydration     Problem: Respiratory - Adult  Goal: Achieves optimal ventilation and oxygenation  9/29/2022 2306 by Mallory Reyes RN  Outcome: Progressing  Flowsheets (Taken 9/29/2022 2000)  Achieves optimal ventilation and oxygenation:   Assess for changes in respiratory status   Assess for changes in mentation and behavior   Position to facilitate oxygenation and minimize respiratory effort   Oxygen supplementation based on oxygen saturation or arterial blood gases   Encourage broncho-pulmonary hygiene including cough, deep breathe, incentive spirometry   Assess the need for suctioning and aspirate as needed   Respiratory therapy support as indicated  9/29/2022 1721 by Mirian Vela RN  Outcome: Progressing  Flowsheets (Taken 9/29/2022 0815)  Achieves optimal ventilation and oxygenation:   Assess for changes in respiratory status   Assess for changes in mentation and behavior   Position to facilitate oxygenation and minimize respiratory effort   Oxygen supplementation based on oxygen saturation or arterial blood gases   Assess the need for suctioning and aspirate as needed   Respiratory therapy support as indicated     Problem: Discharge Planning  Goal: Discharge to home or other facility with appropriate resources  9/29/2022 2306 by Mallory Ryees RN  Outcome: Progressing  Flowsheets (Taken 9/29/2022 2000)  Discharge to home or other facility with appropriate resources: Identify barriers to discharge with patient and caregiver  9/29/2022 1721 by Chapis Nieves RN  Outcome: Progressing  Flowsheets (Taken 9/29/2022 0815)  Discharge to home or other facility with appropriate resources:   Identify barriers to discharge with patient and caregiver   Identify discharge learning needs (meds, wound care, etc)     Problem: Pain  Goal: Verbalizes/displays adequate comfort level or baseline comfort level  9/29/2022 2306 by Marah Pan RN  Outcome: Progressing  Flowsheets (Taken 9/29/2022 2000)  Verbalizes/displays adequate comfort level or baseline comfort level:   Encourage patient to monitor pain and request assistance   Assess pain using appropriate pain scale   Administer analgesics based on type and severity of pain and evaluate response  9/29/2022 1721 by Chapis Nieves RN  Outcome: Progressing  Flowsheets (Taken 9/29/2022 1200)  Verbalizes/displays adequate comfort level or baseline comfort level:   Assess pain using appropriate pain scale   Implement non-pharmacological measures as appropriate and evaluate response   Notify Licensed Independent Practitioner if interventions unsuccessful or patient reports new pain   Administer analgesics based on type and severity of pain and evaluate response     Problem: Skin/Tissue Integrity  Goal: Absence of new skin breakdown  Description: 1. Monitor for areas of redness and/or skin breakdown  2. Assess vascular access sites hourly  3. Every 4-6 hours minimum:  Change oxygen saturation probe site  4. Every 4-6 hours:  If on nasal continuous positive airway pressure, respiratory therapy assess nares and determine need for appliance change or resting period.   Outcome: Progressing     Problem: Safety - Adult  Goal: Free from fall injury  Outcome: Progressing  Flowsheets (Taken 9/29/2022 1735 by Chapis Nieves RN)  Free From Fall Injury: Instruct family/caregiver on patient safety     Problem: ABCDS Injury Assessment  Goal: Absence of physical injury  Outcome: Progressing  Flowsheets (Taken 9/29/2022 1735 by Adrian Vaughn RN)  Absence of Physical Injury: Implement safety measures based on patient assessment     Problem: Neurosensory - Adult  Goal: Achieves stable or improved neurological status  Outcome: Progressing  Goal: Absence of seizures  Outcome: Progressing  Goal: Remains free of injury related to seizures activity  Outcome: Progressing  Goal: Achieves maximal functionality and self care  Outcome: Progressing     Problem: Skin/Tissue Integrity - Adult  Goal: Skin integrity remains intact  Outcome: Progressing  Flowsheets  Taken 9/29/2022 2000 by Nicolette Ortega RN  Skin Integrity Remains Intact:   Monitor for areas of redness and/or skin breakdown   Assess vascular access sites hourly   Every 4-6 hours minimum: Change oxygen saturation probe site  Taken 9/29/2022 1735 by Adrian Vaughn RN  Skin Integrity Remains Intact:   Monitor for areas of redness and/or skin breakdown   Assess vascular access sites hourly   Every 4-6 hours minimum: Change oxygen saturation probe site   Every 4-6 hours: If on nasal continuous positive airway pressure, respiratory therapy assesses nares and determine need for appliance change or resting period  Goal: Incisions, wounds, or drain sites healing without S/S of infection  Outcome: Progressing  Goal: Oral mucous membranes remain intact  Outcome: Progressing     Problem: Musculoskeletal - Adult  Goal: Return mobility to safest level of function  Outcome: Progressing  Goal: Maintain proper alignment of affected body part  Outcome: Progressing  Goal: Return ADL status to a safe level of function  Outcome: Progressing     Problem: Genitourinary - Adult  Goal: Absence of urinary retention  Outcome: Progressing  Goal: Urinary catheter remains patent  Outcome: Progressing     Problem: Metabolic/Fluid and Electrolytes - Adult  Goal: Electrolytes maintained within normal limits  Outcome: Progressing  Goal: Hemodynamic stability and optimal renal function maintained  Outcome: Progressing  Goal: Glucose maintained within prescribed range  Outcome: Progressing     Problem: Anxiety  Goal: Will report anxiety at manageable levels  Description: INTERVENTIONS:  1. Administer medication as ordered  2. Teach and rehearse alternative coping skills  3. Provide emotional support with 1:1 interaction with staff  Outcome: Progressing     Problem: Coping  Goal: Pt/Family able to verbalize concerns and demonstrate effective coping strategies  Description: INTERVENTIONS:  1. Assist patient/family to identify coping skills, available support systems and cultural and spiritual values  2. Provide emotional support, including active listening and acknowledgement of concerns of patient and caregivers  3. Reduce environmental stimuli, as able  4. Instruct patient/family in relaxation techniques, as appropriate  5. Assess for spiritual pain/suffering and initiate Spiritual Care, Psychosocial Clinical Specialist consults as needed  Outcome: Progressing     Problem: Decision Making  Goal: Pt/Family able to effectively weigh alternatives and participate in decision making related to treatment and care  Description: INTERVENTIONS:  1. Determine when there are differences between patient's view, family's view, and healthcare provider's view of condition  2. Facilitate patient and family articulation of goals for care  3. Help patient and family identify pros/cons of alternative solutions  4. Provide information as requested by patient/family  5. Respect patient/family right to receive or not to receive information  6. Serve as a liaison between patient and family and health care team  7. Initiate Consults from Ethics, Palliative Care or initiate [address] [place] [address] as is appropriate  Outcome: Progressing     Problem: Confusion  Goal: Confusion, delirium, dementia, or psychosis is improved or at baseline  Description: INTERVENTIONS:  1.  Assess for possible contributors to thought disturbance, including medications, impaired vision or hearing, underlying metabolic abnormalities, dehydration, psychiatric diagnoses, and notify attending LIP  2. [place] high risk fall precautions, as indicated  3. Provide frequent short contacts to provide reality reorientation, refocusing and direction  4. Decrease environmental stimuli, including noise as appropriate  5. Monitor and intervene to maintain adequate nutrition, hydration, elimination, sleep and activity  6. If unable to ensure safety without constant attention obtain sitter and review sitter guidelines with assigned personnel  7. Initiate Psychosocial CNS and Spiritual Care consult, as indicated  Outcome: Progressing     Problem: Behavior  Goal: Pt/Family maintain appropriate behavior and adhere to behavioral management agreement, if implemented  Description: INTERVENTIONS:  1. Assess patient/family's coping skills and  non-compliant behavior (including use of illegal substances)  2. Notify security of behavior or suspected illegal substances which indicate the need for search of the family and/or belongings  3. Encourage verbalization of thoughts and concerns in a socially appropriate manner  4. Utilize positive, consistent limit setting strategies supporting safety of patient, staff and others  5. Encourage participation in the decision making process about the behavioral management agreement  6. If a visitor's behavior poses a threat to safety call refer to organization policy.   7. Initiate consult with , Psychosocial CNS, Spiritual Care as appropriate  Outcome: Progressing

## 2022-09-30 NOTE — PROGRESS NOTES
[facility] Medicine Services  INPATIENT PROGRESS NOTE    Patient Name: Tawny Shin  Date of Admission: 6/3/2022  Today's Date: 06/12/22  Length of Stay: 8  Primary Care Physician: Leta Julian DO    Subjective   Chief Complaint: right hip wound  HPI     Patient was seen and examined at bedside.  Patient much more awake and alert today.  She has had a normal day.  When I came to see the patient earlier the light in the room was off so I did not both her.  But per her and the nurse she has had a normal day without any issues.        Review of Systems   Constitutional: Positive for fatigue. Negative for activity change, appetite change, chills and fever.   Respiratory: Negative for cough and shortness of breath.    Cardiovascular: Negative for chest pain and palpitations.   Gastrointestinal: Negative for abdominal distention, abdominal pain, diarrhea, nausea and vomiting.   Neurological: Negative for weakness.   Psychiatric/Behavioral: Negative for decreased concentration and dysphoric mood.     All pertinent negatives and positives are as above. All other systems have been reviewed and are negative unless otherwise stated.     Objective    Temp:  [97.7 °F (36.5 °C)-98.4 °F (36.9 °C)] 98.4 °F (36.9 °C)  Heart Rate:  [60-66] 64  Resp:  [16-20] 20  BP: (130-178)/(43-66) 130/60  Physical Exam  Vitals and nursing note reviewed.   Constitutional:       Appearance: Normal appearance.   HENT:      Head: Normocephalic and atraumatic.      Mouth/Throat:      Mouth: Mucous membranes are dry.      Pharynx: Oropharynx is clear.   Eyes:      General: No scleral icterus.     Conjunctiva/sclera: Conjunctivae normal.   Cardiovascular:      Rate and Rhythm: Normal rate and regular rhythm.   Pulmonary:      Effort: Pulmonary effort is normal. No respiratory distress.      Breath sounds: Normal breath sounds.   Abdominal:      General: Abdomen is flat. Bowel sounds are normal. There is no  Critical Care Team - Daily Progress Note      Date and time: 2022 7:49 AM  Patient's name:  Jorge Harden Record Number: [id number]  Patient's account/billing number: [de-identified]  Patient's YOB: 1940  Age: 79 y.o. Date of Admission: 2022  4:29 AM  Length of stay during current admission: 1      Primary Care Physician: Nadia Patel MD  ICU Attending Physician: Dr. Tigre Patrick    Code Status: DNR-CCA    Reason for ICU admission: Respiratory Failure requiring Intubation. SUBJECTIVE:     OVERNIGHT EVENTS:       No acute events reported overnight. Patient is off nitro drip since yesterday. Patient is currently intubated on sedation. Propofol 15. Patient is Alert and oriented. following commands. Afebrile. Hemodynamically stable. In no acute distress. PRVC/20/300/. Patient placed on pressure support CPAP this [email or web address]. [email or web address]  Blood sugars controlled. Fever:-  Temp (24hrs), Av.4 °F (36.9 °C), Min:97.2 °F (36.2 °C), Max:99.7 °F (59.9 °C)    Systolic (51BBH), UOB:870 , Min:87 , EJO:459     Diastolic (32HHT), MGE:91, Min:57, Max:122      Urine output in the last 24 hours:  In: 1403.3 [I.V.:983.2; NG/GT:120]  Out: 1938 [XHYDL:5050]    Patient Vitals for the past 96 hrs (Last 3 readings):   Weight   22 0600 164 lb 14.5 oz (74.8 kg)   22 0823 154 lb 12.2 oz (70.2 kg)       AWAKE & FOLLOWING COMMANDS:  [] No   [x] Yes    CURRENT VENTILATION STATUS:     [x] Ventilator  [] BIPAP  [] Nasal Cannula [] Room Air        SECRETIONS Amount:  [] Small [] Moderate  [] Large  [x] None  Color:     [] White [] Colored  [] Bloody    SEDATION:  RAAS Score:  [x] Propofol gtt  [] Versed gtt  [] Ativan gtt   [] No Sedation    PARALYZED:  [x] No    [] Yes    DIARRHEA:                [x] No                [] Yes  (C. Difficile status: [] positive [] negative                                                                                                                     [] pending)    VASOPRESSORS:  [x] No    [] Yes    If yes -   [] Levophed       [] Dopamine     [] Vasopressin       [] Dobutamine  [] Phenylephrine         [] Epinephrine    CENTRAL LINES:     [x] No   [] Yes   (Date of Insertion:   )           If yes -     [] Right IJ     [] Left IJ [] Right Femoral [] Left Femoral                   [] Right Subclavian [] Left Subclavian       PEREZ'S CATHETER:   [] No   [x] Yes  (Date of Insertion:   )     URINE OUTPUT:            [x] Good   [] Low              [] Anuric    History Of Present Illness:   History was obtained from Chart Review . Tad Scott is a 79 y.o. who presented to the ER via EMS due to shortness of breath. Patient has longstanding history of CHF, hypertension, recurrent pneumonia, diabetes, pulmonary edema, NSTEMI's, coronary artery disease, atrial fibrillation not on anticoagulation, COPD, stage IV CKD. Patient was just discharged from [facility] on 9/23 secondary to hypoxia and CHF exacerbation. He echo was done at that time patient was found to have an EF of 25% with global hypokinesis. Patient was started on Lasix with improvement of symptoms discharged home on home O2. At that time cardiac catheterization was recommended but patient declined as she did not want worsening kidney function and any chance of dialysis. Patient came in today via EMS complaining of shortness of breath. In the emergency department patient was hypoxic in the 60s on BiPAP. Decision was made to intubate for airway protection. Patient is a DNR CCA, daughter was unsure about what her wishes were for intubation. Patient was started on propofol as well as a nitro drip. She was febrile with a leukocytosis of 15.1, Vanco and cefepime ordered for potentially hospital-acquired pneumonia.   Patient had labs significant for distension.      Palpations: Abdomen is soft. There is no mass.      Tenderness: There is no abdominal tenderness.      Hernia: No hernia is present.   Skin:     General: Skin is warm and dry.      Coloration: Skin is pale.   Neurological:      General: No focal deficit present.      Mental Status: She is alert and oriented to person, place, and time.   Psychiatric:         Attention and Perception: Attention normal.         Mood and Affect: Mood normal.         Cognition and Memory: Cognition normal.         Judgment: Judgment normal.       Results Review:  I have reviewed the labs, radiology results, and diagnostic studies.    Laboratory Data:   Results from last 7 days   Lab Units 06/12/22  0755 06/11/22  0613 06/10/22  0929   WBC 10*3/mm3 4.07 5.75 5.57   HEMOGLOBIN g/dL 8.4* 8.4* 8.9*   HEMATOCRIT % 27.7* 27.9* 29.4*   PLATELETS 10*3/mm3 179 205 201        Results from last 7 days   Lab Units 06/12/22  0755 06/11/22  0613 06/10/22  1019   SODIUM mmol/L 140 138 135*   POTASSIUM mmol/L 3.5 3.6 3.7   CHLORIDE mmol/L 107 103 101   CO2 mmol/L 27.0 26.0 28.0   BUN mg/dL 13 13 12   CREATININE mg/dL 0.71 0.76 0.64   CALCIUM mg/dL 8.5* 8.5* 8.5*   BILIRUBIN mg/dL 0.2 0.3 0.3   ALK PHOS U/L 84 84 93   ALT (SGPT) U/L 6 6 8   AST (SGOT) U/L 14 13 13   GLUCOSE mg/dL 93 83 107*       Culture Data:   Blood Culture   Date Value Ref Range Status   06/03/2022 No growth at 5 days  Final   06/03/2022 No growth at 5 days  Final     Urine Culture   Date Value Ref Range Status   06/04/2022 >100,000 CFU/mL Pseudomonas aeruginosa (A)  Final   06/04/2022 >100,000 CFU/mL Escherichia coli (A)  Final     Wound Culture   Date Value Ref Range Status   06/04/2022 Scant growth (1+) Staphylococcus aureus (A)  Final   06/04/2022 Light growth (2+) Staphylococcus aureus (A)  Final       Radiology Data:   Imaging Results (Last 24 Hours)     ** No results found for the last 24 hours. **          I have reviewed the patient's current medications.      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 Paul.  Patient was noted to have right hip pain and drainage from the site.    General surgery was consulted, the patient was taken to the OR on 6/4 by Dr. Salcido.  The CT scan showed inflamed fluid collection.  There was noted to be some necrotic tissue in there, the patient had significant irrigation and debridement of the necrotic tissue.  The patient went to OR again with Dr. Turner, bone culture obtained.  Wound cleaned copiously irrigated with vancomycin-containing saline.      Infectious disease was consulted, she has been followed by them periodically for history of infections in this area in the past.  Cultures have again grown staph aureus.  Patient's urine is growing Pseudomonas and E. coli.  Infectious disease managing antibiotics.    Plastic surgery was consulted, as they did her previous flap.  They recommended against the wound VAC and to reconsult orthopedic surgery.  Patient's MRI show concern for osteomyelitis as well as abscess formation in the deeper tissues.    Appreciate wound care consult as well.    Patient has had no right upper quadrant abdominal pain for me.    After patients confusion on 6/11, patient had EEG ordered.  MRI showed no acute processes, but did show that there were some inspissated secretions and fungal  potassium of 5.1, BUN/creatinine 45/2.13, blood glucose of 266, proBNP of 19,511 and a troponin of 53. Daughter is at bedside, I did discuss with her at length patient's condition. She is agreeable with continuing care as it is, patient remains a DNR CCA at this time. OBJECTIVE:     VITAL SIGNS:  BP (!) 144/78   Pulse 95   Temp 98.6 °F (37 °C) (Bladder)   Resp 20   Ht 5' 1\" (1.549 m)   Wt 164 lb 14.5 oz (74.8 kg)   SpO2 99%   BMI 31.16 kg/m²   Tmax over 24 hours:  Temp (24hrs), Av.4 °F (36.9 °C), Min:97.2 °F (36.2 °C), Max:99.7 °F (37.6 °C)      Patient Vitals for the past 6 hrs:   BP Temp Temp src Pulse Resp SpO2 Weight   22 0700 (!) 144/78 -- -- 95 20 99 % --   22 0600 (!) 142/59 98.6 °F (37 °C) Bladder 86 20 98 % 164 lb 14.5 oz (74.8 kg)   22 0500 (!) 142/74 -- -- 92 20 98 % --   22 0400 (!) 154/80 98.2 °F (36.8 °C) Bladder 87 19 99 % --   22 0323 -- -- -- 88 20 100 % --   22 0300 (!) 144/114 -- -- 94 18 99 % --   22 0200 (!) 153/79 97.7 °F (36.5 °C) Bladder 92 18 100 % --         Intake/Output Summary (Last 24 hours) at 2022 0749  Last data filed at 2022 0700  Gross per 24 hour   Intake 1403.34 ml   Output 1938 ml   Net -534.66 ml     Wt Readings from Last 2 Encounters:   22 164 lb 14.5 oz (74.8 kg)   22 155 lb 10.3 oz (70.6 kg)     Body mass index is 31.16 kg/m².         PHYSICAL EXAMINATION:    General appearance - Intubated and sedated , alert, well appearing, and in no distress  Mental status - alert, oriented to person, place, and time, normal mood, behavior, speech, dress, motor activity, and thought processes  Eyes - pupils equal and reactive, extraocular eye movements intact  Ears - bilateral TM's and external ear canals normal  Nose - normal and patent, no erythema, discharge or polyps  Chest - clear to auscultation, no wheezes, rales or rhonchi, symmetric air entry  Heart - normal rate, regular rhythm, normal S1, S2, no murmurs, rubs, clicks or gallops  Abdomen - soft, nontender, nondistended, no masses or organomegaly  Neurological - alert, oriented, normal speech, no focal findings or movement disorder noted}  Extremities - peripheral pulses normal, no pedal edema, no clubbing or cyanosis  Skin - normal coloration and turgor, no rashes, no suspicious skin lesions noted      Any additional physical findings:    MEDICATIONS:    Scheduled Meds:   sodium chloride flush  5-40 mL IntraVENous 2 times per day    sennosides-docusate sodium  1 tablet Oral BID    famotidine (PEPCID) injection  20 mg IntraVENous BID    heparin (porcine)  5,000 Units SubCUTAneous 3 times per day    vancomycin (VANCOCIN) intermittent dosing (placeholder)   Other RX Placeholder    insulin lispro  0-4 Units SubCUTAneous TID WC    insulin lispro  0-4 Units SubCUTAneous Nightly    bumetanide  1 mg IntraVENous BID    carvedilol  6.25 mg Oral BID WC    cefepime  1,000 mg IntraVENous Q24H     Continuous Infusions:   propofol 15 mcg/kg/min (09/30/22 0700)    sodium chloride 10 mL/hr at 09/30/22 0700    dextrose       PRN Meds:   sodium chloride flush, 5-40 mL, PRN  sodium chloride, , PRN  ondansetron, 4 mg, Q8H PRN   Or  ondansetron, 4 mg, Q6H PRN  polyethylene glycol, 17 g, Daily PRN  acetaminophen, 650 mg, Q6H PRN   Or  acetaminophen, 650 mg, Q6H PRN  fentanNYL, 50 mcg, Q1H PRN  glucose, 4 tablet, PRN  dextrose bolus, 125 mL, PRN   Or  dextrose bolus, 250 mL, PRN  glucagon (rDNA), 1 mg, PRN  dextrose, , Continuous PRN          VENT SETTINGS (Comprehensive) (if applicable):  Vent Information  Equipment Changed: Expiratory Filter, HME  Additional Respiratory Assessments  Heart Rate: 95  Resp: 20  SpO2: 99 %  End Tidal CO2: 38 (%)  Position: Semi-Govea's  Humidification Source: HME    ABGs:     Laboratory findings:    Complete Blood Count:   Recent Labs     09/29/22  0437 09/29/22  0848 09/30/22  0305   WBC 15.9* 19.0* 15.4*   HGB 13.5 12.1 11.8*   HCT 44.5 37.9 35.2* sinusitis would be in this differential.  If continues to be an issues may have to consider ENT consult to evaluate this further.  CXR and ABG unremarkable.      MRI Brain With & Without Contrast    Result Date: 6/10/2022  EXAMINATION:  MRI BRAIN W WO CONTRAST-  6/10/2022 7:00 PM CDT  HISTORY: Mental status change, unknown cause.  TECHNIQUE: Multiplanar imaging was performed in a high field magnet before and after gadolinium contrast administration.  COMPARISON: 2/9/2021.  FINDINGS: There is no evidence of acute infarct on the diffusion-weighted sequence. There are scattered areas of T2 high signal within the hemispheric periventricular white matter. There is a rim of T2 high signal seen best on the FLAIR sequence around the lateral ventricles. There is mild atrophy. There is moderate dilatation of the lateral and third ventricles. There are no mass lesions. There are no areas of abnormal contrast enhancement intracranially. There is a partially empty appearance of the sella turcica. There is a small amount of fluid in the optic nerve sheaths. The transverse sinuses are normal in size. There is mucosal thickening in the paranasal sinuses predominantly in the maxillary, ethmoid and sphenoid sinuses. There is T1 high signal within the maxillary and sphenoid sinuses and to a lesser extent within the posterior left ethmoid sinus. These areas demonstrate relative flow void on T2 images. There is mucosal enhancement of the paranasal sinuses.      Impression: 1. Scattered areas of T2 high signal within the hemispheric white matter are nonspecific and likely due to chronic small vessel disease. 2. Mild atrophy. 3. There is moderate dilatation of the lateral and third ventricles. This may be related to the atrophy. There is a band of T2 high signal seen best on the FLAIR sequence surrounding the lateral ventricles suggesting possible transependymal flow. Hydrocephalus is not ruled out. 4. Sinus disease, as described. T1 high   288 258        Last 3 Blood Glucose:   Recent Labs     09/29/22  0848 09/29/22 1937 09/30/22  0305   GLUCOSE 160* 136* 94        PT/INR:    Lab Results   Component Value Date/Time    PROTIME 10.1 09/29/2022 04:37 AM    INR 1.0 09/29/2022 04:37 AM     PTT:    Lab Results   Component Value Date/Time    APTT 24.8 09/29/2022 04:37 AM       Comprehensive Metabolic Profile:   Recent Labs     09/29/22  0437 09/29/22  0848 09/29/22 1937 09/30/22  0305   * 134* 134* 135   K 5.1 5.6* 5.4* 5.2   CL 93* 97* 97* 96*   CO2 20 25 25 25   BUN 45* 48* 47* 47*   CREATININE 2.13* 2.01* 2.06* 2.27*   GLUCOSE 266* 160* 136* 94   CALCIUM 9.5 9.0 9.4 9.5   PROT 8.1  --   --   --    LABALBU 3.7  --   --   --    BILITOT 0.4  --   --   --    ALKPHOS 95  --   --   --    AST 22  --   --   --    ALT 9  --   --   --       Magnesium:   Lab Results   Component Value Date/Time    MG 1.9 09/22/2022 04:54 AM     Phosphorus:   Lab Results   Component Value Date/Time    PHOS 3.1 05/21/2021 06:59 AM     Ionized Calcium:   Lab Results   Component Value Date/Time    CAION 1.13 01/14/2020 05:56 AM        Urinalysis:     Troponin: No results for input(s): TROPONINI in the last 72 hours. Microbiology:    Cultures during this admission:     Blood cultures:                 [] None drawn      [x] Negative             []  Positive (Details:  )  Urine Culture:                   [] None drawn      [x] Negative             []  Positive (Details:  )  Sputum Culture:               [x] None drawn       [] Negative             []  Positive (Details:  )   Endotracheal aspirate:     [x] None drawn       [] Negative             []  Positive (Details:  )     Other pertinent Labs:       Radiology/Imaging:     Chest Xray (9/30/2022):    ASSESSMENT:     Principal Problem:    Heart failure ([facility] [address]  Active Problems:    Acute respiratory failure with hypoxia ([facility] [address]  Resolved Problems:    * No resolved hospital problems.  *            PLAN:     WEAN PER signal within the maxillary, sphenoid and posterior left ethmoid region could be related to inspissated secretions. Fungal sinusitis is also in the differential diagnosis.  This report was finalized on 06/10/2022 19:56 by Dr. Zurdo Vazquez MD.      Home medications reviewed, and resumed as appropriate.    Once we determine duration of antibiotics we can likely discharge the patient back to SNF with a PICC line.  Will defer this to infectious disease.    Discharge Planning: I expect the patient to be discharged to SNF > 2 days.  Electronically signed by Moises Oliveira MD, 06/12/22, 19:02 CDT.   PROTOCOL:  [] No   [x] Yes  [] N/A    DISCONTINUE ANY LABS:   [x] No   [] Yes    ICU PROPHYLAXIS:  Stress ulcer:  [] PPI Agent  [x] [id number] [] Sucralfate  [] Other:  VTE:   [] Enoxaparin  [x] Unfract. Heparin Subcut  [] EPC Cuffs    NUTRITION:  [x] NPO [] Tube Feeding (Specify: ) [] TPN  [] PO (Diet: Diet NPO)    HOME MEDICATIONS RECONCILED: [] No  [x] Yes    INSULIN DRIP:   [x] No   [] Yes    CONSULTATION NEEDED:  [x] No   [] Yes    FAMILY UPDATED:    [x] No   [] Yes    TRANSFER OUT OF ICU:   [x] No   [] Yes    ADDITIONAL PLAN:    Neurologic:   Neuro intact  Neuro checks per protocol  Sedation:Propofol  Pain-PRN fentanyl    Cardiovascular:  Hemodynamically stable  HR  MAP greater than 65. CHF with recent EF of 25%  Pt recently refused cardiac cath . Hx of HTN     Pulmonary:  Maintain oxygen sats >92%  Pulmonary toilet  CXR with with cardiomegaly and vascular congestion. Right sided pleural effusion   COPD hx   Home Spiriva and albuterol   Concern for Hospital acquired pneumonia   Vancomycin and cefepime       GI/Nutrition  Ulcer Prophylaxis: H2 blockers not indicated  Diet:Diet NPO    Renal/Fluid/Electrolyte  Stage IV CKD   BUN/Cr 45/2.13   MIVF at [facility]   I/O: In: 1403.3 [I.V.:983.2; NG/GT:120]  Out:  [Urine:1758]  Monitor electrolytes, replace PRN     ID  WBC:   Lab Results   Component Value Date    WBC 15.4 (H) 2022     Tmax: Temp (24hrs), Av.4 °F (36.9 °C), Min:97.2 °F (36.2 °C), Max:99.7 °F (37.6 °C)    Antimicrobials: vancomycin and Cefepime   Hematology:  Recent Labs     22  0437 22  0848 22  0305   HGB 13.5 12.1 11.8*    stable  Endocrine:   glucose controlled - most recent BGL is   Recent Labs     22  0848 22  1937 22  0305   GLUCOSE 160* 136* 94     DVT Prophylaxis  Heparin        Filomena Dover MD MCELESTINE.              Critical care resident,  Department of Internal Medicine/ Critical care  [facility], [facility] 9/30/2022, 7:49 AM

## 2022-09-30 NOTE — PROGRESS NOTES
[facility] [address]  Occupational Therapy Not Seen Note    DATE: 2022    NAME: Ramakrishna Fitzgerald  MRN: [id number]   : 1940      Patient not seen this date for Occupational Therapy due to:    Patient Declined: Pt adamantly declined  OT evaluation this PM despite max encouragement and education. Educated on importance of early mobilization and activity. Pt reports motivated to continue to live alone, but not receptive to the importance of therapy's role in assisting pt to reach this goal. Pt became frustrated.      Next Scheduled Treatment: Check back 10/1/2022     Electronically signed by Nino Powell OT on 2022 at 1:57 PM

## 2022-09-30 NOTE — PROGRESS NOTES
Physical Therapy Cancel Note      DATE: 2022    NAME: Steven Rodriguez  MRN: [id number]   : 1940      Patient not seen this date for Physical Therapy due to: Other: pt intubated, sedated but alert, following commands; per RN, working on extubating today sometime; will hold PT eval until pt extubated to mobilize pt as medically appropriate.   Check 10/1      Electronically signed by Jag Branham PT on 2022 at 9:23 AM

## 2022-10-01 ENCOUNTER — APPOINTMENT (OUTPATIENT)
Dept: GENERAL RADIOLOGY | Age: 82
DRG: 871 | End: 2022-10-01
Payer: MEDICARE

## 2022-10-01 LAB
ABSOLUTE EOS #: 0.09 K/UL (ref 0–0.44)
ABSOLUTE IMMATURE GRANULOCYTE: 0.03 K/UL (ref 0–0.3)
ABSOLUTE LYMPH #: 1.08 K/UL (ref 1.1–3.7)
ABSOLUTE MONO #: 0.39 K/UL (ref 0.1–1.2)
ANION GAP SERPL CALCULATED.3IONS-SCNC: 13 MMOL/L (ref 9–17)
BASOPHILS # BLD: 1 % (ref 0–2)
BASOPHILS ABSOLUTE: 0.05 K/UL (ref 0–0.2)
BUN BLDV-MCNC: 48 MG/DL (ref 8–23)
CALCIUM SERPL-MCNC: 9.4 MG/DL (ref 8.6–10.4)
CHLORIDE BLD-SCNC: 95 MMOL/L (ref 98–107)
CO2: 28 MMOL/L (ref 20–31)
CREAT SERPL-MCNC: 2.25 MG/DL (ref 0.5–0.9)
EOSINOPHILS RELATIVE PERCENT: 1 % (ref 1–4)
GFR AFRICAN AMERICAN: 25 ML/MIN
GFR NON-AFRICAN AMERICAN: 21 ML/MIN
GFR SERPL CREATININE-BSD FRML MDRD: ABNORMAL ML/MIN/{1.73_M2}
GLUCOSE BLD-MCNC: 104 MG/DL (ref 65–105)
GLUCOSE BLD-MCNC: 111 MG/DL (ref 70–99)
GLUCOSE BLD-MCNC: 89 MG/DL (ref 65–105)
GLUCOSE BLD-MCNC: 95 MG/DL (ref 65–105)
GLUCOSE BLD-MCNC: 99 MG/DL (ref 65–105)
HCT VFR BLD CALC: 34.8 % (ref 36.3–47.1)
HEMOGLOBIN: 11.4 G/DL (ref 11.9–15.1)
IMMATURE GRANULOCYTES: 0 %
LYMPHOCYTES # BLD: 10 % (ref 24–43)
MCH RBC QN AUTO: 29.5 PG (ref 25.2–33.5)
MCHC RBC AUTO-ENTMCNC: 32.8 G/DL (ref 28.4–34.8)
MCV RBC AUTO: 90.2 FL (ref 82.6–102.9)
MONOCYTES # BLD: 4 % (ref 3–12)
NRBC AUTOMATED: 0 PER 100 WBC
PDW BLD-RTO: 17.2 % (ref 11.8–14.4)
PLATELET # BLD: 239 K/UL (ref 138–453)
PMV BLD AUTO: 9.6 FL (ref 8.1–13.5)
POTASSIUM SERPL-SCNC: 4.3 MMOL/L (ref 3.7–5.3)
RBC # BLD: 3.86 M/UL (ref 3.95–5.11)
RBC # BLD: ABNORMAL 10*6/UL
SEG NEUTROPHILS: 84 % (ref 36–65)
SEGMENTED NEUTROPHILS ABSOLUTE COUNT: 8.76 K/UL (ref 1.5–8.1)
SODIUM BLD-SCNC: 136 MMOL/L (ref 135–144)
URIC ACID: 8.8 MG/DL (ref 2.4–5.7)
WBC # BLD: 10.4 K/UL (ref 3.5–11.3)

## 2022-10-01 PROCEDURE — 2580000003 HC RX 258: Performed by: STUDENT IN AN ORGANIZED HEALTH CARE EDUCATION/TRAINING PROGRAM

## 2022-10-01 PROCEDURE — 85025 COMPLETE CBC W/AUTO DIFF WBC: CPT

## 2022-10-01 PROCEDURE — 80048 BASIC METABOLIC PNL TOTAL CA: CPT

## 2022-10-01 PROCEDURE — 99291 CRITICAL CARE FIRST HOUR: CPT | Performed by: INTERNAL MEDICINE

## 2022-10-01 PROCEDURE — 36415 COLL VENOUS BLD VENIPUNCTURE: CPT

## 2022-10-01 PROCEDURE — 6360000002 HC RX W HCPCS: Performed by: STUDENT IN AN ORGANIZED HEALTH CARE EDUCATION/TRAINING PROGRAM

## 2022-10-01 PROCEDURE — 94761 N-INVAS EAR/PLS OXIMETRY MLT: CPT

## 2022-10-01 PROCEDURE — 2700000000 HC OXYGEN THERAPY PER DAY

## 2022-10-01 PROCEDURE — 82947 ASSAY GLUCOSE BLOOD QUANT: CPT

## 2022-10-01 PROCEDURE — 6370000000 HC RX 637 (ALT 250 FOR IP): Performed by: STUDENT IN AN ORGANIZED HEALTH CARE EDUCATION/TRAINING PROGRAM

## 2022-10-01 PROCEDURE — 84550 ASSAY OF BLOOD/URIC ACID: CPT

## 2022-10-01 PROCEDURE — 1200000000 HC SEMI PRIVATE

## 2022-10-01 PROCEDURE — 87641 MR-STAPH DNA AMP PROBE: CPT

## 2022-10-01 PROCEDURE — 2500000003 HC RX 250 WO HCPCS: Performed by: STUDENT IN AN ORGANIZED HEALTH CARE EDUCATION/TRAINING PROGRAM

## 2022-10-01 RX ORDER — OXYCODONE HYDROCHLORIDE AND ACETAMINOPHEN 5; 325 MG/1; MG/1
1 TABLET ORAL ONCE
Status: COMPLETED | OUTPATIENT
Start: 2022-10-01 | End: 2022-10-01

## 2022-10-01 RX ORDER — OXYCODONE HYDROCHLORIDE AND ACETAMINOPHEN 5; 325 MG/1; MG/1
1 TABLET ORAL EVERY 4 HOURS PRN
Status: DISCONTINUED | OUTPATIENT
Start: 2022-10-01 | End: 2022-10-01

## 2022-10-01 RX ORDER — KETOROLAC TROMETHAMINE 15 MG/ML
10 INJECTION, SOLUTION INTRAMUSCULAR; INTRAVENOUS ONCE
Status: COMPLETED | OUTPATIENT
Start: 2022-10-01 | End: 2022-10-01

## 2022-10-01 RX ORDER — LABETALOL HYDROCHLORIDE 5 MG/ML
5 INJECTION, SOLUTION INTRAVENOUS ONCE
Status: COMPLETED | OUTPATIENT
Start: 2022-10-01 | End: 2022-10-01

## 2022-10-01 RX ADMIN — CARVEDILOL 6.25 MG: 6.25 TABLET, FILM COATED ORAL at 16:40

## 2022-10-01 RX ADMIN — CARVEDILOL 6.25 MG: 6.25 TABLET, FILM COATED ORAL at 08:27

## 2022-10-01 RX ADMIN — SODIUM CHLORIDE, PRESERVATIVE FREE 10 ML: 5 INJECTION INTRAVENOUS at 08:28

## 2022-10-01 RX ADMIN — CEFEPIME 1000 MG: 1 INJECTION, POWDER, FOR SOLUTION INTRAMUSCULAR; INTRAVENOUS at 11:21

## 2022-10-01 RX ADMIN — SODIUM CHLORIDE, PRESERVATIVE FREE 20 MG: 5 INJECTION INTRAVENOUS at 21:03

## 2022-10-01 RX ADMIN — SODIUM CHLORIDE, PRESERVATIVE FREE 20 MG: 5 INJECTION INTRAVENOUS at 08:27

## 2022-10-01 RX ADMIN — HEPARIN SODIUM 5000 UNITS: 5000 INJECTION INTRAVENOUS; SUBCUTANEOUS at 14:35

## 2022-10-01 RX ADMIN — ONDANSETRON 4 MG: 2 INJECTION INTRAMUSCULAR; INTRAVENOUS at 21:27

## 2022-10-01 RX ADMIN — HEPARIN SODIUM 5000 UNITS: 5000 INJECTION INTRAVENOUS; SUBCUTANEOUS at 21:04

## 2022-10-01 RX ADMIN — OXYCODONE HYDROCHLORIDE AND ACETAMINOPHEN 1 TABLET: 5; 325 TABLET ORAL at 18:00

## 2022-10-01 RX ADMIN — HEPARIN SODIUM 5000 UNITS: 5000 INJECTION INTRAVENOUS; SUBCUTANEOUS at 07:24

## 2022-10-01 RX ADMIN — BUMETANIDE 1 MG: 0.25 INJECTION, SOLUTION INTRAMUSCULAR; INTRAVENOUS at 08:29

## 2022-10-01 RX ADMIN — Medication 5 MG: at 23:34

## 2022-10-01 RX ADMIN — KETOROLAC TROMETHAMINE 10 MG: 15 INJECTION, SOLUTION INTRAMUSCULAR; INTRAVENOUS at 11:27

## 2022-10-01 RX ADMIN — BUMETANIDE 1 MG: 0.25 INJECTION, SOLUTION INTRAMUSCULAR; INTRAVENOUS at 21:04

## 2022-10-01 RX ADMIN — VANCOMYCIN HYDROCHLORIDE 500 MG: 500 INJECTION, POWDER, LYOPHILIZED, FOR SOLUTION INTRAVENOUS at 09:35

## 2022-10-01 RX ADMIN — SODIUM CHLORIDE, PRESERVATIVE FREE 10 ML: 5 INJECTION INTRAVENOUS at 21:03

## 2022-10-01 RX ADMIN — DOCUSATE SODIUM 50 MG AND SENNOSIDES 8.6 MG 1 TABLET: 8.6; 5 TABLET, FILM COATED ORAL at 08:27

## 2022-10-01 ASSESSMENT — PAIN SCALES - GENERAL: PAINLEVEL_OUTOF10: 0

## 2022-10-01 NOTE — PROGRESS NOTES
Critical Care Team - Daily Progress Note      Date and time: 10/1/2022 2:05 PM  Patient's name:  Jorge Harden Record Number: [id number]  Patient's account/billing number: [de-identified]  Patient's YOB: 1940  Age: 79 y.o. Date of Admission: 9/29/2022  4:29 AM  Length of stay during current admission: 2      Primary Care Physician: Alexis Lozano MD  ICU Attending Physician: Dr. Mooney [place] Status: DNR-CCA    Reason for ICU admission:   Chief Complaint   Patient presents with    Respiratory Distress         SUBJECTIVE:   HPI:  History was obtained from Chart Review . Jennifer Multani is a 79 y.o. who presented to the ER via EMS due to shortness of breath. Patient has longstanding history of CHF, hypertension, recurrent pneumonia, diabetes, pulmonary edema, NSTEMI's, coronary artery disease, atrial fibrillation not on anticoagulation, COPD, stage IV CKD. Patient was just discharged from [facility] on 9/23 secondary to hypoxia and CHF exacerbation. He echo was done at that time patient was found to have an EF of 25% with global hypokinesis. Patient was started on Lasix with improvement of symptoms discharged home on home O2. At that time cardiac catheterization was recommended but patient declined as she did not want worsening kidney function and any chance of dialysis. Patient came in today via EMS complaining of shortness of breath. In the emergency department patient was hypoxic in the 60s on BiPAP. Decision was made to intubate for airway protection. Patient is a DNR CCA, daughter was unsure about what her wishes were for intubation. Patient was started on propofol as well as a nitro drip. She was febrile with a leukocytosis of 15.1, Vanco and cefepime ordered for potentially hospital-acquired pneumonia. Patient had labs significant for potassium of 5.1, BUN/creatinine 45/2.13, blood glucose of 266, proBNP of 19,511 and a troponin of 53.      Daughter is at bedside, I did discuss with her at length patient's condition. She is agreeable with continuing care as it is, patient remains a DNR CCA at this time. 9/30 patient was extubated this morning. Tolerated well. No immediate complications. Resumed oral diet. Interval history:    - During bedside evaluation, the patient was alert, oriented and in no acute distress. She reported feeling tired but she has been feeling better than yesterday. - Patient was breathing through nasal cannula with 6 L oxygen. - The patient denies any shortness of breath, cough, chest pain, fever, chills, headache, abdominal pain, constipation, diarrhea, pain with urination.   - The patient did have sputum production which was greenish in color.    - She also reported having tenderness bilaterally in the legs. - Patient had a bowel movement yesterday. Kitchen catheter was taken out and patient is urinating fine. OVERNIGHT EVENTS:       No acute events overnight.     AWAKE & FOLLOWING COMMANDS:  [] No   [x] Yes    CURRENT VENTILATION STATUS:     [] Ventilator  [] BIPAP  [x] Nasal Cannula [] Room Air      SECRETIONS Amount:  [] Small [x] Moderate  [] Large  [] None  Color:     [] White [x] Colored  [] Bloody    SEDATION:  RAAS Score:  [] Propofol gtt  [] Versed gtt  [] Ativan gtt   [x] No Sedation    PARALYZED:  [x] No    [] Yes    DIARRHEA:                [x] No                [] Yes  (C. Difficile status: [] positive                                                                                                                       [] negative                                                                                                                     [] pending)    VASOPRESSORS:  [x] No    [] Yes    If yes -   [] Levophed       [] Dopamine     [] Vasopressin       [] Dobutamine  [] Phenylephrine         [] Epinephrine    CENTRAL LINES:     [x] No   [] Yes   (Date of Insertion:   )           If yes -     [] Right IJ     [] Left IJ [] Right Femoral [] Left Femoral                   [] Right Subclavian [] Left Subclavian       PEREZ'S CATHETER:   [x] No   [] Yes  (Date of Insertion:   )     URINE OUTPUT:            [x] Good   [] Low              [] Anuric      OBJECTIVE:     VITAL SIGNS:  BP (!) 149/89   Pulse 77   Temp 98.6 °F (37 °C) (Oral)   Resp 16   Ht 5' 1\" (1.549 m)   Wt 154 lb 12.2 oz (70.2 kg)   SpO2 97%   BMI 29.24 kg/m²   Tmax over 24 hours:  Temp (24hrs), Av.2 °F (37.3 °C), Min:98.6 °F (37 °C), Max:99.9 °F (37.7 °C)      Patient Vitals for the past 8 hrs:   BP Temp Temp src Pulse Resp SpO2   10/01/22 1000 (!) 149/89 -- -- 77 16 --   10/01/22 0945 -- -- -- 75 16 97 %   10/01/22 0900 (!) 143/83 -- -- 82 16 --   10/01/22 0800 (!) 164/95 98.6 °F (37 °C) Oral 85 16 --   10/01/22 0700 (!) 170/91 -- -- 92 16 --         Intake/Output Summary (Last 24 hours) at 10/1/2022 1405  Last data filed at 10/1/2022 0900  Gross per 24 hour   Intake 150 ml   Output 475 ml   Net -325 ml     Date 10/01/22 0000 - 10/01/22 2359   Shift 3965-0276 2730-6913 1805-7077 24 Hour Total   INTAKE   P.O.(mL/kg/hr)  50  50   Shift Total(mL/kg)  50(0.7)  50(0.7)   OUTPUT   Shift Total(mL/kg)       Weight (kg) 70.2 70.2 70.2 70.2     Wt Readings from Last 3 Encounters:   10/01/22 154 lb 12.2 oz (70.2 kg)   22 155 lb 10.3 oz (70.6 kg)   21 190 lb 4.1 oz (86.3 kg)     Body mass index is 29.24 kg/m². PHYSICAL EXAM:  Constitutional: Appears well, no distress  EENT: PERRLA, EOMI, sclera clear, anicteric, oropharynx clear, no lesions, neck supple with midline trachea. Neck: Supple, symmetrical, trachea midline  Respiratory: clear to auscultation, no wheezes or rales and unlabored breathing. No intercostal tenderness  Cardiovascular: regular rate and rhythm, normal S1, S2, no murmur noted and 2+ pulses throughout  Abdomen: soft, nontender, nondistended, no masses or organomegaly  NEUROLOGIC: Awake, alert, oriented to name, place and time. Extremities:  peripheral pulses normal, no pedal edema, no clubbing or cyanosis. Extreme tenderness on palpation of bilateral lower limbs was noted. SKIN: Erythema and ulceration on bilateral lower legs noted.     Any additional physical findings:      MEDICATIONS:  Scheduled Meds:   sodium chloride flush  5-40 mL IntraVENous 2 times per day    sennosides-docusate sodium  1 tablet Oral BID    famotidine (PEPCID) injection  20 mg IntraVENous BID    heparin (porcine)  5,000 Units SubCUTAneous 3 times per day    insulin lispro  0-4 Units SubCUTAneous TID WC    insulin lispro  0-4 Units SubCUTAneous Nightly    bumetanide  1 mg IntraVENous BID    carvedilol  6.25 mg Oral BID WC    cefepime  1,000 mg IntraVENous Q24H     Continuous Infusions:   sodium chloride 10 mL/hr at 09/30/22 0700    dextrose       PRN Meds:   sodium chloride flush, 5-40 mL, PRN  sodium chloride, , PRN  ondansetron, 4 mg, Q8H PRN   Or  ondansetron, 4 mg, Q6H PRN  polyethylene glycol, 17 g, Daily PRN  acetaminophen, 650 mg, Q6H PRN   Or  acetaminophen, 650 mg, Q6H PRN  fentanNYL, 50 mcg, Q1H PRN  glucose, 4 tablet, PRN  dextrose bolus, 125 mL, PRN   Or  dextrose bolus, 250 mL, PRN  glucagon (rDNA), 1 mg, PRN  dextrose, , Continuous PRN        SUPPORT DEVICES: [] Ventilator [] BIPAP  [x] Nasal Cannula [] Room Air    Vent Information  Equipment Changed: Expiratory Filter, HME  Vent Mode: (S) CPAP/PS (Per MD)  Additional Respiratory Assessments  Heart Rate: 77  Resp: 16  SpO2: 97 %  End Tidal CO2: 38 (%)  Position: Semi-Govea's  Humidification Source: HME      Lab Results   Component Value Date/Time    PHART 7.472 09/20/2022 01:32 PM    FUH4XKV 35.1 09/20/2022 01:32 PM    PO2ART 78.9 09/20/2022 01:32 PM    LHD0YQY 25.6 09/20/2022 01:32 PM    USM6HRM 27 01/14/2020 02:14 AM    K2ZVFTGW 94.7 09/20/2022 01:32 PM    FIO2 30.0 09/30/2022 04:43 AM     Lactic Acid:   Lab Results   Component Value Date/Time    LACTA 2.2 09/20/2022 03:51 AM    LACTA 4.1 09/20/2022 12:35 AM    LACTA NOT REPORTED 09/13/2019 09:14 PM         DATA:  Complete Blood Count:   Recent Labs     09/29/22  0848 09/30/22  0305 10/01/22  0850   WBC 19.0* 15.4* 10.4   HGB 12.1 11.8* 11.4*   MCV 90.9 89.6 90.2    258 239   RBC 4.17 3.93* 3.86*   HCT 37.9 35.2* 34.8*   MCH 29.0 30.0 29.5   MCHC 31.9 33.5 32.8   RDW 17.0* 17.0* 17.2*   MPV 9.6 9.8 9.6        PT/INR:    Lab Results   Component Value Date/Time    PROTIME 10.1 09/29/2022 04:37 AM    INR 1.0 09/29/2022 04:37 AM     PTT:    Lab Results   Component Value Date/Time    APTT 24.8 09/29/2022 04:37 AM       Basal Metabolic Profile:   Recent Labs     09/29/22  1937 09/30/22  0305 10/01/22  0850   * 135 136   K 5.4* 5.2 4.3   BUN 47* 47* 48*   CREATININE 2.06* 2.27* 2.25*   CL 97* 96* 95*   CO2 25 25 28      Magnesium:   Lab Results   Component Value Date/Time    MG 1.9 09/22/2022 04:54 AM    MG 2.0 09/21/2022 03:39 PM    MG 3.4 09/20/2022 12:35 AM     Phosphorus:   Lab Results   Component Value Date/Time    PHOS 3.1 05/21/2021 06:59 AM    PHOS 3.4 05/20/2021 06:49 AM    PHOS 3.6 05/19/2021 06:09 AM     S. Calcium:  Recent Labs     10/01/22  0850   CALCIUM 9.4     S. Ionized Calcium:No results for input(s): IONCA in the last 72 hours.       Urinalysis:   Lab Results   Component Value Date/Time    NITRU NEGATIVE 09/29/2022 05:30 AM    COLORU Yellow 09/29/2022 05:30 AM    PHUR 5.5 09/29/2022 05:30 AM    WBCUA TOO NUMEROUS TO COUNT 09/29/2022 05:30 AM    RBCUA 2 TO 5 09/29/2022 05:30 AM    MUCUS NOT REPORTED 05/15/2021 01:09 PM    TRICHOMONAS NOT REPORTED 05/15/2021 01:09 PM    YEAST MODERATE 09/29/2022 05:30 AM    BACTERIA MANY 09/20/2022 02:36 AM    SPECGRAV 1.019 09/29/2022 05:30 AM    LEUKOCYTESUR LARGE 09/29/2022 05:30 AM    UROBILINOGEN Normal 09/29/2022 05:30 AM    BILIRUBINUR NEGATIVE 09/29/2022 05:30 AM    GLUCOSEU 1+ 09/29/2022 05:30 AM    KETUA NEGATIVE 09/29/2022 05:30 AM    AMORPHOUS NOT REPORTED 05/15/2021 01:09 PM CARDIAC ENZYMES: No results for input(s): CKMB, CKMBINDEX, TROPONINI in the last 72 hours. Invalid input(s): CKTOTAL;3  BNP: No results for input(s): BNP in the last 72 hours. LFTS  Recent Labs     09/29/22  0437   ALKPHOS 95   ALT 9   AST 22   BILITOT 0.4   BILIDIR 0.1   LABALBU 3.7       AMYLASE/LIPASE/AMMONIA  No results for input(s): AMYLASE, LIPASE, AMMONIA in the last 72 hours. Last 3 Blood Glucose:   Recent Labs     09/29/22  0437 09/29/22  0848 09/29/22  1937 09/30/22  0305 10/01/22  0850   GLUCOSE 266* 160* 136* 94 111*      HgBA1c:    Lab Results   Component Value Date/Time    LABA1C 6.7 07/15/2019 06:27 AM         TSH:    Lab Results   Component Value Date/Time    TSH 2.31 05/16/2021 07:07 AM     ANEMIA STUDIES  No results for input(s): LABIRON, TIBC, FERRITIN, LQQUFAMY91, FOLATE, OCCULTBLD in the last 72 hours. Cultures during this admission:     Blood cultures:                 [] None drawn      [x] Negative             []  Positive (Details:  )  Urine Culture:                   [] None drawn      [x] Negative             []  Positive (Details:  )  Sputum Culture:               [x] None drawn       [] Negative             []  Positive (Details:  )   Endotracheal aspirate:     [x] None drawn       [] Negative             []  Positive (Details:  )             Chest Xray (10/1/2022): Chest x-ray done on 9/30/2022  Impression   Improving congestive changes with mild residual patchy parenchymal opacities. ASSESSMENT:     Principal Problem:    Heart failure (HCC)  Active Problems:    Acute respiratory failure with hypoxia (HCC)  Resolved Problems:    * No resolved hospital problems.  *       PLAN:     PLAN/MEDICAL DECISION MAKING:  Neurologic:   Neuro intact  Neuro checks per protocol  no sedation  Patient alert and oriented  Cardiovascular:  Hemodynamically stable  MAP goal >65  Heart rate 77; sinus rhythm  Blood pressure 149/89  Not on pressors  On Coreg for hypertension. Pulmonary:  Maintain oxygen sats >92%  Pulmonary toilet  On 6 L nasal cannula oxygen. Patient extubated on 2022. Vent Information  Equipment Changed: Expiratory Filter, HME  Vent Mode: (S) CPAP/PS (Per MD)  GI/Nutrition  sennakot 8.6-50 mg 1 tablet oral 2 times daily. Ulcer Prophylaxis:  Pepcid 20 mg    Diet:ADULT DIET; Regular  Renal/Fluid/Electrolyte  IV Fluids: 0.9% sodium chloride 5- to 50 mL/h as needed  I/O: In: 250 [P.O.:250]  Out: 1325 [Urine:1325]  UOP: 0.8 cc/kg/hr  Monitor electrolytes, replace PRN   ID  WBC:   Lab Results   Component Value Date    WBC 10.4 10/01/2022     Tmax: Temp (24hrs), Av.2 °F (37.3 °C), Min:98.6 °F (37 °C), Max:99.9 °F (37.7 °C)    Antimicrobials: Was on vancomycin and cefepime. Vancomycin was discontinued on 10/1/2022. Hematology:  Recent Labs     22  0848 22  0305 10/01/22  0850   HGB 12.1 11.8* 11.4*    trending down  Endocrine:   glucose controlled - most recent BGL is   Recent Labs     22  1937 22  0305 10/01/22  0850   GLUCOSE 136* 94 111*     DVT Prophylaxis  Heparin  Discharge Needs:    , PT, OT, ST, SW, and Case Management      Patient given Toradol for extremity pain. Discharge order in place. Awaiting bed for to the floors. CODE STATUS: DNR-CCA             Nemo Cummings MD, M.D.              Department of Internal Medicine/ Critical care  [facility] ([place]             10/1/2022, 2:05 PM

## 2022-10-01 NOTE — PROGRESS NOTES
[address] [facility] [address]  PROGRESS NOTE    Shift date: 10/1/22  Shift day: Saturday   Shift # 1    Room # 3027/3027-01   Name: Terrie Ibanez                Oriental orthodox: [address] of Zoroastrian: Unknown    Referral: Routine Visit    Admit Date & Time: 9/29/2022  4:29 AM    Assessment:  Terrie Ibanez is a 79 y.o. female in the hospital because congestive heart failure. Upon entering the room writer observes daughter at bedside. Patient is sitting up in her bed and eating her dinner. Intervention:  Writer introduced self and title as  Writer offered space for patient and daughter of patient  to express feelings, needs, and concerns and provided a ministry presence.  provide active listening to what the patient communicated. Patient was grateful for the prayers and the visitation. The daughter expressed thankfulness for the visit. The patient felt better after the  visitation. Outcome:   met and conversed with the patient and provided spiritual care as needed. Plan:  Chaplains will remain available to offer spiritual and emotional support as needed. Electronically signed by Cb Carpio Resident, on 10/1/2022 at 6:02 PM.  [facility]  [phone]           10/01/22 1800   Encounter Summary   Service Provided For: Patient   Referral/Consult From: [address] [place] [address]   Last Encounter  10/01/22   Complexity of Encounter Moderate   Begin Time 1740   End Time  1750   Total Time Calculated 10 min   Encounter    Type Initial Screen/Assessment   Assessment/Intervention/Outcome   Assessment Calm;Coping;Powerlessness;Peaceful; Hopeful   Intervention Active listening;Sustaining Presence/Ministry of presence;Prayer (assurance of)/[place];Nurtured Hope;Explored/Affirmed feelings, thoughts, concerns   Outcome Acceptance; Coping;Engaged in conversation;Expressed feelings, needs, and concerns;Encouraged;Receptive;Peace;Comfort   Plan and Referrals   Plan/Referrals Other (Comment)  (If requested)

## 2022-10-01 NOTE — PLAN OF CARE
Problem: Respiratory - Adult  Goal: Achieves optimal ventilation and oxygenation  Outcome: Progressing     Problem: Neurosensory - Adult  Goal: Achieves stable or improved neurological status  Outcome: Progressing  Goal: Absence of seizures  Outcome: Progressing  Goal: Remains free of injury related to seizures activity  Outcome: Progressing  Goal: Achieves maximal functionality and self care  Outcome: Progressing     Problem: Skin/Tissue Integrity - Adult  Goal: Skin integrity remains intact  Outcome: Progressing  Goal: Incisions, wounds, or drain sites healing without S/S of infection  Outcome: Progressing  Goal: Oral mucous membranes remain intact  Outcome: Progressing     Problem: Genitourinary - Adult  Goal: Absence of urinary retention  Outcome: Progressing  Goal: Urinary catheter remains patent  Outcome: Progressing     Problem: Metabolic/Fluid and Electrolytes - Adult  Goal: Electrolytes maintained within normal limits  Outcome: Progressing  Goal: Hemodynamic stability and optimal renal function maintained  Outcome: Progressing  Goal: Glucose maintained within prescribed range  Outcome: Progressing     Problem: Cardiovascular - Adult  Goal: Maintains optimal cardiac output and hemodynamic stability  Outcome: Progressing  Goal: Absence of cardiac dysrhythmias or at baseline  Outcome: Progressing     Problem: Pain  Goal: Verbalizes/displays adequate comfort level or baseline comfort level  Outcome: Progressing     Problem: Skin/Tissue Integrity  Goal: Absence of new skin breakdown  Description: 1. Monitor for areas of redness and/or skin breakdown  2. Assess vascular access sites hourly  3. Every 4-6 hours minimum:  Change oxygen saturation probe site  4. Every 4-6 hours:  If on nasal continuous positive airway pressure, respiratory therapy assess nares and determine need for appliance change or resting period.   Outcome: Progressing     Problem: Discharge Planning  Goal: Discharge to home or other facility with appropriate resources  Outcome: Progressing     Problem: Safety - Adult  Goal: Free from fall injury  Outcome: Progressing     Problem: ABCDS Injury Assessment  Goal: Absence of physical injury  Outcome: Progressing

## 2022-10-01 NOTE — PLAN OF CARE
Problem: Respiratory - Adult  Goal: Achieves optimal ventilation and oxygenation  10/1/2022 0347 by Judi Abad RN  Outcome: Progressing  9/30/2022 1830 by Jonathan Alejo RN  Outcome: Progressing     Problem: Discharge Planning  Goal: Discharge to home or other facility with appropriate resources  10/1/2022 0347 by Judi Abad RN  Outcome: Progressing  9/30/2022 1830 by Jonathan Alejo RN  Outcome: Progressing     Problem: Pain  Goal: Verbalizes/displays adequate comfort level or baseline comfort level  10/1/2022 0347 by Judi Abad RN  Outcome: Progressing  9/30/2022 1830 by Jonathan Alejo RN  Outcome: Progressing     Problem: Skin/Tissue Integrity  Goal: Absence of new skin breakdown  Description: 1. Monitor for areas of redness and/or skin breakdown  2. Assess vascular access sites hourly  3. Every 4-6 hours minimum:  Change oxygen saturation probe site  4. Every 4-6 hours:  If on nasal continuous positive airway pressure, respiratory therapy assess nares and determine need for appliance change or resting period.   10/1/2022 0347 by Judi Abad RN  Outcome: Progressing  9/30/2022 1830 by Jonathan Alejo RN  Outcome: Progressing     Problem: Safety - Adult  Goal: Free from fall injury  10/1/2022 0347 by Judi Abad RN  Outcome: Progressing  9/30/2022 1830 by Jonathan Alejo RN  Outcome: Progressing     Problem: ABCDS Injury Assessment  Goal: Absence of physical injury  10/1/2022 0347 by Judi Abad RN  Outcome: Progressing  9/30/2022 1830 by Jonathan Alejo RN  Outcome: Progressing     Problem: Neurosensory - Adult  Goal: Achieves stable or improved neurological status  10/1/2022 0347 by Judi Abad RN  Outcome: Progressing  9/30/2022 1830 by Jonahtan Alejo RN  Outcome: Progressing  Goal: Absence of seizures  10/1/2022 0347 by Judi Abad RN  Outcome: Progressing  9/30/2022 1830 by Jonathan Alejo RN  Outcome: Progressing  Goal: Remains free of injury related to seizures activity  10/1/2022 0347 by Rod Espinosa RN  Outcome: Progressing  9/30/2022 1830 by Austin Mathews RN  Outcome: Progressing  Goal: Achieves maximal functionality and self care  10/1/2022 0347 by Rod Espinosa RN  Outcome: Progressing  9/30/2022 1830 by Austin Mathews RN  Outcome: Progressing     Problem: Skin/Tissue Integrity - Adult  Goal: Skin integrity remains intact  10/1/2022 0347 by Rod Espinosa RN  Outcome: Progressing  9/30/2022 1830 by Austin Mathews RN  Outcome: Progressing  Goal: Incisions, wounds, or drain sites healing without S/S of infection  10/1/2022 0347 by Rod Espinosa RN  Outcome: Progressing  9/30/2022 1830 by Austin Mathews RN  Outcome: Progressing  Goal: Oral mucous membranes remain intact  10/1/2022 0347 by Rod Espinosa RN  Outcome: Progressing  9/30/2022 1830 by Austin Mathews RN  Outcome: Progressing     Problem: Musculoskeletal - Adult  Goal: Return mobility to safest level of function  10/1/2022 0347 by Rod Espinosa RN  Outcome: Progressing  9/30/2022 1830 by Austin Mathews RN  Outcome: Progressing  Goal: Maintain proper alignment of affected body part  10/1/2022 0347 by Rod Espinosa RN  Outcome: Progressing  9/30/2022 1830 by Austin Mathews RN  Outcome: Progressing  Goal: Return ADL status to a safe level of function  10/1/2022 0347 by Rod Espinosa RN  Outcome: Progressing  9/30/2022 1830 by Austin Mathews RN  Outcome: Progressing     Problem: Genitourinary - Adult  Goal: Absence of urinary retention  10/1/2022 0347 by Rod Espinosa RN  Outcome: Progressing  9/30/2022 1830 by Austin Mathews RN  Outcome: Progressing  Goal: Urinary catheter remains patent  10/1/2022 0347 by Rod Espinosa RN  Outcome: Progressing  9/30/2022 1830 by Austin Mathews RN  Outcome: Progressing     Problem: Metabolic/Fluid and Electrolytes - Adult  Goal: Electrolytes maintained within normal limits  10/1/2022 0347 by Rosalia Laura RN  Outcome: Progressing  9/30/2022 1830 by Hellen Doll RN  Outcome: Progressing  Goal: Hemodynamic stability and optimal renal function maintained  10/1/2022 0347 by Rosalia Laura RN  Outcome: Progressing  9/30/2022 1830 by Hellen Doll RN  Outcome: Progressing  Goal: Glucose maintained within prescribed range  10/1/2022 0347 by Rosalia Laura RN  Outcome: Progressing  9/30/2022 1830 by Hellen Doll RN  Outcome: Progressing     Problem: Anxiety  Goal: Will report anxiety at manageable levels  Description: INTERVENTIONS:  1. Administer medication as ordered  2. Teach and rehearse alternative coping skills  3. Provide emotional support with 1:1 interaction with staff  10/1/2022 0347 by Rosalia Laura RN  Outcome: Progressing  9/30/2022 1830 by Hellen Doll RN  Outcome: Progressing     Problem: Coping  Goal: Pt/Family able to verbalize concerns and demonstrate effective coping strategies  Description: INTERVENTIONS:  1. Assist patient/family to identify coping skills, available support systems and cultural and spiritual values  2. Provide emotional support, including active listening and acknowledgement of concerns of patient and caregivers  3. Reduce environmental stimuli, as able  4. Instruct patient/family in relaxation techniques, as appropriate  5. Assess for spiritual pain/suffering and initiate Spiritual Care, Psychosocial Clinical Specialist consults as needed  10/1/2022 0347 by Rosalia Laura RN  Outcome: Progressing  9/30/2022 1830 by Hellen Doll RN  Outcome: Progressing     Problem: Decision Making  Goal: Pt/Family able to effectively weigh alternatives and participate in decision making related to treatment and care  Description: INTERVENTIONS:  1. Determine when there are differences between patient's view, family's view, and healthcare provider's view of condition  2.  Facilitate patient and family articulation of goals for care  3. Help patient and family identify pros/cons of alternative solutions  4. Provide information as requested by patient/family  5. Respect patient/family right to receive or not to receive information  6. Serve as a liaison between patient and family and health care team  7. Initiate Consults from Ethics, Palliative Care or initiate [facility] as is appropriate  10/1/2022 0347 by Eugenio Barron RN  Outcome: Progressing  9/30/2022 1830 by Bertin Nuñez RN  Outcome: Progressing     Problem: Confusion  Goal: Confusion, delirium, dementia, or psychosis is improved or at baseline  Description: INTERVENTIONS:  1. Assess for possible contributors to thought disturbance, including medications, impaired vision or hearing, underlying metabolic abnormalities, dehydration, psychiatric diagnoses, and notify attending LIP  2. [place] high risk fall precautions, as indicated  3. Provide frequent short contacts to provide reality reorientation, refocusing and direction  4. Decrease environmental stimuli, including noise as appropriate  5. Monitor and intervene to maintain adequate nutrition, hydration, elimination, sleep and activity  6. If unable to ensure safety without constant attention obtain sitter and review sitter guidelines with assigned personnel  7. Initiate Psychosocial CNS and Spiritual Care consult, as indicated  10/1/2022 0347 by Eugenio Barron RN  Outcome: Progressing  9/30/2022 1830 by Bertin Nuñez RN  Outcome: Progressing     Problem: Behavior  Goal: Pt/Family maintain appropriate behavior and adhere to behavioral management agreement, if implemented  Description: INTERVENTIONS:  1. Assess patient/family's coping skills and  non-compliant behavior (including use of illegal substances)  2. Notify security of behavior or suspected illegal substances which indicate the need for search of the family and/or belongings  3.  Encourage verbalization of thoughts and concerns in a socially appropriate manner  4. Utilize positive, consistent limit setting strategies supporting safety of patient, staff and others  5. Encourage participation in the decision making process about the behavioral management agreement  6. If a visitor's behavior poses a threat to safety call refer to organization policy.   7. Initiate consult with , Psychosocial CNS, Spiritual Care as appropriate  10/1/2022 0347 by Sharon Sinclair RN  Outcome: Progressing  9/30/2022 1830 by Ramona Maldonado RN  Outcome: Progressing     Problem: Cardiovascular - Adult  Goal: Maintains optimal cardiac output and hemodynamic stability  10/1/2022 0347 by Sharon Sinclair RN  Outcome: Progressing  9/30/2022 1830 by Ramona Maldonado RN  Outcome: Progressing  Goal: Absence of cardiac dysrhythmias or at baseline  10/1/2022 0347 by Sharon Sinclair RN  Outcome: Progressing  9/30/2022 1830 by Ramona Maldonado RN  Outcome: Progressing     Problem: Chronic Conditions and Co-morbidities  Goal: Patient's chronic conditions and co-morbidity symptoms are monitored and maintained or improved  10/1/2022 0347 by Sharon Sinclair RN  Outcome: Progressing  9/30/2022 1830 by Ramona Maldonado RN  Outcome: Progressing

## 2022-10-02 PROBLEM — J18.9 PNEUMONIA: Status: ACTIVE | Noted: 2022-10-02

## 2022-10-02 LAB
ABSOLUTE EOS #: 0.21 K/UL (ref 0–0.44)
ABSOLUTE IMMATURE GRANULOCYTE: <0.03 K/UL (ref 0–0.3)
ABSOLUTE LYMPH #: 1.31 K/UL (ref 1.1–3.7)
ABSOLUTE MONO #: 0.38 K/UL (ref 0.1–1.2)
ANION GAP SERPL CALCULATED.3IONS-SCNC: 12 MMOL/L (ref 9–17)
BASOPHILS # BLD: 1 % (ref 0–2)
BASOPHILS ABSOLUTE: 0.04 K/UL (ref 0–0.2)
BUN BLDV-MCNC: 41 MG/DL (ref 8–23)
CALCIUM SERPL-MCNC: 9.3 MG/DL (ref 8.6–10.4)
CHLORIDE BLD-SCNC: 94 MMOL/L (ref 98–107)
CO2: 29 MMOL/L (ref 20–31)
CREAT SERPL-MCNC: 2.29 MG/DL (ref 0.5–0.9)
EOSINOPHILS RELATIVE PERCENT: 4 % (ref 1–4)
GFR AFRICAN AMERICAN: 25 ML/MIN
GFR NON-AFRICAN AMERICAN: 20 ML/MIN
GFR SERPL CREATININE-BSD FRML MDRD: ABNORMAL ML/MIN/{1.73_M2}
GLUCOSE BLD-MCNC: 101 MG/DL (ref 65–105)
GLUCOSE BLD-MCNC: 85 MG/DL (ref 65–105)
GLUCOSE BLD-MCNC: 91 MG/DL (ref 70–99)
GLUCOSE BLD-MCNC: 95 MG/DL (ref 65–105)
HCT VFR BLD CALC: 35 % (ref 36.3–47.1)
HEMOGLOBIN: 11.2 G/DL (ref 11.9–15.1)
IMMATURE GRANULOCYTES: 0 %
LYMPHOCYTES # BLD: 22 % (ref 24–43)
MCH RBC QN AUTO: 29.3 PG (ref 25.2–33.5)
MCHC RBC AUTO-ENTMCNC: 32 G/DL (ref 28.4–34.8)
MCV RBC AUTO: 91.6 FL (ref 82.6–102.9)
MONOCYTES # BLD: 6 % (ref 3–12)
MRSA, DNA, NASAL: NEGATIVE
NRBC AUTOMATED: 0 PER 100 WBC
PDW BLD-RTO: 16.5 % (ref 11.8–14.4)
PLATELET # BLD: 232 K/UL (ref 138–453)
PMV BLD AUTO: 9.5 FL (ref 8.1–13.5)
POTASSIUM SERPL-SCNC: 4.3 MMOL/L (ref 3.7–5.3)
RBC # BLD: 3.82 M/UL (ref 3.95–5.11)
RBC # BLD: ABNORMAL 10*6/UL
SEG NEUTROPHILS: 67 % (ref 36–65)
SEGMENTED NEUTROPHILS ABSOLUTE COUNT: 4.06 K/UL (ref 1.5–8.1)
SODIUM BLD-SCNC: 135 MMOL/L (ref 135–144)
SPECIMEN DESCRIPTION: NORMAL
WBC # BLD: 6 K/UL (ref 3.5–11.3)

## 2022-10-02 PROCEDURE — 97116 GAIT TRAINING THERAPY: CPT

## 2022-10-02 PROCEDURE — 80048 BASIC METABOLIC PNL TOTAL CA: CPT

## 2022-10-02 PROCEDURE — 36415 COLL VENOUS BLD VENIPUNCTURE: CPT

## 2022-10-02 PROCEDURE — 2500000003 HC RX 250 WO HCPCS: Performed by: STUDENT IN AN ORGANIZED HEALTH CARE EDUCATION/TRAINING PROGRAM

## 2022-10-02 PROCEDURE — 6370000000 HC RX 637 (ALT 250 FOR IP): Performed by: STUDENT IN AN ORGANIZED HEALTH CARE EDUCATION/TRAINING PROGRAM

## 2022-10-02 PROCEDURE — 97530 THERAPEUTIC ACTIVITIES: CPT

## 2022-10-02 PROCEDURE — A4216 STERILE WATER/SALINE, 10 ML: HCPCS | Performed by: STUDENT IN AN ORGANIZED HEALTH CARE EDUCATION/TRAINING PROGRAM

## 2022-10-02 PROCEDURE — 85025 COMPLETE CBC W/AUTO DIFF WBC: CPT

## 2022-10-02 PROCEDURE — 6360000002 HC RX W HCPCS: Performed by: STUDENT IN AN ORGANIZED HEALTH CARE EDUCATION/TRAINING PROGRAM

## 2022-10-02 PROCEDURE — 97535 SELF CARE MNGMENT TRAINING: CPT

## 2022-10-02 PROCEDURE — 97166 OT EVAL MOD COMPLEX 45 MIN: CPT

## 2022-10-02 PROCEDURE — 99291 CRITICAL CARE FIRST HOUR: CPT | Performed by: INTERNAL MEDICINE

## 2022-10-02 PROCEDURE — 2580000003 HC RX 258: Performed by: STUDENT IN AN ORGANIZED HEALTH CARE EDUCATION/TRAINING PROGRAM

## 2022-10-02 PROCEDURE — 82947 ASSAY GLUCOSE BLOOD QUANT: CPT

## 2022-10-02 PROCEDURE — 97162 PT EVAL MOD COMPLEX 30 MIN: CPT

## 2022-10-02 PROCEDURE — 1200000000 HC SEMI PRIVATE

## 2022-10-02 RX ORDER — DONEPEZIL HYDROCHLORIDE 10 MG/1
10 TABLET, FILM COATED ORAL NIGHTLY
Status: DISCONTINUED | OUTPATIENT
Start: 2022-10-02 | End: 2022-10-05 | Stop reason: HOSPADM

## 2022-10-02 RX ORDER — PREDNISONE 20 MG/1
40 TABLET ORAL DAILY
Status: DISCONTINUED | OUTPATIENT
Start: 2022-10-02 | End: 2022-10-03

## 2022-10-02 RX ORDER — ALPRAZOLAM 0.25 MG/1
0.25 TABLET ORAL 2 TIMES DAILY PRN
Status: DISCONTINUED | OUTPATIENT
Start: 2022-10-02 | End: 2022-10-05 | Stop reason: HOSPADM

## 2022-10-02 RX ORDER — ALBUTEROL SULFATE 90 UG/1
2 AEROSOL, METERED RESPIRATORY (INHALATION) EVERY 6 HOURS PRN
Status: DISCONTINUED | OUTPATIENT
Start: 2022-10-02 | End: 2022-10-05 | Stop reason: HOSPADM

## 2022-10-02 RX ORDER — HYDRALAZINE HYDROCHLORIDE 25 MG/1
25 TABLET, FILM COATED ORAL 2 TIMES DAILY
Status: DISCONTINUED | OUTPATIENT
Start: 2022-10-02 | End: 2022-10-03

## 2022-10-02 RX ADMIN — DOCUSATE SODIUM 50 MG AND SENNOSIDES 8.6 MG 1 TABLET: 8.6; 5 TABLET, FILM COATED ORAL at 09:45

## 2022-10-02 RX ADMIN — BUMETANIDE 1 MG: 0.25 INJECTION, SOLUTION INTRAMUSCULAR; INTRAVENOUS at 22:17

## 2022-10-02 RX ADMIN — DOCUSATE SODIUM 50 MG AND SENNOSIDES 8.6 MG 1 TABLET: 8.6; 5 TABLET, FILM COATED ORAL at 21:42

## 2022-10-02 RX ADMIN — ACETAMINOPHEN 650 MG: 325 TABLET ORAL at 09:45

## 2022-10-02 RX ADMIN — HEPARIN SODIUM 5000 UNITS: 5000 INJECTION INTRAVENOUS; SUBCUTANEOUS at 21:43

## 2022-10-02 RX ADMIN — PREDNISONE 40 MG: 20 TABLET ORAL at 14:15

## 2022-10-02 RX ADMIN — DONEPEZIL HYDROCHLORIDE 10 MG: 10 TABLET, FILM COATED ORAL at 21:42

## 2022-10-02 RX ADMIN — SODIUM CHLORIDE, PRESERVATIVE FREE 10 ML: 5 INJECTION INTRAVENOUS at 10:00

## 2022-10-02 RX ADMIN — HYDRALAZINE HYDROCHLORIDE 25 MG: 25 TABLET, FILM COATED ORAL at 21:42

## 2022-10-02 RX ADMIN — HEPARIN SODIUM 5000 UNITS: 5000 INJECTION INTRAVENOUS; SUBCUTANEOUS at 14:30

## 2022-10-02 RX ADMIN — BUMETANIDE 1 MG: 0.25 INJECTION, SOLUTION INTRAMUSCULAR; INTRAVENOUS at 11:45

## 2022-10-02 RX ADMIN — HEPARIN SODIUM 5000 UNITS: 5000 INJECTION INTRAVENOUS; SUBCUTANEOUS at 06:00

## 2022-10-02 RX ADMIN — CEFEPIME 1000 MG: 1 INJECTION, POWDER, FOR SOLUTION INTRAMUSCULAR; INTRAVENOUS at 11:15

## 2022-10-02 RX ADMIN — CARVEDILOL 6.25 MG: 6.25 TABLET, FILM COATED ORAL at 09:45

## 2022-10-02 RX ADMIN — SODIUM CHLORIDE, PRESERVATIVE FREE 10 ML: 5 INJECTION INTRAVENOUS at 21:42

## 2022-10-02 RX ADMIN — SODIUM CHLORIDE, PRESERVATIVE FREE 20 MG: 5 INJECTION INTRAVENOUS at 10:00

## 2022-10-02 RX ADMIN — CARVEDILOL 6.25 MG: 6.25 TABLET, FILM COATED ORAL at 17:52

## 2022-10-02 ASSESSMENT — PAIN SCALES - GENERAL
PAINLEVEL_OUTOF10: 4
PAINLEVEL_OUTOF10: 0

## 2022-10-02 NOTE — PROGRESS NOTES
Pharmacy Note     Renal Dose Adjustment    Antonieta Goode is a 79 y.o. female. Pharmacist assessment of renally cleared medications. Recent Labs     09/30/22  0305 10/01/22  0850   BUN 47* 48*       Recent Labs     09/30/22  0305 10/01/22  0850   CREATININE 2.27* 2.25*       Estimated Creatinine Clearance: 17 mL/min (A) (based on SCr of 2.25 mg/dL (H)). Height:   Ht Readings from Last 1 Encounters:   09/29/22 5' 1\" (1.549 m)     Weight:  Wt Readings from Last 1 Encounters:   10/01/22 154 lb 12.2 oz (70.2 kg)       The following medication dose has been adjusted based upon renal function per P&T Guidelines:             Pepcid 20 mg IV bid changed to Pepcid 20 mg IV every 48 hours due to CrCl =17 ml/min. Give dose after dialysis on   Dialysis days.

## 2022-10-02 NOTE — PLAN OF CARE
Problem: Respiratory - Adult  Goal: Achieves optimal ventilation and oxygenation  10/2/2022 0315 by Norma Tavarez RN  Outcome: Progressing  10/1/2022 1944 by Bessie Best RN  Outcome: Progressing     Problem: Discharge Planning  Goal: Discharge to home or other facility with appropriate resources  10/2/2022 0315 by Norma Tavarez RN  Outcome: Progressing  10/1/2022 1944 by Bessie Best RN  Outcome: Progressing     Problem: Pain  Goal: Verbalizes/displays adequate comfort level or baseline comfort level  10/2/2022 0315 by Norma Tavarez RN  Outcome: Progressing  10/1/2022 1944 by Bessie Best RN  Outcome: Progressing     Problem: Skin/Tissue Integrity  Goal: Absence of new skin breakdown  Description: 1. Monitor for areas of redness and/or skin breakdown  2. Assess vascular access sites hourly  3. Every 4-6 hours minimum:  Change oxygen saturation probe site  4. Every 4-6 hours:  If on nasal continuous positive airway pressure, respiratory therapy assess nares and determine need for appliance change or resting period.   10/2/2022 0315 by Norma Tavarez RN  Outcome: Progressing  10/1/2022 1944 by Bessie Best RN  Outcome: Progressing     Problem: Safety - Adult  Goal: Free from fall injury  10/2/2022 0315 by Norma Tavarez RN  Outcome: Progressing  10/1/2022 1944 by Bessie Best RN  Outcome: Progressing     Problem: ABCDS Injury Assessment  Goal: Absence of physical injury  10/2/2022 0315 by Norma Tavarze RN  Outcome: Progressing  10/1/2022 1944 by Bessie Best RN  Outcome: Progressing     Problem: Neurosensory - Adult  Goal: Achieves stable or improved neurological status  10/2/2022 0315 by Norma Tavarez RN  Outcome: Progressing  10/1/2022 1944 by Bessie Best RN  Outcome: Progressing  Goal: Absence of seizures  10/2/2022 0315 by Norma Tavarez RN  Outcome: Progressing  10/1/2022 1944 by Bessie Best RN  Outcome: Progressing  Goal: Remains free of injury related to seizures activity  10/2/2022 0315 by Felicity Mott RN  Outcome: Progressing  10/1/2022 1944 by Bertin Nuñez RN  Outcome: Progressing  Goal: Achieves maximal functionality and self care  10/2/2022 0315 by Felicity Mott RN  Outcome: Progressing  10/1/2022 1944 by Bertin Nuñez RN  Outcome: Progressing     Problem: Skin/Tissue Integrity - Adult  Goal: Skin integrity remains intact  10/2/2022 0315 by Felicity Mott RN  Outcome: Progressing  10/1/2022 1944 by Bertin Nuñez RN  Outcome: Progressing  Goal: Incisions, wounds, or drain sites healing without S/S of infection  10/2/2022 0315 by Felicity Mott RN  Outcome: Progressing  10/1/2022 1944 by Bertin Nuñez RN  Outcome: Progressing  Goal: Oral mucous membranes remain intact  10/2/2022 0315 by Felicity Mott RN  Outcome: Progressing  10/1/2022 1944 by Bertin Nuñez RN  Outcome: Progressing     Problem: Musculoskeletal - Adult  Goal: Return mobility to safest level of function  10/2/2022 0315 by Felicity Mott RN  Outcome: Progressing  10/1/2022 1944 by Bertin Nuñez RN  Outcome: Progressing  Goal: Maintain proper alignment of affected body part  10/2/2022 0315 by Felicity Mott RN  Outcome: Progressing  10/1/2022 1944 by Bertin Nueñz RN  Outcome: Progressing  Goal: Return ADL status to a safe level of function  10/2/2022 0315 by Felicity Mott RN  Outcome: Progressing  10/1/2022 1944 by Bertin Nuñez RN  Outcome: Progressing     Problem: Genitourinary - Adult  Goal: Absence of urinary retention  10/2/2022 0315 by Felicity Mott RN  Outcome: Progressing  10/1/2022 1944 by Bertin Nuñez RN  Outcome: Progressing  Goal: Urinary catheter remains patent  10/2/2022 0315 by Felicity Mott RN  Outcome: Progressing  10/1/2022 1944 by Bertin Nuñez RN  Outcome: Progressing     Problem: Metabolic/Fluid and Electrolytes - Adult  Goal: Electrolytes maintained within normal limits  10/2/2022 0315 by Felicity Mott RN  Outcome: Progressing  10/1/2022 1944 by Bertin Nuñez RN  Outcome: Progressing  Goal: Hemodynamic stability and optimal renal function maintained  10/2/2022 0315 by Jesus Goode RN  Outcome: Progressing  10/1/2022 1944 by Ramona Maldonado RN  Outcome: Progressing  Goal: Glucose maintained within prescribed range  10/2/2022 0315 by Jesus Goode RN  Outcome: Progressing  10/1/2022 1944 by Ramona Maldonado RN  Outcome: Progressing     Problem: Anxiety  Goal: Will report anxiety at manageable levels  Description: INTERVENTIONS:  1. Administer medication as ordered  2. Teach and rehearse alternative coping skills  3. Provide emotional support with 1:1 interaction with staff  10/2/2022 0315 by Jesus Goode RN  Outcome: Progressing  10/1/2022 1944 by Ramona Maldonado RN  Outcome: Progressing     Problem: Coping  Goal: Pt/Family able to verbalize concerns and demonstrate effective coping strategies  Description: INTERVENTIONS:  1. Assist patient/family to identify coping skills, available support systems and cultural and spiritual values  2. Provide emotional support, including active listening and acknowledgement of concerns of patient and caregivers  3. Reduce environmental stimuli, as able  4. Instruct patient/family in relaxation techniques, as appropriate  5. Assess for spiritual pain/suffering and initiate Spiritual Care, Psychosocial Clinical Specialist consults as needed  10/2/2022 0315 by Jesus Goode RN  Outcome: Progressing  10/1/2022 1944 by Ramona Maldonado RN  Outcome: Progressing     Problem: Decision Making  Goal: Pt/Family able to effectively weigh alternatives and participate in decision making related to treatment and care  Description: INTERVENTIONS:  1. Determine when there are differences between patient's view, family's view, and healthcare provider's view of condition  2. Facilitate patient and family articulation of goals for care  3. Help patient and family identify pros/cons of alternative solutions  4. Provide information as requested by patient/family  5.  Respect patient/family right to receive or not to receive information  6. Serve as a liaison between patient and family and health care team  7. Initiate Consults from Ethics, Palliative Care or initiate [facility] as is appropriate  10/2/2022 0315 by Yung Roe RN  Outcome: Progressing  10/1/2022 1944 by Darin Portillo RN  Outcome: Progressing     Problem: Confusion  Goal: Confusion, delirium, dementia, or psychosis is improved or at baseline  Description: INTERVENTIONS:  1. Assess for possible contributors to thought disturbance, including medications, impaired vision or hearing, underlying metabolic abnormalities, dehydration, psychiatric diagnoses, and notify attending LIP  2. [place] high risk fall precautions, as indicated  3. Provide frequent short contacts to provide reality reorientation, refocusing and direction  4. Decrease environmental stimuli, including noise as appropriate  5. Monitor and intervene to maintain adequate nutrition, hydration, elimination, sleep and activity  6. If unable to ensure safety without constant attention obtain sitter and review sitter guidelines with assigned personnel  7. Initiate Psychosocial CNS and Spiritual Care consult, as indicated  10/2/2022 0315 by Yung Roe RN  Outcome: Progressing  10/1/2022 1944 by Darin Portillo RN  Outcome: Progressing     Problem: Behavior  Goal: Pt/Family maintain appropriate behavior and adhere to behavioral management agreement, if implemented  Description: INTERVENTIONS:  1. Assess patient/family's coping skills and  non-compliant behavior (including use of illegal substances)  2. Notify security of behavior or suspected illegal substances which indicate the need for search of the family and/or belongings  3. Encourage verbalization of thoughts and concerns in a socially appropriate manner  4. Utilize positive, consistent limit setting strategies supporting safety of patient, staff and others  5.  Encourage participation in the decision making process about the behavioral management agreement  6. If a visitor's behavior poses a threat to safety call refer to organization policy.   7. Initiate consult with , Psychosocial CNS, Spiritual Care as appropriate  10/2/2022 0315 by Jacobo Yuan RN  Outcome: Progressing  10/1/2022 1944 by Geronimo Quinteros RN  Outcome: Progressing     Problem: Cardiovascular - Adult  Goal: Maintains optimal cardiac output and hemodynamic stability  10/2/2022 0315 by Jacobo Yuan RN  Outcome: Progressing  10/1/2022 1944 by Geronimo Quinteros RN  Outcome: Progressing  Goal: Absence of cardiac dysrhythmias or at baseline  10/2/2022 0315 by Jacobo Yuan RN  Outcome: Progressing  10/1/2022 1944 by Geronimo Quinteros RN  Outcome: Progressing     Problem: Chronic Conditions and Co-morbidities  Goal: Patient's chronic conditions and co-morbidity symptoms are monitored and maintained or improved  10/2/2022 0315 by Jacobo Yuan RN  Outcome: Progressing  10/1/2022 1944 by Geronimo Quinteros RN  Outcome: Progressing   Jacobo Yuan RN

## 2022-10-02 NOTE — PROGRESS NOTES
Physical Therapy  Facility/Department: [facility] CAR 3- MICU  Physical Therapy Initial Assessment    Name: Julio Angulo  : 1940  MRN: [id number]  Date of Service: 10/2/2022  Chief Complaint   Patient presents with    Respiratory Distress      Discharge Recommendations:  Patient would benefit from continued therapy after discharge   PT Equipment Recommendations  Equipment Needed: Yes  Mobility Devices: Nanine Robles: Rolling      Patient Diagnosis(es): The encounter diagnosis was Acute respiratory failure with hypoxia ([address] Past Medical History:  has a past medical history of Anxiety, Arthritis, Atherosclerosis, Body mass index (bmi) 25.0-25.9, adult, CHF exacerbation (HCC), COPD (chronic obstructive pulmonary disease) ([facility]), CVA (cerebral vascular accident) ([address] Depression, Diabetes mellitus ([address] Diverticula, esophagus congenital, Former smoker, Hypercholesteremia, Hypertension, Hypokalemia, Joint pain, knee, Myocardial infarct, old, Pneumonia, Protein S deficiency ([address] Pulmonary embolism ([address] Reflux esophagitis, Tinea corporis, Tremor, URI (upper respiratory infection), UTI (urinary tract infection), and Zenker's diverticulum. Past Surgical History:  has a past surgical history that includes Carotid endarterectomy (Bilateral); Tonsillectomy; and Endoscopy, colon, diagnostic. Assessment   Body Structures, Functions, Activity Limitations Requiring Skilled Therapeutic Intervention: Decreased strength;Decreased functional mobility ; Decreased endurance; Increased pain;Decreased balance;Decreased safe awareness;Decreased cognition;Decreased posture;Decreased coordination  Assessment: Pt with mobility deficits requiring min-A to ambulate 6 feet with a RW. Pt is unsteady with ambulation this date, demonstrates significant safety awareness deficits as well as impaired trunk control, decreased BLE strength, and impaired endurance this date.   Pt would be unsafe to return to prior living arrangements upon discharge due to fall risk. Pt would benefit from additional therapy upon discharge to assist in return to independent PLOF. Therapy Prognosis: Good  Decision Making: Medium Complexity  Requires PT Follow-Up: Yes  Activity Tolerance  Activity Tolerance: Patient limited by fatigue;Patient limited by endurance     Plan   Physcial Therapy Plan  General Plan:  (5-6x/week)  Current Treatment Recommendations: Balance training, Functional mobility training, Strengthening, Transfer training, Gait training, Patient/Caregiver education & training, Safety education & training, Home exercise program, Therapeutic activities, Equipment evaluation, education, & procurement, Endurance training, Stair training  Safety Devices  Type of Devices: Patient at risk for falls, Call light within reach, Gait belt, Left in chair, Chair alarm in place, Nurse notified  Restraints  Restraints Initially in Place: No     Restrictions  Restrictions/Precautions  Restrictions/Precautions: Fall Risk, General Precautions  Required Braces or Orthoses?: No  Position Activity Restriction  Other position/activity restrictions: up with A, Asp. prec., 6L O2 at baseline, MAP goal >65, DNR-CCA     Subjective   General  Patient assessed for rehabilitation services?: Yes  Response To Previous Treatment: Not applicable  Family / Caregiver Present: No  Follows Commands: Within Functional Limits  Subjective  Subjective: Pt supine in bed and agreeable to therapy, RN agreeable to therapy. Pt slightly confused pleasant and cooperative throughout.  Pt denies pain at rest.         Social/Functional History  Social/Functional History  Lives With: Alone  Type of Home: House  Home Layout: One level  Home Access: Ramped entrance  Bathroom Shower/Tub: Walk-in shower  Home Equipment: [address], [address], quad, Rollator  ADL Assistance: Independent  Active : No  Patient's  Info: family helps with transport  Occupation: Retired  Additional Comments: Pt is a questionable historian with no family at baseline, answers \"yes\" to nearly all questions  Vision/Hearing  Vision  Vision: Impaired  Vision Exceptions: Wears glasses at all times  Hearing  Hearing: Exceptions to [facility]  Hearing Exceptions: Hard of hearing/hearing concerns    Cognition   Cognition  Overall Cognitive Status: Exceptions  Attention Span: Difficulty dividing attention; Difficulty attending to directions  Memory: Decreased recall of precautions;Decreased recall of recent events  Safety Judgement: Decreased awareness of need for assistance  Problem Solving: Assistance required to generate solutions;Decreased awareness of errors;Assistance required to correct errors made;Assistance required to identify errors made  Initiation: Requires cues for some  Sequencing: Requires cues for some     Objective                 AROM RLE (degrees)  RLE AROM: WFL  AROM LLE (degrees)  LLE AROM : WFL  AROM RUE (degrees)  RUE General AROM: Co-eval with OT, see OT note for UE detail. AROM LUE (degrees)  LUE General AROM: Co-eval with OT, see OT note for UE detail. Strength RLE  Strength RLE: Exception  R Hip Flexion: 3-/5  R Knee Extension: 3+/5  R Ankle Dorsiflexion: 4-/5  R Ankle Plantar flexion: 4-/5  Strength LLE  Strength LLE: Exception  L Hip Flexion: 3-/5  L Knee Extension: 3+/5  L Ankle Dorsiflexion: 4-/5  L Ankle Plantar Flexion: 4-/5  Strength RUE  Comment: Co-eval with OT, see OT note for UE detail. Strength LUE  Comment: Co-eval with OT, see OT note for UE detail. Bed mobility  Supine to Sit: Contact guard assistance  Sit to Supine:  (pt retired up to a chair at the conclusion of today's session.)  Scooting: Contact guard assistance  Bed Mobility Comments: HOB elevated ~30 degrees, increased time/effort to perform. Transfers  Sit to Stand: Contact guard assistance  Stand to Sit: Contact guard assistance  Comment: Verbal cues for hand placement.   Ambulation  Surface: Level tile  Device: Rolling Walker  Assistance: Minimal assistance  Quality of Gait: unsteady, decreased stride length, forward flexed trunk, no LOB. Gait Deviations: Slow Adriana;Decreased step length;Decreased step height  Distance: 3 feet R/L EOB, seated rest break, 6 feet forward/retro, seated rest break, 3 feet. More Ambulation?: No  Stairs/Curb  Stairs?: No     Balance  Sitting - Static: Fair;+  Sitting - Dynamic: Fair  Standing - Static: Fair;-  Standing - Dynamic: Poor;+  Comments: standing balance assessed while using a RW                                                        AM-PAC Score  AM-PAC Inpatient Mobility Raw Score : 15 (10/02/22 1350)  AM-PAC Inpatient T-Scale Score : 39.45 (10/02/22 1350)  Mobility Inpatient CMS 0-100% Score: 57.7 (10/02/22 1350)  Mobility Inpatient CMS G-Code Modifier : CK (10/02/22 1350)              Goals  Short Term Goals  Time Frame for Short Term Goals: 14 visits  Short Term Goal 1: Pt will ambulate 125 feet with a RW and SBA to increase functional independence. Short Term Goal 2: Pt will negotiate 3 stairs with a unilateral handrail and SBA to allow the pt to enter prior living arrangements. Short Term Goal 3: Pt will demonstrate fair+ standing balance to decrease fall risk. Short Term Goal 4: Pt will tolerate a 35 minute therapy session to promote increased endurance. Short Term Goal 5: Pt will perform sit<>stand transfer with supervision. Additional Goals?: No       Education  Patient Education  Education Given To: Patient  Education Provided: Role of Therapy;Transfer Training;Plan of Care;Equipment; Fall Prevention Strategies  Education Method: Verbal  Barriers to Learning: None  Education Outcome: Verbalized understanding      Therapy Time   Individual Concurrent Group Co-treatment   Time In 1010         Time Out 1047         Minutes 37         Timed Code Treatment Minutes: LIDYA Mason 20, PT

## 2022-10-02 NOTE — PROGRESS NOTES
Critical care team - Resident sign-out to medicine service      Date and time: 10/2/2022 2:47 PM  Patient's name:  Jorge Harden Record Number: [id number]  Patient's account/billing number: [de-identified]  Patient's YOB: 1940  Age: 79 y.o. Date of Admission: 9/29/2022  4:29 AM  Length of stay during current admission: 3    Primary Care Physician: Char Marshall MD    Code Status: DNR-CCA    Mode of physician to physician communication:        [] Via telephone   [] In person     Date and time of sign-out: 10/2/2022 2:47 PM    Accepting Internal Medicine resident: Dr. Nithin Diop    Accepting Medicine team: IM Team     Accepting team's attending: Dr. Mahnaz Burns    Patient's current ICU Bed:  [phone]    Patient's assigned bed on floor:  320        [x] Med-Surg Monitored [] Step-down       [] Psychiatry ICU       [] Psych floor     Reason for ICU admission:   Respiratory failure requiring intubation      ICU course summary:     History was obtained from Chart Review . Yolie Menon is a 79 y.o. who presented to the ER via EMS due to shortness of breath. Patient has longstanding history of CHF, hypertension, recurrent pneumonia, diabetes, pulmonary edema, NSTEMI's, coronary artery disease, atrial fibrillation not on anticoagulation, COPD, stage IV CKD. Patient was just discharged from [facility] on 9/23 secondary to hypoxia and CHF exacerbation. He echo was done at that time patient was found to have an EF of 25% with global hypokinesis. Patient was started on Lasix with improvement of symptoms discharged home on home O2. At that time cardiac catheterization was recommended but patient declined as she did not want worsening kidney function and any chance of dialysis. Patient came in today via EMS complaining of shortness of breath. In the emergency department patient was hypoxic in the 60s on BiPAP. Decision was made to intubate for airway protection.   Patient is a DNR CCA, daughter was unsure about what her wishes were for intubation. Patient was started on propofol as well as a nitro drip. She was febrile with a leukocytosis of 15.1, Vanco and cefepime ordered for potentially hospital-acquired pneumonia. Patient had labs significant for potassium of 5.1, BUN/creatinine 45/2.13, blood glucose of 266, proBNP of 19,511 and a troponin of 53. Daughter is at bedside, I did discuss with her at length patient's condition. She is agreeable with continuing care as it is, patient remains a DNR CCA at this time. 9/30 patient was extubated this morning. Tolerated well. No immediate complications. Resumed oral diet. 10/1/2022: Patient feels tired but feels better than yesterday. No shortness of breath, cough, chest pain. Patient produces phlegm which is being scheduled. She will complains of bilateral lower extremity And was given Toradol. Patient had bowel movement yesterday and Kitchen catheter was taken out. Procedures during patient's ICU stay:     Endotracheal intubation    Current Vitals:     BP (!) 149/58   Pulse 71   Temp 98.6 °F (37 °C)   Resp 13   Ht 5' 1\" (1.549 m)   Wt 154 lb 12.2 oz (70.2 kg)   SpO2 100%   BMI 29.24 kg/m²       Cultures:     Blood cultures:                 [] None drawn      [x] Negative             []  Positive (Details:  )  Urine Culture:                   [] None drawn      [x] Negative             []  Positive (Details:  )  Sputum Culture:               [x] None drawn       [] Negative             []  Positive (Details:  )   Endotracheal aspirate:     [x] None drawn       [] Negative             []  Positive (Details:  )       Consults:     1.   None    Assessment:     Patient Active Problem List    Diagnosis Date Noted    Heart failure ([facility] 09/29/2022    Pulmonary edema with congestive heart failure ([facility] 09/20/2022    NSTEMI (non-ST elevated myocardial infarction) ([facility] 09/20/2022    Acute respiratory failure with hypoxia ([facility] 09/20/2022    UTI (urinary tract infection) 09/20/2022    CAD (coronary artery disease) 09/20/2022    Atrial fibrillation ([address] 09/20/2022    Pulmonary edema cardiac cause ([address] 05/15/2021    Recurrent aspiration pneumonia ([address] 05/15/2021    Fatigue     Acute-on-chronic kidney injury ([address] 01/15/2020    CHF exacerbation ([address] 01/13/2020    Severe sepsis ([address] 01/13/2020    Zenker diverticulum 09/14/2019    Lactic acidosis 08/18/2019    Hypertensive urgency 08/18/2019    Sepsis ([address] 08/17/2019    Aspiration pneumonitis ([address] 07/14/2019    Stage 4 chronic kidney disease ([address] 08/09/2016    Simple chronic bronchitis ([address]     Type 2 diabetes mellitus with kidney complication, with long-term current use of insulin ([address]     Pneumonia due to organism 08/04/2016    COPD (chronic obstructive pulmonary disease) ([address]     Diabetes mellitus ([address]     Essential hypertension        Additional assessment:    Patient is on 6 L nasal cannula oxygen and breathing comfortably. Not on pressor support. The patient is hypertensive on Coreg and required 5 mg IV labetalol once last night for increased blood pressures. Has excruciating pain on lower extremity palpation especially in bilateral big toes. Recommended Follow-up:     Prednisone 40 mg once daily started for suspected gout. Uric acid 8.8 mg/dL. Above mentioned assessment and plan was discussed by me with the admitting medicine resident. The medicine team assigned to the patient by medicine admitting resident will be following up the patient from now onwards on the floor. Valdo Michelle MD, MCELESTINE.   Internal Medicine PGY1  10/2/2022, 2:47 PM

## 2022-10-02 NOTE — PROGRESS NOTES
Occupational Therapy  Facility/Department: [facility] CAR 3- MICU  Occupational Therapy Initial Assessment    Name: Julio Angulo  : 1940  MRN: [id number]  Date of Service: 10/2/2022  Chief Complaint   Patient presents with    Respiratory Distress       Discharge Recommendations: Pt unsafe to return to PLOF without at least 24 hr supervision, pt confused, balance and func mob concerns, poor ADL performance this date with socks/combing hair, etc.  Patient would benefit from continued therapy after discharge          Patient Diagnosis(es): The encounter diagnosis was Acute respiratory failure with hypoxia ([address] Past Medical History:  has a past medical history of Anxiety, Arthritis, Atherosclerosis, Body mass index (bmi) 25.0-25.9, adult, CHF exacerbation ([facility]), COPD (chronic obstructive pulmonary disease) ([facility]), CVA (cerebral vascular accident) ([address] Depression, Diabetes mellitus ([address] Diverticula, esophagus congenital, Former smoker, Hypercholesteremia, Hypertension, Hypokalemia, Joint pain, knee, Myocardial infarct, old, Pneumonia, Protein S deficiency ([address] Pulmonary embolism ([address] Reflux esophagitis, Tinea corporis, Tremor, URI (upper respiratory infection), UTI (urinary tract infection), and Zenker's diverticulum. Past Surgical History:  has a past surgical history that includes Carotid endarterectomy (Bilateral); Tonsillectomy; and Endoscopy, colon, diagnostic. Assessment   Performance deficits / Impairments: Decreased ADL status; Decreased functional mobility ; Decreased safe awareness;Decreased cognition;Decreased strength;Decreased balance;Decreased endurance;Decreased high-level IADLs  Prognosis: Good  Decision Making: Medium Complexity  REQUIRES OT FOLLOW-UP: Yes  Activity Tolerance  Activity Tolerance: Treatment limited secondary to decreased cognition;Patient limited by fatigue        Plan   Occupational Therapy Plan  Times Per Week: 3-4x Restrictions  Restrictions/Precautions  Restrictions/Precautions: Fall Risk, General Precautions  Required Braces or Orthoses?: No  Position Activity Restriction  Other position/activity restrictions: up with A, Asp. prec., 6L O2 at baseline, MAP goal >65, DNR-CCA    Subjective   General  Patient assessed for rehabilitation services?: Yes  Family / Caregiver Present: No  Diagnosis: CHF, SOB  Subjective  Subjective: Pt did not c/o pain but did mention Numbness/tingling in feet this date     Social/Functional History  Social/Functional History  Lives With: Alone  Type of Home: House  Home Layout: One level  Home Access: Ramped entrance  Bathroom Shower/Tub: Walk-in shower  Home Equipment: Cane, Cane, quad, Rollator  ADL Assistance: Independent  Active : No  Patient's  Info: family helps with transport  Occupation: Retired  Additional Comments: Pt is a questionable historian with no family at baseline, answers \"yes\" to nearly all questions       Objective      Safety Devices  Type of Devices: Patient at risk for falls;Call light within reach;Gait belt;Left in chair;Chair alarm in place;Nurse notified  Restraints  Restraints Initially in Place: No  Bed Mobility Training  Bed Mobility Training: Yes  Supine to Sit: Stand-by assistance  Sit to Supine: Other (comment) (LUZ MARIA as pt retired sitting in recliner at end of session with alarm on and legs elevated)  Scooting: Stand-by assistance  Balance  Sitting: Intact (SUP only, pt sat for ~19 min total this date, unsupported on EOB)  Standing: Impaired (Min A while standing bedside with RW, ~3 min total)  Transfer Training  Transfer Training: Yes  Sit to Stand: Minimum assistance (with mod cues for proper hand placement)  Stand to Sit: Minimum assistance  Bed to Chair: Minimum assistance  Gait  Overall Level of Assistance: Minimum assistance (Slow pace, pt side-stepped at bedside, whent anterior/posterior at bedside, took seated rest break before transferring to recliner at end of session)  Base of Support: Narrowed  Speed/Adriana: Slow;Shuffled  Gait Abnormalities: Shuffling gait  Assistive Device: Walker, rolling;Gait belt     AROM: Generally decreased, functional (Chronic shoulder deficits)  PROM: Within functional limits  Strength: Generally decreased, functional (3-/5 at B/L shoulders-pt using RUE to assist AROM of LUE this date, 5/5 at B/L elbows/hands)  Coordination: Generally decreased, functional (Decreased speed)  Tone: Normal  Sensation: Impaired (N/T in feet, may be new deficit)  ADL  Feeding: Minimal assistance;Setup; Increased time to complete  Grooming: Minimal assistance;Setup; Increased time to complete  UE Bathing: Moderate assistance; Increased time to complete;Setup  LE Bathing: Maximum assistance;Setup; Increased time to complete  UE Dressing: Moderate assistance; Increased time to complete;Setup  LE Dressing: Maximum assistance;Setup; Increased time to complete  Toileting: Maximum assistance;Setup; Increased time to complete  Additional Comments: Pt very confused this date, asked to don a sock and looked around room distracted-unable to perform any aspect of this activity except pt did make a joke that a sock with called a \"pain-in-the-a__\", pt cooperative with therapy, yet when handed a comb pt named the comb and then required mod cues to actually use it with BUE's, pt was asssited in answering 2 personal cell-phone calls from female relatives, pt attempting to accidently cancel calls and could not place speaker on/put phone to ear without cues              Vision  Vision: Impaired  Vision Exceptions: Wears glasses at all times  Hearing  Hearing: Exceptions to [facility]  Hearing Exceptions: Hard of hearing/hearing concerns  Cognition  Overall Cognitive Status: Exceptions  Attention Span: Difficulty dividing attention; Difficulty attending to directions  Memory: Decreased recall of precautions;Decreased recall of recent events  Safety Judgement: Decreased awareness of need for assistance  Problem Solving: Assistance required to generate solutions;Decreased awareness of errors;Assistance required to correct errors made;Assistance required to identify errors made  Initiation: Requires cues for some  Sequencing: Requires cues for some  Cognition Comment: Pt easily distracted, when asked what month it was currently pt started naming random numbers, pt had difficulty simply answering a phone call on personal cell, pt confused and repeating self at times  Orientation  Overall Orientation Status: Impaired  Orientation Level: Oriented to person;Disoriented to time;Disoriented to situation;Oriented to place         Education Given To: Patient  Education Provided: Role of Therapy;Plan of Care;Transfer Training;ADL Adaptive Strategies; Equipment  Education Provided Comments: Proper hand placement for transfers  Education Method: Verbal;Demonstration  Barriers to Learning: Cognition;Vision; Hearing  Education Outcome: Continued education needed  LUE AROM (degrees)  LUE AROM : Exceptions  L Shoulder Flexion 0-180: Limited to ~80 degrees  L Elbow Flexion 0-145: WFL  L Elbow Extension 145-0: WFL  RUE AROM (degrees)  RUE AROM : Exceptions  R Shoulder Flexion 0-180: Limited to ~80 degrees  R Elbow Flexion 0-145: WFL  R Elbow Extension 145-0: [facility] Score        AM-[facility] Inpatient Daily Activity Raw Score: 14 (10/02/22 1208)  AM-PAC Inpatient ADL T-Scale Score : 33.39 (10/02/22 1208)  ADL Inpatient CMS 0-100% Score: 59.67 (10/02/22 1208)  ADL Inpatient CMS G-Code Modifier : CK (10/02/22 1208)         Goals  Short Term Goals  Time Frame for Short Term Goals: Pt will by discharge  Short Term Goal 1: demo standing during func activity for 4 min+ using LRD PRN and no seated rest break, SBA  Short Term Goal 2: demo Mod I for all bed mobility using bed rails PRN  Short Term Goal 3: demo ADL UB bathing/dressing activity at mod I with 2 cues only  Short Term Goal 4: demo ADL LB bathing/dressing activity at SBA, AE PRN, with 2 cues only  Short Term Goal 5: demo SBA for all func transfers using RW PRN, including toilet transfers     Therapy Time   Individual Concurrent Group Co-treatment   Time In [address] [place] [address] Treatment Minutes: 12 Minutes       Wiliam See, OTR/L

## 2022-10-02 NOTE — PROGRESS NOTES
Critical Care Team - Daily Progress Note      Date and time: 10/2/2022 2:19 PM  Patient's name:  Jorge Harden Record Number: [id number]  Patient's account/billing number: [de-identified]  Patient's YOB: 1940  Age: 79 y.o. Date of Admission: 9/29/2022  4:29 AM  Length of stay during current admission: 3      Primary Care Physician: Elias Antonio MD  ICU Attending Physician: Dr. Jaja Escalante Status: DNR-CCA    Reason for ICU admission:   Chief Complaint   Patient presents with    Respiratory Distress         SUBJECTIVE:   HPI:  History was obtained from Chart Review . Rebecca Grimes is a 79 y.o. who presented to the ER via EMS due to shortness of breath. Patient has longstanding history of CHF, hypertension, recurrent pneumonia, diabetes, pulmonary edema, NSTEMI's, coronary artery disease, atrial fibrillation not on anticoagulation, COPD, stage IV CKD. Patient was just discharged from [facility] on 9/23 secondary to hypoxia and CHF exacerbation. He echo was done at that time patient was found to have an EF of 25% with global hypokinesis. Patient was started on Lasix with improvement of symptoms discharged home on home O2. At that time cardiac catheterization was recommended but patient declined as she did not want worsening kidney function and any chance of dialysis. Patient came in today via EMS complaining of shortness of breath. In the emergency department patient was hypoxic in the 60s on BiPAP. Decision was made to intubate for airway protection. Patient is a DNR CCA, daughter was unsure about what her wishes were for intubation. Patient was started on propofol as well as a nitro drip. She was febrile with a leukocytosis of 15.1, Vanco and cefepime ordered for potentially hospital-acquired pneumonia. Patient had labs significant for potassium of 5.1, BUN/creatinine 45/2.13, blood glucose of 266, proBNP of 19,511 and a troponin of 53.      Daughter is at bedside, I did discuss with her at length patient's condition. She is agreeable with continuing care as it is, patient remains a DNR CCA at this time. 9/30 patient was extubated this morning. Tolerated well. No immediate complications. Resumed oral diet. 10/1: Kitchen catheter was given ordered patient was urinating fine. Patient on 6 L nasal cannula oxygen. Given Toradol for extremity pain. Vancomycin stopped, cefepime continued. Interval history:    During the evaluation, the patient was alert, oriented, and in no acute distress. The patient was breathing on 6 L nasal cannula oxygen comfortably. She reported shortness of breath only when lying down. Patient is not on pressor supports cardiovascular examination was within normal limits. Patient denied any chest pain, cough but there is sputum production which is greenish-yellow in color. The patient denies any fever or chills. There is no abdominal pain, constipation, diarrhea, blood in stools, blood in urine. The patient reported of extreme pain bilaterally in her feet and toes especially during touch. Bilateral lower extremity examination showed no, erythema or signs of infection. OVERNIGHT EVENTS:       Patient's blood pressure increased to 171/54 mmHg. Was given IV 5 mg of labetalol.     AWAKE & FOLLOWING COMMANDS:  [] No   [x] Yes    CURRENT VENTILATION STATUS:     [] Ventilator  [] BIPAP  [x] Nasal Cannula [] Room Air      IF INTUBATED, ET TUBE MARKING AT LOWER LIP:       cms    SECRETIONS Amount:  [] Small [x] Moderate  [] Large  [] None  Color:     [] White [x] Colored  [] Bloody    SEDATION:  RAAS Score:  [] Propofol gtt  [] Versed gtt  [] Ativan gtt   [x] No Sedation    PARALYZED:  [x] No    [] Yes    DIARRHEA:                [x] No                [] Yes  (C. Difficile status: [] positive                                                                                                                       [] negative [] pending)    VASOPRESSORS:  [x] No    [] Yes     CENTRAL LINES:     [x] No   [] Yes   (Date of Insertion:   )               PEREZ'S CATHETER:   [x] No   [] Yes  (Date of Insertion:   )     URINE OUTPUT:            [x] Good   [x] Low              [] Anuric      OBJECTIVE:     VITAL SIGNS:  BP (!) 149/58   Pulse 71   Temp 98.6 °F (37 °C)   Resp 13   Ht 5' 1\" (1.549 m)   Wt 154 lb 12.2 oz (70.2 kg)   SpO2 100%   BMI 29.24 kg/m²   Tmax over 24 hours:  Temp (24hrs), Av.2 °F (36.8 °C), Min:98 °F (36.7 °C), Max:98.6 °F (37 °C)      Patient Vitals for the past 8 hrs:   Pulse Resp   10/02/22 0700 71 13         Intake/Output Summary (Last 24 hours) at 10/2/2022 1419  Last data filed at 10/2/2022 0347  Gross per 24 hour   Intake 654.68 ml   Output 200 ml   Net 454.68 ml     Date 10/02/22 0000 - 10/02/22 2359   Shift 6161-3554 7541-6224 5611-5714 24 Hour Total   INTAKE   I.V.(mL/kg) 426. 5(6.1)   426. 5(6.1)   IV Piggyback(mL/kg) 178.2(2.5)   178.2(2.5)   Shift Total(mL/kg) 604. 7(8.6)   604. 7(8.6)   OUTPUT   Urine(mL/kg/hr) 200(0.4)   200   Shift Total(mL/kg) 200(2.8)   200(2.8)   Weight (kg) 70.2 70.2 70.2 70.2     Wt Readings from Last 3 Encounters:   10/01/22 154 lb 12.2 oz (70.2 kg)   22 155 lb 10.3 oz (70.6 kg)   21 190 lb 4.1 oz (86.3 kg)     Body mass index is 29.24 kg/m². PHYSICAL EXAM:  Constitutional: Appears well, no distress  EENT: PERRLA, EOMI, sclera clear, anicteric, oropharynx clear, no lesions, neck supple with midline trachea. Neck: Supple, symmetrical, trachea midline, no adenopathy, thyroid symmetric, no jvd skin normal  Respiratory: clear to auscultation, no wheezes or rales and unlabored breathing. No intercostal tenderness. patient patient did report having some shortness of breath especially during lying down.   Cardiovascular: regular rate and rhythm, normal S1, S2, no murmur noted and 2+ pulses throughout  Abdomen: soft, nontender, nondistended, no masses or organomegaly  NEUROLOGIC: Awake, alert, oriented to name, place and time. Extremities:  peripheral pulses normal, no pedal edema, no clubbing or cyanosis. Patient had excruciating pain and tenderness on her feet. SKIN: Patient denies ulceration on the lower extremities but it could be appreciated.     Any additional physical findings:      MEDICATIONS:  Scheduled Meds:   predniSONE  40 mg Oral Daily    sodium chloride flush  5-40 mL IntraVENous 2 times per day    sennosides-docusate sodium  1 tablet Oral BID    famotidine (PEPCID) injection  20 mg IntraVENous BID    heparin (porcine)  5,000 Units SubCUTAneous 3 times per day    insulin lispro  0-4 Units SubCUTAneous TID WC    insulin lispro  0-4 Units SubCUTAneous Nightly    bumetanide  1 mg IntraVENous BID    carvedilol  6.25 mg Oral BID WC    cefepime  1,000 mg IntraVENous Q24H     Continuous Infusions:   sodium chloride 10 mL/hr at 10/02/22 0347    dextrose       PRN Meds:   sodium chloride flush, 5-40 mL, PRN  sodium chloride, , PRN  ondansetron, 4 mg, Q8H PRN   Or  ondansetron, 4 mg, Q6H PRN  polyethylene glycol, 17 g, Daily PRN  acetaminophen, 650 mg, Q6H PRN   Or  acetaminophen, 650 mg, Q6H PRN  fentanNYL, 50 mcg, Q1H PRN  glucose, 4 tablet, PRN  dextrose bolus, 125 mL, PRN   Or  dextrose bolus, 250 mL, PRN  glucagon (rDNA), 1 mg, PRN  dextrose, , Continuous PRN        SUPPORT DEVICES: [] Ventilator [] BIPAP  [x] Nasal Cannula [] Room Air        Lab Results   Component Value Date/Time    PHART 7.472 09/20/2022 01:32 PM    IGV7OPD 35.1 09/20/2022 01:32 PM    PO2ART 78.9 09/20/2022 01:32 PM    BQB2VCS 25.6 09/20/2022 01:32 PM    HXL5VVF 27 01/14/2020 02:14 AM    D0FPRQCO 94.7 09/20/2022 01:32 PM    FIO2 30.0 09/30/2022 04:43 AM     Lactic Acid:   Lab Results   Component Value Date/Time    LACTA 2.2 09/20/2022 03:51 AM    LACTA 4.1 09/20/2022 12:35 AM    LACTA NOT REPORTED 09/13/2019 09:14 PM         DATA:  Complete Blood Count:   Recent Labs     09/30/22  0305 10/01/22  0850   WBC 15.4* 10.4   HGB 11.8* 11.4*   MCV 89.6 90.2    239   RBC 3.93* 3.86*   HCT 35.2* 34.8*   MCH 30.0 29.5   MCHC 33.5 32.8   RDW 17.0* 17.2*   MPV 9.8 9.6        PT/INR:    Lab Results   Component Value Date/Time    PROTIME 10.1 09/29/2022 04:37 AM    INR 1.0 09/29/2022 04:37 AM     PTT:    Lab Results   Component Value Date/Time    APTT 24.8 09/29/2022 04:37 AM       Basal Metabolic Profile:   Recent Labs     09/29/22  1937 09/30/22  0305 10/01/22  0850   * 135 136   K 5.4* 5.2 4.3   BUN 47* 47* 48*   CREATININE 2.06* 2.27* 2.25*   CL 97* 96* 95*   CO2 25 25 28      Magnesium:   Lab Results   Component Value Date/Time    MG 1.9 09/22/2022 04:54 AM    MG 2.0 09/21/2022 03:39 PM    MG 3.4 09/20/2022 12:35 AM     Phosphorus:   Lab Results   Component Value Date/Time    PHOS 3.1 05/21/2021 06:59 AM    PHOS 3.4 05/20/2021 06:49 AM    PHOS 3.6 05/19/2021 06:09 AM     S. Calcium:  Recent Labs     10/01/22  0850   CALCIUM 9.4     S. Ionized Calcium:No results for input(s): IONCA in the last 72 hours. Urinalysis:   Lab Results   Component Value Date/Time    NITRU NEGATIVE 09/29/2022 05:30 AM    COLORU Yellow 09/29/2022 05:30 AM    PHUR 5.5 09/29/2022 05:30 AM    WBCUA TOO NUMEROUS TO COUNT 09/29/2022 05:30 AM    RBCUA 2 TO 5 09/29/2022 05:30 AM    MUCUS NOT REPORTED 05/15/2021 01:09 PM    TRICHOMONAS NOT REPORTED 05/15/2021 01:09 PM    YEAST MODERATE 09/29/2022 05:30 AM    BACTERIA MANY 09/20/2022 02:36 AM    SPECGRAV 1.019 09/29/2022 05:30 AM    LEUKOCYTESUR LARGE 09/29/2022 05:30 AM    UROBILINOGEN Normal 09/29/2022 05:30 AM    BILIRUBINUR NEGATIVE 09/29/2022 05:30 AM    GLUCOSEU 1+ 09/29/2022 05:30 AM    KETUA NEGATIVE 09/29/2022 05:30 AM    AMORPHOUS NOT REPORTED 05/15/2021 01:09 PM       CARDIAC ENZYMES: No results for input(s): CKMB, CKMBINDEX, TROPONINI in the last 72 hours.     Invalid input(s): CKTOTAL;3  BNP: No results for input(s): BNP in the last 72 hours. LFTS  No results for input(s): ALKPHOS, ALT, AST, BILITOT, BILIDIR, LABALBU in the last 72 hours. AMYLASE/LIPASE/AMMONIA  No results for input(s): AMYLASE, LIPASE, AMMONIA in the last 72 hours. Last 3 Blood Glucose:   Recent Labs     09/29/22  1937 09/30/22  0305 10/01/22  0850   GLUCOSE 136* 94 111*      HgBA1c:    Lab Results   Component Value Date/Time    LABA1C 6.7 07/15/2019 06:27 AM         TSH:    Lab Results   Component Value Date/Time    TSH 2.31 05/16/2021 07:07 AM     ANEMIA STUDIES  No results for input(s): LABIRON, TIBC, FERRITIN, [id number], FOLATE, OCCULTBLD in the last 72 hours. Cultures during this admission:     Blood cultures:                 [] None drawn      [x] Negative             []  Positive (Details:  )  Urine Culture:                   [] None drawn      [x] Negative             []  Positive (Details:  )  Sputum Culture:               [x] None drawn       [] Negative             []  Positive (Details:  )   Endotracheal aspirate:     [x] None drawn       [] Negative             []  Positive (Details:  )             Chest Xray (10/2/2022): CXR on 9/29/2022  Impression   Improving congestive changes with mild residual patchy parenchymal opacities. ASSESSMENT:     Principal Problem:    Heart failure (HCC)  Active Problems:    Acute respiratory failure with hypoxia (HCC)  Resolved Problems:    * No resolved hospital problems. *       PLAN:     PLAN/MEDICAL DECISION MAKING:  Neurologic:   Neuro intact  Neuro checks per protocol  no sedation  Patient is alert and oriented  Cardiovascular:  Hemodynamically stable  MAP goal >65  Not on pressors  On Coreg for hypertension  Blood pressure 149/58 mmHg  Blood pressure increased to 171/54 mmHg last night. Was given IV labetalol 5 mg once.   Heart rate 75 bpm  Pulmonary:  Maintain oxygen sats >92%  Pulmonary toilet  Reports shortness of breath during lying down  On 6 L nasal cannula oxygen  Was extubated on 2020. Breathing normally. GI/Nutrition  sennakot 826-50 mg 1 tablet oral twice daily   ulcer Prophylaxis:  Pepcid 20 mg    Diet:ADULT DIET; Regular  Renal/Fluid/Electrolyte  IV Fluids: No continuous fluids. 0.9% sodium chloride IV 5 to 250 mL/h as needed. I/O: In: 804.7 [P.O.:200; I.V.:426.5]  Out: 200 [Urine:200]  UOP: 0.1 cc/kg/hr  Monitor electrolytes, replace PRN   ID  WBC:   Lab Results   Component Value Date    WBC 10.4 10/01/2022     Tmax: Temp (24hrs), Av.2 °F (36.8 °C), Min:98 °F (36.7 °C), Max:98.6 °F (37 °C)    Antimicrobials: Cefepime course underway. First dose on 2022. Full course for 7 days. Hematology:  Recent Labs     22  0305 10/01/22  0850   HGB 11.8* 11.4*    stable  Endocrine:   glucose controlled - most recent BGL is   Recent Labs     22  1937 22  0305 10/01/22  0850   GLUCOSE 136* 94 111*     DVT Prophylaxis  Heparin  Discharge Needs:  PT, OT, ST, SW, and Case Management    - Patient given prednisone 40 mg oral daily for bilateral feet pain. Patient's uric acid levels cured to be 8.8 mg/dL. - Will be transferred to Med Surg unit. Transfer order already in place. CODE STATUS: DNR-CCA             Honorio Gillespie MD, M.D.              Department of Internal Medicine/ Critical care  [facility]             10/2/2022, 2:19 PM

## 2022-10-02 NOTE — PROGRESS NOTES
[address] [place] [address]     Department of Internal Medicine - Staff Internal Medicine Service   ICU PATIENT TRANSFER NOTE        Patient:  Antonieta Goode  YOB: 1940  MRN: [id number]     Acct: [de-identified]     Admit date: 9/29/2022    Code Status:-  Full code     Reason for ICU Admission:-   Acute hypoxic failure, shortness of breath. Acute exacerbation of congestive heart failure along with hospital-acquired pneumonia    SUPPORT DEVICES: [] Ventilator [] BIPAP  [x] Nasal Cannula [] Room Air    Consultations:- [] Cardiology [] Nephrology  [] Hemo onco  [] GI                               [] ID [] ENT  [] Rheum [] Endo   []Physiotherapy                                 Others:-     NUTRITION:  [] NPO [] Tube Feeding (Specify: ) [] TPN  [x] PO    Central Lines:- [x] No   [] Yes           If yes - Days/Date of Insertion. Pt seen,examined and Chart reviewed. ICU COURSE:  Patient with past medical history of congestive heart failure, stage IV CKD, COPD with 2 L oxygen at home, type 2 diabetes mellitus, A. fib and hypertension was initially seen for decompensated heart failure and hypertensive emergency at [facility]. She was managed in ED due to pulmonary edema and hypertensive emergency along with acute flash pulmonary edema. She was started on IV nitroglycerin, IV Lasix. Patient also had positive urine analysis for UTI and she was started on cefepime in ED. cardiology and pulmonology were consulted due to trending upwards of troponin. Patient denied cardiac work-up such as catheterization. Her proBNP kept elevating patient repeatedly declined cardiac procedures. Her echo was done at [facility] which showed ejection fraction 25%. Urine culture came back positive for E. coli sensitive to ciprofloxacin ceftriaxone, she was started on IV ceftriaxone.   Patient wants to discharge from the facility    Patient presented to [facility] via ED due to shortness of breath. She was hypoxic with saturation of 60% on BiPAP. She was intubated for airway protection. Patient remained febrile with WBC count of 15.1, Vanco and cefepime were started. Her BUN and creatinine at presentation was 45 and 2.13 respectively. Her proBNP was 19,511, but troponin levels 53. Her chest x-ray was done which showed cardiomegaly and vascular congestion along with right-sided pleural effusion. Patient was managed on the basis of CHF exacerbation along with hospital-acquired pneumonia. Patient was extubated on 09/30 and was maintained on 6 L oxygen via nasal cannula. Patient was put on Coreg for hypertension, she was not adult regular diet with Pepcid 20 mg for ulcer prophylaxis. Physical Exam:   Vitals: BP (!) 149/58   Pulse 71   Temp 98.6 °F (37 °C)   Resp 13   Ht 5' 1\" (1.549 m)   Wt 154 lb 12.2 oz (70.2 kg)   SpO2 100%   BMI 29.24 kg/m²   24 hour intake/output:  Intake/Output Summary (Last 24 hours) at 10/2/2022 1523  Last data filed at 10/2/2022 0347  Gross per 24 hour   Intake 654.68 ml   Output 200 ml   Net 454.68 ml     Last 3 weights: Wt Readings from Last 3 Encounters:   10/01/22 154 lb 12.2 oz (70.2 kg)   09/23/22 155 lb 10.3 oz (70.6 kg)   05/21/21 190 lb 4.1 oz (86.3 kg)       Physical Exam  Constitutional:       General: She is awake. Interventions: Nasal cannula in place. Comments: Nasal cannula on 6 L   Cardiovascular:      Heart sounds: Normal heart sounds. Pulmonary:      Breath sounds: Normal breath sounds. No stridor. Abdominal:      General: Bowel sounds are normal.      Palpations: Abdomen is soft. Tenderness: There is no abdominal tenderness. Musculoskeletal:      Right upper leg: No edema. Left upper leg: No edema. Right lower leg: No edema. Left lower leg: No edema. Neurological:      Mental Status: She is alert.    Psychiatric:         Attention and Perception: Attention normal.         Mood and Affect: Mood normal. Behavior: Behavior is cooperative. Medications:Current Inpatient  Scheduled Meds:   predniSONE  40 mg Oral Daily    sodium chloride flush  5-40 mL IntraVENous 2 times per day    sennosides-docusate sodium  1 tablet Oral BID    famotidine (PEPCID) injection  20 mg IntraVENous BID    heparin (porcine)  5,000 Units SubCUTAneous 3 times per day    insulin lispro  0-4 Units SubCUTAneous TID WC    insulin lispro  0-4 Units SubCUTAneous Nightly    bumetanide  1 mg IntraVENous BID    carvedilol  6.25 mg Oral BID WC    cefepime  1,000 mg IntraVENous Q24H     Continuous Infusions:   sodium chloride 10 mL/hr at 10/02/22 0347    dextrose       PRN Meds:sodium chloride flush, sodium chloride, ondansetron **OR** ondansetron, polyethylene glycol, acetaminophen **OR** acetaminophen, fentanNYL, glucose, dextrose bolus **OR** dextrose bolus, glucagon (rDNA), dextrose    Objective:    CBC:   Recent Labs     09/30/22  0305 10/01/22  0850   WBC 15.4* 10.4   HGB 11.8* 11.4*    239     BMP:    Recent Labs     09/29/22  1937 09/30/22  0305 10/01/22  0850   * 135 136   K 5.4* 5.2 4.3   CL 97* 96* 95*   CO2 25 25 28   BUN 47* 47* 48*   CREATININE 2.06* 2.27* 2.25*   GLUCOSE 136* 94 111*     Calcium:  Recent Labs     10/01/22  0850   CALCIUM 9.4     Ionized Calcium:No results for input(s): IONCA in the last 72 hours. Magnesium:No results for input(s): MG in the last 72 hours. Phosphorus:No results for input(s): PHOS in the last 72 hours. BNP:No results for input(s): BNP in the last 72 hours. Glucose:  Recent Labs     10/01/22  1628 10/01/22  2100 10/02/22  1134   POCGLU 89 95 95     HgbA1C: No results for input(s): LABA1C in the last 72 hours. INR: No results for input(s): INR in the last 72 hours. Hepatic: No results for input(s): ALKPHOS, ALT, AST, PROT, BILITOT, BILIDIR, LABALBU in the last 72 hours. Amylase and Lipase:No results for input(s): LACTA, AMYLASE in the last 72 hours.   Lactic Acid: No results for input(s): LACTA in the last 72 hours. CARDIAC ENZYMES:No results for input(s): CKTOTAL, CKMB, CKMBINDEX, TROPONINI in the last 72 hours. BNP: No results for input(s): BNP in the last 72 hours. Lipids: No results for input(s): CHOL, TRIG, HDL, LDL, LDLCALC in the last 72 hours. ABGs: No results found for: PH, PCO2, PO2, HCO3, O2SAT  Thyroid:   Lab Results   Component Value Date/Time    TSH 2.31 05/16/2021 07:07 AM        Urinalysis: No results for input(s): BACTERIA, BLOODU, CLARITYU, COLORU, PHUR, PROTEINU, RBCUA, SPECGRAV, BILIRUBINUR, NITRU, WBCUA, LEUKOCYTESUR, GLUCOSEU in the last 72 hours. Assessment:  Principal Problem:    Heart failure (HCC)  Active Problems:    Acute respiratory failure with hypoxia (HCC)  Resolved Problems:    * No resolved hospital problems. *          Plan:  Continue Bumex per nephrology recommendation  Start on hydralazine tablet 25 mg, Coreg tablet 6.25 mg for high blood pressure  Continue prednisone tablet 40 mg for hyperuricemia. Continue insulin Humalog subcu nightly along with subcu 3 times daily with meals  Continue sennosides docusate sodium  Continue Pepcid 20 Mg injection  Continue monitoring input and output          Shante Chavez MD             Resident Internal Medicine, PGY-1  [facility].            10/2/2022, 3:23 PM

## 2022-10-03 LAB
ABSOLUTE EOS #: <0.03 K/UL (ref 0–0.44)
ABSOLUTE EOS #: <0.03 K/UL (ref 0–0.44)
ABSOLUTE IMMATURE GRANULOCYTE: <0.03 K/UL (ref 0–0.3)
ABSOLUTE IMMATURE GRANULOCYTE: <0.03 K/UL (ref 0–0.3)
ABSOLUTE LYMPH #: 0.78 K/UL (ref 1.1–3.7)
ABSOLUTE LYMPH #: 0.87 K/UL (ref 1.1–3.7)
ABSOLUTE MONO #: 0.13 K/UL (ref 0.1–1.2)
ABSOLUTE MONO #: 0.31 K/UL (ref 0.1–1.2)
ANION GAP SERPL CALCULATED.3IONS-SCNC: 13 MMOL/L (ref 9–17)
ANION GAP SERPL CALCULATED.3IONS-SCNC: 18 MMOL/L (ref 9–17)
BASOPHILS # BLD: 0 % (ref 0–2)
BASOPHILS # BLD: 0 % (ref 0–2)
BASOPHILS ABSOLUTE: 0.03 K/UL (ref 0–0.2)
BASOPHILS ABSOLUTE: <0.03 K/UL (ref 0–0.2)
BUN BLDV-MCNC: 43 MG/DL (ref 8–23)
BUN BLDV-MCNC: 47 MG/DL (ref 8–23)
CALCIUM SERPL-MCNC: 9.6 MG/DL (ref 8.6–10.4)
CALCIUM SERPL-MCNC: 9.7 MG/DL (ref 8.6–10.4)
CHLORIDE BLD-SCNC: 92 MMOL/L (ref 98–107)
CHLORIDE BLD-SCNC: 93 MMOL/L (ref 98–107)
CO2: 25 MMOL/L (ref 20–31)
CO2: 29 MMOL/L (ref 20–31)
CREAT SERPL-MCNC: 2.02 MG/DL (ref 0.5–0.9)
CREAT SERPL-MCNC: 2.18 MG/DL (ref 0.5–0.9)
EOSINOPHILS RELATIVE PERCENT: 0 % (ref 1–4)
EOSINOPHILS RELATIVE PERCENT: 0 % (ref 1–4)
GFR AFRICAN AMERICAN: 26 ML/MIN
GFR NON-AFRICAN AMERICAN: 22 ML/MIN
GFR SERPL CREATININE-BSD FRML MDRD: 24 ML/MIN/1.73M2
GFR SERPL CREATININE-BSD FRML MDRD: ABNORMAL ML/MIN/{1.73_M2}
GLUCOSE BLD-MCNC: 104 MG/DL (ref 65–105)
GLUCOSE BLD-MCNC: 111 MG/DL (ref 65–105)
GLUCOSE BLD-MCNC: 130 MG/DL (ref 70–99)
GLUCOSE BLD-MCNC: 131 MG/DL (ref 65–105)
GLUCOSE BLD-MCNC: 133 MG/DL (ref 70–99)
GLUCOSE BLD-MCNC: 98 MG/DL (ref 65–105)
HCT VFR BLD CALC: 34.8 % (ref 36.3–47.1)
HCT VFR BLD CALC: 38.4 % (ref 36.3–47.1)
HEMOGLOBIN: 11.5 G/DL (ref 11.9–15.1)
HEMOGLOBIN: 12.8 G/DL (ref 11.9–15.1)
IMMATURE GRANULOCYTES: 0 %
IMMATURE GRANULOCYTES: 0 %
LYMPHOCYTES # BLD: 11 % (ref 24–43)
LYMPHOCYTES # BLD: 11 % (ref 24–43)
MCH RBC QN AUTO: 29.6 PG (ref 25.2–33.5)
MCH RBC QN AUTO: 30.1 PG (ref 25.2–33.5)
MCHC RBC AUTO-ENTMCNC: 33 G/DL (ref 28.4–34.8)
MCHC RBC AUTO-ENTMCNC: 33.3 G/DL (ref 28.4–34.8)
MCV RBC AUTO: 89.7 FL (ref 82.6–102.9)
MCV RBC AUTO: 90.4 FL (ref 82.6–102.9)
MONOCYTES # BLD: 2 % (ref 3–12)
MONOCYTES # BLD: 4 % (ref 3–12)
NRBC AUTOMATED: 0 PER 100 WBC
NRBC AUTOMATED: 0 PER 100 WBC
PDW BLD-RTO: 16.2 % (ref 11.8–14.4)
PDW BLD-RTO: 16.7 % (ref 11.8–14.4)
PLATELET # BLD: 244 K/UL (ref 138–453)
PLATELET # BLD: 272 K/UL (ref 138–453)
PMV BLD AUTO: 9.7 FL (ref 8.1–13.5)
PMV BLD AUTO: 9.9 FL (ref 8.1–13.5)
POTASSIUM SERPL-SCNC: 4.5 MMOL/L (ref 3.7–5.3)
POTASSIUM SERPL-SCNC: 4.8 MMOL/L (ref 3.7–5.3)
RBC # BLD: 3.88 M/UL (ref 3.95–5.11)
RBC # BLD: 4.25 M/UL (ref 3.95–5.11)
RBC # BLD: ABNORMAL 10*6/UL
RBC # BLD: ABNORMAL 10*6/UL
SEG NEUTROPHILS: 84 % (ref 36–65)
SEG NEUTROPHILS: 87 % (ref 36–65)
SEGMENTED NEUTROPHILS ABSOLUTE COUNT: 6.29 K/UL (ref 1.5–8.1)
SEGMENTED NEUTROPHILS ABSOLUTE COUNT: 6.43 K/UL (ref 1.5–8.1)
SODIUM BLD-SCNC: 134 MMOL/L (ref 135–144)
SODIUM BLD-SCNC: 136 MMOL/L (ref 135–144)
WBC # BLD: 7.2 K/UL (ref 3.5–11.3)
WBC # BLD: 7.7 K/UL (ref 3.5–11.3)

## 2022-10-03 PROCEDURE — 94640 AIRWAY INHALATION TREATMENT: CPT

## 2022-10-03 PROCEDURE — 2580000003 HC RX 258: Performed by: STUDENT IN AN ORGANIZED HEALTH CARE EDUCATION/TRAINING PROGRAM

## 2022-10-03 PROCEDURE — 80048 BASIC METABOLIC PNL TOTAL CA: CPT

## 2022-10-03 PROCEDURE — 6370000000 HC RX 637 (ALT 250 FOR IP): Performed by: INTERNAL MEDICINE

## 2022-10-03 PROCEDURE — 36415 COLL VENOUS BLD VENIPUNCTURE: CPT

## 2022-10-03 PROCEDURE — 82947 ASSAY GLUCOSE BLOOD QUANT: CPT

## 2022-10-03 PROCEDURE — 6370000000 HC RX 637 (ALT 250 FOR IP): Performed by: STUDENT IN AN ORGANIZED HEALTH CARE EDUCATION/TRAINING PROGRAM

## 2022-10-03 PROCEDURE — 85025 COMPLETE CBC W/AUTO DIFF WBC: CPT

## 2022-10-03 PROCEDURE — 2500000003 HC RX 250 WO HCPCS: Performed by: STUDENT IN AN ORGANIZED HEALTH CARE EDUCATION/TRAINING PROGRAM

## 2022-10-03 PROCEDURE — 99233 SBSQ HOSP IP/OBS HIGH 50: CPT | Performed by: INTERNAL MEDICINE

## 2022-10-03 PROCEDURE — 6360000002 HC RX W HCPCS: Performed by: STUDENT IN AN ORGANIZED HEALTH CARE EDUCATION/TRAINING PROGRAM

## 2022-10-03 PROCEDURE — 1200000000 HC SEMI PRIVATE

## 2022-10-03 RX ORDER — IPRATROPIUM BROMIDE AND ALBUTEROL SULFATE 2.5; .5 MG/3ML; MG/3ML
1 SOLUTION RESPIRATORY (INHALATION) 3 TIMES DAILY
Status: DISCONTINUED | OUTPATIENT
Start: 2022-10-03 | End: 2022-10-05 | Stop reason: HOSPADM

## 2022-10-03 RX ORDER — HYDRALAZINE HYDROCHLORIDE 50 MG/1
50 TABLET, FILM COATED ORAL 2 TIMES DAILY
Status: DISCONTINUED | OUTPATIENT
Start: 2022-10-03 | End: 2022-10-05 | Stop reason: HOSPADM

## 2022-10-03 RX ORDER — PREDNISONE 20 MG/1
20 TABLET ORAL DAILY
Status: DISCONTINUED | OUTPATIENT
Start: 2022-10-04 | End: 2022-10-05 | Stop reason: HOSPADM

## 2022-10-03 RX ADMIN — SODIUM CHLORIDE, PRESERVATIVE FREE 10 ML: 5 INJECTION INTRAVENOUS at 08:51

## 2022-10-03 RX ADMIN — FENTANYL CITRATE 50 MCG: 50 INJECTION, SOLUTION INTRAMUSCULAR; INTRAVENOUS at 22:01

## 2022-10-03 RX ADMIN — CEFEPIME 1000 MG: 1 INJECTION, POWDER, FOR SOLUTION INTRAMUSCULAR; INTRAVENOUS at 11:26

## 2022-10-03 RX ADMIN — HEPARIN SODIUM 5000 UNITS: 5000 INJECTION INTRAVENOUS; SUBCUTANEOUS at 13:32

## 2022-10-03 RX ADMIN — HYDRALAZINE HYDROCHLORIDE 50 MG: 50 TABLET, FILM COATED ORAL at 22:02

## 2022-10-03 RX ADMIN — HEPARIN SODIUM 5000 UNITS: 5000 INJECTION INTRAVENOUS; SUBCUTANEOUS at 22:01

## 2022-10-03 RX ADMIN — HEPARIN SODIUM 5000 UNITS: 5000 INJECTION INTRAVENOUS; SUBCUTANEOUS at 06:49

## 2022-10-03 RX ADMIN — BUMETANIDE 1 MG: 0.25 INJECTION, SOLUTION INTRAMUSCULAR; INTRAVENOUS at 22:01

## 2022-10-03 RX ADMIN — IPRATROPIUM BROMIDE AND ALBUTEROL SULFATE 1 AMPULE: .5; 2.5 SOLUTION RESPIRATORY (INHALATION) at 20:31

## 2022-10-03 RX ADMIN — FENTANYL CITRATE 50 MCG: 50 INJECTION, SOLUTION INTRAMUSCULAR; INTRAVENOUS at 03:09

## 2022-10-03 RX ADMIN — DONEPEZIL HYDROCHLORIDE 10 MG: 10 TABLET, FILM COATED ORAL at 22:02

## 2022-10-03 RX ADMIN — SODIUM CHLORIDE, PRESERVATIVE FREE 10 ML: 5 INJECTION INTRAVENOUS at 22:02

## 2022-10-03 RX ADMIN — BUMETANIDE 1 MG: 0.25 INJECTION, SOLUTION INTRAMUSCULAR; INTRAVENOUS at 08:52

## 2022-10-03 RX ADMIN — DOCUSATE SODIUM 50 MG AND SENNOSIDES 8.6 MG 1 TABLET: 8.6; 5 TABLET, FILM COATED ORAL at 22:02

## 2022-10-03 ASSESSMENT — PAIN SCALES - WONG BAKER
WONGBAKER_NUMERICALRESPONSE: 0

## 2022-10-03 ASSESSMENT — ENCOUNTER SYMPTOMS
ALLERGIC/IMMUNOLOGIC NEGATIVE: 1
VOICE CHANGE: 0
SHORTNESS OF BREATH: 0
WHEEZING: 0
COUGH: 0
CHOKING: 0
CONSTIPATION: 0
NAUSEA: 0
RHINORRHEA: 0
DIARRHEA: 0
SHORTNESS OF BREATH: 1
PHOTOPHOBIA: 0
SORE THROAT: 0
EYE REDNESS: 0
ABDOMINAL PAIN: 0
STRIDOR: 0
VOMITING: 0

## 2022-10-03 ASSESSMENT — PAIN DESCRIPTION - LOCATION: LOCATION: BACK

## 2022-10-03 ASSESSMENT — PAIN SCALES - GENERAL
PAINLEVEL_OUTOF10: 7
PAINLEVEL_OUTOF10: 10
PAINLEVEL_OUTOF10: 9
PAINLEVEL_OUTOF10: 5

## 2022-10-03 ASSESSMENT — PAIN DESCRIPTION - DESCRIPTORS: DESCRIPTORS: ACHING

## 2022-10-03 ASSESSMENT — PAIN DESCRIPTION - ORIENTATION: ORIENTATION: UPPER;LOWER;RIGHT;LEFT

## 2022-10-03 ASSESSMENT — PAIN - FUNCTIONAL ASSESSMENT: PAIN_FUNCTIONAL_ASSESSMENT: ACTIVITIES ARE NOT PREVENTED

## 2022-10-03 NOTE — CARE COORDINATION
Transitional planning note: call placed to patient's daughter Braden Au to discuss transitional planning. Braden Au reports that patient does live alone with no one available to stay with her and needs rehab. Patient does already have home healthcare through [facility] but she feels her mother needs more rehab prior to going home. She is requesting SNF. [facility] of [facility] provided and suzie requests referrals to [facility] Referrals sent as requested. 1430: call from [id number] with [facility] informing they can accept and will start precert    5581: spoke with clement at [facility] and she states they have no beds. Advised that patient is going to another facility.      1540: notified patient's daughter Lisette Stein that patient has been accepted at [facility] and they are starting precert

## 2022-10-03 NOTE — PROGRESS NOTES
Physician Progress Note      Fritz Godfrey  CSN #:                  [id number]  :                       1940  ADMIT DATE:       2022 4:29 AM  DISCH DATE:  RESPONDING  PROVIDER #:        Ap Adams          QUERY TEXT:    Pt admitted with Pneumonia and has CHF documented. If possible, please   document in progress notes and discharge summary further specificity regarding   the type and acuity of CHF:    The medical record reflects the following:  Risk Factors: Age, CHF, EF 25%  Clinical Indicators: ECHO 22 EF 25% LV mild to mod dilation. MR mild to   moderate. H/P CHF exacerbation, Respiratory failure likely due to pulmonary   edema. Pt recently refused cardiac cath. Hx of HTN. On nitro gtt @50. CXR   with with cardiomegaly and vascular congestion. Right sided pleural effusion. BNP 19,511, Trop 53, B/P's 82/58, 94/56, 156/86, 160/99  Treatment: IV Lasix/Bumex, ICU monitoring, CXR, Ventilator support, labs,  Options provided:  -- Acute on Chronic Systolic CHF/HFrEF  -- Acute on Chronic Diastolic CHF/HFpEF  -- Acute on Chronic Systolic and Diastolic CHF  -- Chronic Systolic CHF/HFrEF  -- Chronic Diastolic CHF/HFpEF  -- Chronic Systolic and Diastolic CHF  -- Other - I will add my own diagnosis  -- Disagree - Not applicable / Not valid  -- Disagree - Clinically unable to determine / Unknown  -- Refer to Clinical Documentation Reviewer    PROVIDER RESPONSE TEXT:    This patient is in acute on chronic diastolic CHF/HFpEF. Query created by:  Debi Tejada on 2022 9:37 AM      Electronically signed by:  Ap Adams 10/3/2022 6:29 PM

## 2022-10-03 NOTE — PROGRESS NOTES
[facility]  Internal Medicine Teaching Residency Program  Inpatient Daily Progress Note  ______________________________________________________________________________    Patient: All Molina  YOB: 1940   LLI:[id number]    Acct: [de-identified]     Room: [facility]/0320-01  Admit date: 9/29/2022  Today's date: 10/03/22  Number of days in the hospital: 4    SUBJECTIVE   Admitting Diagnosis: Acute respiratory failure with hypoxia ([address]  CC:  Acute hypoxic failure, shortness of breath. Acute exacerbation of congestive heart failure along with hospital-acquired pneumonia    - Pt examined at bedside. Chart & results reviewed. Patient hemodynamically stable  Patient lying comfortably on bed  Patient is tolerating her diet  Patient denies any shortness of breath, dyspnea  Patient denies any chest pain, lightheadedness and headache  She denies any nausea and vomiting  Patient is on nasal cannula, maintaining 100% oxygen saturation at 5 L. Patient does states that she uses 2 L of oxygen at home, goal is to wean her to her home oxygen level. Review of Systems   Constitutional:  Positive for fatigue. Negative for appetite change, chills, fever and unexpected weight change. HENT:  Negative for congestion, postnasal drip and rhinorrhea. Respiratory:  Negative for cough, choking, shortness of breath, wheezing and stridor. Cardiovascular:  Negative for chest pain and leg swelling. Gastrointestinal:  Negative for abdominal pain, constipation, diarrhea, nausea and vomiting. Genitourinary:  Negative for dysuria, enuresis and urgency. Neurological:  Positive for weakness. Negative for syncope. Psychiatric/Behavioral:  Positive for decreased concentration. Negative for confusion and sleep disturbance. BRIEF HISTORY     Patient, 79years old female, presented to the hospital with shortness of breath.   Patient was initially seen at [facility] clinic due to hypertensive emergency along with acute flash pulmonary edema. She was started on nitroglycerin and Lasix. Her urinalysis was positive for UTI and she was also started on cefepime. Her troponins were trending upward, cardiology and pulmonology were consulted. Patient denied any acute cardiac work-up. Her echo was done at [facility] which showed ejection fraction 25%. Her urine culture came back positive for E. coli and she was started on IV ceftriaxone. While her presentation to [address] [place] [address], she presented with acute hypoxia, shortness of breath and oxygen saturation of 60% at BiPAP. She was intubated. Patient remained febrile with WBC count of 15.1 and she was initially started on vancomycin and cefepime. Patient was extubated, he was maintaining her sats and she was hemodynamically stable. Patient complaint of right great toe pain and she was started on prednisone for suspected gout. Her leukocytosis resolved. Vancomycin was discontinued and she remained on cefepime for healthcare associated pneumonia. She was continued on Bumex and Coreg along with supportive care and she was okay to transfer out of ICU. OBJECTIVE     Vital Signs:  BP (!) 150/56   Pulse 70   Temp 98.4 °F (36.9 °C) (Oral)   Resp 16   Ht 5' 1\" (1.549 m)   Wt 154 lb 12.2 oz (70.2 kg)   SpO2 100%   BMI 29.24 kg/m²     Temp (24hrs), Av.4 °F (36.9 °C), Min:98.1 °F (36.7 °C), Max:98.6 °F (37 °C)    In: 1254.7   Out: 410 [Urine:410]      Physical Exam  Constitutional:       Appearance: She is underweight. Interventions: Nasal cannula in place. Comments: On 4 L   Cardiovascular:      Rate and Rhythm: Normal rate. Heart sounds: Normal heart sounds. Pulmonary:      Effort: Pulmonary effort is normal.      Breath sounds: Normal breath sounds. Chest:      Chest wall: No tenderness. Abdominal:      General: Bowel sounds are normal.      Palpations: Abdomen is soft. Tenderness:  There is no abdominal tenderness. Musculoskeletal:      Right lower leg: No edema. Left lower leg: No edema. Neurological:      Mental Status: She is lethargic. Psychiatric:         Behavior: Behavior is cooperative. Medications:  Scheduled Medications:    predniSONE  40 mg Oral Daily    donepezil  10 mg Oral Nightly    hydrALAZINE  25 mg Oral BID    [START ON 10/4/2022] famotidine (PEPCID) injection  20 mg IntraVENous Q48H    sodium chloride flush  5-40 mL IntraVENous 2 times per day    sennosides-docusate sodium  1 tablet Oral BID    heparin (porcine)  5,000 Units SubCUTAneous 3 times per day    insulin lispro  0-4 Units SubCUTAneous TID WC    insulin lispro  0-4 Units SubCUTAneous Nightly    bumetanide  1 mg IntraVENous BID    carvedilol  6.25 mg Oral BID WC    cefepime  1,000 mg IntraVENous Q24H     Continuous Infusions:    sodium chloride 10 mL/hr at 10/02/22 0347    dextrose       PRN MedicationsALPRAZolam, 0.25 mg, BID PRN  albuterol sulfate HFA, 2 puff, Q6H PRN  sodium chloride flush, 5-40 mL, PRN  sodium chloride, , PRN  ondansetron, 4 mg, Q8H PRN   Or  ondansetron, 4 mg, Q6H PRN  polyethylene glycol, 17 g, Daily PRN  acetaminophen, 650 mg, Q6H PRN   Or  acetaminophen, 650 mg, Q6H PRN  fentanNYL, 50 mcg, Q1H PRN  glucose, 4 tablet, PRN  dextrose bolus, 125 mL, PRN   Or  dextrose bolus, 250 mL, PRN  glucagon (rDNA), 1 mg, PRN  dextrose, , Continuous PRN        Diagnostic Labs:  CBC:   Recent Labs     10/01/22  0850 10/02/22  1907 10/03/22  0550   WBC 10.4 6.0 7.2   RBC 3.86* 3.82* 4.25   HGB 11.4* 11.2* 12.8   HCT 34.8* 35.0* 38.4   MCV 90.2 91.6 90.4   RDW 17.2* 16.5* 16.2*    232 272     BMP:   Recent Labs     10/01/22  0850 10/02/22  1907 10/03/22  0550    135 136   K 4.3 4.3 4.8   CL 95* 94* 93*   CO2 28 29 25   BUN 48* 41* 43*   CREATININE 2.25* 2.29* 2.18*     BNP: No results for input(s): BNP in the last 72 hours.   PT/INR: No results for input(s): PROTIME, INR in the last 72 hours.  APTT: No results for input(s): APTT in the last 72 hours. CARDIAC ENZYMES: No results for input(s): CKMB, CKMBINDEX, TROPONINI in the last 72 hours. Invalid input(s): CKTOTAL;3  FASTING LIPID PANEL:  Lab Results   Component Value Date    CHOL 239 (H) 05/16/2021    HDL 51 05/16/2021    TRIG 173 (H) 05/16/2021     LIVER PROFILE: No results for input(s): AST, ALT, ALB, BILIDIR, BILITOT, ALKPHOS in the last 72 hours. MICROBIOLOGY:   Lab Results   Component Value Date/Time    CULTURE NO GROWTH 3 DAYS 09/29/2022 05:45 AM       Imaging:    XR CHEST PORTABLE    Result Date: 9/30/2022  Improving congestive changes with mild residual patchy parenchymal opacities. XR CHEST PORTABLE    Result Date: 9/29/2022  Cardiomegaly with vascular congestion and diffuse increased interstitial changes seen predominantly within the perihilar regions and lung bases suggestive of congestive failure, with a suspected right-sided pleural effusion. ET tube measures 3.1 cm above the dioni. Enteric catheter tip terminates in the gastric fundus directed laterally. XR ABDOMEN FOR NG/OG/NE TUBE PLACEMENT    Result Date: 9/29/2022  Cardiomegaly with vascular congestion and diffuse increased interstitial changes seen predominantly within the perihilar regions and lung bases suggestive of congestive failure, with a suspected right-sided pleural effusion. ET tube measures 3.1 cm above the dioni. Enteric catheter tip terminates in the gastric fundus directed laterally. ASSESSMENT & PLAN     ASSESSMENT / PLAN:   Patient, 79years old female, presented to the hospital with shortness of breath. She was acutely hypoxic with 60% saturation on BiPAP. She was intubated. Her leukocytosis initially was 15.1 which improved later on. She was extubated. She was initially started on vancomycin and cefepime, later on vancomycin was discontinued and she is currently on cefepime with suspicion of hospital-acquired pneumonia.   Patient is hemodynamically stable. Principal Problem:  Acute respiratory failure with hypoxia [facility])  Plan:   Pulmonology is following, appreciate their recommendations  Patient was intubated because of acute hypoxia, oxygen saturation of 60% and BiPAP. He was extubated, maintaining her oxygen sats of above 98 to 97% on nasal cannula. Continue albuterol sulfate inhalers  Continue aspiration precautions  Pulse oximeter check every 4 hours    Active Problems:  Heart failure (HCC)  Plan:   Continue Bumex injection 1 Mg  Continue Coreg tablet 6.25 Mg  Continue hydralazine tablet 50 mg   ECHO 09/20/2022 :   Left ventricle is mild to moderately dilated. Estimated EF 25%. Severe global hypokinesis. Normal right ventricular size, reduced function. Left atrium appears dilated. Aortic valve is trileaflet. Aortic valve sclerosis without stenosis. Small  LVOT diameter. Trivial aortic insufficiency. Normal aortic root dimension. Mitral annular calcification is seen. Mild to moderate mitral  regurgitation. Mild MS mean gradient 5 mmHg  Normal tricuspid valve structure and function. Insignificant tricuspid  regurgitation, unable to estimate RVSP. IVC appears normal diameter , difficult to assess respiratory variation  No significant pericardial effusion is seen.     Pneumonia  Plan:   Patient was initially started on both vancomycin and cefepime  Vancomycin discontinued  Continue cefepime 1 g    Diabetes mellitus ([address]  Plan:  POC glucose fingerstick 98 10/03/2022  Insulin lispro 0-4 subcu nightly  Insulin lispro 0-4 subcu 3 times daily with meals    Essential hypertension  Plan:   Blood pressure 10/03/2022  Continue Coreg tablet 6.25 Mg  Continue hydralazine tablet 50 mg    Stage 4 chronic kidney disease ([address]  Plan:   Avoid nephrotoxic drugs  Continue creatinine monitoring   patient is at her baseline creatinine    Diet: Diet regular  DVT ppx : Heparin injection 5000 units  GI ppx: Pepcid 20 Mg IV    PT/OT/SW: Consulted, follow-up with recommendations  Discharge Planning:  following, will appreciate recommendations      Kenn Burgos M.D. Internal Medicine Resident PGY-1  [facility].    6:33 AM 10/3/2022     Please note that part of this chart was generated using voice recognition dictation software. Although every effort was made to ensure the accuracy of this automated transcription, some errors in transcription may have occurred.   //

## 2022-10-03 NOTE — PROGRESS NOTES
PULMONARY & CRITICAL CARE MEDICINE PROGRESS  NOTE     Patient:  Harpreet Valle  MRN: [id number]  Admit date: 9/29/2022  Primary Care Physician: Alfonso Sanchez MD  Consulting Physician: Mika Beatty MD  CODE Status: DNR-CCA  LOS: 4    SUBJECTIVE     I personally interviewed/examined the patient, reviewed interval history and interpreted all available radiographic, laboratory data at the time of service. Chief Compliant/Reason for Initial Consult:   Acute hypoxic respiratory failure/pulm edema requiring intubation    Brief Hospital Course: The patient is a 79 y.o. female history of systolic heart failure with recent ejection fraction of 25%, atrial fibrillation, history of COPD on home oxygen at 2 L, stage IV chronic kidney disease, coronary artery disease, hypertension and diabetes mellitus. Presented to emergency room on 09/29/2022 with respiratory distress severe hypoxia failed BiPAP and requiring intubation patient was treated for pulm edema as she has hypertensive emergency also treated with nitroglycerin infusion diuretic. She has bilateral airspace infiltrate and she was empirically treated for possible healthcare associated pneumonia. Her WBC count was elevated and her proBNP was greater than 19,000. Patient was ultimately extubated in ICU and ultimately transferred out of ICU. Remains on empiric antibiotic therapy. Patient is DNR CCA      Interval History:  10/03/22  Overnight events noted. She is hemodynamically stable systolic blood pressure around 150s she is afebrile. She is on nasal cannula 3 L saturating 96%. Patient is very weak looking arousable does not give much information currently denies shortness of breath. But admits that she is not feeling good. She has mild cough shortness of breath does not complain of sputum production chest pain currently.     Chest x-ray on 09/30/2022 at that time endotracheal tube was in place bilateral pulmonary congestion and edema possible effusion    Review of Systems:  Review of Systems   Constitutional:  Positive for activity change and fatigue. Negative for fever. HENT:  Negative for postnasal drip, sore throat and voice change. Eyes:  Negative for photophobia and redness. Respiratory:  Positive for shortness of breath. Negative for cough and wheezing. Cardiovascular:  Positive for leg swelling. Negative for chest pain. Gastrointestinal:  Negative for abdominal pain, diarrhea and vomiting. Endocrine: Negative. Genitourinary:  Negative for dysuria and hematuria. Musculoskeletal:  Positive for arthralgias and myalgias. Skin: Negative. Allergic/Immunologic: Negative. Neurological:  Negative for dizziness and speech difficulty. Hematological:  Negative for adenopathy. Does not bruise/bleed easily. Psychiatric/Behavioral:  Positive for decreased concentration. The patient is nervous/anxious.       OBJECTIVE     VITAL SIGNS:   LAST-  /67   Pulse 97   Temp 97.7 °F (36.5 °C) (Oral)   Resp 16   Ht 5' 1\" (1.549 m)   Wt 142 lb (64.4 kg)   SpO2 96%   BMI 26.83 kg/m²   8-24 HR RANGE-  TEMP Temp  Av.3 °F (36.8 °C)  Min: 97.7 °F (36.5 °C)  Max: 98.6 °F (37 °C)   BP Systolic (85ZXR), FZA:364 , Min:119 , BDY:852      Diastolic (70OMM), MGL:11, Min:55, Max:67     PULSE Pulse  Av.4  Min: 70  Max: 97   RR Resp  Av  Min: 16  Max: 16   O2 SAT SpO2  Av.5 %  Min: 96 %  Max: 97 %   OXYGEN DELIVERY O2 Flow Rate (L/min)  Av L/min  Min: 4 L/min  Max: 4 L/min     Systemic Examination:   Physical Exam  General appearance - looks comfortable and mildly tachypneic not in acute distress  Mental status - alert, oriented to person, place, and time  Eyes - pupils equal and reactive, extraocular eye movements intact  Mouth - mucous membranes moist, pharynx normal without lesions  Neck - supple, no significant adenopathy  Chest - Chest was symmetrical without dullness to percussion. Breath sounds bilaterally were present and distant mild rhonchi present. No wheezing no crackles anteriorly. Positive there is no intercostal recession or use of accessory muscles  Heart - normal rate, regular rhythm, normal S1, S2, positive systolic murmur at lower at apex, no rubs, clicks or gallops  Abdomen - soft, nontender, nondistended, no masses or organomegaly  Neurological - alert, oriented, normal speech, no focal findings or movement disorder noted  Extremities - peripheral pulses normal, positive pedal edema, no clubbing or cyanosis. Chronic venous stasis  Skin - normal coloration and turgor, no rashes, no suspicious skin lesions noted     DATA REVIEW     Medications:  Scheduled Meds:   hydrALAZINE  50 mg Oral BID    predniSONE  40 mg Oral Daily    donepezil  10 mg Oral Nightly    [START ON 10/4/2022] famotidine (PEPCID) injection  20 mg IntraVENous Q48H    sodium chloride flush  5-40 mL IntraVENous 2 times per day    sennosides-docusate sodium  1 tablet Oral BID    heparin (porcine)  5,000 Units SubCUTAneous 3 times per day    insulin lispro  0-4 Units SubCUTAneous TID WC    insulin lispro  0-4 Units SubCUTAneous Nightly    bumetanide  1 mg IntraVENous BID    carvedilol  6.25 mg Oral BID WC    cefepime  1,000 mg IntraVENous Q24H     Continuous Infusions:   sodium chloride 10 mL/hr at 10/02/22 0347    dextrose       LABS:-  ABG:   No results for input(s): POCPH, POCPCO2, POCPO2, POCHCO3, ZIHH4LWD in the last 72 hours.   CBC:   Recent Labs     10/01/22  0850 10/02/22  1907 10/03/22  0550   WBC 10.4 6.0 7.2   HGB 11.4* 11.2* 12.8   HCT 34.8* 35.0* 38.4   MCV 90.2 91.6 90.4    232 272   LYMPHOPCT 10* 22* 11*   RBC 3.86* 3.82* 4.25   MCH 29.5 29.3 30.1   MCHC 32.8 32.0 33.3   RDW 17.2* 16.5* 16.2*     BMP:   Recent Labs     10/01/22  0850 10/02/22  1907 10/03/22  0550    135 136   K 4.3 4.3 4.8   CL 95* 94* 93*   CO2 28 29 25   BUN 48* 41* 43* CREATININE 2.25* 2.29* 2.18*   GLUCOSE 111* 91 133*     Liver Function Test:   No results for input(s): PROT, LABALBU, ALT, AST, GGT, ALKPHOS, BILITOT in the last 72 hours. Amylase/Lipase:  No results for input(s): AMYLASE, LIPASE in the last 72 hours. Coagulation Profile:   No results for input(s): INR, PROTIME, APTT in the last 72 hours. Cardiac Enzymes:  No results for input(s): CKTOTAL, CKMB, CKMBINDEX, TROPONINI in the last 72 hours. Lactic Acid:  Lab Results   Component Value Date    LACTA 2.2 09/20/2022    LACTA 4.1 (H) 09/20/2022    LACTA NOT REPORTED 09/13/2019     BNP:   No results found for: BNP  D-Dimer:  Lab Results   Component Value Date    DDIMER 3.99 09/14/2019     Others:   Lab Results   Component Value Date    TSH 2.31 05/16/2021     Lab Results   Component Value Date    SEDRATE 89 (H) 09/20/2022    CRP 47.7 (H) 09/20/2022     Lab Results   Component Value Date    URICACID 8.8 (H) 10/01/2022     No results found for: IRON, TIBC, FERRITIN  No results found for: SPEP, UPEP  No results found for: PSA, CEA, , QP4773,     Input/Output:    Intake/Output Summary (Last 24 hours) at 10/3/2022 1121  Last data filed at 10/2/2022 1635  Gross per 24 hour   Intake 650 ml   Output 210 ml   Net 440 ml       Microbiology:  Recent Labs     10/01/22  1143   [facility] . NASAL SWAB       Pathology:    Radiology reports:  XR CHEST PORTABLE   Final Result   Improving congestive changes with mild residual patchy parenchymal opacities. XR CHEST PORTABLE   Final Result   Cardiomegaly with vascular congestion and diffuse increased interstitial   changes seen predominantly within the perihilar regions and lung bases   suggestive of congestive failure, with a suspected right-sided pleural   effusion. ET tube measures 3.1 cm above the dioni. Enteric catheter tip terminates in the gastric fundus directed laterally.          XR ABDOMEN FOR NG/OG/NE TUBE PLACEMENT   Final Result   Cardiomegaly with vascular congestion and diffuse increased interstitial   changes seen predominantly within the perihilar regions and lung bases   suggestive of congestive failure, with a suspected right-sided pleural   effusion. ET tube measures 3.1 cm above the dioni. Enteric catheter tip terminates in the gastric fundus directed laterally. Echocardiogram:   Results for orders placed during the hospital encounter of 09/20/22    ECHO Complete 2D W Doppler W Color    Narrative  [facility]    Transthoracic Echocardiography Report (TTE)    Patient Name Roselia Fitch Date of Study                 09/20/2022  A    Date of      1940  Gender                        Female  Birth    Age          79 year(s)  Race                              Room Number  2124        Height:                       61.02 inch, 155 cm    Corporate ID [id number]    Weight:                       150 pounds, 68 kg  #    Patient Acct [de-identified]   BSA:           1.67 m^2       BMI:      28.32  #                                                                kg/m^2    MR #         [id number]      Sonographer                   Prachi Hoythel    Accession #  [id number]  Interpreting Physician        [place]    Fellow                   Referring Nurse Practitioner    Interpreting             Referring Physician           Neo Cohen    Type of Study    TTE procedure:2D Echocardiogram, M-Mode, Doppler, Color Doppler, Contrast  study. Procedure Date  Date: 09/20/2022 Start: 10:36 AM    Study Location: [address] [place] [address]  Technical Quality: Limited visualization due to body habitus. Indications:Congestive heart failure. History / Tech. Comments:  COPD DM HTN HLD COPD CKD    Patient Status: Inpatient    Contrast Medium: Definity.  Amount - 2 ml    Height: 61.02 inches Weight: 150 pounds BSA: 1.67 m^2 BMI: 28.32 kg/m^2    Rhythm: Within normal limits HR: 78 bpm BP: 166/81 mmHg    CONCLUSIONS    Summary  Echo contrast utilized on this technically difficult study. Left ventricle is mild to moderately dilated. Estimated EF 25%. Severe global hypokinesis. Normal right ventricular size, reduced function. Left atrium appears dilated. Aortic valve is trileaflet. Aortic valve sclerosis without stenosis. Small  LVOT diameter. Trivial aortic insufficiency. Normal aortic root dimension. Mitral annular calcification is seen. Mild to moderate mitral  regurgitation. Mild MS mean gradient 5 mmHg  Normal tricuspid valve structure and function. Insignificant tricuspid  regurgitation, unable to estimate RVSP. IVC appears normal diameter , difficult to assess respiratory variation  No significant pericardial effusion is seen. Signature  ----------------------------------------------------------------------------  Electronically signed by Yolanda Paul(Interpreting physician) on  09/20/2022 05:56 PM  ----------------------------------------------------------------------------    ----------------------------------------------------------------------------  Electronically signed by Jessica Hoyt(Sonographer) on 09/20/2022 11:41  AM  ----------------------------------------------------------------------------  FINDINGS  Left Atrium  Left atrium appears dilated. Left Ventricle  Left ventricle is mildly dilated. Estimated EF 25%. Global hypokinesis. Right Atrium  Right atrium is normal in size. Right Ventricle  Normal right ventricular size reduced function. Mitral Valve  Mitral annular calcification is seen. Mild to moderate mitral regurgitation. Aortic Valve  Aortic valve is trileaflet. Aortic valve sclerosis without stenosis. Small LVOT diameter. Trivial aortic insufficiency. Tricuspid Valve  Normal tricuspid valve structure and function. Insignificant tricuspid regurgitation, unable to estimate RVSP.   Pulmonic Valve  Pulmonic valve not well visualized but Doppler velocities are normal. No  pulmonic insufficiency. Pericardial Effusion  No significant pericardial effusion is seen. Miscellaneous  Normal aortic root dimension. M-mode / 2D Measurements & Calculations:    LVIDd:6.09 cm(3.7 - 5.6 cm)      Diastolic GGQIDB:848 ml  KMVHK:3.89 cm(2.2 - 4.0 cm)      Systolic HMLIYT:982 ml  RLYZ:7.57 cm(0.6 - 1.1 cm)       Aortic Root:3 cm(2.0 - 3.7 cm)  LVPWd:0.93 cm(0.6 - 1.1 cm)      LA Dimension: 2.6 cm(1.9 - 4.0 cm)  Fractional Shortenin.09 %    LA volume/Index: 35 ml /21m^2  Calculated LVEF (%): 32.34 %     AV Cusp Separation: 0.7 cm  LVOT:1.5 cm    Mitral:                              Aortic    Peak E-Wave: 1.41 m/s                Peak Velocity: 1.70 m/s  Peak A-Wave: 1.20 m/s                Mean Velocity: 1.10 m/s  E/A Ratio: 1.18                      Peak Gradient: 11.56 mmHg  Peak Gradient: 7.95 mmHg             Mean Gradient: 5 mmHg  Mean Gradient: 5 mmHg  Deceleration Time: 155 msec  Area (continuity): 0.91 cm^2  MR Alias Velocity: 0.39 m/s          AV VTI: 31.7 cm  MR Velocity: 5.50 m/s  MARK Volumetric: 0.07 cm^2  Area (continuity): 0.62 cm^2  Mean Velocity: 0.93 m/s  MR VTI: 183 cm    Septal Wall E' velocity:0.03 m/s  Lateral Wall E' velocity:0.08 m/s      Cardiac Catheterization:   No results found for this or any previous visit. ASSESSMENT AND PLAN     Assessment:    //Acute on chronic hypoxic respiratory failure. //Acute on chronic systolic heart failure  //Pulm edema pulm venous congestion secondary to above  //COPD severity not known on chronic oxygen therapy  //Coronary artery disease  //Chronic kidney disease  //Diabetes mellitus  //Hypertension  //Paroxysmal atrial fibrillation. (Per history)    Plan:    Patient is currently on nasal cannula 3 L maintaining saturation above 92%. Optimization of CHF treatment per primary service. Patient is currently on Coreg hydralazine and Bumex. Continue with supplemental O2.   Chest x-ray tomorrow  Patient is very weak poor oral intake. Recommend speech evaluation/swallow evaluation. Strict aspiration precaution. Anticoagulation if needed per primary service  Will start DuoNeb aerosol. Encourage incentive spirometry, pulmonary toilet, aspiration precautions  Patient is currently on Bumex need adjustment of diuretic. Follow-up renal function  Continue to monitor I/O with a goal of negative fluid balance  Antimicrobials reviewed; currently on cefepime empirically  Continue short course of prednisone  DVT prophylaxis on heparin subcutaneous  Physical/occupational/speech therapy; increase activity as tolerated    Discussed with nursing staff, treatment plan discussed. I updated the patient regarding the current clinical condition, provisional diagnosis and management plan. I addressed concerns and answered all questions to the best of my abilities. It was my pleasure to evaluate Dudley Hamlin today. We will continue to follow. I would like to thank you for allowing me to participate in the care of this patient. Please feel free to call with any further questions or concerns. Roseline De La Cruz MD, M.D. Pulmonary and Critical Care Medicine           10/3/2022, 11:21 AM    Please note that this chart was generated using voice recognition Dragon dictation software. Although every effort was made to ensure the accuracy of this automated transcription, some errors in transcription may have occurred.

## 2022-10-03 NOTE — PLAN OF CARE
Problem: Respiratory - Adult  Goal: Achieves optimal ventilation and oxygenation  Outcome: Progressing     Problem: Neurosensory - Adult  Goal: Achieves stable or improved neurological status  Outcome: Progressing  Goal: Absence of seizures  Outcome: Progressing  Goal: Remains free of injury related to seizures activity  Outcome: Progressing  Goal: Achieves maximal functionality and self care  Outcome: Progressing     Problem: Skin/Tissue Integrity - Adult  Goal: Skin integrity remains intact  Outcome: Progressing  Goal: Incisions, wounds, or drain sites healing without S/S of infection  Outcome: Progressing  Goal: Oral mucous membranes remain intact  Outcome: Progressing     Problem: Musculoskeletal - Adult  Goal: Return mobility to safest level of function  Outcome: Progressing  Flowsheets (Taken 10/2/2022 2000)  Return Mobility to Safest Level of Function:   Assess patient stability and activity tolerance for standing, transferring and ambulating with or without assistive devices   Assist with transfers and ambulation using safe patient handling equipment as needed  Goal: Maintain proper alignment of affected body part  Outcome: Progressing  Goal: Return ADL status to a safe level of function  Outcome: Progressing     Problem: Metabolic/Fluid and Electrolytes - Adult  Goal: Electrolytes maintained within normal limits  Outcome: Progressing  Goal: Hemodynamic stability and optimal renal function maintained  Outcome: Progressing  Goal: Glucose maintained within prescribed range  Outcome: Progressing     Problem: Cardiovascular - Adult  Goal: Maintains optimal cardiac output and hemodynamic stability  Outcome: Progressing  Goal: Absence of cardiac dysrhythmias or at baseline  Outcome: Progressing     Problem: Pain  Goal: Verbalizes/displays adequate comfort level or baseline comfort level  Outcome: Progressing     Problem: Discharge Planning  Goal: Discharge to home or other facility with appropriate resources  Outcome: Progressing     Problem: ABCDS Injury Assessment  Goal: Absence of physical injury  Outcome: Progressing     Problem: Anxiety  Goal: Will report anxiety at manageable levels  Description: INTERVENTIONS:  1. Administer medication as ordered  2. Teach and rehearse alternative coping skills  3. Provide emotional support with 1:1 interaction with staff  Outcome: Progressing     Problem: Coping  Goal: Pt/Family able to verbalize concerns and demonstrate effective coping strategies  Description: INTERVENTIONS:  1. Assist patient/family to identify coping skills, available support systems and cultural and spiritual values  2. Provide emotional support, including active listening and acknowledgement of concerns of patient and caregivers  3. Reduce environmental stimuli, as able  4. Instruct patient/family in relaxation techniques, as appropriate  5. Assess for spiritual pain/suffering and initiate Spiritual Care, Psychosocial Clinical Specialist consults as needed  Outcome: Progressing     Problem: Decision Making  Goal: Pt/Family able to effectively weigh alternatives and participate in decision making related to treatment and care  Description: INTERVENTIONS:  1. Determine when there are differences between patient's view, family's view, and healthcare provider's view of condition  2. Facilitate patient and family articulation of goals for care  3. Help patient and family identify pros/cons of alternative solutions  4. Provide information as requested by patient/family  5. Respect patient/family right to receive or not to receive information  6. Serve as a liaison between patient and family and health care team  7. Initiate Consults from Ethics, Palliative Care or initiate [address] [place] [address] as is appropriate  Outcome: Progressing       Problem: Confusion  Goal: Confusion, delirium, dementia, or psychosis is improved or at baseline  Description: INTERVENTIONS:  1.  Assess for possible contributors to thought disturbance, including medications, impaired vision or hearing, underlying metabolic abnormalities, dehydration, psychiatric diagnoses, and notify attending LIP  2. [place] high risk fall precautions, as indicated  3. Provide frequent short contacts to provide reality reorientation, refocusing and direction  4. Decrease environmental stimuli, including noise as appropriate  5. Monitor and intervene to maintain adequate nutrition, hydration, elimination, sleep and activity  6. If unable to ensure safety without constant attention obtain sitter and review sitter guidelines with assigned personnel  7. Initiate Psychosocial CNS and Spiritual Care consult, as indicated  Outcome: Progressing     Problem: Behavior  Goal: Pt/Family maintain appropriate behavior and adhere to behavioral management agreement, if implemented  Description: INTERVENTIONS:  1. Assess patient/family's coping skills and  non-compliant behavior (including use of illegal substances)  2. Notify security of behavior or suspected illegal substances which indicate the need for search of the family and/or belongings  3. Encourage verbalization of thoughts and concerns in a socially appropriate manner  4. Utilize positive, consistent limit setting strategies supporting safety of patient, staff and others  5. Encourage participation in the decision making process about the behavioral management agreement  6. If a visitor's behavior poses a threat to safety call refer to organization policy.   7. Initiate consult with , Psychosocial CNS, Spiritual Care as appropriate  Outcome: Progressing     Problem: Chronic Conditions and Co-morbidities  Goal: Patient's chronic conditions and co-morbidity symptoms are monitored and maintained or improved  Outcome: Progressing

## 2022-10-03 NOTE — PROGRESS NOTES
Physician Progress Note      PATIENTRoopa Frias  CSN #:                  [id number]  :                       1940  ADMIT DATE:       2022 4:29 AM  DISCH DATE:  RESPONDING  PROVIDER #:        Caitlin Strickland          QUERY TEXT:    Pt admitted with Pneumonia. Pt noted to have WBC 15.1, temps 101.1>101.8 . If   possible, please document in the progress notes and discharge summary if you   are evaluating and /or treating any of the following: The medical record reflects the following:  Risk Factors: Age, CHF, PNA  Clinical Indicators: WBC 15.1, temps 101.1>101.8, HR , RR 16-35. B/P's   78/60, 92/69, 82/58. CXR Cardiomegaly with vascular congestion and diffuse   increased interstitial changes seen predominantly within the perihilar regions   and lung bases  suggestive of congestive failure, with a suspected right-sided pleural   effusion. Treatment: IV Vanco/Maxipeme, labs, ICU monitoring, CXR, XR ABD, Ventilator   support  Options provided:  -- Sepsis, present on admission  -- Pneumonia  without Sepsis  -- Other - I will add my own diagnosis  -- Disagree - Not applicable / Not valid  -- Disagree - Clinically unable to determine / Unknown  -- Refer to Clinical Documentation Reviewer    PROVIDER RESPONSE TEXT:    This patient has sepsis which was present on admission. Query created by:  Leah Dumont on 2022 9:43 AM      Electronically signed by:  Caitlin Strickland 10/3/2022 6:19 AM

## 2022-10-03 NOTE — PLAN OF CARE
Problem: Skin/Tissue Integrity - Adult  Goal: Skin integrity remains intact  10/3/2022 1658 by Makenzie Patterson RN  Outcome: Progressing  10/3/2022 0318 by Mary Hamlin RN  Outcome: Progressing     Problem: Skin/Tissue Integrity  Goal: Absence of new skin breakdown  Description: 1. Monitor for areas of redness and/or skin breakdown  2. Assess vascular access sites hourly  3. Every 4-6 hours minimum:  Change oxygen saturation probe site  4. Every 4-6 hours:  If on nasal continuous positive airway pressure, respiratory therapy assess nares and determine need for appliance change or resting period.   10/3/2022 1658 by Makenzie Patterson RN  Outcome: Progressing  10/3/2022 0318 by Mary Hamlin RN  Outcome: Progressing

## 2022-10-03 NOTE — PROGRESS NOTES
Congestive Heart Failure Education completed and charted. CHF booklet given. Patient was receptive to education. Pt would benefit from reinforcement. There is no family at bedside. Pt tells me she lives alone. Discussed the  importance of medication compliance. Discussed the importance of a heart healthy diet. Discussed 2000 mg sodium-restricted daily diet. Patient instructed to limit fluid intake to  1.5 to 2 liters per day. Patient instructed to weigh self at the same time of each day each morning, reinforced teaching to monitor for 3-5 lb weight increase over 1-2 days notify physician if change noted. Signs and symptoms of CHF discussed with patient, such as feeling more tired than normal, feeling short of breath, coughing that increases when lying down, sudden weight gain, swelling of the feet, legs or belly. Patient verbalized understanding to notify physician office if these symptoms occur.   EF 25%

## 2022-10-04 ENCOUNTER — APPOINTMENT (OUTPATIENT)
Dept: GENERAL RADIOLOGY | Age: 82
DRG: 871 | End: 2022-10-04
Payer: MEDICARE

## 2022-10-04 PROBLEM — J96.21 ACUTE ON CHRONIC RESPIRATORY FAILURE WITH HYPOXIA (HCC): Status: ACTIVE | Noted: 2022-09-20

## 2022-10-04 LAB
ABSOLUTE EOS #: 0 K/UL (ref 0–0.44)
ABSOLUTE EOS #: 0.11 K/UL (ref 0–0.44)
ABSOLUTE IMMATURE GRANULOCYTE: 0 K/UL (ref 0–0.3)
ABSOLUTE IMMATURE GRANULOCYTE: <0.03 K/UL (ref 0–0.3)
ABSOLUTE LYMPH #: 0.65 K/UL (ref 1.1–3.7)
ABSOLUTE LYMPH #: 2.12 K/UL (ref 1.1–3.7)
ABSOLUTE MONO #: 0.07 K/UL (ref 0.1–1.2)
ABSOLUTE MONO #: 0.59 K/UL (ref 0.1–1.2)
ANION GAP SERPL CALCULATED.3IONS-SCNC: 11 MMOL/L (ref 9–17)
ANION GAP SERPL CALCULATED.3IONS-SCNC: 12 MMOL/L (ref 9–17)
BASOPHILS # BLD: 0 % (ref 0–2)
BASOPHILS # BLD: 1 % (ref 0–2)
BASOPHILS ABSOLUTE: 0 K/UL (ref 0–0.2)
BASOPHILS ABSOLUTE: 0.05 K/UL (ref 0–0.2)
BUN BLDV-MCNC: 46 MG/DL (ref 8–23)
BUN BLDV-MCNC: 51 MG/DL (ref 8–23)
CALCIUM SERPL-MCNC: 9.8 MG/DL (ref 8.6–10.4)
CALCIUM SERPL-MCNC: 9.8 MG/DL (ref 8.6–10.4)
CHLORIDE BLD-SCNC: 91 MMOL/L (ref 98–107)
CHLORIDE BLD-SCNC: 94 MMOL/L (ref 98–107)
CO2: 31 MMOL/L (ref 20–31)
CO2: 32 MMOL/L (ref 20–31)
CREAT SERPL-MCNC: 2.19 MG/DL (ref 0.5–0.9)
CREAT SERPL-MCNC: 2.21 MG/DL (ref 0.5–0.9)
CULTURE: NORMAL
CULTURE: NORMAL
EOSINOPHILS RELATIVE PERCENT: 0 % (ref 1–4)
EOSINOPHILS RELATIVE PERCENT: 2 % (ref 1–4)
GFR SERPL CREATININE-BSD FRML MDRD: 22 ML/MIN/1.73M2
GFR SERPL CREATININE-BSD FRML MDRD: 22 ML/MIN/1.73M2
GLUCOSE BLD-MCNC: 105 MG/DL (ref 65–105)
GLUCOSE BLD-MCNC: 111 MG/DL (ref 65–105)
GLUCOSE BLD-MCNC: 122 MG/DL (ref 65–105)
GLUCOSE BLD-MCNC: 127 MG/DL (ref 65–105)
GLUCOSE BLD-MCNC: 138 MG/DL (ref 70–99)
GLUCOSE BLD-MCNC: 89 MG/DL (ref 70–99)
HCT VFR BLD CALC: 36.7 % (ref 36.3–47.1)
HCT VFR BLD CALC: 37.1 % (ref 36.3–47.1)
HEMOGLOBIN: 11.9 G/DL (ref 11.9–15.1)
HEMOGLOBIN: 11.9 G/DL (ref 11.9–15.1)
IMMATURE GRANULOCYTES: 0 %
IMMATURE GRANULOCYTES: 0 %
LYMPHOCYTES # BLD: 29 % (ref 24–43)
LYMPHOCYTES # BLD: 9 % (ref 24–43)
Lab: NORMAL
Lab: NORMAL
MCH RBC QN AUTO: 29.2 PG (ref 25.2–33.5)
MCH RBC QN AUTO: 29.3 PG (ref 25.2–33.5)
MCHC RBC AUTO-ENTMCNC: 32.1 G/DL (ref 28.4–34.8)
MCHC RBC AUTO-ENTMCNC: 32.4 G/DL (ref 28.4–34.8)
MCV RBC AUTO: 90.4 FL (ref 82.6–102.9)
MCV RBC AUTO: 90.9 FL (ref 82.6–102.9)
MONOCYTES # BLD: 1 % (ref 3–12)
MONOCYTES # BLD: 8 % (ref 3–12)
MORPHOLOGY: ABNORMAL
NRBC AUTOMATED: 0 PER 100 WBC
NRBC AUTOMATED: 0 PER 100 WBC
PDW BLD-RTO: 16.8 % (ref 11.8–14.4)
PDW BLD-RTO: 16.8 % (ref 11.8–14.4)
PLATELET # BLD: 257 K/UL (ref 138–453)
PLATELET # BLD: 267 K/UL (ref 138–453)
PMV BLD AUTO: 10 FL (ref 8.1–13.5)
PMV BLD AUTO: 9.9 FL (ref 8.1–13.5)
POTASSIUM SERPL-SCNC: 4.2 MMOL/L (ref 3.7–5.3)
POTASSIUM SERPL-SCNC: 4.8 MMOL/L (ref 3.7–5.3)
RBC # BLD: 4.06 M/UL (ref 3.95–5.11)
RBC # BLD: 4.08 M/UL (ref 3.95–5.11)
RBC # BLD: ABNORMAL 10*6/UL
SEG NEUTROPHILS: 60 % (ref 36–65)
SEG NEUTROPHILS: 90 % (ref 36–65)
SEGMENTED NEUTROPHILS ABSOLUTE COUNT: 4.52 K/UL (ref 1.5–8.1)
SEGMENTED NEUTROPHILS ABSOLUTE COUNT: 6.48 K/UL (ref 1.5–8.1)
SODIUM BLD-SCNC: 134 MMOL/L (ref 135–144)
SODIUM BLD-SCNC: 137 MMOL/L (ref 135–144)
SPECIMEN DESCRIPTION: NORMAL
SPECIMEN DESCRIPTION: NORMAL
WBC # BLD: 7.2 K/UL (ref 3.5–11.3)
WBC # BLD: 7.4 K/UL (ref 3.5–11.3)

## 2022-10-04 PROCEDURE — 80048 BASIC METABOLIC PNL TOTAL CA: CPT

## 2022-10-04 PROCEDURE — 6370000000 HC RX 637 (ALT 250 FOR IP): Performed by: STUDENT IN AN ORGANIZED HEALTH CARE EDUCATION/TRAINING PROGRAM

## 2022-10-04 PROCEDURE — 94150 VITAL CAPACITY TEST: CPT

## 2022-10-04 PROCEDURE — 97110 THERAPEUTIC EXERCISES: CPT

## 2022-10-04 PROCEDURE — 6370000000 HC RX 637 (ALT 250 FOR IP): Performed by: INTERNAL MEDICINE

## 2022-10-04 PROCEDURE — 6360000002 HC RX W HCPCS: Performed by: STUDENT IN AN ORGANIZED HEALTH CARE EDUCATION/TRAINING PROGRAM

## 2022-10-04 PROCEDURE — 2500000003 HC RX 250 WO HCPCS: Performed by: STUDENT IN AN ORGANIZED HEALTH CARE EDUCATION/TRAINING PROGRAM

## 2022-10-04 PROCEDURE — 99233 SBSQ HOSP IP/OBS HIGH 50: CPT | Performed by: INTERNAL MEDICINE

## 2022-10-04 PROCEDURE — 97530 THERAPEUTIC ACTIVITIES: CPT

## 2022-10-04 PROCEDURE — 82947 ASSAY GLUCOSE BLOOD QUANT: CPT

## 2022-10-04 PROCEDURE — 2700000000 HC OXYGEN THERAPY PER DAY

## 2022-10-04 PROCEDURE — 94640 AIRWAY INHALATION TREATMENT: CPT

## 2022-10-04 PROCEDURE — 2580000003 HC RX 258: Performed by: STUDENT IN AN ORGANIZED HEALTH CARE EDUCATION/TRAINING PROGRAM

## 2022-10-04 PROCEDURE — 71045 X-RAY EXAM CHEST 1 VIEW: CPT

## 2022-10-04 PROCEDURE — 97535 SELF CARE MNGMENT TRAINING: CPT

## 2022-10-04 PROCEDURE — 85025 COMPLETE CBC W/AUTO DIFF WBC: CPT

## 2022-10-04 PROCEDURE — 97116 GAIT TRAINING THERAPY: CPT

## 2022-10-04 PROCEDURE — 94761 N-INVAS EAR/PLS OXIMETRY MLT: CPT

## 2022-10-04 PROCEDURE — 1200000000 HC SEMI PRIVATE

## 2022-10-04 PROCEDURE — 36415 COLL VENOUS BLD VENIPUNCTURE: CPT

## 2022-10-04 RX ADMIN — HYDRALAZINE HYDROCHLORIDE 50 MG: 50 TABLET, FILM COATED ORAL at 12:21

## 2022-10-04 RX ADMIN — HEPARIN SODIUM 5000 UNITS: 5000 INJECTION INTRAVENOUS; SUBCUTANEOUS at 15:35

## 2022-10-04 RX ADMIN — CEFEPIME 1000 MG: 1 INJECTION, POWDER, FOR SOLUTION INTRAMUSCULAR; INTRAVENOUS at 12:22

## 2022-10-04 RX ADMIN — DONEPEZIL HYDROCHLORIDE 10 MG: 10 TABLET, FILM COATED ORAL at 21:10

## 2022-10-04 RX ADMIN — DOCUSATE SODIUM 50 MG AND SENNOSIDES 8.6 MG 1 TABLET: 8.6; 5 TABLET, FILM COATED ORAL at 21:10

## 2022-10-04 RX ADMIN — HYDRALAZINE HYDROCHLORIDE 50 MG: 50 TABLET, FILM COATED ORAL at 21:09

## 2022-10-04 RX ADMIN — SODIUM CHLORIDE, PRESERVATIVE FREE 10 ML: 5 INJECTION INTRAVENOUS at 12:21

## 2022-10-04 RX ADMIN — CARVEDILOL 6.25 MG: 6.25 TABLET, FILM COATED ORAL at 12:21

## 2022-10-04 RX ADMIN — SODIUM CHLORIDE, PRESERVATIVE FREE 10 ML: 5 INJECTION INTRAVENOUS at 21:15

## 2022-10-04 RX ADMIN — IPRATROPIUM BROMIDE AND ALBUTEROL SULFATE 1 AMPULE: .5; 2.5 SOLUTION RESPIRATORY (INHALATION) at 14:55

## 2022-10-04 RX ADMIN — PREDNISONE 20 MG: 20 TABLET ORAL at 12:21

## 2022-10-04 RX ADMIN — BUMETANIDE 1 MG: 0.25 INJECTION, SOLUTION INTRAMUSCULAR; INTRAVENOUS at 12:23

## 2022-10-04 RX ADMIN — HEPARIN SODIUM 5000 UNITS: 5000 INJECTION INTRAVENOUS; SUBCUTANEOUS at 23:01

## 2022-10-04 RX ADMIN — BUMETANIDE 1 MG: 0.25 INJECTION, SOLUTION INTRAMUSCULAR; INTRAVENOUS at 21:12

## 2022-10-04 RX ADMIN — ALPRAZOLAM 0.25 MG: 0.25 TABLET ORAL at 21:12

## 2022-10-04 RX ADMIN — HEPARIN SODIUM 5000 UNITS: 5000 INJECTION INTRAVENOUS; SUBCUTANEOUS at 05:43

## 2022-10-04 ASSESSMENT — ENCOUNTER SYMPTOMS
SHORTNESS OF BREATH: 1
PHOTOPHOBIA: 0
ALLERGIC/IMMUNOLOGIC NEGATIVE: 1
CHOKING: 0
EYE REDNESS: 0
STRIDOR: 0
NAUSEA: 0
RHINORRHEA: 0
WHEEZING: 0
VOMITING: 0
DIARRHEA: 0
SORE THROAT: 0
CONSTIPATION: 0
COUGH: 0
ABDOMINAL PAIN: 0
SHORTNESS OF BREATH: 0
VOICE CHANGE: 0

## 2022-10-04 ASSESSMENT — PAIN SCALES - WONG BAKER
WONGBAKER_NUMERICALRESPONSE: 0

## 2022-10-04 ASSESSMENT — PAIN SCALES - GENERAL
PAINLEVEL_OUTOF10: 0
PAINLEVEL_OUTOF10: 0

## 2022-10-04 NOTE — DISCHARGE INSTR - COC
Continuity of Care Form    Patient Name: Va Devi   :  1940  MRN:  [id number]    Admit date:  2022  Discharge date:  10/05/2022    Code Status Order: DNR-CCA   Advance Directives:     Admitting Physician:  Nik Sinclair MD  PCP: Jerilyn Sutton MD    Discharging Nurse: [facility] Unit/Room#: 0320/0320-01  Discharging Unit Phone Number: [phone]    Emergency Contact:   Extended Emergency Contact Information  Primary Emergency Contact: Lorin Hammond Phone: [phone]  Relation: Child   needed? No  Secondary Emergency Contact: Aidan Harmon Phone: [phone]  Relation: Child   needed?  No    Past Surgical History:  Past Surgical History:   Procedure Laterality Date    CAROTID ENDARTERECTOMY Bilateral     ENDOSCOPY, COLON, DIAGNOSTIC      TONSILLECTOMY         Immunization History:   Immunization History   Administered Date(s) Administered    Influenza, FLUARIX, FLULAVAL, FLUZONE (age 10 mo+) AND AFLURIA, (age 1 y+), PF, 0.5mL 2019       Active Problems:  Patient Active Problem List   Diagnosis Code    Pneumonia due to organism J18.9    COPD (chronic obstructive pulmonary disease) ([facility] [address] J44.9    Diabetes mellitus ([facility] [address] E11.9    Essential hypertension I10    Simple chronic bronchitis ([facility] [address] J41.0    Type 2 diabetes mellitus with kidney complication, with long-term current use of insulin (HCC) E11.29, Z79.4    Stage 4 chronic kidney disease (HCC) N18.4    Aspiration pneumonitis (HCC) J69.0    Sepsis ([address] A41.9    Lactic acidosis E87.20    Hypertensive urgency I16.0    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    CHF exacerbation ([facility]) I50.9    Severe sepsis (HCC) A41.9, R65.20    Acute-on-chronic kidney injury ([facility] [address] N17.9, N18.9    Pulmonary edema cardiac cause (HCC) I50.1    Recurrent aspiration pneumonia (HCC) J69.0    Fatigue R53.83    Pulmonary edema with congestive heart failure (HCC) I50.1    NSTEMI (non-ST elevated myocardial infarction) ([facility] [address] I21.4 Acute respiratory failure with hypoxia ([facility]) J96.01    UTI (urinary tract infection) N39.0    CAD (coronary artery disease) I25.10    Atrial fibrillation ([facility]) I48.91    Heart failure ([facility]) I50.9    Pneumonia J18.9       Isolation/Infection:   Isolation            No Isolation          Patient Infection Status       Infection Onset Added Last Indicated Last Indicated By Review Planned Expiration Resolved Resolved By    None active    Resolved    COVID-19 (Rule Out) 09/29/22 09/29/22 09/29/22 COVID-19, Rapid (Ordered)   09/29/22 Rule-Out Test Resulted    COVID-19 (Rule Out) 09/20/22 09/20/22 09/20/22 Respiratory Panel, Molecular, with COVID-19 (Restricted: peds pts or suitable admitted adults) (Ordered)   09/20/22 Diana Berumen RN    9/20/2022 negative test    COVID-19 (Rule Out) 09/20/22 09/20/22 09/20/22 COVID-19 & Influenza Combo (Ordered)   09/20/22 Rule-Out Test Resulted    COVID-19 (Rule Out) 05/16/21 05/16/21 05/16/21 COVID-19, Rapid (Ordered)   05/16/21 Rule-Out Test Resulted            Nurse Assessment:  Last Vital Signs: /75   Pulse 77   Temp 97.6 °F (36.4 °C) (Oral)   Resp 18   Ht 5' 1\" (1.549 m)   Wt 143 lb 4.8 oz (65 kg)   SpO2 99%   BMI 27.08 kg/m²     Last documented pain score (0-10 scale): Pain Level: 0  Last Weight:   Wt Readings from Last 1 Encounters:   10/04/22 143 lb 4.8 oz (65 kg)     Mental Status:  oriented and alert    IV Access:  - None    Nursing Mobility/ADLs:  Walking   Assisted  Transfer  Assisted  Bathing  Assisted  Dressing  Assisted  Toileting  Assisted  Feeding  Independent  Med Admin  Assisted  Med Delivery   whole    Wound Care Documentation and Therapy:        Elimination:  Continence: Bowel: No  Bladder: No  Urinary Catheter: None   Colostomy/Ileostomy/Ileal Conduit: No       Date of Last BM: 10-4-2022  No intake or output data in the 24 hours ending 10/04/22 1341  No intake/output data recorded.     Safety Concerns:     History of Falls (last 30 days)    Impairments/Disabilities:      None    Nutrition Therapy:  Current Nutrition Therapy:   - Oral Diet:  General    Routes of Feeding: Oral  Liquids: No Restrictions  Daily Fluid Restriction: yes - amount 1500  Last Modified Barium Swallow with Video (Video Swallowing Test): not done    Treatments at the Time of Hospital Discharge:   Respiratory Treatments: ***  Oxygen Therapy:  is on oxygen at 2-3 L/min per nasal cannula. Ventilator:    - No ventilator support    Rehab Therapies: Physical Therapy and Occupational Therapy  Weight Bearing Status/Restrictions: No weight bearing restrictions  Other Medical Equipment (for information only, NOT a DME order):  walker  Other Treatments: ***    Patient's personal belongings (please select all that are sent with patient):  Jodi    RN SIGNATURE:  Electronically signed by Merry Arce RN on 10/5/22 at 10:55 AM EDT    CASE MANAGEMENT/SOCIAL WORK SECTION    Inpatient Status Date: ***    Readmission Risk Assessment Score:  Readmission Risk              Risk of Unplanned Readmission:  23           Discharging to Facility/ Agency   Name: [facility]   Address:  Phone:  Fax:    Dialysis Facility (if applicable)   Name:  Address:  Dialysis Schedule:  Phone:  Fax:    / signature: Electronically signed by Myrna Padilla RN on 10/5/22 at 10:10 AM EDT    PHYSICIAN SECTION    Prognosis: Fair    Condition at Discharge: Stable    Rehab Potential (if transferring to Rehab): Fair    Recommended Labs or Other Treatments After Discharge: You completed course of antibiotic for pneumonia. Please follow-up with your PCP within 2 weeks of discharge and continue to take your medications as prescribed. We decreased the dose of carvedilol to 6.25 mg bid due to softer blood pressure. There was concern of atrial fibrillation on telemetry but serial EKG did not show any atrial fibrillation. EKG did show sinus tachycardia with multiple PACs.   Telemetry did not show any atrial fibrillation either. Please follow-up with your cardiology as an outpatient. In case of worsening symptoms, seek medical attention come to emergency department. Physician Certification: I certify the above information and transfer of Julio Angulo  is necessary for the continuing treatment of the diagnosis listed and that she requires [facility] for less 30 days.      Update Admission H&P: No change in H&P    PHYSICIAN SIGNATURE:  Electronically signed by Matthew Carrillo MD on 10/4/22 at 1:41 PM EDT

## 2022-10-04 NOTE — PROGRESS NOTES
[facility]  Internal Medicine Teaching Residency Program  Inpatient Daily Progress Note  ______________________________________________________________________________    Patient: All Molina  YOB: 1940   GNT:[id number]    Acct: [de-identified]     Room: [facility]/0320-01  Admit date: 9/29/2022  Today's date: 10/04/22  Number of days in the hospital: 5    SUBJECTIVE   Admitting Diagnosis: Acute respiratory failure with hypoxia ([address]  CC:  Acute hypoxic failure, shortness of breath. Acute exacerbation of congestive heart failure along with hospital-acquired pneumonia    - Pt examined at bedside. Chart & results reviewed. Patient hemodynamically stable  Patient lying comfortably on bed  Patient denies any shortness of breath, dyspnea  Patient denies any chest pain, lightheadedness and headache  She denies any nausea and vomiting  Reports of having mild pain and tenderness in bilateral lower limbs. On examination both lower limbs without any swelling and edema mildly tender. Review of Systems   Constitutional:  Positive for fatigue. Negative for appetite change, chills, fever and unexpected weight change. HENT:  Negative for congestion, postnasal drip and rhinorrhea. Respiratory:  Negative for cough, choking, shortness of breath, wheezing and stridor. Cardiovascular:  Negative for chest pain and leg swelling. Gastrointestinal:  Negative for abdominal pain, constipation, diarrhea, nausea and vomiting. Genitourinary:  Negative for dysuria, enuresis and urgency. Neurological:  Positive for weakness. Negative for syncope. Psychiatric/Behavioral:  Negative for confusion, decreased concentration and sleep disturbance. BRIEF HISTORY     Patient, 79years old female, presented to the hospital with shortness of breath. Patient was initially seen at [facility] due to hypertensive emergency along with acute flash pulmonary edema.   She was started on nitroglycerin and Lasix. Her urinalysis was positive for UTI and she was also started on cefepime. Her troponins were trending upward, cardiology and pulmonology were consulted. Patient denied any acute cardiac work-up. Her echo was done at [facility] which showed ejection fraction 25%. Her urine culture came back positive for E. coli and she was started on IV ceftriaxone. While her presentation to [address] [place] [address], she presented with acute hypoxia, shortness of breath and oxygen saturation of 60% at BiPAP. She was intubated. Patient remained febrile with WBC count of 15.1 and she was initially started on vancomycin and cefepime. Patient was extubated, he was maintaining her sats and she was hemodynamically stable. Patient complaint of right great toe pain and she was started on prednisone for suspected gout. Her leukocytosis resolved. Vancomycin was discontinued and she remained on cefepime for healthcare associated pneumonia. She was continued on Bumex and Coreg along with supportive care and she was okay to transfer out of ICU. OBJECTIVE     Vital Signs:  /75   Pulse 77   Temp 97.6 °F (36.4 °C) (Oral)   Resp 18   Ht 5' 1\" (1.549 m)   Wt 143 lb 4.8 oz (65 kg)   SpO2 99%   BMI 27.08 kg/m²     Temp (24hrs), Av.8 °F (36.6 °C), Min:97.3 °F (36.3 °C), Max:98.4 °F (36.9 °C)    No intake/output data recorded. Physical Exam  Constitutional:       Appearance: She is underweight. Interventions: Nasal cannula in place. Comments:      Cardiovascular:      Rate and Rhythm: Normal rate. Heart sounds: Normal heart sounds. Pulmonary:      Effort: Pulmonary effort is normal.      Breath sounds: Normal breath sounds. Chest:      Chest wall: No tenderness. Abdominal:      General: Bowel sounds are normal.      Palpations: Abdomen is soft. Tenderness: There is no abdominal tenderness. Musculoskeletal:      Right lower leg: No edema.       Left lower leg: No edema. Neurological:      Mental Status: She is lethargic. Psychiatric:         Behavior: Behavior is cooperative. Medications:  Scheduled Medications:    hydrALAZINE  50 mg Oral BID    predniSONE  20 mg Oral Daily    ipratropium-albuterol  1 ampule Inhalation TID    donepezil  10 mg Oral Nightly    famotidine (PEPCID) injection  20 mg IntraVENous Q48H    sodium chloride flush  5-40 mL IntraVENous 2 times per day    sennosides-docusate sodium  1 tablet Oral BID    heparin (porcine)  5,000 Units SubCUTAneous 3 times per day    insulin lispro  0-4 Units SubCUTAneous TID WC    insulin lispro  0-4 Units SubCUTAneous Nightly    bumetanide  1 mg IntraVENous BID    carvedilol  6.25 mg Oral BID WC    cefepime  1,000 mg IntraVENous Q24H     Continuous Infusions:    sodium chloride 10 mL/hr at 10/02/22 0347    dextrose       PRN MedicationsALPRAZolam, 0.25 mg, BID PRN  albuterol sulfate HFA, 2 puff, Q6H PRN  sodium chloride flush, 5-40 mL, PRN  sodium chloride, , PRN  ondansetron, 4 mg, Q8H PRN   Or  ondansetron, 4 mg, Q6H PRN  polyethylene glycol, 17 g, Daily PRN  acetaminophen, 650 mg, Q6H PRN   Or  acetaminophen, 650 mg, Q6H PRN  fentanNYL, 50 mcg, Q1H PRN  glucose, 4 tablet, PRN  dextrose bolus, 125 mL, PRN   Or  dextrose bolus, 250 mL, PRN  glucagon (rDNA), 1 mg, PRN  dextrose, , Continuous PRN      Diagnostic Labs:  CBC:   Recent Labs     10/03/22  0550 10/03/22  1859 10/04/22  0539   WBC 7.2 7.7 7.4   RBC 4.25 3.88* 4.08   HGB 12.8 11.5* 11.9   HCT 38.4 34.8* 37.1   MCV 90.4 89.7 90.9   RDW 16.2* 16.7* 16.8*    244 257       BMP:   Recent Labs     10/03/22  0550 10/03/22  1859 10/04/22  0539    134* 137   K 4.8 4.5 4.2   CL 93* 92* 94*   CO2 25 29 32*   BUN 43* 47* 46*   CREATININE 2.18* 2.02* 2.21*       BNP: No results for input(s): BNP in the last 72 hours. PT/INR: No results for input(s): PROTIME, INR in the last 72 hours. APTT: No results for input(s):  APTT in the last 72 hours.  CARDIAC ENZYMES: No results for input(s): CKMB, CKMBINDEX, TROPONINI in the last 72 hours. Invalid input(s): CKTOTAL;3  FASTING LIPID PANEL:  Lab Results   Component Value Date    CHOL 239 (H) 05/16/2021    HDL 51 05/16/2021    TRIG 173 (H) 05/16/2021     LIVER PROFILE: No results for input(s): AST, ALT, ALB, BILIDIR, BILITOT, ALKPHOS in the last 72 hours. MICROBIOLOGY:   Lab Results   Component Value Date/Time    CULTURE NO GROWTH 5 DAYS 09/29/2022 05:45 AM       Imaging:    XR CHEST PORTABLE    Result Date: 9/30/2022  Improving congestive changes with mild residual patchy parenchymal opacities. XR CHEST PORTABLE    Result Date: 9/29/2022  Cardiomegaly with vascular congestion and diffuse increased interstitial changes seen predominantly within the perihilar regions and lung bases suggestive of congestive failure, with a suspected right-sided pleural effusion. ET tube measures 3.1 cm above the dioni. Enteric catheter tip terminates in the gastric fundus directed laterally. XR ABDOMEN FOR NG/OG/NE TUBE PLACEMENT    Result Date: 9/29/2022  Cardiomegaly with vascular congestion and diffuse increased interstitial changes seen predominantly within the perihilar regions and lung bases suggestive of congestive failure, with a suspected right-sided pleural effusion. ET tube measures 3.1 cm above the dioni. Enteric catheter tip terminates in the gastric fundus directed laterally. ASSESSMENT & PLAN     ASSESSMENT / PLAN:   Patient, 79years old female, presented to the hospital with shortness of breath. She was acutely hypoxic with 60% saturation on BiPAP. She was intubated. Her leukocytosis initially was 15.1 which improved later on. She was extubated. She was initially started on vancomycin and cefepime, later on vancomycin was discontinued and she is currently on cefepime with suspicion of hospital-acquired pneumonia. Patient is hemodynamically stable.     Principal Problem:  Acute respiratory failure with hypoxia ([facility] [address]  Plan:   Pulmonology is following, appreciate their recommendations  Patient was intubated because of acute hypoxia, oxygen saturation of 60% and BiPAP. He was extubated, maintaining her oxygen sats of above 98 to 97% on nasal cannula. Continue albuterol sulfate inhalers  Continue supplemental oxygen  Patient on Coreg, hydralazine and Bumex  Continue aspiration precautions  Pulse oximeter check every 4 hours  Chest x-ray shows interval improvement with mild vascular congestion    Active Problems:  Heart failure (HCC)  Plan:   Continue Bumex injection 1 Mg  Continue Coreg tablet 6.25 Mg  Continue hydralazine tablet 50 mg  Fluid restriction of 1.5 L diet, low-sodium diet   ECHO 09/20/2022 :   Left ventricle is mild to moderately dilated. Estimated EF 25%. Severe global hypokinesis. Normal right ventricular size, reduced function. Left atrium appears dilated. Aortic valve is trileaflet. Aortic valve sclerosis without stenosis. Small  LVOT diameter. Trivial aortic insufficiency. Normal aortic root dimension. Mitral annular calcification is seen. Mild to moderate mitral  regurgitation. Mild MS mean gradient 5 mmHg  Normal tricuspid valve structure and function. Insignificant tricuspid  regurgitation, unable to estimate RVSP. IVC appears normal diameter , difficult to assess respiratory variation  No significant pericardial effusion is seen.     Pneumonia  Plan:   Patient was initially started on both vancomycin and cefepime  Vancomycin discontinued  Continue cefepime 1 g    Diabetes mellitus ([facility] [address]  Plan:  POC glucose fingerstick 98 10/03/2022  Glucose today 89 10/04/2022  Insulin lispro 0-4 subcu nightly  Insulin lispro 0-4 subcu 3 times daily with meals    Essential hypertension  Plan:   Continue Coreg tablet 6.25 Mg  Continue hydralazine tablet 50 mg    Stage 4 chronic kidney disease (HCC)  Plan:   Avoid nephrotoxic drugs  Continue creatinine monitoring   patient is at her baseline creatinine    Speech Language Pathology  Pt refuses swallow study, stating she is not open to a feeding tube and is aware of her Zenker's Diverticulum, aspiration, and risks of aspiration. Diet: Diet regular, low-sodium and fluid restriction with dysphagia related diet  DVT ppx : Heparin injection 5000 units  GI ppx: Pepcid 20 Mg IV    PT/OT/SW: Consulted, follow-up with recommendations  Discharge Planning:  following, patient has been accepted at [facility] and they are starting precert      Mario Perdomo M.D. Internal Medicine Resident PGY-1  [address]    11:35 AM 10/4/2022     Please note that part of this chart was generated using voice recognition dictation software. Although every effort was made to ensure the accuracy of this automated transcription, some errors in transcription may have occurred.   //

## 2022-10-04 NOTE — PROGRESS NOTES
[facility]  Speech Language Pathology    Date: 10/4/2022  Patient Name: Vitaliy Mckeon  YOB: 1940   AGE: 79 y.o. MRN: [id number]        Patient Not Available for Speech Therapy     Due to:  [] Testing  [] Hemodialysis  [] Cancelled by RN  [] Surgery   [] Intubation/Sedation/Pain Medication  [] Medical instability  [x] Other: Order received for bedside swallow evaluation. ST would recommend pt complete Modified barium Swallow Study to assess swallow function due to history of silent aspiration. Pt refuses swallow study, stating she is not open to a feeding tube and is aware of her Zenker's Diverticulum, aspiration, and risks of aspiration. Per chart review pt completed video swallow studies 10/22/2019 and 5/18/2021. ST recommended NPO with alternative means of nutrition. See reports for further details.      Next scheduled treatment: please reconsult if pt goals change/pt would like MBSS  Completed by: Leslie Koehler, SLP, M.S. [id number] [facility]

## 2022-10-04 NOTE — PROGRESS NOTES
PULMONARY & CRITICAL CARE MEDICINE PROGRESS  NOTE     Patient:  Vitaliy Mckeon  MRN: [id number]  Admit date: 9/29/2022  Primary Care Physician: Leanne Drake MD  Consulting Physician: Nnamdi Rod MD  CODE Status: DNR-CCA  LOS: 5    SUBJECTIVE     I personally interviewed/examined the patient, reviewed interval history and interpreted all available radiographic, laboratory data at the time of service. Chief Compliant/Reason for Initial Consult:   Acute hypoxic respiratory failure/pulm edema requiring intubation    Brief Hospital Course: The patient is a 79 y.o. female history of systolic heart failure with recent ejection fraction of 25%, atrial fibrillation, history of COPD on home oxygen at 2 L, stage IV chronic kidney disease, coronary artery disease, hypertension and diabetes mellitus. Presented to emergency room on 09/29/2022 with respiratory distress severe hypoxia failed BiPAP and requiring intubation patient was treated for pulm edema as she has hypertensive emergency also treated with nitroglycerin infusion diuretic. She has bilateral airspace infiltrate and she was empirically treated for possible healthcare associated pneumonia. Her WBC count was elevated and her proBNP was greater than 19,000. Patient was ultimately extubated in ICU and ultimately transferred out of ICU. Remains on empiric antibiotic therapy. Patient is DNR CCA      Interval History:  10/04/22  Overnight events noted chart reviewed. Patient is afebrile. Hemodynamically patient is stable. She is on 3 to 4 L nasal cannula maintaining saturation 98%. According to patient she is feeling better she denies cough unable to expectorate denies chest pain or fever shortness of breath on activity. Still very weak but better. Denies dysphagia or aspiration.   Okay swallow evaluation ordered but patient declined to get swallow evaluation  Chest x-ray on 10/04/2022 done and reviewed as compared to chest x-ray on 2022 bilateral pulmonary infiltrate are much better/pulm edema improved with clearing of infiltrate. Review of Systems:  Review of Systems   Constitutional:  Positive for activity change and fatigue. Negative for fever. HENT:  Negative for postnasal drip, sore throat and voice change. Eyes:  Negative for photophobia and redness. Respiratory:  Positive for shortness of breath. Negative for cough and wheezing. Cardiovascular:  Positive for leg swelling. Negative for chest pain. Gastrointestinal:  Negative for abdominal pain, diarrhea and vomiting. Endocrine: Negative. Genitourinary:  Negative for dysuria and hematuria. Musculoskeletal:  Positive for arthralgias. Negative for myalgias. Skin: Negative. Allergic/Immunologic: Negative. Neurological:  Negative for dizziness and speech difficulty. Hematological:  Negative for adenopathy. Does not bruise/bleed easily. Psychiatric/Behavioral:  Positive for decreased concentration. The patient is not nervous/anxious.       OBJECTIVE     VITAL SIGNS:   LAST-  /75   Pulse 80   Temp 97.6 °F (36.4 °C) (Oral)   Resp 17   Ht 5' 1\" (1.549 m)   Wt 143 lb 4.8 oz (65 kg)   SpO2 99%   BMI 27.08 kg/m²   8-24 HR RANGE-  TEMP Temp  Av.8 °F (36.6 °C)  Min: 97.3 °F (36.3 °C)  Max: 98.4 °F (92.4 °C)   BP Systolic (22KUB), GAS:932 , Min:121 , MUK:470      Diastolic (43MBW), HDL:97, Min:75, Max:98     PULSE Pulse  Av.8  Min: 57  Max: 93   RR Resp  Av  Min: 17  Max: 17   O2 SAT No data recorded   OXYGEN DELIVERY O2 Flow Rate (L/min)  Av L/min  Min: 4 L/min  Max: 4 L/min     Systemic Examination:   Physical Exam  General appearance - looks comfortable and mildly tachypneic not in acute distress  Mental status - alert, oriented to person, place, and time  Eyes - pupils equal and reactive, extraocular eye movements intact  Mouth - mucous membranes moist, pharynx normal without lesions  Neck - supple, no significant adenopathy  Chest - Chest was symmetrical without dullness to percussion. Breath sounds bilaterally were present and distant mild rhonchi present. No wheezing mild bibasilar crackles present. There is no intercostal recession or use of accessory muscles  Heart - normal rate, regular rhythm, normal S1, S2, positive systolic murmur at lower at apex, no rubs, clicks or gallops  Abdomen - soft, nontender, nondistended, no masses or organomegaly  Neurological - alert, oriented, normal speech, no focal findings or movement disorder noted  Extremities - peripheral pulses normal, mild pedal edema, no clubbing or cyanosis. Chronic venous stasis  Skin - normal coloration and turgor, no rashes, no suspicious skin lesions noted     DATA REVIEW     Medications:  Scheduled Meds:   hydrALAZINE  50 mg Oral BID    predniSONE  20 mg Oral Daily    ipratropium-albuterol  1 ampule Inhalation TID    donepezil  10 mg Oral Nightly    famotidine (PEPCID) injection  20 mg IntraVENous Q48H    sodium chloride flush  5-40 mL IntraVENous 2 times per day    sennosides-docusate sodium  1 tablet Oral BID    heparin (porcine)  5,000 Units SubCUTAneous 3 times per day    insulin lispro  0-4 Units SubCUTAneous TID WC    insulin lispro  0-4 Units SubCUTAneous Nightly    bumetanide  1 mg IntraVENous BID    carvedilol  6.25 mg Oral BID WC    cefepime  1,000 mg IntraVENous Q24H     Continuous Infusions:   sodium chloride 10 mL/hr at 10/02/22 0347    dextrose       LABS:-  ABG:   No results for input(s): POCPH, POCPCO2, POCPO2, POCHCO3, [id number] in the last 72 hours.   CBC:   Recent Labs     10/03/22  0550 10/03/22  1859 10/04/22  0539   WBC 7.2 7.7 7.4   HGB 12.8 11.5* 11.9   HCT 38.4 34.8* 37.1   MCV 90.4 89.7 90.9    244 257   LYMPHOPCT 11* 11* 29   RBC 4.25 3.88* 4.08   MCH 30.1 29.6 29.2   MCHC 33.3 33.0 32.1   RDW 16.2* 16.7* 16.8*       BMP:   Recent Labs     10/03/22  0550 10/03/22  1859 10/04/22  0539    134* 137   K 4.8 4.5 4.2   CL 93* 92* 94*   CO2 25 29 32*   BUN 43* 47* 46*   CREATININE 2.18* 2.02* 2.21*   GLUCOSE 133* 130* 89       Liver Function Test:   No results for input(s): PROT, LABALBU, ALT, AST, GGT, ALKPHOS, BILITOT in the last 72 hours. Amylase/Lipase:  No results for input(s): AMYLASE, LIPASE in the last 72 hours. Coagulation Profile:   No results for input(s): INR, PROTIME, APTT in the last 72 hours. Cardiac Enzymes:  No results for input(s): CKTOTAL, CKMB, CKMBINDEX, TROPONINI in the last 72 hours. Lactic Acid:  Lab Results   Component Value Date    LACTA 2.2 09/20/2022    LACTA 4.1 (H) 09/20/2022    LACTA NOT REPORTED 09/13/2019     BNP:   No results found for: BNP  D-Dimer:  Lab Results   Component Value Date    DDIMER 3.99 09/14/2019     Others:   Lab Results   Component Value Date    TSH 2.31 05/16/2021     Lab Results   Component Value Date    SEDRATE 89 (H) 09/20/2022    CRP 47.7 (H) 09/20/2022     Lab Results   Component Value Date    URICACID 8.8 (H) 10/01/2022     No results found for: IRON, TIBC, FERRITIN  No results found for: SPEP, UPEP  No results found for: PSA, CEA, , [id number],     Input/Output:  No intake or output data in the 24 hours ending 10/04/22 1622      Microbiology:  No results for input(s): SPECDESC, SPECDESC, SPECIAL, CULTURE, CULTURE, STATUS, ORG, CDIFFTOXPCR, CAMPYLOBPCR, SALMONELLAPC, SHIGAPCR, SHIGELLAPCR, MPNEUG, MPNEUM, LACTOQL in the last 72 hours. Pathology:    Radiology reports:  XR CHEST PORTABLE   Final Result   Continued improvement in vascular congestion. XR CHEST PORTABLE   Final Result   Improving congestive changes with mild residual patchy parenchymal opacities.          XR CHEST PORTABLE   Final Result   Cardiomegaly with vascular congestion and diffuse increased interstitial   changes seen predominantly within the perihilar regions and lung bases   suggestive of congestive failure, with a suspected right-sided pleural   effusion. ET tube measures 3.1 cm above the dioni. Enteric catheter tip terminates in the gastric fundus directed laterally. XR ABDOMEN FOR NG/OG/NE TUBE PLACEMENT   Final Result   Cardiomegaly with vascular congestion and diffuse increased interstitial   changes seen predominantly within the perihilar regions and lung bases   suggestive of congestive failure, with a suspected right-sided pleural   effusion. ET tube measures 3.1 cm above the dioni. Enteric catheter tip terminates in the gastric fundus directed laterally. Echocardiogram:   Results for orders placed during the hospital encounter of 09/20/22    ECHO Complete 2D W Doppler W Color    Narrative  [address]    Transthoracic Echocardiography Report (TTE)    Patient Name Tristen Pringle Date of Study                 09/20/2022  A    Date of      1940  Gender                        Female  Birth    Age          79 year(s)  Race                              Room Number  2124        Height:                       61.02 inch, 155 cm    Corporate ID [id number]    Weight:                       150 pounds, 68 kg  #    Patient Acct [de-identified]   BSA:           1.67 m^2       BMI:      28.32  #                                                                kg/m^2    MR #         [id number]      Sonographer                   Jessica Hoyt    Accession #  [id number]  Interpreting Physician        Wing Bhat 61    Fellow                   Referring Nurse Practitioner    Interpreting             Referring Physician           Neo Castanon    Type of Study    TTE procedure:2D Echocardiogram, M-Mode, Doppler, Color Doppler, Contrast  study. Procedure Date  Date: 09/20/2022 Start: 10:36 AM    Study Location: [facility]  Technical Quality: Limited visualization due to body habitus. Indications:Congestive heart failure. History / Tech. Comments:  COPD DM HTN HLD COPD CKD    Patient Status: Inpatient    Contrast Medium: Definity. Amount - 2 ml    Height: 61.02 inches Weight: 150 pounds BSA: 1.67 m^2 BMI: 28.32 kg/m^2    Rhythm: Within normal limits HR: 78 bpm BP: 166/81 mmHg    CONCLUSIONS    Summary  Echo contrast utilized on this technically difficult study. Left ventricle is mild to moderately dilated. Estimated EF 25%. Severe global hypokinesis. Normal right ventricular size, reduced function. Left atrium appears dilated. Aortic valve is trileaflet. Aortic valve sclerosis without stenosis. Small  LVOT diameter. Trivial aortic insufficiency. Normal aortic root dimension. Mitral annular calcification is seen. Mild to moderate mitral  regurgitation. Mild MS mean gradient 5 mmHg  Normal tricuspid valve structure and function. Insignificant tricuspid  regurgitation, unable to estimate RVSP. IVC appears normal diameter , difficult to assess respiratory variation  No significant pericardial effusion is seen. Signature  ----------------------------------------------------------------------------  Electronically signed by Yolanda Paul(Interpreting physician) on  09/20/2022 05:56 PM  ----------------------------------------------------------------------------    ----------------------------------------------------------------------------  Electronically signed by Jessica Hoyt(Sonographer) on 09/20/2022 11:41  AM  ----------------------------------------------------------------------------  FINDINGS  Left Atrium  Left atrium appears dilated. Left Ventricle  Left ventricle is mildly dilated. Estimated EF 25%. Global hypokinesis. Right Atrium  Right atrium is normal in size. Right Ventricle  Normal right ventricular size reduced function. Mitral Valve  Mitral annular calcification is seen. Mild to moderate mitral regurgitation. Aortic Valve  Aortic valve is trileaflet. Aortic valve sclerosis without stenosis.  Small LVOT diameter. Trivial aortic insufficiency. Tricuspid Valve  Normal tricuspid valve structure and function. Insignificant tricuspid regurgitation, unable to estimate RVSP. Pulmonic Valve  Pulmonic valve not well visualized but Doppler velocities are normal. No  pulmonic insufficiency. Pericardial Effusion  No significant pericardial effusion is seen. Miscellaneous  Normal aortic root dimension. M-mode / 2D Measurements & Calculations:    LVIDd:6.09 cm(3.7 - 5.6 cm)      Diastolic KJXEHL:698 ml  QOYTJ:9.45 cm(2.2 - 4.0 cm)      Systolic SDGOZR:226 ml  MTEH:2.63 cm(0.6 - 1.1 cm)       Aortic Root:3 cm(2.0 - 3.7 cm)  LVPWd:0.93 cm(0.6 - 1.1 cm)      LA Dimension: 2.6 cm(1.9 - 4.0 cm)  Fractional Shortenin.09 %    LA volume/Index: 35 ml /21m^2  Calculated LVEF (%): 32.34 %     AV Cusp Separation: 0.7 cm  LVOT:1.5 cm    Mitral:                              Aortic    Peak E-Wave: 1.41 m/s                Peak Velocity: 1.70 m/s  Peak A-Wave: 1.20 m/s                Mean Velocity: 1.10 m/s  E/A Ratio: 1.18                      Peak Gradient: 11.56 mmHg  Peak Gradient: 7.95 mmHg             Mean Gradient: 5 mmHg  Mean Gradient: 5 mmHg  Deceleration Time: 155 msec  Area (continuity): 0.91 cm^2  MR Alias Velocity: 0.39 m/s          AV VTI: 31.7 cm  MR Velocity: 5.50 m/s  MARK Volumetric: 0.07 cm^2  Area (continuity): 0.62 cm^2  Mean Velocity: 0.93 m/s  MR VTI: 183 cm    Septal Wall E' velocity:0.03 m/s  Lateral Wall E' velocity:0.08 m/s      Cardiac Catheterization:   No results found for this or any previous visit. ASSESSMENT AND PLAN     Assessment:    //Acute on chronic hypoxic respiratory failure. //Acute on chronic systolic heart failure  //Pulm edema pulm venous congestion secondary to above  //COPD severity not known on chronic oxygen therapy  //Coronary artery disease  //Chronic kidney disease  //Diabetes mellitus  //Hypertension  //Paroxysmal atrial fibrillation.   (Per history)    Plan:    Patient is currently on nasal cannula 3 L maintaining saturation above 92%. Optimization of CHF treatment per primary service. Patient is currently on Coreg hydralazine and Bumex. Monitor creatinine  Continue with supplemental O2. Chest x-ray 10/4/2022 reviewed and significantly improved  Recommend speech evaluation/swallow evaluation. Patient declined swallow evaluation  Strict aspiration precaution. Anticoagulation if needed per primary service  Continue DuoNeb aerosol  Encourage incentive spirometry, pulmonary toilet, aspiration precautions  Patient is currently on Bumex need adjustment of diuretic. Continue to monitor I/O with a goal of negative fluid balance  Antimicrobials reviewed; currently on cefepime empirically okay to discontinue after 5 days  Continue short course of prednisone. DVT prophylaxis on heparin subcutaneous  Physical/occupational/speech therapy; increase activity as tolerated    Discussed with nursing staff, treatment plan discussed. I updated the patient regarding the current clinical condition, provisional diagnosis and management plan. I addressed concerns and answered all questions to the best of my abilities. It was my pleasure to evaluate Kasandra Lee today. We will continue to follow. I would like to thank you for allowing me to participate in the care of this patient. Please feel free to call with any further questions or concerns. Hannah Mendez MD, M.D. Pulmonary and Critical Care Medicine           10/4/2022, 4:22 PM    Please note that this chart was generated using voice recognition Dragon dictation software. Although every effort was made to ensure the accuracy of this automated transcription, some errors in transcription may have occurred.

## 2022-10-04 NOTE — PROGRESS NOTES
Occupational Therapy  Facility/Department: [facility] RENAL//MED SURG  Occupational Daily Treatment Note    Name: Tad Scott  : 1940  MRN: [id number]  Date of Service: 10/4/2022    Discharge Recommendations:  Patient would benefit from continued therapy after discharge    Patient Diagnosis(es): The encounter diagnosis was Acute respiratory failure with hypoxia ([address] Past Medical History:  has a past medical history of Anxiety, Arthritis, Atherosclerosis, Body mass index (bmi) 25.0-25.9, adult, CHF exacerbation ([facility]), COPD (chronic obstructive pulmonary disease) ([facility]), CVA (cerebral vascular accident) ([address] Depression, Diabetes mellitus ([address] Diverticula, esophagus congenital, Former smoker, Hypercholesteremia, Hypertension, Hypokalemia, Joint pain, knee, Myocardial infarct, old, Pneumonia, Protein S deficiency ([address] Pulmonary embolism ([address] Reflux esophagitis, Tinea corporis, Tremor, URI (upper respiratory infection), UTI (urinary tract infection), and Zenker's diverticulum. Past Surgical History:  has a past surgical history that includes Carotid endarterectomy (Bilateral); Tonsillectomy; and Endoscopy, colon, diagnostic. Assessment   Performance deficits / Impairments: Decreased ADL status; Decreased functional mobility ; Decreased safe awareness;Decreased strength;Decreased balance;Decreased endurance;Decreased high-level IADLs;Decreased cognition  Prognosis: Good  Activity Tolerance  Activity Tolerance: Patient Tolerated treatment well        Plan   Occupational Therapy Plan  Times Per Week: 3-4x     Restrictions  Restrictions/Precautions  Restrictions/Precautions: Fall Risk, General Precautions, NPO  Required Braces or Orthoses?: No  Position Activity Restriction  Other position/activity restrictions: up with A, Asp. prec., O2 NC 4 Lm. MAP goal >65, DNR-CCA  Pain assessment: Pt stated mild discomfort L shoulder d/t prior condition. Subjective      Safety Devices  Type of Devices:  All fall risk glasses at all times  Cognition  Overall Cognitive Status: Exceptions  Following Commands: Follows all commands without difficulty  Attention Span: Attends with cues to redirect  Memory: Appears intact  Safety Judgement: Decreased awareness of need for assistance  Problem Solving: Assistance required to identify errors made;Assistance required to correct errors made  Initiation: Requires cues for some  Sequencing: Requires cues for some  Orientation  Overall Orientation Status: Within Functional Limits     Education Given To: Patient  Education Provided Comments: OT POC, transfer/walker safety, importance of participation in therapy with good return. AM-PAC Score        AM-PAC Inpatient Daily Activity Raw Score: 16 (10/04/22 1330)  AM-PAC Inpatient ADL T-Scale Score : 35.96 (10/04/22 1330)  ADL Inpatient CMS 0-100% Score: 53.32 (10/04/22 1330)  ADL Inpatient CMS G-Code Modifier : CK (10/04/22 1330)      Goals  Short Term Goals  Time Frame for Short Term Goals: Pt will by discharge  Short Term Goal 1: demo standing during func activity for 4 min+ using LRD PRN and no seated rest break, SBA  Short Term Goal 2: demo Mod I for all bed mobility using bed rails PRN  Short Term Goal 3: demo ADL UB bathing/dressing activity at mod I with 2 cues only  Short Term Goal 4: demo ADL LB bathing/dressing activity at SBA, AE PRN, with 2 cues only  Short Term Goal 5: demo SBA for all func transfers using RW PRN, including toilet transfers       Therapy Time   Individual Concurrent Group Co-treatment   Time In 1003         Time Out 1056         Minutes 53         Timed Code Treatment Minutes: 53 Minutes    Pt supine in bed upon therapist arrival. Pleasant and agreeable to therapy. See above for LOF for all tasks. Pt retired to seated in chair at end of session with chair alarm activated and call light within reach.       RADHA Brandon/GEMA

## 2022-10-04 NOTE — PLAN OF CARE
Problem: Respiratory - Adult  Goal: Achieves optimal ventilation and oxygenation  Outcome: Progressing     Problem: Discharge Planning  Goal: Discharge to home or other facility with appropriate resources  Outcome: Progressing     Problem: Pain  Goal: Verbalizes/displays adequate comfort level or baseline comfort level  Outcome: Progressing     Problem: Skin/Tissue Integrity  Goal: Absence of new skin breakdown  Description: 1. Monitor for areas of redness and/or skin breakdown  2. Assess vascular access sites hourly  3. Every 4-6 hours minimum:  Change oxygen saturation probe site  4. Every 4-6 hours:  If on nasal continuous positive airway pressure, respiratory therapy assess nares and determine need for appliance change or resting period.   10/4/2022 0547 by Melania Morris RN  Outcome: Progressing  10/3/2022 1658 by Rm Vera RN  Outcome: Progressing     Problem: Safety - Adult  Goal: Free from fall injury  Outcome: Progressing     Problem: ABCDS Injury Assessment  Goal: Absence of physical injury  Outcome: Progressing     Problem: Neurosensory - Adult  Goal: Achieves stable or improved neurological status  Outcome: Progressing  Goal: Absence of seizures  Outcome: Progressing  Goal: Remains free of injury related to seizures activity  Outcome: Progressing  Goal: Achieves maximal functionality and self care  Outcome: Progressing     Problem: Skin/Tissue Integrity - Adult  Goal: Skin integrity remains intact  10/4/2022 0547 by Melania Morris RN  Outcome: Progressing  10/3/2022 1658 by Rm Vera RN  Outcome: Progressing  Goal: Incisions, wounds, or drain sites healing without S/S of infection  Outcome: Progressing  Goal: Oral mucous membranes remain intact  Outcome: Progressing     Problem: Musculoskeletal - Adult  Goal: Return mobility to safest level of function  Outcome: Progressing  Goal: Maintain proper alignment of affected body part  Outcome: Progressing  Goal: Return ADL status to a safe level of function  Outcome: Progressing     Problem: Genitourinary - Adult  Goal: Absence of urinary retention  Outcome: Progressing  Goal: Urinary catheter remains patent  Outcome: Progressing     Problem: Metabolic/Fluid and Electrolytes - Adult  Goal: Electrolytes maintained within normal limits  Outcome: Progressing  Goal: Hemodynamic stability and optimal renal function maintained  Outcome: Progressing  Goal: Glucose maintained within prescribed range  Outcome: Progressing     Problem: Anxiety  Goal: Will report anxiety at manageable levels  Description: INTERVENTIONS:  1. Administer medication as ordered  2. Teach and rehearse alternative coping skills  3. Provide emotional support with 1:1 interaction with staff  Outcome: Progressing     Problem: Coping  Goal: Pt/Family able to verbalize concerns and demonstrate effective coping strategies  Description: INTERVENTIONS:  1. Assist patient/family to identify coping skills, available support systems and cultural and spiritual values  2. Provide emotional support, including active listening and acknowledgement of concerns of patient and caregivers  3. Reduce environmental stimuli, as able  4. Instruct patient/family in relaxation techniques, as appropriate  5. Assess for spiritual pain/suffering and initiate Spiritual Care, Psychosocial Clinical Specialist consults as needed  Outcome: Progressing     Problem: Decision Making  Goal: Pt/Family able to effectively weigh alternatives and participate in decision making related to treatment and care  Description: INTERVENTIONS:  1. Determine when there are differences between patient's view, family's view, and healthcare provider's view of condition  2. Facilitate patient and family articulation of goals for care  3. Help patient and family identify pros/cons of alternative solutions  4. Provide information as requested by patient/family  5.  Respect patient/family right to receive or not to receive information  6. Serve as a liaison between patient and family and health care team  7. Initiate Consults from Ethics, Palliative Care or initiate [facility] as is appropriate  Outcome: Progressing     Problem: Confusion  Goal: Confusion, delirium, dementia, or psychosis is improved or at baseline  Description: INTERVENTIONS:  1. Assess for possible contributors to thought disturbance, including medications, impaired vision or hearing, underlying metabolic abnormalities, dehydration, psychiatric diagnoses, and notify attending LIP  2. [place] high risk fall precautions, as indicated  3. Provide frequent short contacts to provide reality reorientation, refocusing and direction  4. Decrease environmental stimuli, including noise as appropriate  5. Monitor and intervene to maintain adequate nutrition, hydration, elimination, sleep and activity  6. If unable to ensure safety without constant attention obtain sitter and review sitter guidelines with assigned personnel  7. Initiate Psychosocial CNS and Spiritual Care consult, as indicated  Outcome: Progressing     Problem: Behavior  Goal: Pt/Family maintain appropriate behavior and adhere to behavioral management agreement, if implemented  Description: INTERVENTIONS:  1. Assess patient/family's coping skills and  non-compliant behavior (including use of illegal substances)  2. Notify security of behavior or suspected illegal substances which indicate the need for search of the family and/or belongings  3. Encourage verbalization of thoughts and concerns in a socially appropriate manner  4. Utilize positive, consistent limit setting strategies supporting safety of patient, staff and others  5. Encourage participation in the decision making process about the behavioral management agreement  6. If a visitor's behavior poses a threat to safety call refer to organization policy.   7. Initiate consult with , Psychosocial CNS, Spiritual Care as appropriate  Outcome: Progressing     Problem: Cardiovascular - Adult  Goal: Maintains optimal cardiac output and hemodynamic stability  Outcome: Progressing  Goal: Absence of cardiac dysrhythmias or at baseline  Outcome: Progressing     Problem: Chronic Conditions and Co-morbidities  Goal: Patient's chronic conditions and co-morbidity symptoms are monitored and maintained or improved  Outcome: Progressing

## 2022-10-05 VITALS
HEIGHT: 61 IN | OXYGEN SATURATION: 98 % | HEART RATE: 77 BPM | TEMPERATURE: 98 F | BODY MASS INDEX: 27.06 KG/M2 | SYSTOLIC BLOOD PRESSURE: 121 MMHG | RESPIRATION RATE: 16 BRPM | WEIGHT: 143.3 LBS | DIASTOLIC BLOOD PRESSURE: 63 MMHG

## 2022-10-05 LAB
ABSOLUTE EOS #: 0.04 K/UL (ref 0–0.44)
ABSOLUTE IMMATURE GRANULOCYTE: 0.03 K/UL (ref 0–0.3)
ABSOLUTE LYMPH #: 1.96 K/UL (ref 1.1–3.7)
ABSOLUTE MONO #: 0.71 K/UL (ref 0.1–1.2)
ANION GAP SERPL CALCULATED.3IONS-SCNC: 13 MMOL/L (ref 9–17)
BASOPHILS # BLD: 1 % (ref 0–2)
BASOPHILS ABSOLUTE: 0.04 K/UL (ref 0–0.2)
BUN BLDV-MCNC: 52 MG/DL (ref 8–23)
CALCIUM SERPL-MCNC: 9.9 MG/DL (ref 8.6–10.4)
CHLORIDE BLD-SCNC: 93 MMOL/L (ref 98–107)
CO2: 32 MMOL/L (ref 20–31)
CREAT SERPL-MCNC: 2.13 MG/DL (ref 0.5–0.9)
EKG ATRIAL RATE: 91 BPM
EKG P AXIS: 61 DEGREES
EKG P-R INTERVAL: 154 MS
EKG Q-T INTERVAL: 372 MS
EKG QRS DURATION: 96 MS
EKG QTC CALCULATION (BAZETT): 457 MS
EKG R AXIS: 42 DEGREES
EKG T AXIS: 164 DEGREES
EKG VENTRICULAR RATE: 91 BPM
EOSINOPHILS RELATIVE PERCENT: 1 % (ref 1–4)
GFR SERPL CREATININE-BSD FRML MDRD: 23 ML/MIN/1.73M2
GLUCOSE BLD-MCNC: 126 MG/DL (ref 65–105)
GLUCOSE BLD-MCNC: 171 MG/DL (ref 65–105)
GLUCOSE BLD-MCNC: 79 MG/DL (ref 70–99)
GLUCOSE BLD-MCNC: 81 MG/DL (ref 65–105)
HCT VFR BLD CALC: 37.7 % (ref 36.3–47.1)
HEMOGLOBIN: 12.5 G/DL (ref 11.9–15.1)
IMMATURE GRANULOCYTES: 0 %
LYMPHOCYTES # BLD: 26 % (ref 24–43)
MCH RBC QN AUTO: 29.6 PG (ref 25.2–33.5)
MCHC RBC AUTO-ENTMCNC: 33.2 G/DL (ref 28.4–34.8)
MCV RBC AUTO: 89.3 FL (ref 82.6–102.9)
MONOCYTES # BLD: 9 % (ref 3–12)
NRBC AUTOMATED: 0 PER 100 WBC
PDW BLD-RTO: 16.8 % (ref 11.8–14.4)
PLATELET # BLD: 263 K/UL (ref 138–453)
PMV BLD AUTO: 9.7 FL (ref 8.1–13.5)
POTASSIUM SERPL-SCNC: 4.2 MMOL/L (ref 3.7–5.3)
RBC # BLD: 4.22 M/UL (ref 3.95–5.11)
RBC # BLD: ABNORMAL 10*6/UL
SEG NEUTROPHILS: 64 % (ref 36–65)
SEGMENTED NEUTROPHILS ABSOLUTE COUNT: 4.86 K/UL (ref 1.5–8.1)
SODIUM BLD-SCNC: 138 MMOL/L (ref 135–144)
WBC # BLD: 7.6 K/UL (ref 3.5–11.3)

## 2022-10-05 PROCEDURE — 6370000000 HC RX 637 (ALT 250 FOR IP): Performed by: STUDENT IN AN ORGANIZED HEALTH CARE EDUCATION/TRAINING PROGRAM

## 2022-10-05 PROCEDURE — 80048 BASIC METABOLIC PNL TOTAL CA: CPT

## 2022-10-05 PROCEDURE — 99232 SBSQ HOSP IP/OBS MODERATE 35: CPT | Performed by: INTERNAL MEDICINE

## 2022-10-05 PROCEDURE — 93005 ELECTROCARDIOGRAM TRACING: CPT

## 2022-10-05 PROCEDURE — 85025 COMPLETE CBC W/AUTO DIFF WBC: CPT

## 2022-10-05 PROCEDURE — 6360000002 HC RX W HCPCS: Performed by: STUDENT IN AN ORGANIZED HEALTH CARE EDUCATION/TRAINING PROGRAM

## 2022-10-05 PROCEDURE — 93010 ELECTROCARDIOGRAM REPORT: CPT | Performed by: INTERNAL MEDICINE

## 2022-10-05 PROCEDURE — 82947 ASSAY GLUCOSE BLOOD QUANT: CPT

## 2022-10-05 PROCEDURE — 2580000003 HC RX 258: Performed by: STUDENT IN AN ORGANIZED HEALTH CARE EDUCATION/TRAINING PROGRAM

## 2022-10-05 PROCEDURE — 6370000000 HC RX 637 (ALT 250 FOR IP): Performed by: INTERNAL MEDICINE

## 2022-10-05 PROCEDURE — 2700000000 HC OXYGEN THERAPY PER DAY

## 2022-10-05 PROCEDURE — 36415 COLL VENOUS BLD VENIPUNCTURE: CPT

## 2022-10-05 PROCEDURE — 2500000003 HC RX 250 WO HCPCS: Performed by: STUDENT IN AN ORGANIZED HEALTH CARE EDUCATION/TRAINING PROGRAM

## 2022-10-05 PROCEDURE — 94761 N-INVAS EAR/PLS OXIMETRY MLT: CPT

## 2022-10-05 RX ORDER — PREDNISONE 20 MG/1
20 TABLET ORAL DAILY
Qty: 1 TABLET | Refills: 0 | Status: SHIPPED | OUTPATIENT
Start: 2022-10-06 | End: 2022-10-07

## 2022-10-05 RX ORDER — ALPRAZOLAM 0.5 MG/1
0.25 TABLET ORAL 2 TIMES DAILY PRN
Qty: 30 TABLET | Refills: 0 | Status: SHIPPED | OUTPATIENT
Start: 2022-10-05 | End: 2022-10-20

## 2022-10-05 RX ORDER — CARVEDILOL 6.25 MG/1
6.25 TABLET ORAL 2 TIMES DAILY WITH MEALS
Qty: 60 TABLET | Refills: 3 | Status: SHIPPED | OUTPATIENT
Start: 2022-10-05

## 2022-10-05 RX ADMIN — HYDRALAZINE HYDROCHLORIDE 50 MG: 50 TABLET, FILM COATED ORAL at 08:38

## 2022-10-05 RX ADMIN — CARVEDILOL 6.25 MG: 6.25 TABLET, FILM COATED ORAL at 08:38

## 2022-10-05 RX ADMIN — PREDNISONE 20 MG: 20 TABLET ORAL at 08:38

## 2022-10-05 RX ADMIN — HEPARIN SODIUM 5000 UNITS: 5000 INJECTION INTRAVENOUS; SUBCUTANEOUS at 06:25

## 2022-10-05 RX ADMIN — SODIUM CHLORIDE, PRESERVATIVE FREE 10 ML: 5 INJECTION INTRAVENOUS at 08:37

## 2022-10-05 RX ADMIN — BUMETANIDE 1 MG: 0.25 INJECTION, SOLUTION INTRAMUSCULAR; INTRAVENOUS at 08:37

## 2022-10-05 RX ADMIN — DOCUSATE SODIUM 50 MG AND SENNOSIDES 8.6 MG 1 TABLET: 8.6; 5 TABLET, FILM COATED ORAL at 08:38

## 2022-10-05 RX ADMIN — CEFEPIME 1000 MG: 1 INJECTION, POWDER, FOR SOLUTION INTRAMUSCULAR; INTRAVENOUS at 12:46

## 2022-10-05 ASSESSMENT — ENCOUNTER SYMPTOMS
SHORTNESS OF BREATH: 0
DIARRHEA: 0
COUGH: 1
SORE THROAT: 0
RHINORRHEA: 0
ABDOMINAL PAIN: 0
NAUSEA: 0
WHEEZING: 0
SHORTNESS OF BREATH: 1
ALLERGIC/IMMUNOLOGIC NEGATIVE: 1
STRIDOR: 0
VOMITING: 0
VOICE CHANGE: 0
CHOKING: 0
CONSTIPATION: 0
EYE REDNESS: 0
PHOTOPHOBIA: 0
COUGH: 0

## 2022-10-05 ASSESSMENT — PAIN SCALES - GENERAL
PAINLEVEL_OUTOF10: 0

## 2022-10-05 NOTE — CARE COORDINATION
Claudene Law from [facility] that precert is approved for patient to come to [facility] today. 1130 Transport is arranged through [employer] [employer] will pick the patient up at 1600. Patient, RN and patient's daughter Nikole Number are notified    [phone] and DAQUAN are complete and placed in packet. Writer left a message with Claudene Law at [facility] notifying her of the patient's  time.

## 2022-10-05 NOTE — PLAN OF CARE
Problem: Respiratory - Adult  Goal: Achieves optimal ventilation and oxygenation  Outcome: Progressing     Problem: Discharge Planning  Goal: Discharge to home or other facility with appropriate resources  Outcome: Progressing     Problem: Pain  Goal: Verbalizes/displays adequate comfort level or baseline comfort level  Outcome: Progressing     Problem: Skin/Tissue Integrity  Goal: Absence of new skin breakdown  Description: 1. Monitor for areas of redness and/or skin breakdown  2. Assess vascular access sites hourly  3. Every 4-6 hours minimum:  Change oxygen saturation probe site  4. Every 4-6 hours:  If on nasal continuous positive airway pressure, respiratory therapy assess nares and determine need for appliance change or resting period.   Outcome: Progressing     Problem: Safety - Adult  Goal: Free from fall injury  Outcome: Progressing     Problem: ABCDS Injury Assessment  Goal: Absence of physical injury  Outcome: Progressing     Problem: Neurosensory - Adult  Goal: Achieves stable or improved neurological status  Outcome: Progressing  Goal: Absence of seizures  Outcome: Progressing  Goal: Remains free of injury related to seizures activity  Outcome: Progressing  Goal: Achieves maximal functionality and self care  Outcome: Progressing     Problem: Skin/Tissue Integrity - Adult  Goal: Skin integrity remains intact  Outcome: Progressing  Goal: Incisions, wounds, or drain sites healing without S/S of infection  Outcome: Progressing  Goal: Oral mucous membranes remain intact  Outcome: Progressing     Problem: Musculoskeletal - Adult  Goal: Return mobility to safest level of function  Outcome: Progressing  Goal: Maintain proper alignment of affected body part  Outcome: Progressing  Goal: Return ADL status to a safe level of function  Outcome: Progressing     Problem: Genitourinary - Adult  Goal: Absence of urinary retention  Outcome: Progressing  Goal: Urinary catheter remains patent  Outcome: Progressing Problem: Metabolic/Fluid and Electrolytes - Adult  Goal: Electrolytes maintained within normal limits  Outcome: Progressing  Goal: Hemodynamic stability and optimal renal function maintained  Outcome: Progressing  Goal: Glucose maintained within prescribed range  Outcome: Progressing     Problem: Anxiety  Goal: Will report anxiety at manageable levels  Description: INTERVENTIONS:  1. Administer medication as ordered  2. Teach and rehearse alternative coping skills  3. Provide emotional support with 1:1 interaction with staff  Outcome: Progressing     Problem: Coping  Goal: Pt/Family able to verbalize concerns and demonstrate effective coping strategies  Description: INTERVENTIONS:  1. Assist patient/family to identify coping skills, available support systems and cultural and spiritual values  2. Provide emotional support, including active listening and acknowledgement of concerns of patient and caregivers  3. Reduce environmental stimuli, as able  4. Instruct patient/family in relaxation techniques, as appropriate  5. Assess for spiritual pain/suffering and initiate Spiritual Care, Psychosocial Clinical Specialist consults as needed  Outcome: Progressing     Problem: Decision Making  Goal: Pt/Family able to effectively weigh alternatives and participate in decision making related to treatment and care  Description: INTERVENTIONS:  1. Determine when there are differences between patient's view, family's view, and healthcare provider's view of condition  2. Facilitate patient and family articulation of goals for care  3. Help patient and family identify pros/cons of alternative solutions  4. Provide information as requested by patient/family  5. Respect patient/family right to receive or not to receive information  6. Serve as a liaison between patient and family and health care team  7.  Initiate Consults from Ethics, Palliative Care or initiate [address] [address] as is appropriate  Outcome: Progressing Problem: Confusion  Goal: Confusion, delirium, dementia, or psychosis is improved or at baseline  Description: INTERVENTIONS:  1. Assess for possible contributors to thought disturbance, including medications, impaired vision or hearing, underlying metabolic abnormalities, dehydration, psychiatric diagnoses, and notify attending LIP  2. [place] high risk fall precautions, as indicated  3. Provide frequent short contacts to provide reality reorientation, refocusing and direction  4. Decrease environmental stimuli, including noise as appropriate  5. Monitor and intervene to maintain adequate nutrition, hydration, elimination, sleep and activity  6. If unable to ensure safety without constant attention obtain sitter and review sitter guidelines with assigned personnel  7. Initiate Psychosocial CNS and Spiritual Care consult, as indicated  Outcome: Progressing     Problem: Behavior  Goal: Pt/Family maintain appropriate behavior and adhere to behavioral management agreement, if implemented  Description: INTERVENTIONS:  1. Assess patient/family's coping skills and  non-compliant behavior (including use of illegal substances)  2. Notify security of behavior or suspected illegal substances which indicate the need for search of the family and/or belongings  3. Encourage verbalization of thoughts and concerns in a socially appropriate manner  4. Utilize positive, consistent limit setting strategies supporting safety of patient, staff and others  5. Encourage participation in the decision making process about the behavioral management agreement  6. If a visitor's behavior poses a threat to safety call refer to organization policy.   7. Initiate consult with , Psychosocial CNS, Spiritual Care as appropriate  Outcome: Progressing     Problem: Cardiovascular - Adult  Goal: Maintains optimal cardiac output and hemodynamic stability  Outcome: Progressing  Goal: Absence of cardiac dysrhythmias or at baseline  Outcome: Progressing     Problem: Chronic Conditions and Co-morbidities  Goal: Patient's chronic conditions and co-morbidity symptoms are monitored and maintained or improved  Outcome: Progressing

## 2022-10-05 NOTE — DISCHARGE INSTRUCTIONS
You completed course of antibiotic for pneumonia. Please follow-up with your PCP within 2 weeks of discharge and continue to take your medications as prescribed. We decreased the dose of carvedilol to 6.25 mg bid due to softer blood pressure. There was concern of atrial fibrillation on telemetry but serial EKG did not show any atrial fibrillation. EKG did show sinus tachycardia with multiple PACs. Telemetry did not show any atrial fibrillation either. Please follow-up with your cardiology as an outpatient. In case of worsening symptoms, seek medical attention come to emergency department.

## 2022-10-05 NOTE — PROGRESS NOTES
[facility]  Internal Medicine Teaching Residency Program  Inpatient Daily Progress Note  ______________________________________________________________________________    Patient: All Molina  YOB: 1940   FGV:[id number]    Acct: [de-identified]     Room: [facility]0/0320-01  Admit date: 9/29/2022  Today's date: 10/05/22  Number of days in the hospital: 6    SUBJECTIVE   Admitting Diagnosis: Acute respiratory failure with hypoxia ([address]  CC:  Acute hypoxic failure, shortness of breath. Acute exacerbation of congestive heart failure along with hospital-acquired pneumonia    - Pt examined at bedside. Chart & results reviewed. Patient hemodynamically stable  Patient lying comfortably on bed  Patient denies any shortness of breath, dyspnea  Patient denies any chest pain, lightheadedness and headache  She denies any nausea and vomiting  Patient maintaining 99% oxygen saturation on 2 L, which is her home oxygen requirement. Overnight, there was a concern for patient having A. fib. EKG was done. EKG has been seen and reviewed. Patient was having normal sinus rhythm with multiple PAC. Patient does follow a cardiologist.  Her cardiologist note were reviewed today, there is no mention of patient having atrial fibrillation before, patient is on any anticoagulants. Patient denies any shortness of breath, chest pain in the overnight event. Patient was counseled to see her cardiologist after discharge. Review of Systems   Constitutional:  Negative for appetite change, chills, fatigue, fever and unexpected weight change. HENT:  Negative for congestion, postnasal drip and rhinorrhea. Respiratory:  Negative for cough, choking, shortness of breath, wheezing and stridor. Cardiovascular:  Negative for chest pain and leg swelling. Gastrointestinal:  Negative for abdominal pain, constipation, diarrhea, nausea and vomiting.    Genitourinary:  Negative for dysuria, enuresis and urgency. Neurological:  Positive for weakness. Negative for syncope. Psychiatric/Behavioral:  Negative for confusion, decreased concentration and sleep disturbance. BRIEF HISTORY     Patient, 79years old female, presented to the hospital with shortness of breath. Patient was initially seen at [facility] clinic due to hypertensive emergency along with acute flash pulmonary edema. She was started on nitroglycerin and Lasix. Her urinalysis was positive for UTI and she was also started on cefepime. Her troponins were trending upward, cardiology and pulmonology were consulted. Patient denied any acute cardiac work-up. Her echo was done at [facility] which showed ejection fraction 25%. Her urine culture came back positive for E. coli and she was started on IV ceftriaxone. While her presentation to [facility], she presented with acute hypoxia, shortness of breath and oxygen saturation of 60% at BiPAP. She was intubated. Patient remained febrile with WBC count of 15.1 and she was initially started on vancomycin and cefepime. Patient was extubated, he was maintaining her sats and she was hemodynamically stable. Patient complaint of right great toe pain and she was started on prednisone for suspected gout. Her leukocytosis resolved. Vancomycin was discontinued and she remained on cefepime for healthcare associated pneumonia. She was continued on Bumex and Coreg along with supportive care and she was okay to transfer out of ICU. OBJECTIVE     Vital Signs:  /72   Pulse 73   Temp 97.7 °F (36.5 °C) (Oral)   Resp 16   Ht 5' 1\" (1.549 m)   Wt 143 lb 4.8 oz (65 kg)   SpO2 99%   BMI 27.08 kg/m²     Temp (24hrs), Av.5 °F (36.4 °C), Min:97.3 °F (36.3 °C), Max:97.7 °F (36.5 °C)    No intake/output data recorded. Physical Exam  Constitutional:       General: She is awake. Appearance: She is underweight. Interventions: Nasal cannula in place. Comments: Nasal cannula 2 L     Cardiovascular:      Rate and Rhythm: Normal rate. Heart sounds: Normal heart sounds. Pulmonary:      Effort: Pulmonary effort is normal.      Breath sounds: Normal breath sounds. Chest:      Chest wall: No tenderness. Abdominal:      General: Bowel sounds are normal.      Palpations: Abdomen is soft. Tenderness: There is no abdominal tenderness. Musculoskeletal:      Right lower leg: No edema. Left lower leg: No edema. Neurological:      Mental Status: She is alert. Psychiatric:         Behavior: Behavior is cooperative.          Medications:  Scheduled Medications:    hydrALAZINE  50 mg Oral BID    predniSONE  20 mg Oral Daily    ipratropium-albuterol  1 ampule Inhalation TID    donepezil  10 mg Oral Nightly    famotidine (PEPCID) injection  20 mg IntraVENous Q48H    sodium chloride flush  5-40 mL IntraVENous 2 times per day    sennosides-docusate sodium  1 tablet Oral BID    heparin (porcine)  5,000 Units SubCUTAneous 3 times per day    insulin lispro  0-4 Units SubCUTAneous TID WC    insulin lispro  0-4 Units SubCUTAneous Nightly    bumetanide  1 mg IntraVENous BID    carvedilol  6.25 mg Oral BID WC    cefepime  1,000 mg IntraVENous Q24H     Continuous Infusions:    sodium chloride 10 mL/hr at 10/02/22 0347    dextrose       PRN MedicationsALPRAZolam, 0.25 mg, BID PRN  albuterol sulfate HFA, 2 puff, Q6H PRN  sodium chloride flush, 5-40 mL, PRN  sodium chloride, , PRN  ondansetron, 4 mg, Q8H PRN   Or  ondansetron, 4 mg, Q6H PRN  polyethylene glycol, 17 g, Daily PRN  acetaminophen, 650 mg, Q6H PRN   Or  acetaminophen, 650 mg, Q6H PRN  fentanNYL, 50 mcg, Q1H PRN  glucose, 4 tablet, PRN  dextrose bolus, 125 mL, PRN   Or  dextrose bolus, 250 mL, PRN  glucagon (rDNA), 1 mg, PRN  dextrose, , Continuous PRN      Diagnostic Labs:  CBC:   Recent Labs     10/04/22  0539 10/04/22  1809 10/05/22  0540   WBC 7.4 7.2 7.6   RBC 4.08 4.06 4.22   HGB 11.9 11.9 12.5   HCT 37.1 36.7 37.7   MCV 90.9 90.4 89.3   RDW 16.8* 16.8* 16.8*    267 263       BMP:   Recent Labs     10/04/22  0539 10/04/22  1809 10/05/22  0540    134* 138   K 4.2 4.8 4.2   CL 94* 91* 93*   CO2 32* 31 32*   BUN 46* 51* 52*   CREATININE 2.21* 2.19* 2.13*       BNP: No results for input(s): BNP in the last 72 hours. PT/INR: No results for input(s): PROTIME, INR in the last 72 hours. APTT: No results for input(s): APTT in the last 72 hours. CARDIAC ENZYMES: No results for input(s): CKMB, CKMBINDEX, TROPONINI in the last 72 hours. Invalid input(s): CKTOTAL;3  FASTING LIPID PANEL:  Lab Results   Component Value Date    CHOL 239 (H) 05/16/2021    HDL 51 05/16/2021    TRIG 173 (H) 05/16/2021     LIVER PROFILE: No results for input(s): AST, ALT, ALB, BILIDIR, BILITOT, ALKPHOS in the last 72 hours. MICROBIOLOGY:   Lab Results   Component Value Date/Time    CULTURE NO GROWTH 5 DAYS 09/29/2022 05:45 AM       Imaging:    XR CHEST PORTABLE    Result Date: 9/30/2022  Improving congestive changes with mild residual patchy parenchymal opacities. XR CHEST PORTABLE    Result Date: 9/29/2022  Cardiomegaly with vascular congestion and diffuse increased interstitial changes seen predominantly within the perihilar regions and lung bases suggestive of congestive failure, with a suspected right-sided pleural effusion. ET tube measures 3.1 cm above the dioni. Enteric catheter tip terminates in the gastric fundus directed laterally. XR ABDOMEN FOR NG/OG/NE TUBE PLACEMENT    Result Date: 9/29/2022  Cardiomegaly with vascular congestion and diffuse increased interstitial changes seen predominantly within the perihilar regions and lung bases suggestive of congestive failure, with a suspected right-sided pleural effusion. ET tube measures 3.1 cm above the dioni. Enteric catheter tip terminates in the gastric fundus directed laterally.        ASSESSMENT & PLAN     ASSESSMENT / PLAN:   Patient, 79years old female, presented to the hospital with shortness of breath. She was acutely hypoxic with 60% saturation on BiPAP. She was intubated. Her leukocytosis initially was 15.1 which improved later on. She was extubated. She was initially started on vancomycin and cefepime, later on vancomycin was discontinued and she is currently on cefepime with suspicion of hospital-acquired pneumonia. Patient is hemodynamically stable. Principal Problem:  Acute respiratory failure with hypoxia ([address]  Plan:   Pulmonology is following, appreciate their recommendations  Patient on nasal cannula 2 L maintaining oxygen saturation of 99%. Patient was intubated because of acute hypoxia, oxygen saturation of 60% and BiPAP. He was extubated, maintaining her oxygen sats of above 98 to 97% on nasal cannula. Continue albuterol sulfate inhalers  Continue supplemental oxygen  Continue prednisone as per pulmonology  Patient on Coreg, hydralazine and Bumex  Continue aspiration precautions  Pulse oximeter check every 4 hours  Chest x-ray shows interval improvement with mild vascular congestion  Cefepime will be discontinued today    Active Problems:  Heart failure (HCC)  Plan:   Continue Bumex injection 1 Mg  Continue Coreg tablet 6.25 Mg  Continue hydralazine tablet 50 mg  Fluid restriction of 1.5 L diet, low-sodium diet   ECHO 09/20/2022 :   Left ventricle is mild to moderately dilated. Estimated EF 25%. Severe global hypokinesis. Normal right ventricular size, reduced function. Left atrium appears dilated. Aortic valve is trileaflet. Aortic valve sclerosis without stenosis. Small  LVOT diameter. Trivial aortic insufficiency. Normal aortic root dimension. Mitral annular calcification is seen. Mild to moderate mitral  regurgitation. Mild MS mean gradient 5 mmHg  Normal tricuspid valve structure and function. Insignificant tricuspid  regurgitation, unable to estimate RVSP.   IVC appears normal diameter , difficult to assess respiratory variation  No significant pericardial effusion is seen. Today her blood pressure was 100/74, on soft discharge, patient will be discharged on Coreg 6.25 Mg    Pneumonia  Plan:   Patient was initially started on both vancomycin and cefepime  Vancomycin discontinued  Continue cefepime 1 g, will be  today    Diabetes mellitus ([facility] [address]  Plan:  POC glucose fingerstick 98 10/03/2022  Glucose today 89 10/04/2022  Glucose today 79 10/05/2022  Insulin lispro 0-4 subcu nightly  Insulin lispro 0-4 subcu 3 times daily with meals    Essential hypertension  Plan:   Continue Coreg tablet 6.25 Mg  Continue hydralazine tablet 50 mg  Today her blood pressure was 100/74, on soft discharge, patient will be discharged on Coreg 6.25 Mg    Stage 4 chronic kidney disease ([facility] [address]  Plan:   Avoid nephrotoxic drugs  Continue creatinine monitoring   patient is at her baseline creatinine      Diet: Diet regular, low-sodium and fluid restriction with dysphagia related diet  DVT ppx : Heparin injection 5000 units  GI ppx: Pepcid 20 Mg IV    PT/OT/SW: Consulted, follow-up with recommendations  Discharge Planning:  following, pre-CERT accepted for [facility], patient will be discharged    Marva Jarvis M.D. Internal Medicine Resident PGY-1  1 [facility]. 11:59 AM 10/5/2022     Please note that part of this chart was generated using voice recognition dictation software. Although every effort was made to ensure the accuracy of this automated transcription, some errors in transcription may have occurred.   //

## 2022-10-05 NOTE — PROGRESS NOTES
[address]  Occupational Therapy Not Seen Note    DATE: 10/5/2022    NAME: Kasandra Lee  MRN: [id number]   : 1940      Patient not seen this date for Occupational Therapy due to:    Patient Declined: Kellen Little declined stating, \"they woke me up last night at 2am for and EKG\"    Next Scheduled Treatment: Attempt in PM or 10/6    Electronically signed by PARUL GaleanaA/L on 10/5/2022 at 11:52 AM

## 2022-10-05 NOTE — PROGRESS NOTES
PULMONARY & CRITICAL CARE MEDICINE PROGRESS  NOTE     Patient:  Lloyd Pickard  MRN: [id number]  Admit date: 9/29/2022  Primary Care Physician: Kecia Reid MD  Consulting Physician: Pat Saucedo MD  CODE Status: DNR-CCA  LOS: 6    SUBJECTIVE     I personally interviewed/examined the patient, reviewed interval history and interpreted all available radiographic, laboratory data at the time of service. Chief Compliant/Reason for Initial Consult:   Acute hypoxic respiratory failure/pulm edema requiring intubation    Brief Hospital Course: The patient is a 79 y.o. female history of systolic heart failure with recent ejection fraction of 25%, atrial fibrillation, history of COPD on home oxygen at 2 L, stage IV chronic kidney disease, coronary artery disease, hypertension and diabetes mellitus. Presented to emergency room on 09/29/2022 with respiratory distress severe hypoxia failed BiPAP and requiring intubation patient was treated for pulm edema as she has hypertensive emergency also treated with nitroglycerin infusion diuretic. She has bilateral airspace infiltrate and she was empirically treated for possible healthcare associated pneumonia. Her WBC count was elevated and her proBNP was greater than 19,000. Patient was ultimately extubated in ICU and ultimately transferred out of ICU. Remains on empiric antibiotic therapy. Patient is DNR CCA      Interval History:  10/05/22  I seen the patient during around today, chart reviewed labs and medications seen. Overnight patient is hemodynamically stable she is on nasal cannula 2 to 3 L currently maintaining good saturation. According patient she is able to cough up sputum she denies increase cough but when she coughs she has pale-colored sputum denies hemoptysis and denies chest pain. According patient she is feeling better she was out of bed to chair yesterday.   Denies aspiration or dysphagia or coughing or choking on eating. Swallow evaluation ordered but patient declined to get swallow evaluation  Chest x-ray on 10/04/2022 done and reviewed as compared to chest x-ray on 2022 bilateral pulmonary infiltrate are much better/pulm edema improved with clearing of infiltrate. Review of Systems:  Review of Systems   Constitutional:  Positive for activity change and fatigue. Negative for fever. HENT:  Negative for postnasal drip, sore throat and voice change. Eyes:  Negative for photophobia and redness. Respiratory:  Positive for cough and shortness of breath. Negative for wheezing. Cardiovascular:  Negative for chest pain and leg swelling. Gastrointestinal:  Negative for abdominal pain, diarrhea and vomiting. Endocrine: Negative. Genitourinary:  Negative for dysuria and hematuria. Musculoskeletal:  Positive for arthralgias. Negative for myalgias. Skin: Negative. Allergic/Immunologic: Negative. Neurological:  Negative for dizziness and speech difficulty. Hematological:  Negative for adenopathy. Does not bruise/bleed easily. Psychiatric/Behavioral:  Negative for decreased concentration. The patient is not nervous/anxious.       OBJECTIVE     VITAL SIGNS:   LAST-  /72   Pulse 73   Temp 97.7 °F (36.5 °C) (Oral)   Resp 16   Ht 5' 1\" (1.549 m)   Wt 143 lb 4.8 oz (65 kg)   SpO2 99%   BMI 27.08 kg/m²   8-24 HR RANGE-  TEMP Temp  Av.5 °F (36.4 °C)  Min: 97.3 °F (36.3 °C)  Max: 97.7 °F (54.3 °C)   BP Systolic (41QOM), UYF:470 , Min:130 , UYI:044      Diastolic (05GQW), WNY:81, Min:72, Max:78     PULSE Pulse  Av  Min: 72  Max: 80   RR Resp  Av  Min: 16  Max: 16   O2 SAT SpO2  Av %  Min: 99 %  Max: 99 %   OXYGEN DELIVERY O2 Flow Rate (L/min)  Av L/min  Min: 4 L/min  Max: 4 L/min     Systemic Examination:   Physical Exam  General appearance - looks comfortable and mildly tachypneic not in acute distress  Mental status - alert, oriented to person, place, and time  Eyes - pupils equal and reactive, extraocular eye movements intact  Mouth - mucous membranes moist, pharynx normal without lesions  Neck - supple, no significant adenopathy  Chest - Chest was symmetrical without dullness to percussion. Breath sounds bilaterally were present and distant mild rhonchi present. No wheezing minimal bibasilar crackles present. There is no intercostal recession or use of accessory muscles  Heart - normal rate, regular rhythm, normal S1, S2, positive systolic murmur at lower at apex, no rubs, clicks or gallops  Abdomen - soft, nontender, nondistended, no masses or organomegaly  Neurological - alert, oriented, normal speech, no focal findings or movement disorder noted  Extremities - peripheral pulses normal, chronic mild pedal edema, no clubbing or cyanosis. Chronic venous stasis  Skin - normal coloration and turgor, no rashes, no suspicious skin lesions noted     DATA REVIEW     Medications:  Scheduled Meds:   hydrALAZINE  50 mg Oral BID    predniSONE  20 mg Oral Daily    ipratropium-albuterol  1 ampule Inhalation TID    donepezil  10 mg Oral Nightly    famotidine (PEPCID) injection  20 mg IntraVENous Q48H    sodium chloride flush  5-40 mL IntraVENous 2 times per day    sennosides-docusate sodium  1 tablet Oral BID    heparin (porcine)  5,000 Units SubCUTAneous 3 times per day    insulin lispro  0-4 Units SubCUTAneous TID WC    insulin lispro  0-4 Units SubCUTAneous Nightly    bumetanide  1 mg IntraVENous BID    carvedilol  6.25 mg Oral BID WC    cefepime  1,000 mg IntraVENous Q24H     Continuous Infusions:   sodium chloride 10 mL/hr at 10/02/22 0347    dextrose       LABS:-  ABG:   No results for input(s): POCPH, POCPCO2, POCPO2, POCHCO3, QKDH1EOW in the last 72 hours.   CBC:   Recent Labs     10/04/22  0539 10/04/22  1809 10/05/22  0540   WBC 7.4 7.2 7.6   HGB 11.9 11.9 12.5   HCT 37.1 36.7 37.7   MCV 90.9 90.4 89.3    267 263 LYMPHOPCT 29 9* 26   RBC 4.08 4.06 4.22   MCH 29.2 29.3 29.6   MCHC 32.1 32.4 33.2   RDW 16.8* 16.8* 16.8*       BMP:   Recent Labs     10/04/22  0539 10/04/22  1809 10/05/22  0540    134* 138   K 4.2 4.8 4.2   CL 94* 91* 93*   CO2 32* 31 32*   BUN 46* 51* 52*   CREATININE 2.21* 2.19* 2.13*   GLUCOSE 89 138* 79       Liver Function Test:   No results for input(s): PROT, LABALBU, ALT, AST, GGT, ALKPHOS, BILITOT in the last 72 hours. Amylase/Lipase:  No results for input(s): AMYLASE, LIPASE in the last 72 hours. Coagulation Profile:   No results for input(s): INR, PROTIME, APTT in the last 72 hours. Cardiac Enzymes:  No results for input(s): CKTOTAL, CKMB, CKMBINDEX, TROPONINI in the last 72 hours. Lactic Acid:  Lab Results   Component Value Date    LACTA 2.2 09/20/2022    LACTA 4.1 (H) 09/20/2022    LACTA NOT REPORTED 09/13/2019     BNP:   No results found for: BNP  D-Dimer:  Lab Results   Component Value Date    DDIMER 3.99 09/14/2019     Others:   Lab Results   Component Value Date    TSH 2.31 05/16/2021     Lab Results   Component Value Date    SEDRATE 89 (H) 09/20/2022    CRP 47.7 (H) 09/20/2022     Lab Results   Component Value Date    URICACID 8.8 (H) 10/01/2022     No results found for: IRON, TIBC, FERRITIN  No results found for: SPEP, UPEP  No results found for: PSA, CEA, , LL5429,     Input/Output:  No intake or output data in the 24 hours ending 10/05/22 1055      Microbiology:  No results for input(s): SPECDESC, SPECDESC, SPECIAL, CULTURE, CULTURE, STATUS, ORG, CDIFFTOXPCR, CAMPYLOBPCR, SALMONELLAPC, SHIGAPCR, SHIGELLAPCR, MPNEUG, MPNEUM, LACTOQL in the last 72 hours. Pathology:    Radiology reports:  XR CHEST PORTABLE   Final Result   Continued improvement in vascular congestion. XR CHEST PORTABLE   Final Result   Improving congestive changes with mild residual patchy parenchymal opacities.          XR CHEST PORTABLE   Final Result   Cardiomegaly with vascular congestion and diffuse increased interstitial   changes seen predominantly within the perihilar regions and lung bases   suggestive of congestive failure, with a suspected right-sided pleural   effusion. ET tube measures 3.1 cm above the dioni. Enteric catheter tip terminates in the gastric fundus directed laterally. XR ABDOMEN FOR NG/OG/NE TUBE PLACEMENT   Final Result   Cardiomegaly with vascular congestion and diffuse increased interstitial   changes seen predominantly within the perihilar regions and lung bases   suggestive of congestive failure, with a suspected right-sided pleural   effusion. ET tube measures 3.1 cm above the dioni. Enteric catheter tip terminates in the gastric fundus directed laterally. Echocardiogram:   Results for orders placed during the hospital encounter of 09/20/22    ECHO Complete 2D W Doppler W Color    Narrative  [facility]    Transthoracic Echocardiography Report (TTE)    Patient Name Beba Albarran Date of Study                 09/20/2022  A    Date of      1940  Gender                        Female  Birth    Age          79 year(s)  Race                              Room Number  2124        Height:                       61.02 inch, 155 cm    Corporate ID [id number]    Weight:                       150 pounds, 68 kg  #    Patient Acct [de-identified]   BSA:           1.67 m^2       BMI:      28.32  #                                                                kg/m^2    MR #         [id number]      Sonographer                   Prachi Hoythel    Accession #  [id number]  Interpreting Physician        [place]    Fellow                   Referring Nurse Practitioner    Interpreting             Referring Physician           Neo Lunsford    Type of Study    TTE procedure:2D Echocardiogram, M-Mode, Doppler, Color Doppler, Contrast  study.     Procedure Date  Date: 09/20/2022 Start: 10:36 AM    Study Location: [facility]  Technical Quality: Limited visualization due to body habitus. Indications:Congestive heart failure. History / Tech. Comments:  COPD DM HTN HLD COPD CKD    Patient Status: Inpatient    Contrast Medium: Definity. Amount - 2 ml    Height: 61.02 inches Weight: 150 pounds BSA: 1.67 m^2 BMI: 28.32 kg/m^2    Rhythm: Within normal limits HR: 78 bpm BP: 166/81 mmHg    CONCLUSIONS    Summary  Echo contrast utilized on this technically difficult study. Left ventricle is mild to moderately dilated. Estimated EF 25%. Severe global hypokinesis. Normal right ventricular size, reduced function. Left atrium appears dilated. Aortic valve is trileaflet. Aortic valve sclerosis without stenosis. Small  LVOT diameter. Trivial aortic insufficiency. Normal aortic root dimension. Mitral annular calcification is seen. Mild to moderate mitral  regurgitation. Mild MS mean gradient 5 mmHg  Normal tricuspid valve structure and function. Insignificant tricuspid  regurgitation, unable to estimate RVSP. IVC appears normal diameter , difficult to assess respiratory variation  No significant pericardial effusion is seen. Signature  ----------------------------------------------------------------------------  Electronically signed by Yolanda Paul(Interpreting physician) on  09/20/2022 05:56 PM  ----------------------------------------------------------------------------    ----------------------------------------------------------------------------  Electronically signed by Jessica Hoyt(Sonographer) on 09/20/2022 11:41  AM  ----------------------------------------------------------------------------  FINDINGS  Left Atrium  Left atrium appears dilated. Left Ventricle  Left ventricle is mildly dilated. Estimated EF 25%. Global hypokinesis. Right Atrium  Right atrium is normal in size. Right Ventricle  Normal right ventricular size reduced function. Mitral Valve  Mitral annular calcification is seen.   Mild to moderate mitral regurgitation. Aortic Valve  Aortic valve is trileaflet. Aortic valve sclerosis without stenosis. Small LVOT diameter. Trivial aortic insufficiency. Tricuspid Valve  Normal tricuspid valve structure and function. Insignificant tricuspid regurgitation, unable to estimate RVSP. Pulmonic Valve  Pulmonic valve not well visualized but Doppler velocities are normal. No  pulmonic insufficiency. Pericardial Effusion  No significant pericardial effusion is seen. Miscellaneous  Normal aortic root dimension. M-mode / 2D Measurements & Calculations:    LVIDd:6.09 cm(3.7 - 5.6 cm)      Diastolic WZHWAF:334 ml  CSIGX:2.40 cm(2.2 - 4.0 cm)      Systolic QVOJGF:605 ml  ECSC:4.64 cm(0.6 - 1.1 cm)       Aortic Root:3 cm(2.0 - 3.7 cm)  LVPWd:0.93 cm(0.6 - 1.1 cm)      LA Dimension: 2.6 cm(1.9 - 4.0 cm)  Fractional Shortenin.09 %    LA volume/Index: 35 ml /21m^2  Calculated LVEF (%): 32.34 %     AV Cusp Separation: 0.7 cm  LVOT:1.5 cm    Mitral:                              Aortic    Peak E-Wave: 1.41 m/s                Peak Velocity: 1.70 m/s  Peak A-Wave: 1.20 m/s                Mean Velocity: 1.10 m/s  E/A Ratio: 1.18                      Peak Gradient: 11.56 mmHg  Peak Gradient: 7.95 mmHg             Mean Gradient: 5 mmHg  Mean Gradient: 5 mmHg  Deceleration Time: 155 msec  Area (continuity): 0.91 cm^2  MR Alias Velocity: 0.39 m/s          AV VTI: 31.7 cm  MR Velocity: 5.50 m/s  MARK Volumetric: 0.07 cm^2  Area (continuity): 0.62 cm^2  Mean Velocity: 0.93 m/s  MR VTI: 183 cm    Septal Wall E' velocity:0.03 m/s  Lateral Wall E' velocity:0.08 m/s      Cardiac Catheterization:   No results found for this or any previous visit. ASSESSMENT AND PLAN     Assessment:    //Acute on chronic hypoxic respiratory failure.   //Acute on chronic systolic heart failure  //Pulm edema pulm venous congestion secondary to above  //COPD severity not known on chronic oxygen therapy  //Coronary artery disease  //Chronic kidney disease  //Diabetes mellitus  //Hypertension  //Paroxysmal atrial fibrillation. (Per history)    Plan:    Patient is currently on nasal cannula 2-3 L maintaining saturation above 92%. Optimization of CHF treatment per primary service. Patient is currently on Coreg hydralazine and Bumex. Monitor creatinine  Continue with supplemental O2. Chest x-ray 10/4/2022 showed significant provement in areas of airspace infiltrate/edema  Strict aspiration precaution. Anticoagulation if needed per primary service  Continue DuoNeb aerosol  Encourage incentive spirometry, pulmonary toilet, aspiration precautions  Patient is currently on Bumex n management per primary service. Continue to monitor I/O with a goal of negative fluid balance  Antimicrobials reviewed; currently on cefepime empirically okay to discontinue after 5 days  Continue short course of prednisone. DVT prophylaxis on heparin subcutaneous  Physical/occupational/speech therapy; increase activity as tolerated    Discussed with nursing staff, treatment plan discussed. I updated the patient regarding the current clinical condition, provisional diagnosis and management plan. I addressed concerns and answered all questions to the best of my abilities. It was my pleasure to evaluate Divya Torres today. We will continue to follow. I would like to thank you for allowing me to participate in the care of this patient. Please feel free to call with any further questions or concerns. Maria M Maki MD, M.D. Pulmonary and Critical Care Medicine           10/5/2022, 10:55 AM    Please note that this chart was generated using voice recognition Dragon dictation software. Although every effort was made to ensure the accuracy of this automated transcription, some errors in transcription may have occurred.

## 2022-10-05 NOTE — PROGRESS NOTES
Pt noted running Afib on tele, ekg done as ordered, seen and examined by Dr Jean Paul Richard, no further order made. Primary care team to note.

## 2022-10-06 LAB
EKG ATRIAL RATE: 82 BPM
EKG P AXIS: 59 DEGREES
EKG P-R INTERVAL: 150 MS
EKG Q-T INTERVAL: 430 MS
EKG QRS DURATION: 92 MS
EKG QTC CALCULATION (BAZETT): 502 MS
EKG R AXIS: 20 DEGREES
EKG T AXIS: 161 DEGREES
EKG VENTRICULAR RATE: 82 BPM

## 2022-10-06 PROCEDURE — 93010 ELECTROCARDIOGRAM REPORT: CPT | Performed by: INTERNAL MEDICINE

## 2022-10-07 ENCOUNTER — HOSPITAL ENCOUNTER (OUTPATIENT)
Age: 82
Setting detail: SPECIMEN
Discharge: HOME | End: 2022-10-07

## 2022-10-07 LAB
ALBUMIN SERPL-MCNC: 3.8 G/DL (ref 3.5–5.2)
ALP BLD-CCNC: 57 U/L (ref 35–104)
ALT SERPL-CCNC: 6 U/L (ref 5–33)
ANION GAP SERPL CALCULATED.3IONS-SCNC: 12 MMOL/L (ref 9–17)
AST SERPL-CCNC: 14 U/L
BILIRUB SERPL-MCNC: 0.4 MG/DL (ref 0.3–1.2)
BUN BLDV-MCNC: 62 MG/DL (ref 8–23)
BUN/CREAT BLD: 22 (ref 9–20)
CALCIUM SERPL-MCNC: 9.6 MG/DL (ref 8.6–10.4)
CHLORIDE BLD-SCNC: 92 MMOL/L (ref 98–107)
CO2: 31 MMOL/L (ref 20–31)
CREAT SERPL-MCNC: 2.78 MG/DL (ref 0.5–0.9)
ESTIMATED AVERAGE GLUCOSE: 120 MG/DL
GFR SERPL CREATININE-BSD FRML MDRD: 16 ML/MIN/1.73M2
GLUCOSE BLD-MCNC: 100 MG/DL (ref 70–99)
HBA1C MFR BLD: 5.8 % (ref 4–6)
HCT VFR BLD CALC: 37.7 % (ref 36.3–47.1)
HEMOGLOBIN: 11.7 G/DL (ref 11.9–15.1)
MAGNESIUM: 2 MG/DL (ref 1.6–2.6)
MCH RBC QN AUTO: 29.2 PG (ref 25.2–33.5)
MCHC RBC AUTO-ENTMCNC: 31 G/DL (ref 28.4–34.8)
MCV RBC AUTO: 94 FL (ref 82.6–102.9)
NRBC AUTOMATED: 0 PER 100 WBC
PDW BLD-RTO: 16.9 % (ref 11.8–14.4)
PLATELET # BLD: 253 K/UL (ref 138–453)
PMV BLD AUTO: 9.8 FL (ref 8.1–13.5)
POTASSIUM SERPL-SCNC: 4 MMOL/L (ref 3.7–5.3)
RBC # BLD: 4.01 M/UL (ref 3.95–5.11)
SODIUM BLD-SCNC: 135 MMOL/L (ref 135–144)
TOTAL PROTEIN: 7.4 G/DL (ref 6.4–8.3)
VITAMIN D 25-HYDROXY: 18.9 NG/ML
WBC # BLD: 10 K/UL (ref 3.5–11.3)

## 2022-10-07 PROCEDURE — 80053 COMPREHEN METABOLIC PANEL: CPT

## 2022-10-07 PROCEDURE — P9603 ONE-WAY ALLOW PRORATED MILES: HCPCS

## 2022-10-07 PROCEDURE — 82306 VITAMIN D 25 HYDROXY: CPT

## 2022-10-07 PROCEDURE — 83735 ASSAY OF MAGNESIUM: CPT

## 2022-10-07 PROCEDURE — 85027 COMPLETE CBC AUTOMATED: CPT

## 2022-10-07 PROCEDURE — 36415 COLL VENOUS BLD VENIPUNCTURE: CPT

## 2022-10-07 PROCEDURE — 83036 HEMOGLOBIN GLYCOSYLATED A1C: CPT

## 2022-10-07 PROCEDURE — 82652 VIT D 1 25-DIHYDROXY: CPT

## 2022-10-08 NOTE — DISCHARGE SUMMARY
[address]     Department of Internal Medicine - Staff Internal Medicine Teaching Service    INPATIENT DISCHARGE SUMMARY      Patient Identification:  Harpreet Valle is a 79 y.o. female. :  1940  MRN: [id number]     Acct: [de-identified]   PCP: Alfonso Sanchez MD  Admit Date:  2022  Discharge date and time: 10/5/2022  4:08 PM   Attending Provider: No att. providers found                                     [facility] Problem Lists:  Principal Problem:    Acute respiratory failure with hypoxia ([address]  Active Problems:    Heart failure ([address]    Pneumonia    COPD (chronic obstructive pulmonary disease) ([address]    Diabetes mellitus ([address]    Essential hypertension    Stage 4 chronic kidney disease ([address]  Resolved Problems:    * No resolved hospital problems. *      HOSPITAL STAY     Brief Inpatient course:   Harpreet Valle is a 79 y.o. female who was admitted for the management of Acute respiratory failure with hypoxia ([address] presented to the emergency department with shortness of breath. Patient was recently discharged from [facility] on  due to hypoxia and CHF exacerbation. Her echo was done at that time which showed ejection fraction of 25% with global hypokinesia. Patient was recommended to get cardiac catheterization, patient declined. In the emergency department, patient was found to be hypoxic in oxygen saturation of 60 on BiPAP. Patient was intubated for airway protection and oxygen saturation. She was febrile with a leukocytosis of 15.1, Vanco and cefepime ordered for potentially hospital-acquired pneumonia. Patient had labs significant for potassium of 5.1, BUN/creatinine 45/2.13, blood glucose of 266, proBNP of 19,511 and a troponin of 53. In medical ICU, patient remain intubated. Patient remained hemodynamically stable. Patient was extubated, she tolerated the procedure well.   Patient was maintaining her oxygen saturation on nasal cannula with oxygen of 6 L initially, which was moved down to 2 L oxygen, which is her home oxygen. During inpatient, patient was advised to get a speech and swallow evaluation, patient refused swallow study as she is aware of Zenker's diverticulum, aspiration and risks of aspiration. Patient remained hemodynamically stable and medically stable at time of discharge. Procedures/ Significant Interventions:    Intubation.      Consults:     Consults:     Final Specialist Recommendations/Findings:   PHARMACY TO DOSE VANCOMYCIN  IP CONSULT TO CRITICAL CARE  IP CONSULT TO IV TEAM      Any Hospital Acquired Infections: none    Discharge Functional Status:  stable    DISCHARGE PLAN     Disposition: SNF    Patient Instructions:   Discharge Medication List as of 10/5/2022  1:09 PM        START taking these medications    Details   predniSONE (DELTASONE) 20 MG tablet Take 1 tablet by mouth daily for 1 dose, Disp-1 tablet, R-0Normal           CONTINUE these medications which have CHANGED    Details   carvedilol (COREG) 6.25 MG tablet Take 1 tablet by mouth 2 times daily (with meals), Disp-60 tablet, R-3Normal           CONTINUE these medications which have NOT CHANGED    Details   spironolactone (ALDACTONE) 25 MG tablet Take 0.5 tablets by mouth daily, Disp-30 tablet, R-3Normal      bumetanide (BUMEX) 1 MG tablet Take 2 mg by mouth dailyHistorical Med      ferrous gluconate (FERGON) 324 (38 Fe) MG tablet Take 324 mg by mouth daily (with breakfast)Historical Med      hydrALAZINE (APRESOLINE) 25 MG tablet Take 25 mg by mouth in the morning and at bedtimeHistorical Med      albuterol sulfate HFA (VENTOLIN HFA) 108 (90 Base) MCG/ACT inhaler Inhale 2 puffs into the lungs every 6 hours as needed for Wheezing or Shortness of Breath, Disp-1 Inhaler, R-0Normal      donepezil (ARICEPT) 10 MG tablet Take 1 tablet by mouth nightly, Disp-30 tablet, R-3Normal      glipiZIDE (GLUCOTROL) 5 MG tablet Take 0.5 tablets by mouth daily, PM

## 2022-10-10 LAB — VITAMIN D 1,25-DIHYDROXY: 18.7 PG/ML (ref 19.9–79.3)

## 2022-10-20 PROBLEM — N39.0 UTI (URINARY TRACT INFECTION): Status: RESOLVED | Noted: 2022-09-20 | Resolved: 2022-10-20
